# Patient Record
Sex: FEMALE | Race: WHITE | NOT HISPANIC OR LATINO | Employment: UNEMPLOYED | URBAN - NONMETROPOLITAN AREA
[De-identification: names, ages, dates, MRNs, and addresses within clinical notes are randomized per-mention and may not be internally consistent; named-entity substitution may affect disease eponyms.]

---

## 2017-01-04 RX ORDER — CITALOPRAM 20 MG/1
TABLET ORAL
Qty: 30 TABLET | Refills: 0 | Status: SHIPPED | OUTPATIENT
Start: 2017-01-04 | End: 2017-02-02 | Stop reason: SDUPTHER

## 2017-01-10 ENCOUNTER — HOSPITAL ENCOUNTER (EMERGENCY)
Facility: HOSPITAL | Age: 32
Discharge: HOME | End: 2017-01-10
Attending: EMERGENCY MEDICINE | Admitting: EMERGENCY MEDICINE

## 2017-01-10 VITALS
TEMPERATURE: 98.9 F | HEART RATE: 87 BPM | SYSTOLIC BLOOD PRESSURE: 117 MMHG | RESPIRATION RATE: 18 BRPM | WEIGHT: 175 LBS | DIASTOLIC BLOOD PRESSURE: 79 MMHG | OXYGEN SATURATION: 97 % | BODY MASS INDEX: 34.36 KG/M2 | HEIGHT: 60 IN

## 2017-01-10 DIAGNOSIS — J40 BRONCHITIS: Primary | ICD-10-CM

## 2017-01-10 LAB
FLUAV AG NPH QL: NEGATIVE
FLUBV AG NPH QL IA: NEGATIVE

## 2017-01-10 PROCEDURE — 99283 EMERGENCY DEPT VISIT LOW MDM: CPT

## 2017-01-10 PROCEDURE — 87804 INFLUENZA ASSAY W/OPTIC: CPT | Performed by: EMERGENCY MEDICINE

## 2017-01-10 RX ORDER — PREDNISONE 20 MG/1
20 TABLET ORAL ONCE
Status: CANCELLED | OUTPATIENT
Start: 2017-01-10 | End: 2017-01-10

## 2017-01-10 RX ORDER — ONDANSETRON 4 MG/1
4 TABLET, FILM COATED ORAL EVERY 6 HOURS PRN
Qty: 12 TABLET | Refills: 0 | Status: SHIPPED | OUTPATIENT
Start: 2017-01-10 | End: 2017-03-29

## 2017-01-10 RX ORDER — PREDNISONE 10 MG/1
10 TABLET ORAL 2 TIMES DAILY
Qty: 10 TABLET | Refills: 0 | Status: SHIPPED | OUTPATIENT
Start: 2017-01-10 | End: 2017-03-29

## 2017-01-10 RX ORDER — IPRATROPIUM BROMIDE AND ALBUTEROL SULFATE 2.5; .5 MG/3ML; MG/3ML
3 SOLUTION RESPIRATORY (INHALATION) ONCE
Status: CANCELLED | OUTPATIENT
Start: 2017-01-10 | End: 2017-01-10

## 2017-01-10 RX ORDER — ALBUTEROL SULFATE 90 UG/1
2 AEROSOL, METERED RESPIRATORY (INHALATION)
Qty: 1 INHALER | Refills: 0 | Status: SHIPPED | OUTPATIENT
Start: 2017-01-10 | End: 2017-11-10

## 2017-01-10 RX ORDER — AMOXICILLIN 500 MG/1
1000 CAPSULE ORAL 3 TIMES DAILY
Qty: 30 CAPSULE | Refills: 0 | Status: SHIPPED | OUTPATIENT
Start: 2017-01-10 | End: 2017-03-29

## 2017-01-10 RX ORDER — AZITHROMYCIN 250 MG/1
500 TABLET, FILM COATED ORAL ONCE
Status: CANCELLED | OUTPATIENT
Start: 2017-01-10 | End: 2017-01-10

## 2017-02-02 RX ORDER — CITALOPRAM 20 MG/1
TABLET ORAL
Qty: 30 TABLET | Refills: 0 | Status: SHIPPED | OUTPATIENT
Start: 2017-02-02 | End: 2017-03-07 | Stop reason: SDUPTHER

## 2017-02-08 NOTE — ED PROVIDER NOTES
Subjective   HPI Comments: Chief Complaint     Vomiting     Nausea     Headache     Cough           Review of Systems   Constitutional: Negative for chills and fever.   HENT: Negative for ear pain and sore throat.    Eyes: Negative for photophobia and discharge.   Respiratory: Positive for cough.    Cardiovascular: Negative for chest pain and palpitations.   Gastrointestinal: Positive for nausea and vomiting. Negative for abdominal pain.   Endocrine: Negative for cold intolerance and heat intolerance.   Genitourinary: Negative for flank pain and hematuria.   Musculoskeletal: Negative for back pain and neck pain.   Skin: Negative for rash.   Neurological: Positive for headaches. Negative for dizziness and syncope.   Psychiatric/Behavioral: Negative for behavioral problems and suicidal ideas.       Past Medical History   Diagnosis Date   • Acid reflux    • Anxiety and depression    • Menorrhagia    • Pelvic pain    • Pelvic pain      menorrha       No Known Allergies    Past Surgical History   Procedure Laterality Date   • Cholecystectomy     • Replacement total knee     • Hysteroscopy tubal sterilization  06/07/2016     Essure Sterilization   • Essure tubal ligation     • Endoscopy N/A 11/22/2016     Procedure: ESOPHAGOGASTRODUODENOSCOPY WITH ANESTHESIA;  Surgeon: Az Miller DO;  Location: [facility] ENDOSCOPY;  Service:        Family History   Problem Relation Age of Onset   • Cancer Other    • Diabetes Other    • Heart attack Father    • No Known Problems Mother    • No Known Problems Brother    • No Known Problems Sister    • No Known Problems Daughter    • Cancer Paternal Grandmother    • Lead poisoning Maternal Grandmother    • Asthma Daughter        Social History     Social History   • Marital status:      Spouse name: N/A   • Number of children: N/A   • Years of education: N/A     Occupational History   • [employer]      Social History Main Topics   • Smoking status: Current Some Day Smoker      Packs/day: 0.25     Years: 2.00     Types: Cigarettes   • Smokeless tobacco: None   • Alcohol use No   • Drug use: No   • Sexual activity: Yes     Partners: Male     Birth control/ protection: Other      Comment: Essure     Other Topics Concern   • None     Social History Narrative           Objective   Physical Exam   Constitutional: She appears well-developed and well-nourished.   HENT:   Head: Normocephalic.   Eyes: Conjunctivae and EOM are normal.   Neck: Normal range of motion. Neck supple.   Cardiovascular: Normal rate and regular rhythm.    Pulmonary/Chest: Effort normal and breath sounds normal.   Abdominal: Soft. There is no rebound.   Musculoskeletal: Normal range of motion.   Neurological: She is alert.   Skin: Skin is warm. No rash noted.   Nursing note and vitals reviewed.      Procedures         ED Course  ED Course                  MDM  Number of Diagnoses or Management Options  Bronchitis:   Diagnosis management comments: Labs Reviewed  INFLUENZA ANTIGEN - Normal  No orders to display        Final diagnoses:   Bronchitis            Ruby Saucedo DO  02/08/17 0905

## 2017-03-07 RX ORDER — CITALOPRAM 20 MG/1
TABLET ORAL
Qty: 30 TABLET | Refills: 0 | Status: SHIPPED | OUTPATIENT
Start: 2017-03-07 | End: 2017-11-10

## 2017-03-29 ENCOUNTER — OFFICE VISIT (OUTPATIENT)
Dept: RETAIL CLINIC | Facility: CLINIC | Age: 32
End: 2017-03-29

## 2017-03-29 VITALS
TEMPERATURE: 97.5 F | RESPIRATION RATE: 20 BRPM | HEART RATE: 93 BPM | BODY MASS INDEX: 35.65 KG/M2 | HEIGHT: 60 IN | OXYGEN SATURATION: 96 % | WEIGHT: 181.6 LBS

## 2017-03-29 DIAGNOSIS — J40 BRONCHITIS: Primary | ICD-10-CM

## 2017-03-29 PROCEDURE — 99213 OFFICE O/P EST LOW 20 MIN: CPT | Performed by: NURSE PRACTITIONER

## 2017-03-29 RX ORDER — METHYLPREDNISOLONE 4 MG/1
TABLET ORAL
Qty: 21 TABLET | Refills: 0 | Status: SHIPPED | OUTPATIENT
Start: 2017-03-29 | End: 2017-11-10

## 2017-03-29 RX ORDER — BENZONATATE 200 MG/1
200 CAPSULE ORAL 3 TIMES DAILY PRN
Qty: 20 CAPSULE | Refills: 0 | Status: SHIPPED | OUTPATIENT
Start: 2017-03-29 | End: 2017-11-10

## 2017-03-29 NOTE — PATIENT INSTRUCTIONS
Acute Bronchitis  Bronchitis is inflammation of the airways that extend from the windpipe into the lungs (bronchi). The inflammation often causes mucus to develop. This leads to a cough, which is the most common symptom of bronchitis.   In acute bronchitis, the condition usually develops suddenly and goes away over time, usually in a couple weeks. Smoking, allergies, and asthma can make bronchitis worse. Repeated episodes of bronchitis may cause further lung problems.   CAUSES  Acute bronchitis is most often caused by the same virus that causes a cold. The virus can spread from person to person (contagious) through coughing, sneezing, and touching contaminated objects.  SIGNS AND SYMPTOMS   · Cough.    · Fever.    · Coughing up mucus.    · Body aches.    · Chest congestion.    · Chills.    · Shortness of breath.    · Sore throat.    DIAGNOSIS   Acute bronchitis is usually diagnosed through a physical exam. Your health care provider will also ask you questions about your medical history. Tests, such as chest X-rays, are sometimes done to rule out other conditions.   TREATMENT   Acute bronchitis usually goes away in a couple weeks. Oftentimes, no medical treatment is necessary. Medicines are sometimes given for relief of fever or cough. Antibiotic medicines are usually not needed but may be prescribed in certain situations. In some cases, an inhaler may be recommended to help reduce shortness of breath and control the cough. A cool mist vaporizer may also be used to help thin bronchial secretions and make it easier to clear the chest.   HOME CARE INSTRUCTIONS  · Get plenty of rest.    · Drink enough fluids to keep your urine clear or pale yellow (unless you have a medical condition that requires fluid restriction). Increasing fluids may help thin your respiratory secretions (sputum) and reduce chest congestion, and it will prevent dehydration.    · Take medicines only as directed by your health care provider.  · If  you were prescribed an antibiotic medicine, finish it all even if you start to feel better.  · Avoid smoking and secondhand smoke. Exposure to cigarette smoke or irritating chemicals will make bronchitis worse. If you are a smoker, consider using nicotine gum or skin patches to help control withdrawal symptoms. Quitting smoking will help your lungs heal faster.    · Reduce the chances of another bout of acute bronchitis by washing your hands frequently, avoiding people with cold symptoms, and trying not to touch your hands to your mouth, nose, or eyes.    · Keep all follow-up visits as directed by your health care provider.    SEEK MEDICAL CARE IF:  Your symptoms do not improve after 1 week of treatment.   SEEK IMMEDIATE MEDICAL CARE IF:  · You develop an increased fever or chills.    · You have chest pain.    · You have severe shortness of breath.  · You have bloody sputum.    · You develop dehydration.  · You faint or repeatedly feel like you are going to pass out.  · You develop repeated vomiting.  · You develop a severe headache.  MAKE SURE YOU:   · Understand these instructions.  · Will watch your condition.  · Will get help right away if you are not doing well or get worse.     This information is not intended to replace advice given to you by your health care provider. Make sure you discuss any questions you have with your health care provider.     Document Released: 01/25/2006 Document Revised: 01/08/2016 Document Reviewed: 06/10/2014  [employer] Interactive Patient Education ©2016 [employer]

## 2017-04-11 RX ORDER — CITALOPRAM 20 MG/1
TABLET ORAL
Qty: 30 TABLET | Refills: 0 | Status: SHIPPED | OUTPATIENT
Start: 2017-04-11 | End: 2017-05-12 | Stop reason: SDUPTHER

## 2017-05-12 RX ORDER — CITALOPRAM 20 MG/1
TABLET ORAL
Qty: 30 TABLET | Refills: 0 | Status: SHIPPED | OUTPATIENT
Start: 2017-05-12 | End: 2017-06-19 | Stop reason: SDUPTHER

## 2017-05-25 ENCOUNTER — OFFICE VISIT (OUTPATIENT)
Dept: RETAIL CLINIC | Facility: CLINIC | Age: 32
End: 2017-05-25

## 2017-05-25 DIAGNOSIS — Z02.83 ENCOUNTER FOR DRUG SCREENING: Primary | ICD-10-CM

## 2017-06-03 ENCOUNTER — HOSPITAL ENCOUNTER (EMERGENCY)
Facility: HOSPITAL | Age: 32
Discharge: HOME | End: 2017-06-03
Attending: EMERGENCY MEDICINE | Admitting: EMERGENCY MEDICINE

## 2017-06-03 ENCOUNTER — APPOINTMENT (OUTPATIENT)
Dept: GENERAL RADIOLOGY | Facility: HOSPITAL | Age: 32
End: 2017-06-03

## 2017-06-03 VITALS
OXYGEN SATURATION: 98 % | BODY MASS INDEX: 35.53 KG/M2 | WEIGHT: 181 LBS | RESPIRATION RATE: 18 BRPM | HEIGHT: 60 IN | TEMPERATURE: 98.5 F | HEART RATE: 60 BPM | SYSTOLIC BLOOD PRESSURE: 118 MMHG | DIASTOLIC BLOOD PRESSURE: 71 MMHG

## 2017-06-03 DIAGNOSIS — S42.401A FRACTURE OF ELBOW, RIGHT, CLOSED, INITIAL ENCOUNTER: Primary | ICD-10-CM

## 2017-06-03 PROCEDURE — 25010000002 HYDROMORPHONE PER 4 MG: Performed by: EMERGENCY MEDICINE

## 2017-06-03 PROCEDURE — 73030 X-RAY EXAM OF SHOULDER: CPT

## 2017-06-03 PROCEDURE — 73070 X-RAY EXAM OF ELBOW: CPT

## 2017-06-03 PROCEDURE — 96372 THER/PROPH/DIAG INJ SC/IM: CPT

## 2017-06-03 PROCEDURE — 99283 EMERGENCY DEPT VISIT LOW MDM: CPT

## 2017-06-03 RX ORDER — ONDANSETRON 4 MG/1
4 TABLET, ORALLY DISINTEGRATING ORAL ONCE
Status: COMPLETED | OUTPATIENT
Start: 2017-06-03 | End: 2017-06-03

## 2017-06-03 RX ORDER — HYDROCODONE BITARTRATE AND ACETAMINOPHEN 5; 325 MG/1; MG/1
1 TABLET ORAL 4 TIMES DAILY PRN
Qty: 20 TABLET | Refills: 0 | Status: SHIPPED | OUTPATIENT
Start: 2017-06-03 | End: 2017-11-10

## 2017-06-03 RX ADMIN — HYDROMORPHONE HYDROCHLORIDE 1 MG: 1 INJECTION, SOLUTION INTRAMUSCULAR; INTRAVENOUS; SUBCUTANEOUS at 09:47

## 2017-06-03 RX ADMIN — ONDANSETRON 4 MG: 4 TABLET, ORALLY DISINTEGRATING ORAL at 09:47

## 2017-06-07 RX ORDER — CITALOPRAM 20 MG/1
TABLET ORAL
Qty: 30 TABLET | Refills: 0 | OUTPATIENT
Start: 2017-06-07

## 2017-06-19 RX ORDER — CITALOPRAM 20 MG/1
TABLET ORAL
Qty: 30 TABLET | Refills: 0 | Status: SHIPPED | OUTPATIENT
Start: 2017-06-19 | End: 2017-07-09 | Stop reason: SDUPTHER

## 2017-07-03 NOTE — ED PROVIDER NOTES
Subjective   HPI Comments: Patient is a 32-year-old female presents to our facility with complaints of fall and arm pain.  Patient fell off bottom step onto concrete sidewalk.  Patient is having left shoulder and arm pain.    Patient is a 32 y.o. female presenting with fall.   History provided by:  Patient  History limited by:  Acuity of condition   used: No    Fall   Mechanism of injury: fall    Injury location:  Hand  Hand injury location:  L hand  Incident location:  Outdoors  Arrived directly from scene: no    Fall:     Fall occurred:  Walking    Impact surface:  [place]    Point of impact:  Hands    Entrapped after fall: no    Protective equipment: none    Suspicion of alcohol use: no    Suspicion of drug use: no    Prior to arrival data:     Blood loss:  None    Loss of consciousness: no      Amnesic to event: no      Airway interventions:  None      Review of Systems   Musculoskeletal: Positive for arthralgias and myalgias.        Patient is having left shoulder and arm pain.   All other systems reviewed and are negative.      Past Medical History:   Diagnosis Date   • Acid reflux    • Anxiety and depression    • Asthma    • Menorrhagia    • Pelvic pain    • Pelvic pain     menorrha       No Known Allergies    Past Surgical History:   Procedure Laterality Date   • CHOLECYSTECTOMY     • ENDOSCOPY N/A 11/22/2016    Procedure: ESOPHAGOGASTRODUODENOSCOPY WITH ANESTHESIA;  Surgeon: Az Miller DO;  Location: [facility] ENDOSCOPY;  Service:    • ESSURE TUBAL LIGATION     • HYSTEROSCOPY TUBAL STERILIZATION  06/07/2016    Essure Sterilization   • REPLACEMENT TOTAL KNEE         Family History   Problem Relation Age of Onset   • Cancer Other    • Diabetes Other    • Heart attack Father    • No Known Problems Mother    • No Known Problems Brother    • No Known Problems Sister    • No Known Problems Daughter    • Cancer Paternal Grandmother    • Lead poisoning Maternal Grandmother    • Asthma  Daughter        Social History     Social History   • Marital status:      Spouse name: N/A   • Number of children: N/A   • Years of education: N/A     Occupational History   • [employer]      Social History Main Topics   • Smoking status: Current Some Day Smoker     Packs/day: 0.25     Years: 2.00     Types: Cigarettes   • Smokeless tobacco: None   • Alcohol use No   • Drug use: No   • Sexual activity: Yes     Partners: Male     Birth control/ protection: Other      Comment: Essure     Other Topics Concern   • None     Social History Narrative           Objective   Physical Exam   Constitutional: She is oriented to person, place, and time. She appears well-developed and well-nourished.   HENT:   Head: Normocephalic and atraumatic.   Right Ear: External ear normal.   Left Ear: External ear normal.   Nose: Nose normal.   Mouth/Throat: Oropharynx is clear and moist.   Eyes: Conjunctivae and EOM are normal. Pupils are equal, round, and reactive to light.   Neck: Normal range of motion.   Cardiovascular: Normal rate, regular rhythm, normal heart sounds and intact distal pulses.    Pulmonary/Chest: Effort normal and breath sounds normal.   Abdominal: Soft. Bowel sounds are normal.   Musculoskeletal: She exhibits edema and tenderness.   Sustained fall injury to right hand region.   Neurological: She is alert and oriented to person, place, and time. She has normal reflexes.   Skin: Skin is warm and dry.   Psychiatric: She has a normal mood and affect. Her behavior is normal. Judgment and thought content normal.   Nursing note and vitals reviewed.      Procedures         ED Course  ED Course   Comment By Time   IMPRESSION:  Acute closed intra-articular displaced fractures involving  the lateral epicondyle of the left humerus as well as the lateral aspect  of the humeral head. Kerri CLAIRE MD 06/03 1111   IMPRESSION:  No fractures identified. Mild narrowing of the subacromial  interval with superior migration of  the humerus may be related to  impingement and chronic rotator cuff pathology. Kerri CLAIRE MD 06/03 1112                  MDM  Number of Diagnoses or Management Options  Fracture of elbow, right, closed, initial encounter: established and improving     Amount and/or Complexity of Data Reviewed  Tests in the radiology section of CPT®: reviewed and ordered  Discussion of test results with the performing providers: yes  Obtain history from someone other than the patient: yes  Discuss the patient with other providers: yes  Independent visualization of images, tracings, or specimens: yes    Risk of Complications, Morbidity, and/or Mortality  Presenting problems: moderate  Diagnostic procedures: moderate  Management options: moderate    Critical Care  Total time providing critical care: > 105 minutes    Patient Progress  Patient progress: stable      Final diagnoses:   Fracture of elbow, right, closed, initial encounter            Kerri CLAIRE MD  07/03/17 0666

## 2017-07-10 RX ORDER — CITALOPRAM 20 MG/1
TABLET ORAL
Qty: 30 TABLET | Refills: 0 | Status: SHIPPED | OUTPATIENT
Start: 2017-07-10 | End: 2017-08-02 | Stop reason: SDUPTHER

## 2017-08-02 ENCOUNTER — OFFICE VISIT (OUTPATIENT)
Dept: OBSTETRICS AND GYNECOLOGY | Facility: CLINIC | Age: 32
End: 2017-08-02

## 2017-08-02 VITALS
WEIGHT: 175 LBS | SYSTOLIC BLOOD PRESSURE: 132 MMHG | BODY MASS INDEX: 34.36 KG/M2 | DIASTOLIC BLOOD PRESSURE: 68 MMHG | HEIGHT: 60 IN

## 2017-08-02 DIAGNOSIS — Z30.8 ENCOUNTER FOR OTHER CONTRACEPTIVE MANAGEMENT: ICD-10-CM

## 2017-08-02 DIAGNOSIS — Z01.419 ENCOUNTER FOR GYNECOLOGICAL EXAMINATION WITHOUT ABNORMAL FINDING: Primary | ICD-10-CM

## 2017-08-02 PROCEDURE — 87661 TRICHOMONAS VAGINALIS AMPLIF: CPT | Performed by: NURSE PRACTITIONER

## 2017-08-02 PROCEDURE — G0123 SCREEN CERV/VAG THIN LAYER: HCPCS | Performed by: NURSE PRACTITIONER

## 2017-08-02 PROCEDURE — 87624 HPV HI-RISK TYP POOLED RSLT: CPT | Performed by: NURSE PRACTITIONER

## 2017-08-02 PROCEDURE — 87512 GARDNER VAG DNA QUANT: CPT | Performed by: NURSE PRACTITIONER

## 2017-08-02 PROCEDURE — 87591 N.GONORRHOEAE DNA AMP PROB: CPT | Performed by: NURSE PRACTITIONER

## 2017-08-02 PROCEDURE — 87481 CANDIDA DNA AMP PROBE: CPT | Performed by: NURSE PRACTITIONER

## 2017-08-02 PROCEDURE — 87798 DETECT AGENT NOS DNA AMP: CPT | Performed by: NURSE PRACTITIONER

## 2017-08-02 PROCEDURE — 99395 PREV VISIT EST AGE 18-39: CPT | Performed by: NURSE PRACTITIONER

## 2017-08-02 PROCEDURE — 87491 CHLMYD TRACH DNA AMP PROBE: CPT | Performed by: NURSE PRACTITIONER

## 2017-08-02 RX ORDER — NORGESTIMATE AND ETHINYL ESTRADIOL 0.25-0.035
KIT ORAL
COMMUNITY
End: 2017-08-02 | Stop reason: SDUPTHER

## 2017-08-02 RX ORDER — NORGESTIMATE AND ETHINYL ESTRADIOL 0.25-0.035
1 KIT ORAL DAILY
Qty: 28 TABLET | Refills: 11 | Status: SHIPPED | OUTPATIENT
Start: 2017-08-02 | End: 2017-08-02 | Stop reason: ALTCHOICE

## 2017-08-02 RX ORDER — OMEPRAZOLE 20 MG/1
20 CAPSULE, DELAYED RELEASE ORAL DAILY
COMMUNITY
End: 2017-11-10

## 2017-08-02 RX ORDER — CITALOPRAM 20 MG/1
TABLET ORAL
Qty: 30 TABLET | Refills: 0 | Status: SHIPPED | OUTPATIENT
Start: 2017-08-02 | End: 2017-09-21 | Stop reason: SDUPTHER

## 2017-08-02 NOTE — PROGRESS NOTES
"Veronica Salomon is a 32 y.o. female.     HPI Comments: Annual exam.      The following portions of the patient's history were reviewed and updated as appropriate: allergies, current medications, past family history, past medical history, past social history, past surgical history and problem list.    /68 (BP Location: Left arm, Patient Position: Sitting)  Ht 60\" (152.4 cm)  Wt 175 lb (79.4 kg)  LMP 07/15/2017 (Approximate)  BMI 34.18 kg/m2    Review of Systems   Constitutional: Negative for activity change, appetite change, fatigue and fever.   HENT: Negative for congestion, sore throat and trouble swallowing.    Eyes: Negative for pain, discharge and visual disturbance.   Respiratory: Negative for apnea, shortness of breath and wheezing.    Cardiovascular: Negative for chest pain, palpitations and leg swelling.   Gastrointestinal: Negative for abdominal pain, constipation and diarrhea.   Genitourinary: Positive for vaginal discharge. Negative for frequency, pelvic pain and urgency.   Musculoskeletal: Negative for back pain and gait problem.        Left arm pain r/t recent fall and surgery, follow up in 2 wks.    Skin: Negative for color change and rash.   Neurological: Negative for dizziness, weakness and numbness.   Psychiatric/Behavioral: Positive for sleep disturbance. Negative for confusion.        Appt with a counselor at [facility] and plans to see Dr. Fabian to begin regulating medication  Pain and anxiety r/t recent injury       Objective   Physical Exam   Constitutional: She is oriented to person, place, and time. She appears well-developed and well-nourished. No distress.   HENT:   Head: Normocephalic.   Right Ear: External ear normal.   Left Ear: External ear normal.   Eyes: Conjunctivae are normal. Right eye exhibits no discharge. Left eye exhibits no discharge. No scleral icterus.   Neck: Normal range of motion. Neck supple. Carotid bruit is not present. No tracheal deviation present. No " thyromegaly present.   Cardiovascular: Normal rate, regular rhythm, normal heart sounds and intact distal pulses.    No murmur heard.  Pulmonary/Chest: Effort normal and breath sounds normal. No respiratory distress. She has no wheezes. Right breast exhibits no inverted nipple, no mass, no nipple discharge, no skin change and no tenderness. Left breast exhibits no inverted nipple, no mass, no nipple discharge, no skin change and no tenderness. Breasts are symmetrical. There is no breast swelling.   Abdominal: Soft. She exhibits no distension and no mass. There is no tenderness. There is no guarding. No hernia. Hernia confirmed negative in the right inguinal area and confirmed negative in the left inguinal area.   Genitourinary: Rectum normal, vagina normal and uterus normal. Rectal exam shows no mass. No breast tenderness, discharge or bleeding. Pelvic exam was performed with patient supine. There is no rash, tenderness, lesion or injury on the right labia. There is no rash, tenderness, lesion or injury on the left labia. Uterus is not enlarged, not fixed and not tender. Cervix exhibits no motion tenderness, no discharge and no friability. Right adnexum displays no mass, no tenderness and no fullness. Left adnexum displays no mass, no tenderness and no fullness. No erythema, tenderness or bleeding in the vagina. No foreign body in the vagina. No signs of injury around the vagina. No vaginal discharge found.   Genitourinary Comments:   BSU normal  Urethral meatus  Normal  Perineum  Normal   Musculoskeletal: Normal range of motion. She exhibits no edema or tenderness.   Lymphadenopathy:        Head (right side): No submental, no submandibular, no tonsillar, no preauricular, no posterior auricular and no occipital adenopathy present.        Head (left side): No submental, no submandibular, no tonsillar, no preauricular, no posterior auricular and no occipital adenopathy present.     She has no cervical adenopathy.         Right cervical: No superficial cervical, no deep cervical and no posterior cervical adenopathy present.       Left cervical: No superficial cervical, no deep cervical and no posterior cervical adenopathy present.     She has no axillary adenopathy.        Right: No inguinal adenopathy present.        Left: No inguinal adenopathy present.   Neurological: She is alert and oriented to person, place, and time. Coordination normal.   Skin: Skin is warm and dry. No bruising and no rash noted. She is not diaphoretic. No erythema.   Psychiatric: She has a normal mood and affect. Her behavior is normal. Judgment and thought content normal.   Nursing note and vitals reviewed.      Assessment/Plan    Well woman exam. Pap collected, BV panel, gonorrhea and chlamydia testing added.     Pt previously saw Dr. Fabian at [facility], that is who regulated medication prior to pregnancy.  Will continue Celexa at this time but will have [facility] monitor medication once pt starts care there.     Will stop OCP r/t to discussion with pt where she reported having a hx of stroke and heart attack in 2013 or 2014. Pt had Essure placed 6/2016 and has not had an HSG. Will order. Will need to discuss alternative BC if fallopian tubes are not occluded.        Ella was seen today for gynecologic exam.    Diagnoses and all orders for this visit:    Encounter for gynecological examination without abnormal finding  -     Liquid-based Pap Smear, Screening    Encounter for other contraceptive management  -     [place] hysterosalpingogram; Future    Other orders  -     Discontinue: norgestimate-ethinyl estradiol (ORTHO-CYCLEN) 0.25-35 MG-MCG per tablet; Take 1 tablet by mouth Daily.  -     citalopram (CeleXA) 20 MG tablet; 1 tablet daily

## 2017-08-07 ENCOUNTER — HOSPITAL ENCOUNTER (OUTPATIENT)
Dept: GENERAL RADIOLOGY | Facility: HOSPITAL | Age: 32
Discharge: HOME | End: 2017-08-07

## 2017-08-08 ENCOUNTER — TRANSCRIBE ORDERS (OUTPATIENT)
Dept: PHYSICAL THERAPY | Facility: HOSPITAL | Age: 32
End: 2017-08-08

## 2017-08-08 DIAGNOSIS — S42.302S: Primary | ICD-10-CM

## 2017-08-09 ENCOUNTER — HOSPITAL ENCOUNTER (OUTPATIENT)
Dept: PHYSICAL THERAPY | Facility: HOSPITAL | Age: 32
Setting detail: THERAPIES SERIES
Discharge: HOME | End: 2017-08-09

## 2017-08-09 DIAGNOSIS — M25.622 STIFFNESS OF ELBOW JOINT, LEFT: ICD-10-CM

## 2017-08-09 DIAGNOSIS — M25.522 ELBOW PAIN, LEFT: Primary | ICD-10-CM

## 2017-08-09 LAB
GEN CATEG CVX/VAG CYTO-IMP: NORMAL
LAB AP CASE REPORT: NORMAL
LAB AP GYN ADDITIONAL INFORMATION: NORMAL
Lab: NORMAL
PATH INTERP SPEC-IMP: NORMAL
STAT OF ADQ CVX/VAG CYTO-IMP: NORMAL

## 2017-08-09 PROCEDURE — 97162 PT EVAL MOD COMPLEX 30 MIN: CPT | Performed by: PHYSICAL THERAPIST

## 2017-08-09 NOTE — THERAPY EVALUATION
Outpatient Physical Therapy Ortho Initial Evaluation  [facility]     Patient Name: Ella Salomon  : 1985  MRN: [id number]  Today's Date: 2017      Visit Date: 2017    There is no problem list on file for this patient.       Past Medical History:   Diagnosis Date   • Acid reflux    • Anxiety and depression    • Asthma    • Bipolar 1 disorder, mixed, severe    • Menorrhagia    • Pelvic pain    • Pelvic pain     menorrha        Past Surgical History:   Procedure Laterality Date   • CHOLECYSTECTOMY     • ELBOW FUSION     • ELBOW PERCUTANEOUS PINNING     • ENDOSCOPY N/A 2016    Procedure: ESOPHAGOGASTRODUODENOSCOPY WITH ANESTHESIA;  Surgeon: Az Miller DO;  Location: [facility] ENDOSCOPY;  Service:    • ESSURE TUBAL LIGATION     • HYSTEROSCOPY TUBAL STERILIZATION  2016    Essure Sterilization   • PERCUTANEOUS PINNING ELBOW FRACTURE     • REPLACEMENT TOTAL KNEE         Visit Dx:     ICD-10-CM ICD-9-CM   1. Elbow pain, left M25.522 719.42   2. Stiffness of elbow joint, left M25.622 719.52             Patient History       17 1300          History    Chief Complaint Pain;Joint stiffness  -HR      Type of Pain Upper Extremity / Arm  -HR      Date Current Problem(s) Began 17  -HR      Brief Description of Current Complaint She had ORIF of L humerus at [place] 17.  -HR      Onset Date- PT 17  -HR      Patient/Caregiver Goals Relieve pain;Improve mobility  -HR      Patient's Rating of General Health Fair  -HR      Hand Dominance right-handed  -HR      Occupation/sports/leisure activities . Applying for disability  -HR      How has patient tried to help current problem? 4 weeks in a sling  -HR      Are you or can you be pregnant No  -HR      Pain     Pain Location Elbow;Arm  -HR      Pain at Present 3;4  -HR      Pain at Best 3  -HR      Pain at Worst 10  -HR      Pain Frequency Constant/continuous  -HR      Pain Description Aching;Sharp;Sore  -HR      What  Performance Factors Make the Current Problem(s) WORSE? Trying to move it.   -HR      What Performance Factors Make the Current Problem(s) BETTER? Rest and pain medication  -HR      Pain Comments She seems to have a very low tolerance to pain and is positioning the arm for most comfort and resisting any  movement at the elbow.   -HR      Difficulties with ADL's? multiple issues- can't lift anything.   -HR      Fall Risk Assessment    Any falls in the past year: Yes  -HR      Factors that contributed to the fall: Lost balance;Tripped  -HR      Daily Activities    Primary Language English  -HR      Are you able to read Yes  -HR      Are you able to write Yes  -HR      How does patient learn best? Listening  -HR      Teaching needs identified Home Exercise Program  -HR      Pt Participated in POC and Goals Yes  -HR      Safety    Are you being hurt, hit, or frightened by anyone at home or in your life? No  -HR      Are you being neglected by a caregiver No  -HR        User Key  (r) = Recorded By, (t) = Taken By, (c) = Cosigned By    Initials Name Provider Type    HR Marisol Greene, PT DPT Physical Therapist                PT Ortho       08/09/17 1300    Subjective Comments    Subjective Comments She saw the surgeon a month after surgery but has not been back. She has been out of the sling since that appointment which would have been around 7/12.  -HR    Precautions and Contraindications    Precautions/Limitations lifting restrictions (specify in comments);non-weight bearing status   no lifting with LUE. NWB through LUE  -HR    Precautions MD order specific to elbow and wrist/hand passive and active ROM.  strengthening. Nothing with shoulder. No lifting  -HR    Subjective Pain    Able to rate subjective pain? yes  -HR    Pre-Treatment Pain Level 3  -HR    Post-Treatment Pain Level 3  -HR    Posture/Observations    Posture/Observations Comments Carries her LUE across her body with elbow flexed. Sitting and  standing  -HR    ROM (Range of Motion)    General ROM upper extremity range of motion deficits identified  -HR    General ROM Detail -59 degrees of L elbow extension. Began with elbow flexed around 120.   -HR    General UE Assessment    ROM elbow/forearm, left: UE ROM deficit  -HR    ROM Detail after stretching, PROM into elbow extension -59.  Decreased supination as she is holding UE in pronation.  -HR    Left Elbow/Forearm    Extension/Flexion ROM Details -59 extension after sustained stretch and STM  -HR    Girth    Girth Measured? Left Upper Extremity  -HR    LUE Edema - Circumference (cm)    Proximal to Elbow Crease (5 inches) 34 cm   5 cm proximal, NOT inches  -HR    Elbow Crease 28 cm  -HR    Distal to Elbow Crease (5 inches) 26 cm   5 cm distal, NOT inches  -HR    Pathomechanics    Pathomechanics Comments She is holding the arm in a flexed position at all times.   -HR      User Key  (r) = Recorded By, (t) = Taken By, (c) = Cosigned By    Initials Name Provider Type    HR Marisol Greene, PT DPT Physical Therapist                            Therapy Education       08/09/17 1300          Therapy Education    Education Details sustained stretch into extension/positioning  -HR      Given HEP  -HR      Program New  -HR      How Provided Demonstration;Verbal  -HR      Provided to Patient;Caregiver  -HR      Level of Understanding Verbalized  -HR        User Key  (r) = Recorded By, (t) = Taken By, (c) = Cosigned By    Initials Name Provider Type    HR Marisol Greene, PT DPT Physical Therapist                PT OP Goals       08/09/17 1300       PT Short Term Goals    STG 1 Patient will be able to rest LUE without increased pain with elbow in less than 50 degrees of flexion.  -HR     STG 1 Progress New  -HR     STG 2 Patient will have no episodes of pain in LUE greater than 6/10.  -HR     STG 2 Progress New  -HR     Long Term Goals    LTG 1 Patient and  will be independent with HEP of sustained  stretching and positioning to improve L elbow extension and supination.  -HR     LTG 1 Progress New  -HR     LTG 2 Patient will be independent with HEP for  strengthening.  -HR     LTG 2 Progress New  -HR     LTG 3 Patient will be able to actively extend L elbow to within 20 degrees of 0.  -HR     LTG 3 Progress New  -HR     Time Calculation    PT Goal Re-Cert Due Date 09/08/17  -HR       User Key  (r) = Recorded By, (t) = Taken By, (c) = Cosigned By    Initials Name Provider Type    HR Marisol Greene, PT DPT Physical Therapist                PT Assessment/Plan       08/09/17 1300       PT Assessment    Functional Limitations Limitation in home management;Limitations in community activities;Limitations in functional capacity and performance;Performance in leisure activities;Performance in self-care ADL;Performance in work activities  -HR     Impairments Integumentary integrity;Impaired muscle length;Edema;Pain;Range of motion  -HR     Assessment Comments Mrs. Salomon had 2 fractures to the distal humerus on the L at the beginning of June. She had an ORIF to repair this 6/12/17. She was in a sling for 4 weeks but has now been out of the sling for almost 4 weeks and is still carrying the arm around across her body with the elbow flexed. She has developed contractures in elbow extension and supination. She also has  weakness on the L compared to the right but is right hand dominant.   -HR     Please refer to paper survey for additional self-reported information Yes  -HR     Rehab Potential Good  -HR     Patient/caregiver participated in establishment of treatment plan and goals Yes  -HR     Patient would benefit from skilled therapy intervention Yes  -HR     PT Plan    PT Frequency 2x/week  -HR     Predicted Duration of Therapy Intervention (days/wks) 4 weeks  -HR     Planned CPT's? PT THER PROC EA 15 MIN: 10649;PT MANUAL THERAPY EA 15 MIN: 03818;PT ELECTRICAL STIM ATTD EA 15 MIN: 01066;PT ELECTRICAL STIM  UNATTEND:   -HR     PT Plan Comments PT to treat 2X/wk X 4 weeks for L elbow, wrist, hand ROM.  -HR       User Key  (r) = Recorded By, (t) = Taken By, (c) = Cosigned By    Initials Name Provider Type    HR Marisol Greene, PT DPT Physical Therapist                  Exercises       08/09/17 1300          Subjective Comments    Subjective Comments She saw the surgeon a month after surgery but has not been back. She has been out of the sling since that appointment which would have been around 7/12.  -HR      Subjective Pain    Able to rate subjective pain? yes  -HR      Pre-Treatment Pain Level 3  -HR      Post-Treatment Pain Level 3  -HR        User Key  (r) = Recorded By, (t) = Taken By, (c) = Cosigned By    Initials Name Provider Type    HR Marisol Greene, PT DPT Physical Therapist                              Time Calculation:         Therapy Charges for Today     Code Description Service Date Service Provider Modifiers Qty    14602867035 HC PT EVAL MOD COMPLEXITY 3 8/9/2017 Marisol Greene, PT DPT GP 1                    Marisol Greene, PT DPT  8/9/2017

## 2017-08-16 ENCOUNTER — HOSPITAL ENCOUNTER (OUTPATIENT)
Dept: PHYSICAL THERAPY | Facility: HOSPITAL | Age: 32
Setting detail: THERAPIES SERIES
End: 2017-08-16

## 2017-08-17 RX ORDER — CITALOPRAM 20 MG/1
TABLET ORAL
Qty: 30 TABLET | Refills: 0 | OUTPATIENT
Start: 2017-08-17

## 2017-08-22 ENCOUNTER — HOSPITAL ENCOUNTER (OUTPATIENT)
Dept: PHYSICAL THERAPY | Facility: HOSPITAL | Age: 32
Setting detail: THERAPIES SERIES
Discharge: HOME | End: 2017-08-22

## 2017-08-22 DIAGNOSIS — M25.622 STIFFNESS OF ELBOW JOINT, LEFT: ICD-10-CM

## 2017-08-22 DIAGNOSIS — M25.522 ELBOW PAIN, LEFT: Primary | ICD-10-CM

## 2017-08-22 PROCEDURE — 97140 MANUAL THERAPY 1/> REGIONS: CPT

## 2017-08-24 NOTE — THERAPY TREATMENT NOTE
Outpatient Physical Therapy Ortho Treatment Note  [facility]     Patient Name: Ella Salomon  : 1985  MRN: [id number]  Today's Date: 2017      Visit Date: 2017    Visit Dx:    ICD-10-CM ICD-9-CM   1. Elbow pain, left M25.522 719.42   2. Stiffness of elbow joint, left M25.622 719.52       There is no problem list on file for this patient.       Past Medical History:   Diagnosis Date   • Acid reflux    • Anxiety and depression    • Asthma    • Bipolar 1 disorder, mixed, severe    • Menorrhagia    • Pelvic pain    • Pelvic pain     menorrha        Past Surgical History:   Procedure Laterality Date   • CHOLECYSTECTOMY     • ELBOW FUSION     • ELBOW PERCUTANEOUS PINNING     • ENDOSCOPY N/A 2016    Procedure: ESOPHAGOGASTRODUODENOSCOPY WITH ANESTHESIA;  Surgeon: Az Miller DO;  Location: [facility] ENDOSCOPY;  Service:    • ESSURE TUBAL LIGATION     • HYSTEROSCOPY TUBAL STERILIZATION  2016    Essure Sterilization   • PERCUTANEOUS PINNING ELBOW FRACTURE     • REPLACEMENT TOTAL KNEE               17 1125   PT Assessment   Assessment Comments No goals met today, this was her inital session post eval. Her elbow ROM is extremely limited still. IASTM was used to address significant ST restrictions initially followed by joint mobilizations to humeroulnar and humeroradial jts to improve overall range. Afterwards her ROM was improved in all ranges with most significant into extension which was improved by 16 degrees. I modified her elbow extension stretch to a more tolerable and improved pathomechanical position.     PT Plan   PT Plan Comments Continue to address her severe ST and joint restrictions of the L elbow with ROM afterwards.           17 1125   Manual Rx 1   Manual Rx 1 Location supine towel supporting elbow; L biceps, forearm structures and lateral scar tissue     Manual Rx 1 Type IASTM   Manual Rx 1 Grade moderate   Manual Rx 1 Duration 12   Manual Rx 2   Manual Rx 2  Location hooklying towel supporting L elbow   Manual Rx 2 Type AP humeroradial glide--inc flexion post   Manual Rx 2 Grade 3   Manual Rx 2 Duration 8   Manual Rx 3   Manual Rx 3 Location hooklying towel supporting L elbow   Manual Rx 3 Type PA humeroradial glide; inc ext post   Manual Rx 3 Grade 3   Manual Rx 3 Duration 5   Manual Rx 4   Manual Rx 4 Location hooklying towel supporting L elbow   Manual Rx 4 Type humeroulnar distraction in open packed position-     Manual Rx 4 Grade 3   Manual Rx 4 Duration 8   Manual Rx 5   Manual Rx 5 Location hooklying towel supporting L elbow   Manual Rx 5 Type prox radioulnar glide   Manual Rx 5 Grade 3   Manual Rx 5 Duration 5             08/22/17 1125   Subjective Comments   Subjective Comments She reported that her elbow is really sore today and painfull. She is trying new stretch exercise.    Subjective Pain   Able to rate subjective pain? yes   Pre-Treatment Pain Level 6   Post-Treatment Pain Level 5   Subjective Pain Comment R elbow   Exercise 1   Exercise Name 1 seated elbow extension stretch with elbow  supported on table    Cueing 1 Verbal;Tactile;Demo   Sets 1 2   Time (Seconds) 1 20s          08/22/17 1125   PT Short Term Goals   STG 1 Patient will be able to rest LUE without increased pain with elbow in less than 50 degrees of flexion.   STG 1 Progress Ongoing   STG 2 Patient will have no episodes of pain in LUE greater than 6/10.   STG 2 Progress Ongoing   STG 2 Progress Comments 6/10 today    Long Term Goals   LTG 1 Patient and  will be independent with HEP of sustained stretching and positioning to improve L elbow extension and supination.   LTG 1 Progress Ongoing   LTG 2 Patient will be independent with HEP for  strengthening.   LTG 2 Progress Ongoing   LTG 3 Patient will be able to actively extend L elbow to within 20 degrees of 0.   LTG 3 Progress Ongoing   LTG 3 Progress Comments lacking 43 from 0, improved from 59 extension; supination 28 deg  at  90/90; elbow flexion 135 painfree    Time Calculation   PT Goal Re-Cert Due Date 09/08/17                                       Time Calculation:   Start Time: 1125  Stop Time: 1210  Time Calculation (min): 45 min  Total Timed Code Minutes- PT: 45 minute(s)            HC PT MANUAL THERAPY EA 15 MIN [id number]   08/22/2017 Jacqueline Covington, PT DPT GP 3 Filed             Jacqueline Covington, PT DPT  8/24/2017

## 2017-08-31 ENCOUNTER — HOSPITAL ENCOUNTER (OUTPATIENT)
Dept: PHYSICAL THERAPY | Facility: HOSPITAL | Age: 32
Setting detail: THERAPIES SERIES
Discharge: HOME | End: 2017-08-31

## 2017-08-31 DIAGNOSIS — M25.622 STIFFNESS OF ELBOW JOINT, LEFT: ICD-10-CM

## 2017-08-31 DIAGNOSIS — M25.522 ELBOW PAIN, LEFT: Primary | ICD-10-CM

## 2017-08-31 PROCEDURE — 97110 THERAPEUTIC EXERCISES: CPT

## 2017-08-31 PROCEDURE — 97140 MANUAL THERAPY 1/> REGIONS: CPT

## 2017-09-05 ENCOUNTER — HOSPITAL ENCOUNTER (OUTPATIENT)
Dept: PHYSICAL THERAPY | Facility: HOSPITAL | Age: 32
Setting detail: THERAPIES SERIES
Discharge: HOME | End: 2017-09-05

## 2017-09-05 DIAGNOSIS — M25.622 STIFFNESS OF ELBOW JOINT, LEFT: ICD-10-CM

## 2017-09-05 DIAGNOSIS — M25.522 ELBOW PAIN, LEFT: Primary | ICD-10-CM

## 2017-09-05 PROCEDURE — 97140 MANUAL THERAPY 1/> REGIONS: CPT

## 2017-09-05 NOTE — THERAPY TREATMENT NOTE
Outpatient Physical Therapy Ortho Treatment Note  [facility]     Patient Name: Ella Salomon  : 1985  MRN: [id number]  Today's Date: 2017      Visit Date: 2017    Visit Dx:    ICD-10-CM ICD-9-CM   1. Elbow pain, left M25.522 719.42   2. Stiffness of elbow joint, left M25.622 719.52       There is no problem list on file for this patient.       Past Medical History:   Diagnosis Date   • Acid reflux    • Anxiety and depression    • Asthma    • Bipolar 1 disorder, mixed, severe    • Heart attack     Patient reported on 17 that she had stress induced heart attack three years ago.   • Menorrhagia    • Pelvic pain    • Pelvic pain     menorrha   • Stroke 3 years ago    Patient reported on 2017 that she had stress induced stroke 3 years ago, but states nothing was done to treat because it was stress induced.        Past Surgical History:   Procedure Laterality Date   • CHOLECYSTECTOMY     • ELBOW FUSION     • ELBOW PERCUTANEOUS PINNING     • ENDOSCOPY N/A 2016    Procedure: ESOPHAGOGASTRODUODENOSCOPY WITH ANESTHESIA;  Surgeon: Az Miller DO;  Location: [facility] ENDOSCOPY;  Service:    • ESSURE TUBAL LIGATION     • HYSTEROSCOPY TUBAL STERILIZATION  2016    Essure Sterilization   • PERCUTANEOUS PINNING ELBOW FRACTURE     • REPLACEMENT TOTAL KNEE                               PT Assessment/Plan       17 1015 17 0959    PT Assessment    Assessment Comments (P)  Vijay Pelletier PTA, saw the patient initially until 10:15am when I took over the session until 10:32. Opal improves with each session. This is only her 3rd session in therapy and her elbow flexion has improved to 135 and extension to lacking 40 degrees (improved by 19 degrees). We continue to work on improving her elbow's PROM into extension and supination with differing manual techniques and IASTM. During extension, she reported the limitation feeling like a muscle stretch at her forearm and during  supination it felt like a stretch at the joint. Per patient report, she is no longer wearing her sling and her elbow pain has significantly and steadily improved since begining therapy. While last week she wasn't able to  her 1yo daughter due to elbow pain, this week she was able to .  -SG Patient reports she continues to be able to tell improvement for her motion.  She continues to be limited for her supination with a firm end feel.  Her elbow extension continue to be limited due soft tissue restrictions.  -SHIMA    PT Plan    PT Plan Comments (P)  We will continue to stretch the brachioradialis, mobilization of the elbow into supination, incorporate AROM.   -SG (P)  Continue to stretch her (L) brachioradialis and capsular tightness into extension and supination.  -SG      User Key  (r) = Recorded By, (t) = Taken By, (c) = Cosigned By    Initials Name Provider Type    SHIMA Pelletier PTA Physical Therapy Assistant    ABRIL Al, PT Student PT Student                    Exercises       09/05/17 0908          Subjective Comments    Subjective Comments Patient reports she is a little sore this morning.  She feels she can straighten her elbow more  -SHIMA      Subjective Pain    Able to rate subjective pain? yes  -SHIMA      Pre-Treatment Pain Level 2  -SHIMA      Post-Treatment Pain Level 2  -TC (r) SG (t) TC (c)      Subjective Pain Comment L elbow  -TC (r) SG (t) TC (c)      Exercise 1    Exercise Name 1 Assessed elbow PROM  -TC (r) SG (t) TC (c)      Time (Minutes) 1 4  -TC (r) SG (t) TC (c)      Additional Comments extension is 40 deg short of 0, 31 deg of supination  -TC (r) SG (t) TC (c)        User Key  (r) = Recorded By, (t) = Taken By, (c) = Cosigned By    Initials Name Provider Type    SHIMA Pelletier PTA Physical Therapy Assistant    SERGEI Covington, PT DPT Physical Therapist    ABRIL Al, PT Student PT Student                        Manual Rx (last 36 hours)      Manual  Treatments       09/05/17 1000          Manual Rx 1    Manual Rx 1 Location PROM elbow extension stretches with towel roll under arm and shoulder stabilized  -SHIMA      Manual Rx 1 Grade 3x 45 second holds  -SHIMA      Manual Rx 1 Duration 5  -SHIMA      Manual Rx 2    Manual Rx 2 Location supine towel supporting elbow; L biceps, forearm structures and lateral scar tissue    -SHIMA      Manual Rx 2 Type IASTM  -SHIMA      Manual Rx 2 Duration 16  -TC (r) SG (t) TC (c)      Manual Rx 3    Manual Rx 3 Location Supine towel supported elbow  -TC (r) SG (t) TC (c)      Manual Rx 3 Type distal radius on ulna supination mobilization  -TC (r) SG (t) TC (c)      Manual Rx 3 Grade 3  -TC (r) SG (t) TC (c)      Manual Rx 3 Duration 8  -TC (r) SG (t) TC (c)        User Key  (r) = Recorded By, (t) = Taken By, (c) = Cosigned By    Initials Name Provider Type    SHIMA Pelletier, PTA Physical Therapy Assistant    TC Jacqueline Covington, PT DPT Physical Therapist    SG Deepa Al, PT Student PT Student                PT OP Goals       09/05/17 1000       PT Short Term Goals    STG 1 Patient will be able to rest LUE without increased pain with elbow in less than 50 degrees of flexion.  -TC (r) SG (t) TC (c)     STG 1 Progress Progressing  -TC (r) SG (t) TC (c)     STG 1 Progress Comments With light overpressure the patient attains 40 deg of flexion with neutral rotation  -TC (r) SG (t) TC (c)     STG 2 Patient will have no episodes of pain in LUE greater than 6/10.  -TC (r) SG (t) TC (c)     STG 2 Progress Ongoing  -TC (r) SG (t) TC (c)     STG 2 Progress Comments Patient's pain was ranked at a 2/10 today, before and after the session.  -TC (r) SG (t) TC (c)     Long Term Goals    LTG 1 Patient and  will be independent with HEP of sustained stretching and positioning to improve L elbow extension and supination.  -TC (r) SG (t) TC (c)     LTG 1 Progress Ongoing  -TC (r) SG (t) TC (c)     LTG 2 Patient will be independent with HEP  for  strengthening.  -TC (r) SG (t) TC (c)     LTG 2 Progress Ongoing  -TC (r) SG (t) TC (c)     LTG 3 Patient will be able to actively extend L elbow to within 20 degrees of 0.  -TC (r) SG (t) TC (c)     LTG 3 Progress Ongoing  -TC (r) SG (t) TC (c)     Time Calculation    PT Goal Re-Cert Due Date 09/08/17  -TC (r) SG (t) TC (c)       User Key  (r) = Recorded By, (t) = Taken By, (c) = Cosigned By    Initials Name Provider Type    SERGEI Covington, PT DPT Physical Therapist    ABRIL Al, PT Student PT Student                Therapy Education       09/05/17 1000          Therapy Education    Education Details Supported supination with light OP  -TC (r) SG (t) TC (c)      Given HEP  -TC (r) SG (t) TC (c)      Program New  -TC (r) SG (t) TC (c)      How Provided Verbal;Demonstration  -TC (r) SG (t) TC (c)      Provided to Patient  -TC (r) SG (t) TC (c)      Level of Understanding Verbalized  -TC (r) SG (t) TC (c)        User Key  (r) = Recorded By, (t) = Taken By, (c) = Cosigned By    Initials Name Provider Type    SEREGI Covington, PT DPT Physical Therapist    ABRIL Al, PT Student PT Student                Time Calculation:   Start Time: 1015  Stop Time: 1032  Time Calculation (min): 17 min  Total Timed Code Minutes- PT: 17 minute(s)    Therapy Charges for Today     Code Description Service Date Service Provider Modifiers Qty    67869243096 HC PT MANUAL THERAPY EA 15 MIN 9/5/2017 Jacqueline Covington, PT DPT GP 1                    Jacqueline Covington, PT DPT  9/5/2017

## 2017-09-05 NOTE — THERAPY TREATMENT NOTE
Outpatient Physical Therapy Ortho Treatment Note  [facility]     Patient Name: Ella Salomon  : 1985  MRN: [id number]  Today's Date: 2017      Visit Date: 2017    Visit Dx:    ICD-10-CM ICD-9-CM   1. Elbow pain, left M25.522 719.42   2. Stiffness of elbow joint, left M25.622 719.52       There is no problem list on file for this patient.       Past Medical History:   Diagnosis Date   • Acid reflux    • Anxiety and depression    • Asthma    • Bipolar 1 disorder, mixed, severe    • Heart attack     Patient reported on 17 that she had stress induced heart attack three years ago.   • Menorrhagia    • Pelvic pain    • Pelvic pain     menorrha   • Stroke 3 years ago    Patient reported on 2017 that she had stress induced stroke 3 years ago, but states nothing was done to treat because it was stress induced.        Past Surgical History:   Procedure Laterality Date   • CHOLECYSTECTOMY     • ELBOW FUSION     • ELBOW PERCUTANEOUS PINNING     • ENDOSCOPY N/A 2016    Procedure: ESOPHAGOGASTRODUODENOSCOPY WITH ANESTHESIA;  Surgeon: Az Miller DO;  Location: [facility] ENDOSCOPY;  Service:    • ESSURE TUBAL LIGATION     • HYSTEROSCOPY TUBAL STERILIZATION  2016    Essure Sterilization   • PERCUTANEOUS PINNING ELBOW FRACTURE     • REPLACEMENT TOTAL KNEE                               PT Assessment/Plan       17 1015 17 0959    PT Assessment    Assessment Comments Vijay Pelletier PTA, say the patient from 9:59 am to 10:15am when I took over the session until 10:32. We continued to work on improving her elbow's PROM into extension and supination with IASTM. During extension, she reported the limitation feeling like a muscle stretch at her forearm and during supination it felt like a stretch at the joint. Per patient report, she is no longer wearing her sling and her elbow pain has significantly and steadily improved since begining therapy. While last week she wasn't able  to  her 3yo daughter due to elbow pain, this week she was able to.  -TC (r) SG (t) TC (c) Patient reports she continues to be able to tell improvement for her motion.  She continues to be limited for her supination with a firm end feel.  Her elbow extension continue to be limited due soft tissue restrictions. Deepa Al, PT Student, finished treating the patient from 10:15 to 10:32.  -SHIMA    PT Plan    PT Plan Comments Continue to stretch the brachioradialis, mobilization of the elbow into supination, incorporate AROM   -TC (r) SG (t) TC (c) Continue to stretch her (L) brachioradialis and capsular tightness into extension and supination.  -TC (r) SG (t) TC (c)      User Key  (r) = Recorded By, (t) = Taken By, (c) = Cosigned By    Initials Name Provider Type    SHIMA Pelletier PTA Physical Therapy Assistant    SERGEI Covington, PT DPT Physical Therapist    ABRIL Al, PT Student PT Student                    Exercises       09/05/17 0959          Subjective Comments    Subjective Comments Patient reports she is a little sore this morning.  She feels she can straighten her elbow more  -SHIMA      Subjective Pain    Able to rate subjective pain? yes  -SHIMA      Pre-Treatment Pain Level 2  -SHIMA      Post-Treatment Pain Level 2  -TC (r) SG (t) TC (c)      Subjective Pain Comment L elbow  -TC (r) SG (t) TC (c)      Exercise 1    Exercise Name 1 Assessed elbow PROM  -TC (r) SG (t) TC (c)      Time (Minutes) 1 4  -TC (r) SG (t) TC (c)      Additional Comments extension is 40 deg short of 0, 31 deg of supination  -TC (r) SG (t) TC (c)        User Key  (r) = Recorded By, (t) = Taken By, (c) = Cosigned By    Initials Name Provider Type    SHIMA Pelletier PTA Physical Therapy Assistant    SERGEI Covington, PT DPT Physical Therapist    ABRIL Al, PT Student PT Student                        Manual Rx (last 36 hours)      Manual Treatments       09/05/17 1000          Manual Rx 1     Manual Rx 1 Location PROM elbow extension stretches with towel roll under arm and shoulder stabilized  -SHIMA      Manual Rx 1 Grade 3x 45 second holds  -SHIMA      Manual Rx 1 Duration 5  -SHIMA      Manual Rx 2    Manual Rx 2 Location supine towel supporting elbow; L biceps, forearm structures and lateral scar tissue    -SHIMA      Manual Rx 2 Type IASTM   8 min under PTA treatment, 8 min under PT Student treatment  -SHIMA      Manual Rx 2 Duration 16  -TC (r) SG (t) TC (c)      Manual Rx 3    Manual Rx 3 Location Supine towel supported elbow  -TC (r) SG (t) TC (c)      Manual Rx 3 Type distal radius on ulna supination mobilization  -TC (r) SG (t) TC (c)      Manual Rx 3 Grade 3  -TC (r) SG (t) TC (c)      Manual Rx 3 Duration 8  -TC (r) SG (t) TC (c)        User Key  (r) = Recorded By, (t) = Taken By, (c) = Cosigned By    Initials Name Provider Type    SHIMA Pelletier, PTA Physical Therapy Assistant    SERGEI Covington, PT DPT Physical Therapist    SG Deepa Al, PT Student PT Student                PT OP Goals       09/05/17 1000       PT Short Term Goals    STG 1 Patient will be able to rest LUE without increased pain with elbow in less than 50 degrees of flexion.  -TC (r) SG (t) TC (c)     STG 1 Progress Progressing  -TC (r) SG (t) TC (c)     STG 1 Progress Comments With light overpressure the patient attains 40 deg of flexion with neutral rotation  -TC (r) SG (t) TC (c)     STG 2 Patient will have no episodes of pain in LUE greater than 6/10.  -TC (r) SG (t) TC (c)     STG 2 Progress Ongoing  -TC (r) SG (t) TC (c)     STG 2 Progress Comments Patient's pain was ranked at a 2/10 today, before and after the session.  -TC (r) SG (t) TC (c)     Long Term Goals    LTG 1 Patient and  will be independent with HEP of sustained stretching and positioning to improve L elbow extension and supination.  -TC (r) SG (t) TC (c)     LTG 1 Progress Ongoing  -TC (r) SG (t) TC (c)     LTG 1 Progress Comments Patient  reports compliance with HEP twice daily  -SHIMA     LTG 2 Patient will be independent with HEP for  strengthening.  -TC (r) SG (t) TC (c)     LTG 2 Progress Ongoing  -TC (r) SG (t) TC (c)     LTG 3 Patient will be able to actively extend L elbow to within 20 degrees of 0.  -TC (r) SG (t) TC (c)     LTG 3 Progress Ongoing  -TC (r) SG (t) TC (c)     Time Calculation    PT Goal Re-Cert Due Date 09/08/17  -TC (r) SG (t) TC (c)       User Key  (r) = Recorded By, (t) = Taken By, (c) = Cosigned By    Initials Name Provider Type    SHIMA Pelletier PTA Physical Therapy Assistant    SERGEI Covington, PT DPT Physical Therapist    ABRIL Al, PT Student PT Student                Therapy Education       09/05/17 1000          Therapy Education    Education Details Supported supination with light OP  -TC (r) SG (t) TC (c)      Given HEP  -TC (r) SG (t) TC (c)      Program New  -TC (r) SG (t) TC (c)      How Provided Verbal;Demonstration  -TC (r) SG (t) TC (c)      Provided to Patient  -TC (r) SG (t) TC (c)      Level of Understanding Verbalized  -TC (r) SG (t) TC (c)        User Key  (r) = Recorded By, (t) = Taken By, (c) = Cosigned By    Initials Name Provider Type    SERGEI Covington, PT DPT Physical Therapist    ABRIL Al, PT Student PT Student                Time Calculation:   Start Time: 0959  Stop Time: 1014  Time Calculation (min): 15 min  Total Timed Code Minutes- PT: 15 minute(s)    Therapy Charges for Today     Code Description Service Date Service Provider Modifiers Qty    88447976945 HC PT MANUAL THERAPY EA 15 MIN 9/5/2017 Vijay Pelletier PTA GP 1                    Vijay Pelletier PTA  9/5/2017

## 2017-09-08 ENCOUNTER — APPOINTMENT (OUTPATIENT)
Dept: PHYSICAL THERAPY | Facility: HOSPITAL | Age: 32
End: 2017-09-08

## 2017-09-12 ENCOUNTER — APPOINTMENT (OUTPATIENT)
Dept: PHYSICAL THERAPY | Facility: HOSPITAL | Age: 32
End: 2017-09-12

## 2017-09-13 ENCOUNTER — HOSPITAL ENCOUNTER (OUTPATIENT)
Dept: PHYSICAL THERAPY | Facility: HOSPITAL | Age: 32
Setting detail: THERAPIES SERIES
Discharge: HOME | End: 2017-09-13

## 2017-09-13 DIAGNOSIS — M25.522 ELBOW PAIN, LEFT: Primary | ICD-10-CM

## 2017-09-13 DIAGNOSIS — M25.622 STIFFNESS OF ELBOW JOINT, LEFT: ICD-10-CM

## 2017-09-13 PROCEDURE — 97032 APPL MODALITY 1+ESTIM EA 15: CPT

## 2017-09-13 PROCEDURE — 97140 MANUAL THERAPY 1/> REGIONS: CPT

## 2017-09-13 PROCEDURE — 97110 THERAPEUTIC EXERCISES: CPT

## 2017-09-13 NOTE — THERAPY PROGRESS REPORT/RE-CERT
Outpatient Physical Therapy Ortho Progress Note  [facility]     Patient Name: Ella Salomon  : 1985  MRN: [id number]  Today's Date: 2017      Visit Date: 2017    Visit Dx:  No diagnosis found.    There is no problem list on file for this patient.       Past Medical History:   Diagnosis Date   • Acid reflux    • Anxiety and depression    • Asthma    • Bipolar 1 disorder, mixed, severe    • Heart attack     Patient reported on 17 that she had stress induced heart attack three years ago.   • Menorrhagia    • Pelvic pain    • Pelvic pain     menorrha   • Stroke 3 years ago    Patient reported on 2017 that she had stress induced stroke 3 years ago, but states nothing was done to treat because it was stress induced.        Past Surgical History:   Procedure Laterality Date   • CHOLECYSTECTOMY     • ELBOW FUSION     • ELBOW PERCUTANEOUS PINNING     • ENDOSCOPY N/A 2016    Procedure: ESOPHAGOGASTRODUODENOSCOPY WITH ANESTHESIA;  Surgeon: Az Miller DO;  Location: [facility] ENDOSCOPY;  Service:    • ESSURE TUBAL LIGATION     • HYSTEROSCOPY TUBAL STERILIZATION  2016    Essure Sterilization   • PERCUTANEOUS PINNING ELBOW FRACTURE     • REPLACEMENT TOTAL KNEE                               PT Assessment/Plan       17 1114       PT Assessment    Assessment Comments Today was the first session that I had worked with her and she is overdue for recertification due to recent cancelations. Her hand strength and elbow ROM are restricted and the musculature of her L UE is very adhered and she is a bit aprehensive with movement.  -EC     PT Plan    PT Plan Comments Continue to work on improving ROM, issue hand puty during her next session.  -EC       User Key  (r) = Recorded By, (t) = Taken By, (c) = Cosigned By    Initials Name Provider Type    EC Esequiel Christensen PTA Physical Therapy Assistant                Modalities       17 1000          ELECTRICAL STIMULATION     Attended/Unattended Attended  -EC      Stimulation Type --   Laserstim Chronic Inflammation Mode  -EC      Location/Electrode Placement/Other L elbow globally  -EC      Rx Minutes 10 mins  -EC        User Key  (r) = Recorded By, (t) = Taken By, (c) = Cosigned By    Initials Name Provider Type    ROSI Christensen PTA Physical Therapy Assistant                Exercises       09/13/17 1000          Subjective Comments    Subjective Comments Reports rain inceases her elbow pain  -EC      Subjective Pain    Able to rate subjective pain? yes  -EC      Pre-Treatment Pain Level 8  -EC      Exercise 1    Exercise Name 1 reviewed gol for recertification  -EC      Exercise 2    Exercise Name 2 PROM and stretching L elbo flexion/extension, pronation/supination   -EC        User Key  (r) = Recorded By, (t) = Taken By, (c) = Cosigned By    Initials Name Provider Type    ROSI Christensen PTA Physical Therapy Assistant                        Manual Rx (last 36 hours)      Manual Treatments       09/13/17 1100          Manual Rx 1    Manual Rx 1 Location supine towel supporting elbow; L biceps, forearm structures and lateral scar tissue    -EC      Manual Rx 1 Type IASTM  -EC      Manual Rx 1 Grade min-mod  -EC      Manual Rx 1 Duration 15  -EC        User Key  (r) = Recorded By, (t) = Taken By, (c) = Cosigned By    Initials Name Provider Type    ROSI Christensen PTA Physical Therapy Assistant                PT OP Goals       09/13/17 1023       PT Short Term Goals    STG Date to Achieve 10/05/17  -EC     STG 1 Patient will be able to rest LUE without increased pain with elbow in less than 50 degrees of flexion.  -EC     STG 1 Progress Progressing  -EC     STG 1 Progress Comments 60 degrees in resting position  -EC     STG 2 Patient will have no episodes of pain in LUE greater than 6/10.  -EC     STG 2 Progress Ongoing  -EC     STG 2 Progress Comments 8/10 today  -EC     Long Term Goals    LTG Date to Achieve 10/13/17   -EC     LTG 1 Patient and  will be independent with HEP of sustained stretching and positioning to improve L elbow extension and supination.  -EC     LTG 1 Progress Ongoing  -EC     LTG 1 Progress Comments verbalized all components today  -EC     LTG 2 Patient will be independent with HEP for  strengthening.  -EC     LTG 2 Progress Ongoing  -EC     LTG 2 Progress Comments pos 1, 2, 3 R 12, 25, and 20 lbs., L 7, 10, 9  -EC     LTG 3 Patient will be able to actively extend L elbow to within 20 degrees of 0.  -EC     LTG 3 Progress Ongoing  -EC     LTG 3 Progress Comments 50 degrees AROM  -EC     Time Calculation    PT Goal Re-Cert Due Date 10/13/17  -EC       User Key  (r) = Recorded By, (t) = Taken By, (c) = Cosigned By    Initials Name Provider Type    ROSI Christensen PTA Physical Therapy Assistant                Therapy Education       09/13/17 1114          Therapy Education    Given Symptoms/condition management  -EC      How Provided Verbal  -EC      Provided to Patient  -EC      Level of Understanding Verbalized  -EC        User Key  (r) = Recorded By, (t) = Taken By, (c) = Cosigned By    Initials Name Provider Type    ROSI Christensen PTA Physical Therapy Assistant                Time Calculation:   Start Time: 1020  Stop Time: 1100  Time Calculation (min): 40 min  Total Timed Code Minutes- PT: 40 minute(s)    Therapy Charges for Today     Code Description Service Date Service Provider Modifiers Qty    22705809892 HC PT MANUAL THERAPY EA 15 MIN 9/13/2017 Esequiel Christensen PTA GP 1    24263577144 HC PT THER PROC EA 15 MIN 9/13/2017 Esequiel Christensen PTA GP 1    06832341403 HC PT ELEC STIM EA-PER 15 MIN 9/13/2017 Esequiel Christensen PTA GP 1                    Esequiel Christensen PTA  9/13/2017

## 2017-09-21 ENCOUNTER — TELEPHONE (OUTPATIENT)
Dept: OBSTETRICS AND GYNECOLOGY | Facility: CLINIC | Age: 32
End: 2017-09-21

## 2017-09-21 ENCOUNTER — DOCUMENTATION (OUTPATIENT)
Dept: OBSTETRICS AND GYNECOLOGY | Facility: CLINIC | Age: 32
End: 2017-09-21

## 2017-09-21 RX ORDER — CITALOPRAM 20 MG/1
TABLET ORAL
Qty: 30 TABLET | Refills: 0 | Status: SHIPPED | OUTPATIENT
Start: 2017-09-21 | End: 2017-11-10

## 2017-09-21 NOTE — PROGRESS NOTES
Called [facility] and they will not give any information if she has been seen or not due to release of information not being signed.  Called [facility] and denied her Celexa till she contacts her office.

## 2017-10-03 ENCOUNTER — HOSPITAL ENCOUNTER (OUTPATIENT)
Dept: PHYSICAL THERAPY | Facility: HOSPITAL | Age: 32
Setting detail: THERAPIES SERIES
Discharge: HOME | End: 2017-10-03

## 2017-10-03 DIAGNOSIS — M25.622 STIFFNESS OF ELBOW JOINT, LEFT: ICD-10-CM

## 2017-10-03 DIAGNOSIS — M25.522 ELBOW PAIN, LEFT: Primary | ICD-10-CM

## 2017-10-03 PROCEDURE — 97032 APPL MODALITY 1+ESTIM EA 15: CPT

## 2017-10-03 PROCEDURE — 97140 MANUAL THERAPY 1/> REGIONS: CPT

## 2017-10-03 NOTE — THERAPY TREATMENT NOTE
Outpatient Physical Therapy Ortho Treatment Note  [facility]     Patient Name: Ella Salomon  : 1985  MRN: [id number]  Today's Date: 10/3/2017      Visit Date: 10/03/2017    Visit Dx:    ICD-10-CM ICD-9-CM   1. Elbow pain, left M25.522 719.42   2. Stiffness of elbow joint, left M25.622 719.52       There is no problem list on file for this patient.       Past Medical History:   Diagnosis Date   • Acid reflux    • Anxiety and depression    • Asthma    • Bipolar 1 disorder, mixed, severe    • Heart attack     Patient reported on 17 that she had stress induced heart attack three years ago.   • Menorrhagia    • Pelvic pain    • Pelvic pain     menorrha   • Stroke 3 years ago    Patient reported on 2017 that she had stress induced stroke 3 years ago, but states nothing was done to treat because it was stress induced.        Past Surgical History:   Procedure Laterality Date   • CHOLECYSTECTOMY     • ELBOW FUSION     • ELBOW PERCUTANEOUS PINNING     • ENDOSCOPY N/A 2016    Procedure: ESOPHAGOGASTRODUODENOSCOPY WITH ANESTHESIA;  Surgeon: Az Miller DO;  Location: [facility] ENDOSCOPY;  Service:    • ESSURE TUBAL LIGATION     • HYSTEROSCOPY TUBAL STERILIZATION  2016    Essure Sterilization   • PERCUTANEOUS PINNING ELBOW FRACTURE     • REPLACEMENT TOTAL KNEE                               PT Assessment/Plan       10/03/17 1018       PT Assessment    Assessment Comments Mrs. Piña has not been seen in our office since 17 due to waiting for more authorizations for treatment.  She presented today with increased pain, which she contributed to the weather.  She reports she has been performing her stretches at home.  She continues to be limited with forearm supination and elbow extension with increased pain during AAROM supination.  She also continues to have adhesions along her lateral aspect of elbow along scar, which improved post IASTM. Her range did improve and was able to achieve  70 degrees elbow extension passively.  -SHIMA     PT Plan    PT Plan Comments We will continue to address her decreased mobility.  We will assess the added hand putty to her  HEP next visit.  -SHIMA       User Key  (r) = Recorded By, (t) = Taken By, (c) = Cosigned By    Initials Name Provider Type    SHIMA Pelletier PTA Physical Therapy Assistant                Modalities       10/03/17 1018          ELECTRICAL STIMULATION    Attended/Unattended Attended  -SHIMA      Stimulation Type --   Laserstim Chronic Inflammation Mode  -SHIMA      Max mAmp --   minimal  -SHIMA      Location/Electrode Placement/Other L lateral elbow  -SHIMA      Rx Minutes 10 mins  -SHIMA        User Key  (r) = Recorded By, (t) = Taken By, (c) = Cosigned By    Initials Name Provider Type    SHIMA Pelletier PTA Physical Therapy Assistant                Exercises       10/03/17 1018          Subjective Comments    Subjective Comments Patient reports the stiffness and pain comes and goes.  She reports she is still dropping stuff due to weakness.   She reports she believes her increased pain is due to the weather outside.    -SHIMA      Subjective Pain    Able to rate subjective pain? yes  -SHIMA      Pre-Treatment Pain Level 8  -SHIAM      Post-Treatment Pain Level 5  -SHIMA      Subjective Pain Comment L elbow strong ache  -SHIMA      Exercise 1    Exercise Name 1 seated with arm supported by pillow: AAROM pronation/supination holding PTA's hands  -SHIMA      Cueing 1 Verbal;Tactile  -SHIMA      Sets 1 1  -SHIMA      Reps 1 5  -SHIMA      Additional Comments This increased pain into supination   -SHIMA      Exercise 2    Exercise Name 2 PROM elbow extension and pronation/supination with elbow at 90 degrees  -SHIMA      Cueing 2 Verbal;Tactile  -SHIMA      Sets 2 1  -SHIMA      Reps 2 5  -SHIMA      Additional Comments each position  -SHIMA        User Key  (r) = Recorded By, (t) = Taken By, (c) = Cosigned By    Initials Name Provider Type    SHIMA Pelletier PTA Physical Therapy Assistant                         Manual Rx (last 36 hours)      Manual Treatments       10/03/17 1022          Manual Rx 1    Manual Rx 1 Location supine towel supporting elbow; L biceps, forearm structures and lateral scar tissue    -SHIMA      Manual Rx 1 Type IASTM  -SHIMA      Manual Rx 1 Grade min-mod  -SHIMA      Manual Rx 1 Duration 15  -SHIMA      Manual Rx 2    Manual Rx 2 Location PROM elbow extension stretches with towel roll under arm and shoulder stabilized  -SHMIA      Manual Rx 2 Type LLLD  -SHIMA      Manual Rx 2 Grade 4 x 45 seconds  -SHIMA      Manual Rx 2 Duration 5  -SHIMA      Manual Rx 3    Manual Rx 3 Location hooklying towel supporting L elbow  -SHIMA      Manual Rx 3 Type humeroulnar distraction in open packed position-    -SHIMA      Manual Rx 3 Grade 2  -SHIMA      Manual Rx 3 Duration 5  -SHIMA        User Key  (r) = Recorded By, (t) = Taken By, (c) = Cosigned By    Initials Name Provider Type    SHIMA Pelletier, PTA Physical Therapy Assistant                PT OP Goals       10/03/17 1018       PT Short Term Goals    STG Date to Achieve 10/05/17  -SHIMA     STG 1 Patient will be able to rest LUE without increased pain with elbow in less than 50 degrees of flexion.  -SHIMA     STG 1 Progress Progressing  -SHIMA     STG 2 Patient will have no episodes of pain in LUE greater than 6/10.  -SHIMA     STG 2 Progress Ongoing  -SHIMA     Long Term Goals    LTG Date to Achieve 10/13/17  -SHIMA     LTG 1 Patient and  will be independent with HEP of sustained stretching and positioning to improve L elbow extension and supination.  -SHIMA     LTG 1 Progress Ongoing  -SHIMA     LTG 2 Patient will be independent with HEP for  strengthening.  -SHIMA     LTG 2 Progress Ongoing  -SHIMA     LTG 2 Progress Comments L was 10,15,20 with average of 15 pounds.  Patient was given hand putty today for HEP for 3-5minutes every day  -SHIMA     LTG 3 Patient will be able to actively extend L elbow to within 20 degrees of 0.  -SHIMA     LTG 3 Progress Ongoing  -SHIMA     LTG 3 Progress  Comments Patient was able to achieve PROM 70 degrees post stretches, IASTM, and mobilizations  -SHIMA     Time Calculation    PT Goal Re-Cert Due Date 10/13/17  -SHIMA       User Key  (r) = Recorded By, (t) = Taken By, (c) = Cosigned By    Initials Name Provider Type    SHIMA Pelletier PTA Physical Therapy Assistant                Therapy Education       10/03/17 1131          Therapy Education    Education Details Hand putty 3-5 minutes a day: pink putty.  -SHIMA      Given HEP  -SHIMA      Program New  -SHIMA      How Provided Verbal  -SHIMA      Provided to Patient  -SHIMA      Level of Understanding Verbalized  -SHIMA        User Key  (r) = Recorded By, (t) = Taken By, (c) = Cosigned By    Initials Name Provider Type    SHIMA Pelletier PTA Physical Therapy Assistant                Time Calculation:   Start Time: 1018  Stop Time: 1109  Time Calculation (min): 51 min  Total Timed Code Minutes- PT: 51 minute(s)    Therapy Charges for Today     Code Description Service Date Service Provider Modifiers Qty    15691098662 HC PT MANUAL THERAPY EA 15 MIN 10/3/2017 Vijay Pelletier PTA GP 2    54958238249 HC PT ELEC STIM EA-PER 15 MIN 10/3/2017 Vijay Pelletier PTA GP 1                    Vijay Pelletier PTA  10/3/2017

## 2017-10-05 ENCOUNTER — HOSPITAL ENCOUNTER (OUTPATIENT)
Dept: PHYSICAL THERAPY | Facility: HOSPITAL | Age: 32
Setting detail: THERAPIES SERIES
End: 2017-10-05

## 2017-10-10 ENCOUNTER — APPOINTMENT (OUTPATIENT)
Dept: PHYSICAL THERAPY | Facility: HOSPITAL | Age: 32
End: 2017-10-10

## 2017-10-17 ENCOUNTER — HOSPITAL ENCOUNTER (OUTPATIENT)
Dept: PHYSICAL THERAPY | Facility: HOSPITAL | Age: 32
Setting detail: THERAPIES SERIES
Discharge: HOME | End: 2017-10-17

## 2017-10-17 DIAGNOSIS — M25.622 STIFFNESS OF ELBOW JOINT, LEFT: ICD-10-CM

## 2017-10-17 DIAGNOSIS — M25.522 ELBOW PAIN, LEFT: Primary | ICD-10-CM

## 2017-10-17 PROCEDURE — 97110 THERAPEUTIC EXERCISES: CPT | Performed by: PHYSICAL THERAPIST

## 2017-10-17 PROCEDURE — 97140 MANUAL THERAPY 1/> REGIONS: CPT | Performed by: PHYSICAL THERAPIST

## 2017-10-17 NOTE — THERAPY PROGRESS REPORT/RE-CERT
Outpatient Physical Therapy Ortho Progress Note  [facility]     Patient Name: Ella Salomon  : 1985  MRN: [id number]  Today's Date: 10/17/2017      Visit Date: 10/17/2017    Visit Dx:    ICD-10-CM ICD-9-CM   1. Elbow pain, left M25.522 719.42   2. Stiffness of elbow joint, left M25.622 719.52       There is no problem list on file for this patient.       Past Medical History:   Diagnosis Date   • Acid reflux    • Anxiety and depression    • Asthma    • Bipolar 1 disorder, mixed, severe    • Heart attack     Patient reported on 17 that she had stress induced heart attack three years ago.   • Menorrhagia    • Pelvic pain    • Pelvic pain     menorrha   • Stroke 3 years ago    Patient reported on 2017 that she had stress induced stroke 3 years ago, but states nothing was done to treat because it was stress induced.        Past Surgical History:   Procedure Laterality Date   • CHOLECYSTECTOMY     • ELBOW FUSION     • ELBOW PERCUTANEOUS PINNING     • ENDOSCOPY N/A 2016    Procedure: ESOPHAGOGASTRODUODENOSCOPY WITH ANESTHESIA;  Surgeon: Az Miller DO;  Location: [facility] ENDOSCOPY;  Service:    • ESSURE TUBAL LIGATION     • HYSTEROSCOPY TUBAL STERILIZATION  2016    Essure Sterilization   • PERCUTANEOUS PINNING ELBOW FRACTURE     • REPLACEMENT TOTAL KNEE                               PT Assessment/Plan       10/17/17 1100       PT Assessment    Functional Limitations Limitations in community activities;Limitations in functional capacity and performance;Performance in leisure activities;Performance in self-care ADL  -KR     Impairments Muscle strength;Pain;Posture;Range of motion;Motor function;Joint mobility;Impaired flexibility  -KR     Assessment Comments This is only Ella's 6th treatment session since her initial evaluatioini on 17. She has had multiple cancelled appointments and one no show. She continues to keep her L elbow in a guarded/flexed position. Her ROM and   strength did show some improvements today but still very limtied.   -KR     Please refer to paper survey for additional self-reported information Yes  -KR     Rehab Potential Good  -KR     Patient/caregiver participated in establishment of treatment plan and goals Yes  -KR     Patient would benefit from skilled therapy intervention Yes  -KR     PT Plan    PT Frequency 2x/week  -KR     Predicted Duration of Therapy Intervention (days/wks) 4-6 weeks  -KR     Planned CPT's? PT THER PROC EA 15 MIN: 15680;PT THER ACT EA 15 MIN: 16486;PT MANUAL THERAPY EA 15 MIN: 39460;PT NEUROMUSC RE-EDUCATION EA 15 MIN: 11156;PT ELECTRICAL STIM UNATTEND: ;PT ELECTRICAL STIM ATTD EA 15 MIN: 55097  -KR     Physical Therapy Interventions (Optional Details) taping;swiss ball techniques;stretching;strengthening;ROM (Range of Motion);postural re-education;patient/family education;neuromuscular re-education;modalities;manual therapy techniques;joint mobilization;home exercise program;orthotic fitting/training  -KR     PT Plan Comments We may look into a brace to assisted with the elbow contracture. Continue to work elbow/soft tissue restrictions and progress with strengthening/ROm as tolerated.   -KR       User Key  (r) = Recorded By, (t) = Taken By, (c) = Cosigned By    Initials Name Provider Type    LONG Coyle, PT DPT Physical Therapist                    Exercises       10/17/17 1100          Subjective Comments    Subjective Comments She reports getting better. She repots increased pain last night, but better this morning. She reports able to do most activities without increase in pain. She reports occassional L forearm shooting pain, but not often.   -KR      Subjective Pain    Able to rate subjective pain? yes  -KR      Pre-Treatment Pain Level 2  -KR      Post-Treatment Pain Level 2  -KR      Subjective Pain Comment L elbow  -KR      Exercise 1    Exercise Name 1 addressed all goals for progress note  -KR      Exercise 2     Exercise Name 2 Prome elbow extension stretch PROM  with therapist blocked shoulder; towel roll supporting elbow  -KR      Cueing 2 Verbal  -KR      Sets 2 5  -KR      Time (Seconds) 2 30  -KR      Exercise 3    Exercise Name 3 manual supination stretch with pillow supporting elbow  -KR      Sets 3 3  -KR      Time (Seconds) 3 30  -KR      Exercise 4    Exercise Name 4 In hooklying with towel roll supporting elbow performed AAROm supination/pronation  -KR      Cueing 4 Verbal;Tactile  -KR      Sets 4 2  -KR      Reps 4 20  -KR      Exercise 5    Exercise Name 5 discussed increasing stretch frequency after she reports only doing elbow extension stretch 1-2 times per day.   -KR        User Key  (r) = Recorded By, (t) = Taken By, (c) = Cosigned By    Initials Name Provider Type    LONG Coyle, PT DPT Physical Therapist                        Manual Rx (last 36 hours)      Manual Treatments       10/17/17 1100          Manual Rx 1    Manual Rx 1 Location supine towel supporting elbow; L biceps, forearm structures and lateral scar tissue    -KR      Manual Rx 1 Type IASTM  -KR      Manual Rx 1 Grade min-mod  -KR      Manual Rx 1 Duration 25  -KR      Manual Rx 2    Manual Rx 2 Location --  -KR      Manual Rx 2 Type --  -KR      Manual Rx 2 Grade --  -KR      Manual Rx 2 Duration --  -KR      Manual Rx 3    Manual Rx 3 Location hooklying towel supporting L elbow  -KR      Manual Rx 3 Type humeroulnar distraction in open packed position-    -KR      Manual Rx 3 Grade 2  -KR      Manual Rx 3 Duration 5  -KR        User Key  (r) = Recorded By, (t) = Taken By, (c) = Cosigned By    Initials Name Provider Type    LONG Coyle, PT DPT Physical Therapist                PT OP Goals       10/17/17 1100       PT Short Term Goals    STG Date to Achieve 10/05/17  -KR     STG 1 Patient will be able to rest LUE without increased pain with elbow in less than 50 degrees of flexion.  -KR     STG 1 Progress Progressing   -KR     STG 1 Progress Comments to 50 today   -KR     STG 2 Patient will have no episodes of pain in LUE greater than 6/10.  -KR     STG 2 Progress Ongoing  -KR     STG 2 Progress Comments up to 9/10 for 2.5 hours  -KR     Long Term Goals    LTG Date to Achieve 10/13/17  -KR     LTG 1 Patient and  will be independent with HEP of sustained stretching and positioning to improve L elbow extension and supination.  -KR     LTG 1 Progress Ongoing  -KR     LTG 1 Progress Comments verbalizes  -KR     LTG 2 Patient will be independent with HEP for  strengthening.  -KR     LTG 2 Progress Ongoing  -KR     LTG 2 Progress Comments L 24, 16, 19 pounds; reports compliance with home putty use  -KR     LTG 3 Patient will be able to actively extend L elbow to within 20 degrees of 0.  -KR     LTG 3 Progress Ongoing  -KR     LTG 3 Progress Comments 50 AROM prior to treatment today today   -KR     Time Calculation    PT Goal Re-Cert Due Date 11/16/17  -KR       User Key  (r) = Recorded By, (t) = Taken By, (c) = Cosigned By    Initials Name Provider Type    LONG Coyle PT DPT Physical Therapist                Therapy Education       10/17/17 1100          Therapy Education    Given HEP  -KR      Program Reinforced  -KR      How Provided Verbal  -KR      Provided to Patient  -KR      Level of Understanding Verbalized;Teach back education performed  -KR        User Key  (r) = Recorded By, (t) = Taken By, (c) = Cosigned By    Initials Name Provider Type    LONG Coyle PT DPT Physical Therapist                Time Calculation:   Start Time: 1100  Stop Time: 1146  Time Calculation (min): 46 min  Total Timed Code Minutes- PT: 46 minute(s)    Therapy Charges for Today     Code Description Service Date Service Provider Modifiers Qty    36188256406 HC PT MANUAL THERAPY EA 15 MIN 10/17/2017 Eden Coyle PT DPT GP 2    10630538565 HC PT THER PROC EA 15 MIN 10/17/2017 Eden Coyle PT DPT GP 1                     Eden Coyle, PT DPT  10/17/2017

## 2017-10-19 ENCOUNTER — APPOINTMENT (OUTPATIENT)
Dept: PHYSICAL THERAPY | Facility: HOSPITAL | Age: 32
End: 2017-10-19

## 2017-10-24 ENCOUNTER — APPOINTMENT (OUTPATIENT)
Dept: PHYSICAL THERAPY | Facility: HOSPITAL | Age: 32
End: 2017-10-24

## 2017-10-26 ENCOUNTER — HOSPITAL ENCOUNTER (OUTPATIENT)
Dept: PHYSICAL THERAPY | Facility: HOSPITAL | Age: 32
Setting detail: THERAPIES SERIES
End: 2017-10-26

## 2017-11-10 PROCEDURE — 87081 CULTURE SCREEN ONLY: CPT | Performed by: NURSE PRACTITIONER

## 2017-11-21 ENCOUNTER — DOCUMENTATION (OUTPATIENT)
Dept: PHYSICAL THERAPY | Facility: HOSPITAL | Age: 32
End: 2017-11-21

## 2017-11-21 DIAGNOSIS — M25.622 STIFFNESS OF ELBOW JOINT, LEFT: ICD-10-CM

## 2017-11-21 DIAGNOSIS — M25.522 ELBOW PAIN, LEFT: Primary | ICD-10-CM

## 2017-11-21 NOTE — THERAPY DISCHARGE NOTE
Outpatient Physical Therapy Discharge Summary         Patient Name: Ella Salomon  : 1985  MRN: [id number]    Today's Date: 2017    Visit Dx:    ICD-10-CM ICD-9-CM   1. Elbow pain, left M25.522 719.42   2. Stiffness of elbow joint, left M25.622 719.52             PT OP Goals       17 1000       PT Short Term Goals    STG Date to Achieve 10/05/17  -KR     STG 1 Patient will be able to rest LUE without increased pain with elbow in less than 50 degrees of flexion.  -KR     STG 1 Progress Not Met  -KR     STG 2 Patient will have no episodes of pain in LUE greater than 6/10.  -KR     STG 2 Progress Not Met  -KR     Long Term Goals    LTG Date to Achieve 10/13/17  -KR     LTG 1 Patient and  will be independent with HEP of sustained stretching and positioning to improve L elbow extension and supination.  -KR     LTG 1 Progress Not Met  -KR     LTG 2 Patient will be independent with HEP for  strengthening.  -KR     LTG 2 Progress Not Met  -KR     LTG 3 Patient will be able to actively extend L elbow to within 20 degrees of 0.  -KR     LTG 3 Progress Not Met  -KR       User Key  (r) = Recorded By, (t) = Taken By, (c) = Cosigned By    Initials Name Provider Type    LONG Coyle, PT DPT Physical Therapist          OP PT Discharge Summary  Date of Discharge: 17  Reason for Discharge: Non-compliant, Unable to participate  Outcomes Achieved: Patient able to partially acheive established goals  Discharge Destination: Home with home program  Discharge Instructions: Pt only attended 6 treatment sessions after her initial evaluation on 17. She cancelled 8 and no showed one session. She made slight progress towards her goals, but unable to make much progress secondary to patient's attendance.       Time Calculation:                    Eden Coyle, PT DPT  2017

## 2018-09-04 ENCOUNTER — OFFICE VISIT (OUTPATIENT)
Dept: PRIMARY CARE CLINIC | Age: 33
End: 2018-09-04
Payer: MEDICAID

## 2018-09-04 VITALS
OXYGEN SATURATION: 98 % | HEIGHT: 60 IN | HEART RATE: 72 BPM | WEIGHT: 200 LBS | SYSTOLIC BLOOD PRESSURE: 118 MMHG | DIASTOLIC BLOOD PRESSURE: 64 MMHG | TEMPERATURE: 98 F | RESPIRATION RATE: 20 BRPM | BODY MASS INDEX: 39.27 KG/M2

## 2018-09-04 DIAGNOSIS — I69.398 VISUAL DISTURBANCE AS LATE EFFECT OF CEREBROVASCULAR ACCIDENT (CVA): ICD-10-CM

## 2018-09-04 DIAGNOSIS — Z86.73 HISTORY OF CARDIOEMBOLIC CEREBROVASCULAR ACCIDENT (CVA): ICD-10-CM

## 2018-09-04 DIAGNOSIS — R29.898 DECREASED STRENGTH OF LOWER EXTREMITY: ICD-10-CM

## 2018-09-04 DIAGNOSIS — K29.30 CHRONIC SUPERFICIAL GASTRITIS, PRESENCE OF BLEEDING UNSPECIFIED: ICD-10-CM

## 2018-09-04 DIAGNOSIS — H53.9 VISUAL DISTURBANCE AS LATE EFFECT OF CEREBROVASCULAR ACCIDENT (CVA): ICD-10-CM

## 2018-09-04 DIAGNOSIS — R55 SYNCOPE, UNSPECIFIED SYNCOPE TYPE: ICD-10-CM

## 2018-09-04 DIAGNOSIS — R29.898 DECREASED STRENGTH OF UPPER EXTREMITY: ICD-10-CM

## 2018-09-04 DIAGNOSIS — F33.1 MODERATE EPISODE OF RECURRENT MAJOR DEPRESSIVE DISORDER (HCC): ICD-10-CM

## 2018-09-04 DIAGNOSIS — G43.909 MIGRAINE SYNDROME: Primary | ICD-10-CM

## 2018-09-04 DIAGNOSIS — I25.10 CORONARY ARTERY DISEASE INVOLVING NATIVE CORONARY ARTERY OF NATIVE HEART WITHOUT ANGINA PECTORIS: ICD-10-CM

## 2018-09-04 PROCEDURE — 99205 OFFICE O/P NEW HI 60 MIN: CPT | Performed by: FAMILY MEDICINE

## 2018-09-04 RX ORDER — SUMATRIPTAN 50 MG/1
50 TABLET, FILM COATED ORAL
Qty: 9 TABLET | Refills: 3 | Status: SHIPPED | OUTPATIENT
Start: 2018-09-04 | End: 2018-10-03

## 2018-09-04 RX ORDER — ATORVASTATIN CALCIUM 20 MG/1
20 TABLET, FILM COATED ORAL DAILY
Qty: 30 TABLET | Refills: 3 | Status: SHIPPED | OUTPATIENT
Start: 2018-09-04 | End: 2018-10-31 | Stop reason: DRUGHIGH

## 2018-09-04 RX ORDER — ASPIRIN 81 MG/1
81 TABLET ORAL DAILY
Qty: 90 TABLET | Refills: 5 | Status: SHIPPED | OUTPATIENT
Start: 2018-09-04 | End: 2020-07-11 | Stop reason: SDUPTHER

## 2018-09-04 RX ORDER — PROPRANOLOL HYDROCHLORIDE 40 MG/1
TABLET ORAL
Qty: 90 TABLET | Refills: 0 | Status: SHIPPED | OUTPATIENT
Start: 2018-09-04 | End: 2018-10-02

## 2018-09-04 RX ORDER — SERTRALINE HYDROCHLORIDE 100 MG/1
150 TABLET, FILM COATED ORAL DAILY
COMMUNITY
End: 2018-10-02

## 2018-09-04 RX ORDER — KETOROLAC TROMETHAMINE 30 MG/ML
30 INJECTION, SOLUTION INTRAMUSCULAR; INTRAVENOUS ONCE
Status: COMPLETED | OUTPATIENT
Start: 2018-09-04 | End: 2018-09-04

## 2018-09-04 RX ADMIN — KETOROLAC TROMETHAMINE 30 MG: 30 INJECTION, SOLUTION INTRAMUSCULAR; INTRAVENOUS at 08:32

## 2018-09-04 ASSESSMENT — ENCOUNTER SYMPTOMS
PHOTOPHOBIA: 0
CHEST TIGHTNESS: 0
NAUSEA: 0
DIARRHEA: 0
SHORTNESS OF BREATH: 0
ABDOMINAL PAIN: 0
VOMITING: 0
CONSTIPATION: 0
COUGH: 0
WHEEZING: 0

## 2018-09-04 ASSESSMENT — PATIENT HEALTH QUESTIONNAIRE - PHQ9
SUM OF ALL RESPONSES TO PHQ9 QUESTIONS 1 & 2: 2
1. LITTLE INTEREST OR PLEASURE IN DOING THINGS: 1
2. FEELING DOWN, DEPRESSED OR HOPELESS: 1
SUM OF ALL RESPONSES TO PHQ QUESTIONS 1-9: 2
SUM OF ALL RESPONSES TO PHQ QUESTIONS 1-9: 2

## 2018-09-04 NOTE — PROGRESS NOTES
Negative for abdominal pain, constipation, diarrhea, nausea and vomiting. Genitourinary: Negative for difficulty urinating, dysuria and frequency. Neurological: Positive for syncope and headaches. Negative for tremors, seizures and speech difficulty. Past Medical History:   Diagnosis Date    Anxiety     Depression     Miscarriage June 2012       Current Outpatient Prescriptions   Medication Sig Dispense Refill    sertraline (ZOLOFT) 100 MG tablet Take 150 mg by mouth daily      aspirin EC 81 MG EC tablet Take 1 tablet by mouth daily 90 tablet 5    atorvastatin (LIPITOR) 20 MG tablet Take 1 tablet by mouth daily Take 1/2 tab daily for 2 weeks, then increase to full tab daily 30 tablet 3    SUMAtriptan (IMITREX) 50 MG tablet Take 1 tablet by mouth once as needed for Migraine (may repeat every 2 hrs, don't exceed 200 mg in 24 hrs) 9 tablet 3    propranolol (INDERAL) 40 MG tablet Take 1 pill twice a day for 5 days, then start 1 pill three times a day thereafter. 90 tablet 0    propranolol (INDERAL) 40 MG tablet Take 1 pill twice a day for 5 days, then start 1 pill three times a day thereafter.  90 tablet 0     Current Facility-Administered Medications   Medication Dose Route Frequency Provider Last Rate Last Dose    ketorolac (TORADOL) injection 30 mg  30 mg Intramuscular Once Yoav Alejandre MD           No Known Allergies    Past Surgical History:   Procedure Laterality Date    CHOLECYSTECTOMY, LAPAROSCOPIC      FRACTURE SURGERY      lower right leg, has metal plate and screws    FRACTURE SURGERY      left wrist    INTRAUTERINE DEVICE INSERTION      Insure placement 6/7/2016       Social History   Substance Use Topics    Smoking status: Never Smoker    Smokeless tobacco: Never Used    Alcohol use Yes      Comment: rarely       Family History   Problem Relation Age of Onset    High Blood Pressure Mother     Diabetes Father     Heart Disease Father     Breast Cancer Other         maternal unspecified syncope type R55 780.2 ECHO Complete 2D W Doppler W Color      Ultrasound carotid doppler      [facility]- Sapphire Mclean MD   8. Chronic superficial gastritis, presence of bleeding unspecified K29.30 535.40 ANTONIO TEST   9. 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 Based on her migraine syndrome and anxiety history I will start her on propanolol but this will also help cover her CAD risk. We may consider starting an ACE inhibitor in the future as well. We will do basic laboratories to rule out her organic disease risk. Time has been taken to fill out paperwork today as well which was an additional 15 minutes in addition to this very complex visit. I will order an echocardiogram carotid ultrasound based on her recurrent history of syncope. I will refer her though based on her history of prior stroke and myocardial infarction to cardiology and neurology. It is difficult to discern if she has any epilepsy, at this time I would doubt so but a rule out and consideration by neurology should be provided. I would expect her to have very little capacity for gainful employment based on her composite of psychiatric health and physical health in regards to her upper extremity and lower extremity weakness and pain. [id number] due to comprehensiveness and risk for patient as well as paperwork for disability.   Orders Placed This Encounter   Procedures    ANTONIO TEST     Standing Status:   Future     Standing Expiration Date:   9/4/2019    Comprehensive Metabolic Panel     Every 11 months     Standing Status:   Standing     Number of Occurrences:   15     Standing Expiration Date:   8/17/2029    Lipid Panel     Standing Status:   Standing Number of Occurrences:   15     Standing Expiration Date:   8/17/2029     Order Specific Question:   Is Patient Fasting?/# of Hours     Answer:   yes/8 hrs    CBC     Standing Status:   Standing     Number of Occurrences:   15     Standing Expiration Date:   8/17/2029    TSH 3RD GENERATION     Standing Status:   Standing     Number of Occurrences:   15     Standing Expiration Date:   8/17/2029   [facility] Neurology- Li Barraza MD     Referral Priority:   Routine     Referral Type:   Eval and Treat     Referral Reason:   Specialty Services Required     Referred to Provider:   Latia Wu DO     Requested Specialty:   Neurology     Number of Visits Requested:   [facility] - Parish Cordero MD     Referral Priority:   Routine     Referral Type:   Eval and Treat     Referral Reason:   Specialty Services Required     Referred to Provider:   Gela Delgado MD     Requested Specialty:   Cardiology     Number of Visits Requested:   1    Ultrasound carotid doppler     Standing Status:   Future     Standing Expiration Date:   9/4/2019    ECHO Complete 2D W Doppler W Color     Standing Status:   Future     Standing Expiration Date:   9/4/2019     Order Specific Question:   Reason for exam:     Answer:   recurrent syncope     Orders Placed This Encounter   Medications    aspirin EC 81 MG EC tablet     Sig: Take 1 tablet by mouth daily     Dispense:  90 tablet     Refill:  5    atorvastatin (LIPITOR) 20 MG tablet     Sig: Take 1 tablet by mouth daily Take 1/2 tab daily for 2 weeks, then increase to full tab daily     Dispense:  30 tablet     Refill:  3    SUMAtriptan (IMITREX) 50 MG tablet     Sig: Take 1 tablet by mouth once as needed for Migraine (may repeat every 2 hrs, don't exceed 200 mg in 24 hrs)     Dispense:  9 tablet     Refill:  3    propranolol (INDERAL) 40 MG tablet     Sig: Take 1 pill twice a day for 5 days, then start 1 pill three times a day thereafter.      Dispense:  90 tablet

## 2018-09-04 NOTE — PATIENT INSTRUCTIONS
Please call if you do not hear about your referal to breath test within 10 days. Please call if you do not hear about your referal to echo, carotid ultrasound, cardiology, and neurology within 10 days. Start taking daily 81 mg aspirin. Start 1/2 tab of atorvastatin daily for 2 weeks, then increase to full tab daily. Start propranolol twice a day and then increase to three times a day after 5 days. Please arrive 15 minutes early to next follow up appointment in months or schedule an appointment sooner if needed.

## 2018-09-06 DIAGNOSIS — I25.10 CORONARY ARTERY DISEASE INVOLVING NATIVE CORONARY ARTERY OF NATIVE HEART WITHOUT ANGINA PECTORIS: ICD-10-CM

## 2018-09-06 LAB
ALBUMIN SERPL-MCNC: 3.8 G/DL (ref 3.5–5.2)
ALP BLD-CCNC: 86 U/L (ref 35–104)
ALT SERPL-CCNC: 14 U/L (ref 5–33)
ANION GAP SERPL CALCULATED.3IONS-SCNC: 16 MMOL/L (ref 7–19)
AST SERPL-CCNC: 19 U/L (ref 5–32)
BILIRUB SERPL-MCNC: 0.4 MG/DL (ref 0.2–1.2)
BUN BLDV-MCNC: 9 MG/DL (ref 6–20)
CALCIUM SERPL-MCNC: 8.9 MG/DL (ref 8.6–10)
CHLORIDE BLD-SCNC: 105 MMOL/L (ref 98–111)
CHOLESTEROL, TOTAL: 177 MG/DL (ref 160–199)
CO2: 22 MMOL/L (ref 22–29)
CREAT SERPL-MCNC: 0.6 MG/DL (ref 0.5–0.9)
GFR NON-AFRICAN AMERICAN: >60
GLUCOSE BLD-MCNC: 84 MG/DL (ref 74–109)
HCT VFR BLD CALC: 38.6 % (ref 37–47)
HDLC SERPL-MCNC: 49 MG/DL (ref 65–121)
HEMOGLOBIN: 12.2 G/DL (ref 12–16)
LDL CHOLESTEROL CALCULATED: 112 MG/DL
MCH RBC QN AUTO: 30.4 PG (ref 27–31)
MCHC RBC AUTO-ENTMCNC: 31.6 G/DL (ref 33–37)
MCV RBC AUTO: 96.3 FL (ref 81–99)
PDW BLD-RTO: 14.1 % (ref 11.5–14.5)
PLATELET # BLD: 332 K/UL (ref 130–400)
PMV BLD AUTO: 9 FL (ref 9.4–12.3)
POTASSIUM SERPL-SCNC: 3.7 MMOL/L (ref 3.5–5)
RBC # BLD: 4.01 M/UL (ref 4.2–5.4)
SODIUM BLD-SCNC: 143 MMOL/L (ref 136–145)
TOTAL PROTEIN: 7.1 G/DL (ref 6.6–8.7)
TRIGL SERPL-MCNC: 79 MG/DL (ref 0–149)
WBC # BLD: 7.8 K/UL (ref 4.8–10.8)

## 2018-09-07 LAB — TSH, 3RD GENERATION: 3.34 MU/L (ref 0.3–4)

## 2018-09-10 ENCOUNTER — TELEPHONE (OUTPATIENT)
Dept: NEUROSURGERY | Age: 33
End: 2018-09-10

## 2018-09-11 ENCOUNTER — HOSPITAL ENCOUNTER (OUTPATIENT)
Dept: NON INVASIVE DIAGNOSTICS | Age: 33
Discharge: HOME | End: 2018-09-11
Payer: MEDICAID

## 2018-09-11 DIAGNOSIS — R55 SYNCOPE, UNSPECIFIED SYNCOPE TYPE: ICD-10-CM

## 2018-09-11 LAB
LV EF: 58 %
LVEF MODALITY: NORMAL

## 2018-09-11 PROCEDURE — 93306 TTE W/DOPPLER COMPLETE: CPT

## 2018-09-11 PROCEDURE — 93880 EXTRACRANIAL BILAT STUDY: CPT

## 2018-09-12 ENCOUNTER — TELEPHONE (OUTPATIENT)
Dept: PRIMARY CARE CLINIC | Age: 33
End: 2018-09-12

## 2018-09-12 DIAGNOSIS — R94.30 ELEVATED RIGHT VENTRICULAR END-DIASTOLIC PRESSURE: Primary | ICD-10-CM

## 2018-10-02 ENCOUNTER — OFFICE VISIT (OUTPATIENT)
Dept: PRIMARY CARE CLINIC | Age: 33
End: 2018-10-02
Payer: MEDICAID

## 2018-10-02 VITALS
DIASTOLIC BLOOD PRESSURE: 80 MMHG | WEIGHT: 199 LBS | HEART RATE: 55 BPM | TEMPERATURE: 98 F | OXYGEN SATURATION: 99 % | BODY MASS INDEX: 38.86 KG/M2 | RESPIRATION RATE: 20 BRPM | SYSTOLIC BLOOD PRESSURE: 136 MMHG

## 2018-10-02 DIAGNOSIS — F51.04 INSOMNIA, PSYCHOPHYSIOLOGICAL: ICD-10-CM

## 2018-10-02 DIAGNOSIS — F33.1 MODERATE EPISODE OF RECURRENT MAJOR DEPRESSIVE DISORDER (HCC): Chronic | ICD-10-CM

## 2018-10-02 DIAGNOSIS — G43.909 MIGRAINE SYNDROME: Primary | ICD-10-CM

## 2018-10-02 DIAGNOSIS — F41.1 GENERALIZED ANXIETY DISORDER: ICD-10-CM

## 2018-10-02 DIAGNOSIS — J01.00 ACUTE NON-RECURRENT MAXILLARY SINUSITIS: ICD-10-CM

## 2018-10-02 PROCEDURE — 99214 OFFICE O/P EST MOD 30 MIN: CPT | Performed by: FAMILY MEDICINE

## 2018-10-02 RX ORDER — AMOXICILLIN AND CLAVULANATE POTASSIUM 875; 125 MG/1; MG/1
1 TABLET, FILM COATED ORAL 2 TIMES DAILY
Qty: 20 TABLET | Refills: 0 | Status: SHIPPED | OUTPATIENT
Start: 2018-10-02 | End: 2018-10-12

## 2018-10-02 RX ORDER — OMEPRAZOLE 20 MG/1
20 TABLET, DELAYED RELEASE ORAL DAILY
Qty: 30 TABLET | Refills: 3 | Status: SHIPPED | OUTPATIENT
Start: 2018-10-02 | End: 2019-02-20 | Stop reason: SDUPTHER

## 2018-10-02 RX ORDER — VENLAFAXINE HYDROCHLORIDE 37.5 MG/1
37.5 CAPSULE, EXTENDED RELEASE ORAL DAILY
Qty: 60 CAPSULE | Refills: 3 | Status: SHIPPED | OUTPATIENT
Start: 2018-10-02 | End: 2018-10-31

## 2018-10-03 ENCOUNTER — OFFICE VISIT (OUTPATIENT)
Dept: NEUROSURGERY | Age: 33
End: 2018-10-03
Payer: MEDICAID

## 2018-10-03 VITALS
WEIGHT: 199 LBS | HEIGHT: 60 IN | DIASTOLIC BLOOD PRESSURE: 68 MMHG | HEART RATE: 53 BPM | BODY MASS INDEX: 39.07 KG/M2 | SYSTOLIC BLOOD PRESSURE: 106 MMHG

## 2018-10-03 DIAGNOSIS — R51.9 NONINTRACTABLE HEADACHE, UNSPECIFIED CHRONICITY PATTERN, UNSPECIFIED HEADACHE TYPE: Primary | ICD-10-CM

## 2018-10-03 DIAGNOSIS — G43.109 MIGRAINE WITH AURA AND WITHOUT STATUS MIGRAINOSUS, NOT INTRACTABLE: Primary | ICD-10-CM

## 2018-10-03 PROCEDURE — 99214 OFFICE O/P EST MOD 30 MIN: CPT | Performed by: NURSE PRACTITIONER

## 2018-10-03 RX ORDER — BUTALBITAL, ACETAMINOPHEN AND CAFFEINE 50; 325; 40 MG/1; MG/1; MG/1
1 TABLET ORAL EVERY 6 HOURS PRN
Qty: 30 TABLET | Refills: 1 | Status: SHIPPED | OUTPATIENT
Start: 2018-10-03 | End: 2018-11-29 | Stop reason: SDUPTHER

## 2018-10-03 RX ORDER — METHYLPREDNISOLONE 4 MG/1
TABLET ORAL
Qty: 1 KIT | Refills: 0 | Status: SHIPPED | OUTPATIENT
Start: 2018-10-03 | End: 2018-10-09

## 2018-10-15 ENCOUNTER — TELEPHONE (OUTPATIENT)
Dept: NEUROLOGY | Age: 33
End: 2018-10-15

## 2018-10-16 ASSESSMENT — ENCOUNTER SYMPTOMS
CONSTIPATION: 0
ABDOMINAL PAIN: 0
SHORTNESS OF BREATH: 0
SORE THROAT: 1
CHEST TIGHTNESS: 0
VOMITING: 0
SINUS PRESSURE: 1
DIARRHEA: 0
COUGH: 0
NAUSEA: 0
WHEEZING: 0
RHINORRHEA: 1

## 2018-10-31 ENCOUNTER — OFFICE VISIT (OUTPATIENT)
Dept: CARDIOLOGY | Age: 33
End: 2018-10-31
Payer: MEDICAID

## 2018-10-31 VITALS
DIASTOLIC BLOOD PRESSURE: 70 MMHG | WEIGHT: 201 LBS | SYSTOLIC BLOOD PRESSURE: 98 MMHG | BODY MASS INDEX: 39.46 KG/M2 | HEIGHT: 60 IN | HEART RATE: 71 BPM

## 2018-10-31 DIAGNOSIS — R55 SYNCOPE, UNSPECIFIED SYNCOPE TYPE: ICD-10-CM

## 2018-10-31 DIAGNOSIS — R53.1 LEFT-SIDED WEAKNESS: ICD-10-CM

## 2018-10-31 DIAGNOSIS — R07.89 OTHER CHEST PAIN: ICD-10-CM

## 2018-10-31 DIAGNOSIS — I25.10 CORONARY ARTERY DISEASE INVOLVING NATIVE CORONARY ARTERY OF NATIVE HEART WITHOUT ANGINA PECTORIS: Primary | Chronic | ICD-10-CM

## 2018-10-31 PROCEDURE — 99213 OFFICE O/P EST LOW 20 MIN: CPT | Performed by: CLINICAL NURSE SPECIALIST

## 2018-10-31 PROCEDURE — 93000 ELECTROCARDIOGRAM COMPLETE: CPT | Performed by: CLINICAL NURSE SPECIALIST

## 2018-10-31 PROCEDURE — 0296T PR EXT ECG > 48HR TO 21 DAY RCRD W/CONECT INTL RCRD: CPT | Performed by: CLINICAL NURSE SPECIALIST

## 2018-10-31 RX ORDER — M-VIT,TX,IRON,MINS/CALC/FOLIC 27MG-0.4MG
1 TABLET ORAL DAILY
COMMUNITY
End: 2020-07-01

## 2018-10-31 RX ORDER — ONDANSETRON 4 MG/1
4 TABLET, FILM COATED ORAL EVERY 8 HOURS PRN
Status: ON HOLD | COMMUNITY
End: 2019-06-24

## 2018-10-31 RX ORDER — ATORVASTATIN CALCIUM 20 MG/1
20 TABLET, FILM COATED ORAL DAILY
COMMUNITY
End: 2018-11-02 | Stop reason: SDUPTHER

## 2018-10-31 RX ORDER — SERTRALINE HYDROCHLORIDE 100 MG/1
TABLET, FILM COATED ORAL
COMMUNITY
End: 2018-12-04 | Stop reason: ALTCHOICE

## 2018-10-31 NOTE — PROGRESS NOTES
tablet 5     No current facility-administered medications for this visit. Review of Systems  Constitutional - + significant activity change  No appetite change, or unexpected weight change. No fever, chills or diaphoresis. + fatigue. HEENT - no significant rhinorrhea or epistaxis. No tinnitus or significant hearing loss. Eyes - no sudden vision change or amaurosis. Respiratory - no significant wheezing, stridor, apnea or cough. No dyspnea on exertion or shortness of breath. Cardiovascular -+ chest pain. No  Orthopnea.  + PND. No sensation of arrhythmia or slow heart rate. No claudication or leg edema. Gastrointestinal - no abdominal swelling or pain. No blood in stool. No severe constipation, diarrhea, nausea, or vomiting. Genitourinary - no difficulty urinating, dysuria, frequency, or urgency. No flank pain or hematuria. No  previous radiation or chemotherapy  Musculoskeletal - no back pain, gait disturbance, or myalgia. Skin - no color change or rash. No pallor. No new surgical incision. Neurologic - no speech difficulty. + lateralizing weakness. + seizures, +syncope  No  significant dizziness. Hematologic - no easy bruising or excessive bleeding. Psychiatric - no severe anxiety or insomnia. No confusion. All other review of systems are negative. Objective  Vital Signs - BP 98/70   Pulse 71   Ht 5' (1.524 m)   Wt 201 lb (91.2 kg)   BMI 39.26 kg/m²   General - [place] is alert, cooperative, and pleasant. Well groomed. No acute distress. Body habitus is obese. HEENT - The head is normocephalic. No circumoral cyanosis. Dentition is normal.   EYES -  No Xanthelasma, no arcus senilis, no conjunctival hemorrhages or discharge. Neck - Supple, without increased jugular venous pressures. No carotid bruits. No mass. Respiratory - Lungs are clear bilaterally. No wheezes or rales. Normal effort without use of accessory muscles.   Cardiovascular - Heart has regular rhythm and rate. No murmurs, rubs or gallops. + pedal pulses and no varicosities. Abdominal -  Soft, nontender, nondistended. Bowel sounds are intact. Extremities - No clubbing, cyanosis, or  edema. Musculoskeletal - No musculoskeletal symptoms. No clubbing . No Osler's nodes. Gait normal .  No kyphosis or scoliosis. Skin -  no statis ulcers or dermatitis. Neurological - No focal signs are identified. Oriented to person, place and time. Psychiatric -  Appropriate affect and mood. Assessment:          Diagnosis Orders   1. Coronary artery disease involving native coronary artery of native heart without angina pectoris  EKG 12 lead    CARDIAC STRESS TEST EXERCISE ONLY    CA EXT ECG > 48HR TO 21 DAY RCRD W/CONECT INTL RCRD   2. Other chest pain  CARDIAC STRESS TEST EXERCISE ONLY    CA EXT ECG > 48HR TO 21 DAY RCRD W/CONECT INTL RCRD   3. Left-sided weakness     4. Syncope, unspecified syncope type  CARDIAC STRESS TEST EXERCISE ONLY    CA EXT ECG > 48HR TO 21 DAY RCRD W/CONECT INTL RCRD   EKG today shows normal sinus rhythm with a rate of 71. No EKG for comparison    2-D echo and on 9/11/18 showed normal LV systolic function with EF 55-60%. Mildly dilated LA. Mildly thickened tricuspid aortic valve with normal movement, mildly thickened but normal mitral valve. Blood pressure and heart rate control. Medical management includes aspirin and statin  Self-reported diagnosis of heart attack. We'll try to get records from [facility]  Is having chest discomfort that will need to be evaluated. Will get stress test    However with syncopal episodes more concerned about arrhythmia versus neurological source. We'll have her wear 2 week monitor to evaluate for any arrhythmias she is having frequent syncopal spells. Does report seizure-like activity. States she does have referral in to neurology.  Will follow up with that as well        Plan    Stress test soon- scheduling will call to set up date and

## 2018-11-02 ENCOUNTER — OFFICE VISIT (OUTPATIENT)
Dept: PRIMARY CARE CLINIC | Age: 33
End: 2018-11-02
Payer: MEDICAID

## 2018-11-02 VITALS
TEMPERATURE: 98 F | HEIGHT: 60 IN | WEIGHT: 202 LBS | SYSTOLIC BLOOD PRESSURE: 120 MMHG | DIASTOLIC BLOOD PRESSURE: 72 MMHG | HEART RATE: 90 BPM | OXYGEN SATURATION: 99 % | RESPIRATION RATE: 20 BRPM | BODY MASS INDEX: 39.66 KG/M2

## 2018-11-02 DIAGNOSIS — G43.909 MIGRAINE SYNDROME: Primary | ICD-10-CM

## 2018-11-02 DIAGNOSIS — F33.1 MODERATE EPISODE OF RECURRENT MAJOR DEPRESSIVE DISORDER (HCC): Chronic | ICD-10-CM

## 2018-11-02 DIAGNOSIS — Z23 NEED FOR INFLUENZA VACCINATION: ICD-10-CM

## 2018-11-02 DIAGNOSIS — I25.10 CORONARY ARTERY DISEASE INVOLVING NATIVE CORONARY ARTERY OF NATIVE HEART WITHOUT ANGINA PECTORIS: Chronic | ICD-10-CM

## 2018-11-02 PROCEDURE — 90686 IIV4 VACC NO PRSV 0.5 ML IM: CPT | Performed by: FAMILY MEDICINE

## 2018-11-02 PROCEDURE — 90471 IMMUNIZATION ADMIN: CPT | Performed by: FAMILY MEDICINE

## 2018-11-02 PROCEDURE — 99214 OFFICE O/P EST MOD 30 MIN: CPT | Performed by: FAMILY MEDICINE

## 2018-11-02 RX ORDER — ATORVASTATIN CALCIUM 40 MG/1
40 TABLET, FILM COATED ORAL DAILY
Qty: 90 TABLET | Refills: 1 | Status: SHIPPED | OUTPATIENT
Start: 2018-11-02 | End: 2019-06-04 | Stop reason: SDUPTHER

## 2018-11-02 RX ORDER — VENLAFAXINE HYDROCHLORIDE 75 MG/1
75 CAPSULE, EXTENDED RELEASE ORAL DAILY
Qty: 30 CAPSULE | Refills: 1 | Status: SHIPPED | OUTPATIENT
Start: 2018-11-02 | End: 2019-01-15 | Stop reason: SDUPTHER

## 2018-11-02 NOTE — PROGRESS NOTES
daily      butalbital-acetaminophen-caffeine (FIORICET, ESGIC) -40 MG per tablet Take 1 tablet by mouth every 6 hours as needed for Headaches 30 tablet 1    omeprazole (PRILOSEC OTC) 20 MG tablet Take 1 tablet by mouth daily 30 tablet 3    aspirin EC 81 MG EC tablet Take 1 tablet by mouth daily 90 tablet 5    amoxicillin-clavulanate (AUGMENTIN) 875-125 MG per tablet Take 1 tablet by mouth 2 times daily for 10 days 20 tablet 0    benzonatate (TESSALON PERLES) 100 MG capsule Take 1 capsule by mouth 3 times daily as needed for Cough 20 capsule 0    predniSONE (DELTASONE) 10 MG tablet Take 3 tablets by mouth daily for 5 days 15 tablet 0     No current facility-administered medications for this visit. No Known Allergies    Past Surgical History:   Procedure Laterality Date    CHOLECYSTECTOMY, LAPAROSCOPIC      FRACTURE SURGERY      lower right leg, has metal plate and screws    FRACTURE SURGERY      left wrist    INTRAUTERINE DEVICE INSERTION      Insure placement 6/7/2016       Social History   Substance Use Topics    Smoking status: Never Smoker    Smokeless tobacco: Never Used    Alcohol use No       Family History   Problem Relation Age of Onset    High Blood Pressure Mother     Diabetes Father     Heart Disease Father     Breast Cancer Other         maternal great aunt       /72   Pulse 90   Temp 98 °F (36.7 °C) (Temporal)   Resp 20   Ht 5' (1.524 m)   Wt 202 lb (91.6 kg)   SpO2 99%   BMI 39.45 kg/m²     Physical Exam   Constitutional: She is oriented to person, place, and time. She appears well-developed and well-nourished. No distress. HENT:   Head: Normocephalic and atraumatic. Cardiovascular: Normal rate, regular rhythm and normal heart sounds. No murmur heard. Pulmonary/Chest: Effort normal and breath sounds normal. No respiratory distress. She has no wheezes. She has no rales. Abdominal: Soft. Bowel sounds are normal. She exhibits no distension.  There is no tenderness. Musculoskeletal:   No pretibial edema b/l. Neurological: She is alert and oriented to person, place, and time. Skin: Skin is warm and dry. She is not diaphoretic. No cyanosis. Psychiatric: Her mood appears not anxious. She does not exhibit a depressed mood. Overall improving based on affect   Nursing note and vitals reviewed. Assessment:    ICD-10-CM    1. Migraine syndrome G43.909    2. Need for influenza vaccination Z23 INFLUENZA, QUADV, 3 YRS AND OLDER, IM, PF, PREFILL SYR OR SDV, 0.5ML (FLUZONE QUADV, PF)   3. Moderate episode of recurrent major depressive disorder (HCC) F33.1    4. Coronary artery disease involving native coronary artery of native heart without angina pectoris I25.10        Plan:   Continue Effexor for migraine syndrome and depression. These are both improving overall. Depression still remains uncontrolled overall though. I will increase atorvastatin for better risk factor protection for her CAD. Orders Placed This Encounter   Procedures    INFLUENZA, QUADV, 3 YRS AND OLDER, IM, PF, PREFILL SYR OR SDV, 0.5ML (FLUZONE QUADV, PF)     Orders Placed This Encounter   Medications    venlafaxine (EFFEXOR XR) 75 MG extended release capsule     Sig: Take 1 capsule by mouth daily     Dispense:  30 capsule     Refill:  1    atorvastatin (LIPITOR) 40 MG tablet     Sig: Take 1 tablet by mouth daily     Dispense:  90 tablet     Refill:  1     Medications Discontinued During This Encounter   Medication Reason    atorvastatin (LIPITOR) 20 MG tablet REORDER     Patient Instructions   Stay on current medications. Please arrive 15 minutes early to next follow up appointment in 2 months or schedule an appointment sooner if needed. Patient given educational handouts and has had all questions answered. Patient voices understanding and agrees to plans along with risks and benefits of plan.  Patient isinstructed to continue prior meds, diet, and exercise plans unless instructed otherwise. Patient agrees to follow up as instructed and sooner if needed. Patient agrees to go to ER if condition becomes emergent. Notesmay be completed with dictation device and spelling errors may occur. Return in about 2 months (around 1/2/2019) for f/u effexor .

## 2018-11-12 ENCOUNTER — OFFICE VISIT (OUTPATIENT)
Dept: PRIMARY CARE CLINIC | Age: 33
End: 2018-11-12
Payer: MEDICAID

## 2018-11-12 VITALS
WEIGHT: 202 LBS | BODY MASS INDEX: 39.66 KG/M2 | RESPIRATION RATE: 20 BRPM | HEIGHT: 60 IN | HEART RATE: 74 BPM | TEMPERATURE: 98 F | OXYGEN SATURATION: 98 % | DIASTOLIC BLOOD PRESSURE: 80 MMHG | SYSTOLIC BLOOD PRESSURE: 118 MMHG

## 2018-11-12 DIAGNOSIS — J20.9 ACUTE BRONCHITIS, UNSPECIFIED ORGANISM: Primary | ICD-10-CM

## 2018-11-12 PROCEDURE — 99213 OFFICE O/P EST LOW 20 MIN: CPT | Performed by: FAMILY MEDICINE

## 2018-11-12 RX ORDER — BENZONATATE 100 MG/1
100 CAPSULE ORAL 3 TIMES DAILY PRN
Qty: 20 CAPSULE | Refills: 0 | Status: SHIPPED | OUTPATIENT
Start: 2018-11-12 | End: 2018-11-19

## 2018-11-12 RX ORDER — PREDNISONE 10 MG/1
30 TABLET ORAL DAILY
Qty: 15 TABLET | Refills: 0 | Status: SHIPPED | OUTPATIENT
Start: 2018-11-12 | End: 2018-11-17

## 2018-11-12 RX ORDER — AMOXICILLIN AND CLAVULANATE POTASSIUM 875; 125 MG/1; MG/1
1 TABLET, FILM COATED ORAL 2 TIMES DAILY
Qty: 20 TABLET | Refills: 0 | Status: SHIPPED | OUTPATIENT
Start: 2018-11-12 | End: 2018-11-22

## 2018-11-12 NOTE — PROGRESS NOTES
Rajesh Vazquez is a 26 y.o. female who presents today for   Chief Complaint   Patient presents with    Pharyngitis    Cough       HPI  Patient is here today for complaint four days worsening coarseness of voice and coughing chest pain and congestion. She denies any exertional chest pain but whenever he takes deep breath she does get some tenderness of the anterior chest wall. She denies history pneumonia but has history of MI in the past. Her ears are also bothering her feel full. No change in PMH, family, social, or surgical history unless mentioned above. Review of Systems   Constitutional: Positive for fatigue. Negative for chills and fever. HENT: Positive for congestion, rhinorrhea, sinus pressure and sore throat. Respiratory: Positive for cough. Negative for chest tightness, shortness of breath and wheezing. Cardiovascular: Negative for chest pain, palpitations and leg swelling. Gastrointestinal: Negative for abdominal pain, constipation, diarrhea, nausea and vomiting. Genitourinary: Negative for difficulty urinating, dysuria and frequency.        Past Medical History:   Diagnosis Date    Anxiety     [facility]erebral [facility] occlusion [facility] infarction [facility] [address]     Depression     [facility] (myocardial infarction) [facility] [address]     Miscarriage June 2012       Current Outpatient Prescriptions   Medication Sig Dispense Refill    amoxicillin-clavulanate (AUGMENTIN) 875-125 MG per tablet Take 1 tablet by mouth 2 times daily for 10 days 20 tablet 0    venlafaxine (EFFEXOR XR) 75 MG extended release capsule Take 1 capsule by mouth daily 30 capsule 1    atorvastatin (LIPITOR) 40 MG tablet Take 1 tablet by mouth daily 90 tablet 1    ondansetron (ZOFRAN) 4 MG tablet Take 4 mg by mouth every 8 hours as needed for Nausea or Vomiting      sertraline (ZOLOFT) 100 MG tablet Take 1/2 tablet daily      Multiple Vitamins-Minerals (THERAPEUTIC MULTIVITAMIN-MINERALS) tablet Take 1 tablet by mouth daily     

## 2018-11-15 ASSESSMENT — ENCOUNTER SYMPTOMS
ABDOMINAL PAIN: 0
VOMITING: 0
NAUSEA: 0
CONSTIPATION: 0
DIARRHEA: 0
CHEST TIGHTNESS: 0
WHEEZING: 0
COUGH: 0
SHORTNESS OF BREATH: 0

## 2018-11-20 ASSESSMENT — ENCOUNTER SYMPTOMS
WHEEZING: 0
DIARRHEA: 0
SORE THROAT: 1
COUGH: 1
CHEST TIGHTNESS: 0
SINUS PRESSURE: 1
RHINORRHEA: 1
VOMITING: 0
NAUSEA: 0
CONSTIPATION: 0
SHORTNESS OF BREATH: 0
ABDOMINAL PAIN: 0

## 2018-11-28 ENCOUNTER — TELEPHONE (OUTPATIENT)
Dept: CARDIOLOGY | Age: 33
End: 2018-11-28

## 2018-11-28 DIAGNOSIS — R51.9 NONINTRACTABLE HEADACHE, UNSPECIFIED CHRONICITY PATTERN, UNSPECIFIED HEADACHE TYPE: ICD-10-CM

## 2018-11-28 NOTE — TELEPHONE ENCOUNTER
Called the patient to give instructions for the Stress Test I scheduled for her.   It is for [facility] Dec 3rd at 9:00 in the [facility]    Left a voice mail   MB

## 2018-11-29 ENCOUNTER — HOSPITAL ENCOUNTER (OUTPATIENT)
Dept: MRI IMAGING | Age: 33
Discharge: HOME | End: 2018-11-29
Payer: MEDICAID

## 2018-11-29 DIAGNOSIS — G43.109 MIGRAINE WITH AURA AND WITHOUT STATUS MIGRAINOSUS, NOT INTRACTABLE: ICD-10-CM

## 2018-11-29 PROCEDURE — 70553 MRI BRAIN STEM W/O & W/DYE: CPT

## 2018-11-29 PROCEDURE — 6360000004 HC RX CONTRAST MEDICATION: Performed by: NURSE PRACTITIONER

## 2018-11-29 PROCEDURE — A9577 INJ MULTIHANCE: HCPCS | Performed by: NURSE PRACTITIONER

## 2018-11-29 RX ORDER — BUTALBITAL, ACETAMINOPHEN AND CAFFEINE 50; 325; 40 MG/1; MG/1; MG/1
1 TABLET ORAL EVERY 6 HOURS PRN
Qty: 30 TABLET | Refills: 1 | Status: SHIPPED | OUTPATIENT
Start: 2018-11-29 | End: 2019-02-14 | Stop reason: SDUPTHER

## 2018-11-29 RX ADMIN — GADOBENATE DIMEGLUMINE 18 ML: 529 INJECTION, SOLUTION INTRAVENOUS at 18:11

## 2018-11-29 NOTE — TELEPHONE ENCOUNTER
Requested Prescriptions     Pending Prescriptions Disp Refills    butalbital-acetaminophen-caffeine (FIORICET, ESGIC) -40 MG per tablet 30 tablet 1     Sig: Take 1 tablet by mouth every 6 hours as needed for Headaches Must Last 30 days     Last filled: 10/3/18  Last Office Visit: 10/3/18  Next Office Visit: 12/4/18  Last [place]:  10/3/18

## 2018-12-03 ENCOUNTER — HOSPITAL ENCOUNTER (OUTPATIENT)
Dept: NON INVASIVE DIAGNOSTICS | Age: 33
Discharge: HOME | End: 2018-12-03
Payer: MEDICAID

## 2018-12-03 DIAGNOSIS — I25.10 CORONARY ARTERY DISEASE INVOLVING NATIVE CORONARY ARTERY OF NATIVE HEART WITHOUT ANGINA PECTORIS: Chronic | ICD-10-CM

## 2018-12-03 DIAGNOSIS — R07.89 OTHER CHEST PAIN: ICD-10-CM

## 2018-12-03 DIAGNOSIS — R55 SYNCOPE, UNSPECIFIED SYNCOPE TYPE: ICD-10-CM

## 2018-12-03 PROCEDURE — 93017 CV STRESS TEST TRACING ONLY: CPT

## 2018-12-04 ENCOUNTER — OFFICE VISIT (OUTPATIENT)
Dept: NEUROSURGERY | Age: 33
End: 2018-12-04
Payer: MEDICAID

## 2018-12-04 VITALS
BODY MASS INDEX: 39.66 KG/M2 | WEIGHT: 202 LBS | DIASTOLIC BLOOD PRESSURE: 80 MMHG | SYSTOLIC BLOOD PRESSURE: 122 MMHG | HEIGHT: 60 IN | HEART RATE: 71 BPM

## 2018-12-04 DIAGNOSIS — R56.9 CONVULSIONS, UNSPECIFIED CONVULSION TYPE (HCC): ICD-10-CM

## 2018-12-04 DIAGNOSIS — R51.9 NONINTRACTABLE HEADACHE, UNSPECIFIED CHRONICITY PATTERN, UNSPECIFIED HEADACHE TYPE: Primary | ICD-10-CM

## 2018-12-04 PROCEDURE — 99213 OFFICE O/P EST LOW 20 MIN: CPT | Performed by: NURSE PRACTITIONER

## 2018-12-04 RX ORDER — NORTRIPTYLINE HYDROCHLORIDE 25 MG/1
25 CAPSULE ORAL NIGHTLY
Qty: 30 CAPSULE | Refills: 2 | Status: SHIPPED | OUTPATIENT
Start: 2018-12-04 | End: 2019-03-06

## 2018-12-04 NOTE — PROGRESS NOTES
ADDITIONAL REVIEW:  No results found for: [id number]  Lab Results   Component Value Date    WBC 7.8 09/06/2018    HGB 12.2 09/06/2018    HCT 38.6 09/06/2018    MCV 96.3 09/06/2018     09/06/2018     Lab Results   Component Value Date     09/06/2018    K 3.7 09/06/2018     09/06/2018    CO2 22 09/06/2018    BUN 9 09/06/2018    CREATININE 0.6 09/06/2018    GLUCOSE 84 09/06/2018    CALCIUM 8.9 09/06/2018    PROT 7.1 09/06/2018    LABALBU 3.8 09/06/2018    BILITOT 0.4 09/06/2018    ALKPHOS 86 09/06/2018    AST 19 09/06/2018    ALT 14 09/06/2018    LABGLOM >60 09/06/2018    GLOB 3.3 07/13/2016     No results found for: TSH  No components found for: VITAMIN D HYDROXY  No components found for: MRI  No components found for: CT     Carotid artery u/s (9/2018)- there is no plaque visualized in bilateral internal carotid arteries. No stenosis in right or left internal carotid arteries. Normal antegrade flow in the bilateral vertebral arteries    Echocardiogram (9/2018)- EF 55-60%; mildly thickened mitral valve and aortic valve; mild tricuspid and pulmonic regurgitation     MRI brain (11/2018)-no acute intracranial abnormality. No focal FLAIR signal abnormality    IMPRESSION:  Shahida Swift is a 26 y.o. female here for follow-up of headaches and possible seizures. She reports a history of stroke as well. Recent MRI brain was unremarkable. Given normal MRI and normal vascular workup at this time, will hold off on hypercoagulable labs. We'll plan to start patient on low-dose nortriptyline for headache preventative. Fioricet is helping with the headaches, would like to stay away from Imitrex given possible prior history of stroke. She continues to note possible seizure-like episodes, has not completed EEG yet. We'll plan to retry EEG, question if events related to underlying anxiety or if events are nonepileptic given history. Will consider AEDs pending EEG. ICD-10-CM    1.  Nonintractable headache, unspecified chronicity pattern, unspecified headache type R51    2. [facility]ulsions, [facility] R56.9        PLAN:  1. Re-try EEG  2. Re-try MRA   3. Recommend complete eye exam- patient will schedule this   4. Nortriptyline 25mg nightly for preventative. Discussed side effects   5. Continue Fioricet prn headaches. Discussed limiting use of this to avoid rebound headache  6. Patient advised of the pathophysiology, etiology, and diagnosis of migraines, along with treatment options, and treatment side effects  7. Continue stroke risk factor maximization  8. Follow up in 3 months, sooner with any worsening     SETH Gonzales     Note:  A total of >50% (>8 minutes) of 15 minutes was spent discussing the pathophysiology and treatment and/or coordination of care of the above diagnoses. This dictation was generated by voice recognition computer software. Although all attempts are made to edit the dictation for accuracy, there may be errors in the transcription that are not intended.

## 2018-12-20 ENCOUNTER — OFFICE VISIT (OUTPATIENT)
Dept: CARDIOLOGY | Age: 33
End: 2018-12-20
Payer: MEDICAID

## 2018-12-20 ENCOUNTER — HOSPITAL ENCOUNTER (OUTPATIENT)
Dept: NEUROLOGY | Age: 33
Discharge: HOME | End: 2018-12-20
Payer: MEDICAID

## 2018-12-20 VITALS
SYSTOLIC BLOOD PRESSURE: 128 MMHG | BODY MASS INDEX: 39.07 KG/M2 | DIASTOLIC BLOOD PRESSURE: 78 MMHG | WEIGHT: 199 LBS | HEIGHT: 60 IN | HEART RATE: 84 BPM

## 2018-12-20 DIAGNOSIS — R55 SYNCOPE, UNSPECIFIED SYNCOPE TYPE: Primary | ICD-10-CM

## 2018-12-20 PROCEDURE — 99213 OFFICE O/P EST LOW 20 MIN: CPT | Performed by: CLINICAL NURSE SPECIALIST

## 2018-12-20 PROCEDURE — 95819 EEG AWAKE AND ASLEEP: CPT

## 2018-12-20 PROCEDURE — 95819 EEG AWAKE AND ASLEEP: CPT | Performed by: PSYCHIATRY & NEUROLOGY

## 2018-12-20 NOTE — PROGRESS NOTES
hx-breast malignancy 07/18/2012    Diffuse cystic mastopathy 04/16/2012    Lump or mass in breast left  04/16/2012     Past Medical History:   Diagnosis Date    Anxiety     Cerebral artery occlusion with cerebral infarction ([facility] [address]     Depression     MI (myocardial infarction) ([facility] [address]     Miscarriage June 2012     Past Surgical History:   Procedure Laterality Date    CHOLECYSTECTOMY, LAPAROSCOPIC      FRACTURE SURGERY      lower right leg, has metal plate and screws    FRACTURE SURGERY      left wrist    INTRAUTERINE DEVICE INSERTION      Insure placement 6/7/2016     Family History   Problem Relation Age of Onset    High Blood Pressure Mother     Diabetes Father     Heart Disease Father     Breast Cancer Other         maternal great aunt     Social History   Substance Use Topics    Smoking status: Never Smoker    Smokeless tobacco: Never Used    Alcohol use No      Current Outpatient Prescriptions   Medication Sig Dispense Refill    nortriptyline (PAMELOR) 25 MG capsule Take 1 capsule by mouth nightly 30 capsule 2    butalbital-acetaminophen-caffeine (FIORICET, ESGIC) -40 MG per tablet Take 1 tablet by mouth every 6 hours as needed for Headaches Must Last 30 days 30 tablet 1    venlafaxine (EFFEXOR XR) 75 MG extended release capsule Take 1 capsule by mouth daily 30 capsule 1    atorvastatin (LIPITOR) 40 MG tablet Take 1 tablet by mouth daily 90 tablet 1    ondansetron (ZOFRAN) 4 MG tablet Take 4 mg by mouth every 8 hours as needed for Nausea or Vomiting      Multiple Vitamins-Minerals (THERAPEUTIC MULTIVITAMIN-MINERALS) tablet Take 1 tablet by mouth daily      omeprazole (PRILOSEC OTC) 20 MG tablet Take 1 tablet by mouth daily 30 tablet 3    aspirin EC 81 MG EC tablet Take 1 tablet by mouth daily 90 tablet 5     No current facility-administered medications for this visit. Allergies: Patient has no known allergies.     Review of Systems  Constitutional - no significant activity change, appetite change, or unexpected weight change. No fever, chills or diaphoresis. No fatigue. HEENT - no significant rhinorrhea or epistaxis. No tinnitus or significant hearing loss. Eyes - no sudden vision change or amaurosis. Respiratory - no significant wheezing, stridor, apnea or cough. No dyspnea on exertion or shortness of breath. Cardiovascular - no exertional chest pain, orthopnea or PND. No sensation of arrhythmia or slow heart rate. No claudication or leg edema. Gastrointestinal - no abdominal swelling or pain. No blood in stool. No severe constipation, diarrhea, nausea, or vomiting. Genitourinary - no difficulty urinating, dysuria, frequency, or urgency. No flank pain or hematuria. Musculoskeletal - no back pain, gait disturbance, or myalgia. Skin - no color change or rash. No pallor. No new surgical incision. Neurologic - no speech difficulty, facial asymmetry or lateralizing weakness. +seizures, + presyncope, + syncope,+ significant dizziness. Hematologic - no easy bruising or excessive bleeding. Psychiatric - no severe anxiety or insomnia. No confusion. All other review of systems are negative. Objective  Vital Signs - /78   Pulse 84   Ht 5' (1.524 m)   Wt 199 lb (90.3 kg)   BMI 38.86 kg/m²   General - [place] is alert, cooperative, and pleasant. Well groomed. No acute distress. Body habitus is obese. HEENT - The head is normocephalic. No circumoral cyanosis. Dentition is normal.   EYES -  No Xanthelasma, no arcus senilis, no conjunctival hemorrhages or discharge. Neck - Supple, without increased jugular venous pressures. No carotid bruits. No mass. Respiratory - Lungs are clear bilaterally. No wheezes or rales. Normal effort without use of accessory muscles. Cardiovascular - Heart has regular rhythm and rate. No murmurs, rubs or gallops. + pedal pulses and no varicosities. Abdominal -  Soft, nontender, nondistended.   Bowel sounds

## 2019-01-02 ENCOUNTER — OFFICE VISIT (OUTPATIENT)
Dept: PRIMARY CARE CLINIC | Age: 34
End: 2019-01-02
Payer: MEDICAID

## 2019-01-02 ENCOUNTER — HOSPITAL ENCOUNTER (OUTPATIENT)
Dept: GENERAL RADIOLOGY | Age: 34
Discharge: HOME | End: 2019-01-02
Payer: MEDICAID

## 2019-01-02 VITALS
HEART RATE: 81 BPM | HEIGHT: 60 IN | OXYGEN SATURATION: 99 % | BODY MASS INDEX: 38.68 KG/M2 | SYSTOLIC BLOOD PRESSURE: 128 MMHG | TEMPERATURE: 97.3 F | WEIGHT: 197 LBS | DIASTOLIC BLOOD PRESSURE: 64 MMHG

## 2019-01-02 DIAGNOSIS — F33.1 MODERATE EPISODE OF RECURRENT MAJOR DEPRESSIVE DISORDER (HCC): Primary | Chronic | ICD-10-CM

## 2019-01-02 DIAGNOSIS — R05.3 CHRONIC COUGH: ICD-10-CM

## 2019-01-02 DIAGNOSIS — R09.89 GLOBUS SENSATION: ICD-10-CM

## 2019-01-02 DIAGNOSIS — G43.909 MIGRAINE SYNDROME: Chronic | ICD-10-CM

## 2019-01-02 PROCEDURE — 99214 OFFICE O/P EST MOD 30 MIN: CPT | Performed by: FAMILY MEDICINE

## 2019-01-02 PROCEDURE — 71046 X-RAY EXAM CHEST 2 VIEWS: CPT

## 2019-01-08 ENCOUNTER — HOSPITAL ENCOUNTER (OUTPATIENT)
Dept: MRI IMAGING | Age: 34
Discharge: HOME | End: 2019-01-08
Payer: MEDICAID

## 2019-01-08 DIAGNOSIS — G43.109 MIGRAINE WITH AURA AND WITHOUT STATUS MIGRAINOSUS, NOT INTRACTABLE: ICD-10-CM

## 2019-01-08 PROCEDURE — 70544 MR ANGIOGRAPHY HEAD W/O DYE: CPT

## 2019-01-09 ASSESSMENT — ENCOUNTER SYMPTOMS
CHEST TIGHTNESS: 0
ABDOMINAL PAIN: 0
SHORTNESS OF BREATH: 0
NAUSEA: 0
DIARRHEA: 0
CONSTIPATION: 0
COUGH: 0
WHEEZING: 0
VOMITING: 0

## 2019-01-16 RX ORDER — VENLAFAXINE HYDROCHLORIDE 75 MG/1
CAPSULE, EXTENDED RELEASE ORAL
Qty: 30 CAPSULE | Refills: 1 | Status: SHIPPED | OUTPATIENT
Start: 2019-01-16 | End: 2019-04-08 | Stop reason: SDUPTHER

## 2019-01-18 ENCOUNTER — OFFICE VISIT (OUTPATIENT)
Dept: PSYCHIATRY | Age: 34
End: 2019-01-18
Payer: MEDICAID

## 2019-01-18 DIAGNOSIS — F33.3 SEVERE EPISODE OF RECURRENT MAJOR DEPRESSIVE DISORDER, WITH PSYCHOTIC FEATURES (HCC): Primary | ICD-10-CM

## 2019-01-18 PROCEDURE — 90791 PSYCH DIAGNOSTIC EVALUATION: CPT | Performed by: COUNSELOR

## 2019-01-23 ENCOUNTER — OFFICE VISIT (OUTPATIENT)
Dept: PRIMARY CARE CLINIC | Age: 34
End: 2019-01-23
Payer: MEDICAID

## 2019-01-23 DIAGNOSIS — J98.4 RESTRICTIVE LUNG DISEASE: Primary | ICD-10-CM

## 2019-01-23 PROCEDURE — 99213 OFFICE O/P EST LOW 20 MIN: CPT | Performed by: FAMILY MEDICINE

## 2019-01-23 PROCEDURE — 94060 EVALUATION OF WHEEZING: CPT | Performed by: FAMILY MEDICINE

## 2019-01-23 RX ORDER — PREDNISONE 20 MG/1
TABLET ORAL
Qty: 20 TABLET | Refills: 0 | Status: SHIPPED | OUTPATIENT
Start: 2019-01-23 | End: 2019-03-06

## 2019-01-23 RX ORDER — ALBUTEROL SULFATE 90 UG/1
2 AEROSOL, METERED RESPIRATORY (INHALATION) EVERY 6 HOURS PRN
Qty: 1 INHALER | Refills: 0 | Status: ON HOLD | OUTPATIENT
Start: 2019-01-23 | End: 2019-06-24

## 2019-01-23 RX ORDER — ALBUTEROL SULFATE 90 UG/1
2 AEROSOL, METERED RESPIRATORY (INHALATION) EVERY 6 HOURS PRN
Qty: 1 INHALER | Refills: 0 | Status: SHIPPED | OUTPATIENT
Start: 2019-01-23 | End: 2019-03-20 | Stop reason: SDUPTHER

## 2019-02-06 ENCOUNTER — OFFICE VISIT (OUTPATIENT)
Dept: PRIMARY CARE CLINIC | Age: 34
End: 2019-02-06
Payer: MEDICAID

## 2019-02-06 VITALS
HEIGHT: 60 IN | WEIGHT: 195.4 LBS | DIASTOLIC BLOOD PRESSURE: 60 MMHG | TEMPERATURE: 98.1 F | SYSTOLIC BLOOD PRESSURE: 122 MMHG | HEART RATE: 78 BPM | OXYGEN SATURATION: 98 % | BODY MASS INDEX: 38.36 KG/M2

## 2019-02-06 DIAGNOSIS — L30.4 INTERTRIGO: ICD-10-CM

## 2019-02-06 DIAGNOSIS — R13.19 OTHER DYSPHAGIA: ICD-10-CM

## 2019-02-06 DIAGNOSIS — J45.20 MILD INTERMITTENT ASTHMA WITHOUT COMPLICATION: ICD-10-CM

## 2019-02-06 DIAGNOSIS — F33.1 MODERATE EPISODE OF RECURRENT MAJOR DEPRESSIVE DISORDER (HCC): Primary | Chronic | ICD-10-CM

## 2019-02-06 PROCEDURE — 99214 OFFICE O/P EST MOD 30 MIN: CPT | Performed by: FAMILY MEDICINE

## 2019-02-06 RX ORDER — KETOCONAZOLE 20 MG/G
CREAM TOPICAL
Qty: 60 G | Refills: 1 | Status: SHIPPED | OUTPATIENT
Start: 2019-02-06 | End: 2020-07-01

## 2019-02-06 ASSESSMENT — ENCOUNTER SYMPTOMS
NAUSEA: 0
COUGH: 0
ABDOMINAL PAIN: 0
VOMITING: 0
WHEEZING: 0
CONSTIPATION: 0
DIARRHEA: 0
CHEST TIGHTNESS: 0
SHORTNESS OF BREATH: 1

## 2019-02-07 ENCOUNTER — HOSPITAL ENCOUNTER (OUTPATIENT)
Dept: SLEEP CENTER | Age: 34
Discharge: HOME | End: 2019-02-09
Payer: MEDICAID

## 2019-02-07 PROCEDURE — 95810 POLYSOM 6/> YRS 4/> PARAM: CPT | Performed by: PSYCHIATRY & NEUROLOGY

## 2019-02-07 PROCEDURE — 95810 POLYSOM 6/> YRS 4/> PARAM: CPT

## 2019-02-11 ASSESSMENT — ENCOUNTER SYMPTOMS
WHEEZING: 0
ABDOMINAL PAIN: 0
CHEST TIGHTNESS: 0
NAUSEA: 0
DIARRHEA: 0
VOMITING: 0
COUGH: 1
CONSTIPATION: 0
SHORTNESS OF BREATH: 1

## 2019-02-14 DIAGNOSIS — R51.9 NONINTRACTABLE HEADACHE, UNSPECIFIED CHRONICITY PATTERN, UNSPECIFIED HEADACHE TYPE: ICD-10-CM

## 2019-02-14 RX ORDER — BUTALBITAL, ACETAMINOPHEN AND CAFFEINE 50; 325; 40 MG/1; MG/1; MG/1
TABLET ORAL
Qty: 30 TABLET | Refills: 1 | Status: SHIPPED | OUTPATIENT
Start: 2019-02-14 | End: 2019-03-27 | Stop reason: SDUPTHER

## 2019-02-25 ENCOUNTER — HOSPITAL ENCOUNTER (OUTPATIENT)
Dept: PULMONOLOGY | Age: 34
Discharge: HOME | End: 2019-02-25
Payer: MEDICAID

## 2019-02-25 ENCOUNTER — OUTSIDE FACILITY SERVICE (OUTPATIENT)
Dept: PULMONOLOGY | Facility: CLINIC | Age: 34
End: 2019-02-25

## 2019-02-25 DIAGNOSIS — J98.4 RESTRICTIVE LUNG DISEASE: ICD-10-CM

## 2019-02-25 PROCEDURE — 94375 RESPIRATORY FLOW VOLUME LOOP: CPT | Performed by: INTERNAL MEDICINE

## 2019-02-25 PROCEDURE — 94375 RESPIRATORY FLOW VOLUME LOOP: CPT

## 2019-02-28 DIAGNOSIS — G47.33 OBSTRUCTIVE SLEEP APNEA: Primary | ICD-10-CM

## 2019-03-06 ENCOUNTER — OFFICE VISIT (OUTPATIENT)
Dept: PRIMARY CARE CLINIC | Age: 34
End: 2019-03-06
Payer: MEDICAID

## 2019-03-06 VITALS
BODY MASS INDEX: 37.69 KG/M2 | HEIGHT: 60 IN | DIASTOLIC BLOOD PRESSURE: 78 MMHG | HEART RATE: 88 BPM | WEIGHT: 192 LBS | TEMPERATURE: 98 F | SYSTOLIC BLOOD PRESSURE: 128 MMHG | OXYGEN SATURATION: 96 % | RESPIRATION RATE: 20 BRPM

## 2019-03-06 DIAGNOSIS — R51.9 NONINTRACTABLE HEADACHE, UNSPECIFIED CHRONICITY PATTERN, UNSPECIFIED HEADACHE TYPE: ICD-10-CM

## 2019-03-06 DIAGNOSIS — J45.40 MODERATE PERSISTENT ASTHMA WITHOUT COMPLICATION: Primary | ICD-10-CM

## 2019-03-06 PROCEDURE — 99213 OFFICE O/P EST LOW 20 MIN: CPT | Performed by: FAMILY MEDICINE

## 2019-03-06 RX ORDER — FLUTICASONE FUROATE AND VILANTEROL 100; 25 UG/1; UG/1
2 POWDER RESPIRATORY (INHALATION) DAILY
Qty: 60 EACH | Refills: 3 | Status: SHIPPED | OUTPATIENT
Start: 2019-03-06 | End: 2019-07-15 | Stop reason: SDUPTHER

## 2019-03-20 ENCOUNTER — OFFICE VISIT (OUTPATIENT)
Dept: CARDIOLOGY | Age: 34
End: 2019-03-20
Payer: MEDICAID

## 2019-03-20 VITALS
HEART RATE: 68 BPM | BODY MASS INDEX: 38.09 KG/M2 | SYSTOLIC BLOOD PRESSURE: 106 MMHG | DIASTOLIC BLOOD PRESSURE: 78 MMHG | WEIGHT: 194 LBS | HEIGHT: 60 IN

## 2019-03-20 DIAGNOSIS — E78.5 DYSLIPIDEMIA: ICD-10-CM

## 2019-03-20 DIAGNOSIS — I25.10 CORONARY ARTERY DISEASE INVOLVING NATIVE CORONARY ARTERY OF NATIVE HEART WITHOUT ANGINA PECTORIS: ICD-10-CM

## 2019-03-20 DIAGNOSIS — R55 SYNCOPE, UNSPECIFIED SYNCOPE TYPE: Primary | ICD-10-CM

## 2019-03-20 PROCEDURE — 99214 OFFICE O/P EST MOD 30 MIN: CPT | Performed by: CLINICAL NURSE SPECIALIST

## 2019-03-21 ASSESSMENT — ENCOUNTER SYMPTOMS
ABDOMINAL PAIN: 0
NAUSEA: 0
CONSTIPATION: 0
SHORTNESS OF BREATH: 0
VOMITING: 0
WHEEZING: 0
COUGH: 0
DIARRHEA: 0
CHEST TIGHTNESS: 0

## 2019-03-22 PROCEDURE — 90471 IMMUNIZATION ADMIN: CPT | Performed by: FAMILY MEDICINE

## 2019-03-22 PROCEDURE — 90732 PPSV23 VACC 2 YRS+ SUBQ/IM: CPT | Performed by: FAMILY MEDICINE

## 2019-03-27 ENCOUNTER — TELEPHONE (OUTPATIENT)
Dept: NEUROSURGERY | Age: 34
End: 2019-03-27

## 2019-03-27 ENCOUNTER — OFFICE VISIT (OUTPATIENT)
Dept: NEUROSURGERY | Age: 34
End: 2019-03-27
Payer: MEDICAID

## 2019-03-27 VITALS
HEART RATE: 75 BPM | DIASTOLIC BLOOD PRESSURE: 75 MMHG | BODY MASS INDEX: 37.3 KG/M2 | WEIGHT: 190 LBS | OXYGEN SATURATION: 99 % | SYSTOLIC BLOOD PRESSURE: 111 MMHG | HEIGHT: 60 IN

## 2019-03-27 DIAGNOSIS — G43.109 MIGRAINE WITH AURA AND WITHOUT STATUS MIGRAINOSUS, NOT INTRACTABLE: ICD-10-CM

## 2019-03-27 DIAGNOSIS — R56.9 CONVULSIONS, UNSPECIFIED CONVULSION TYPE (HCC): ICD-10-CM

## 2019-03-27 DIAGNOSIS — R55 SYNCOPE, UNSPECIFIED SYNCOPE TYPE: Primary | ICD-10-CM

## 2019-03-27 DIAGNOSIS — R51.9 NONINTRACTABLE HEADACHE, UNSPECIFIED CHRONICITY PATTERN, UNSPECIFIED HEADACHE TYPE: ICD-10-CM

## 2019-03-27 PROCEDURE — 99213 OFFICE O/P EST LOW 20 MIN: CPT | Performed by: NURSE PRACTITIONER

## 2019-03-27 RX ORDER — BUTALBITAL, ACETAMINOPHEN AND CAFFEINE 50; 325; 40 MG/1; MG/1; MG/1
TABLET ORAL
Qty: 30 TABLET | Refills: 1 | Status: SHIPPED | OUTPATIENT
Start: 2019-03-27 | End: 2019-06-27 | Stop reason: SDUPTHER

## 2019-03-28 ENCOUNTER — TELEPHONE (OUTPATIENT)
Dept: NEUROLOGY | Age: 34
End: 2019-03-28

## 2019-03-28 ENCOUNTER — TELEPHONE (OUTPATIENT)
Dept: NEUROSURGERY | Age: 34
End: 2019-03-28

## 2019-04-02 ENCOUNTER — TELEPHONE (OUTPATIENT)
Dept: NEUROSURGERY | Age: 34
End: 2019-04-02

## 2019-04-02 NOTE — TELEPHONE ENCOUNTER
Called and spoke with the patient informing her that her Emgality has been approved. Patient voiced thank you.

## 2019-04-03 ENCOUNTER — HOSPITAL ENCOUNTER (OUTPATIENT)
Dept: VASCULAR LAB | Age: 34
Discharge: HOME | End: 2019-04-03
Payer: MEDICAID

## 2019-04-03 DIAGNOSIS — R55 SYNCOPE, UNSPECIFIED SYNCOPE TYPE: ICD-10-CM

## 2019-04-03 PROCEDURE — 93880 EXTRACRANIAL BILAT STUDY: CPT

## 2019-04-08 RX ORDER — VENLAFAXINE HYDROCHLORIDE 75 MG/1
CAPSULE, EXTENDED RELEASE ORAL
Qty: 30 CAPSULE | Refills: 1 | Status: SHIPPED | OUTPATIENT
Start: 2019-04-08 | End: 2019-06-04 | Stop reason: SDUPTHER

## 2019-04-10 ENCOUNTER — HOSPITAL ENCOUNTER (OUTPATIENT)
Dept: CARDIAC CATH/INVASIVE PROCEDURES | Age: 34
Setting detail: OUTPATIENT SURGERY
Discharge: HOME | End: 2019-04-10
Attending: INTERNAL MEDICINE | Admitting: INTERNAL MEDICINE
Payer: MEDICAID

## 2019-04-10 VITALS
DIASTOLIC BLOOD PRESSURE: 110 MMHG | TEMPERATURE: 98.3 F | OXYGEN SATURATION: 96 % | HEIGHT: 60 IN | WEIGHT: 199 LBS | BODY MASS INDEX: 39.07 KG/M2 | SYSTOLIC BLOOD PRESSURE: 131 MMHG | RESPIRATION RATE: 18 BRPM | HEART RATE: 101 BPM

## 2019-04-10 DIAGNOSIS — R55 SYNCOPE, UNSPECIFIED SYNCOPE TYPE: ICD-10-CM

## 2019-04-10 PROCEDURE — 2580000003 HC RX 258: Performed by: INTERNAL MEDICINE

## 2019-04-10 PROCEDURE — 99152 MOD SED SAME PHYS/QHP 5/>YRS: CPT | Performed by: INTERNAL MEDICINE

## 2019-04-10 PROCEDURE — 93005 ELECTROCARDIOGRAM TRACING: CPT

## 2019-04-10 PROCEDURE — 99238 HOSP IP/OBS DSCHRG MGMT 30/<: CPT | Performed by: INTERNAL MEDICINE

## 2019-04-10 PROCEDURE — 33285 INSJ SUBQ CAR RHYTHM MNTR: CPT | Performed by: INTERNAL MEDICINE

## 2019-04-10 PROCEDURE — 2500000003 HC RX 250 WO HCPCS

## 2019-04-10 PROCEDURE — 6360000002 HC RX W HCPCS

## 2019-04-10 PROCEDURE — 6370000000 HC RX 637 (ALT 250 FOR IP): Performed by: INTERNAL MEDICINE

## 2019-04-10 RX ORDER — SODIUM CHLORIDE 0.9 % (FLUSH) 0.9 %
10 SYRINGE (ML) INJECTION PRN
Status: DISCONTINUED | OUTPATIENT
Start: 2019-04-10 | End: 2019-04-10 | Stop reason: HOSPADM

## 2019-04-10 RX ORDER — SODIUM CHLORIDE 0.9 % (FLUSH) 0.9 %
10 SYRINGE (ML) INJECTION EVERY 12 HOURS SCHEDULED
Status: DISCONTINUED | OUTPATIENT
Start: 2019-04-10 | End: 2019-04-10 | Stop reason: HOSPADM

## 2019-04-10 RX ORDER — ACETAMINOPHEN 325 MG/1
650 TABLET ORAL EVERY 4 HOURS PRN
Status: DISCONTINUED | OUTPATIENT
Start: 2019-04-10 | End: 2019-04-10 | Stop reason: HOSPADM

## 2019-04-10 RX ORDER — SODIUM CHLORIDE 9 MG/ML
INJECTION, SOLUTION INTRAVENOUS CONTINUOUS
Status: DISCONTINUED | OUTPATIENT
Start: 2019-04-10 | End: 2019-04-10 | Stop reason: HOSPADM

## 2019-04-10 RX ORDER — ONDANSETRON 2 MG/ML
4 INJECTION INTRAMUSCULAR; INTRAVENOUS EVERY 6 HOURS PRN
Status: DISCONTINUED | OUTPATIENT
Start: 2019-04-10 | End: 2019-04-10 | Stop reason: HOSPADM

## 2019-04-10 RX ORDER — CHLORHEXIDINE GLUCONATE 4 G/100ML
SOLUTION TOPICAL ONCE
Status: COMPLETED | OUTPATIENT
Start: 2019-04-10 | End: 2019-04-10

## 2019-04-10 RX ADMIN — SODIUM CHLORIDE: 9 INJECTION, SOLUTION INTRAVENOUS at 09:03

## 2019-04-10 RX ADMIN — CHLORHEXIDINE GLUCONATE: 213 SOLUTION TOPICAL at 09:03

## 2019-04-10 NOTE — H&P
SETH Jansen   Nurse Practitioner   Certified Nurse Specialist   Progress Notes      Signed   Encounter Date:  3/20/2019          Related encounter: Office Visit from 3/20/2019 in [facility]         Signed        Expand All Collapse All          Show:Clear all  [x]Manual[x]Template[x]Copied    Added by:  [x]SETH Pemberton      []Pema for details      [facility] [id number]  Phone: [phone]  Fax: [phone]     OFFICE VISIT:  3/20/2019     Skylar Rodriguez - : 1985     Reason For Visit:  Eliseo Pham is a 26 y.o. female who is here for 3 Month Follow-Up (Patient has had an episode of syncope last Sun); Loss of Consciousness (FU); and Coronary Artery Disease  Patient was seen and evaluated 10/31/18 for syncope  2-D echo in September showed no significant structural abnormality. Patient underwent stress test December 3 that was negative for ischemia  She wore monitor for 12-1/2 days. Showed average heart rate 86 with range   Underlying rhythm was sinus. Symptoms correlated with normal sinus rhythm and tachycardia. Rare ectopy     She has had sleep study. Has follow-up next week  Under care of psychiatrist     She has had another episode of syncope. She put daughter in car and walked around car. Sudden onset of syncope. Collapsed on the ground. Was witnessed by her brother and has been. States was unconscious for about 5 minutes. When she came to she was confused, SOB and blurred vision. That resolved. She states her EEG was normal.  No recent change in medications except for she is now off one of her psychiatric medications because it was causing hallucinations.     Subjective  Opel denies exertional chest pain. She has AKBAR. Usually no resting shortness of breath. Had breathing tests and has been put on inhaler. No paroxysmal nocturnal dyspnea,  arrhythmia, edema.   The patient denies numbness or weakness to suggest cerebrovascular accident or transient ischemic attack. Vianney Wolf MD is PCP.   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) 09/04/2018    Moderate episode of recurrent major depressive disorder ([address] 09/04/2018    Decreased strength of lower extremity 09/04/2018    Syncope 09/04/2018    Chronic superficial gastritis 09/04/2018    Family hx-breast malignancy 07/18/2012    Diffuse cystic mastopathy 04/16/2012    Lump or mass in breast left  04/16/2012      Past Medical History        Past Medical History:   Diagnosis Date    Anxiety      [facility]erebral artery occlusion [facility] infarction [address] [address]      Depression      [facility])      Miscarriage June 2012         Past Surgical History         Past Surgical History:   Procedure Laterality Date    CHOLECYSTECTOMY, LAPAROSCOPIC        FRACTURE SURGERY         lower right leg, has metal plate and screws    FRACTURE SURGERY         left wrist    INTRAUTERINE DEVICE INSERTION         Insure placement 6/7/2016         Family History         Family History   Problem Relation Age of Onset    High Blood Pressure Mother      Diabetes Father      Heart Disease Father      Breast Cancer Other           maternal great aunt         Social History           Tobacco Use    Smoking status: Never Smoker    Smokeless tobacco: Never Used   Substance Use Topics    Alcohol use: No      Current Facility-Administered Medications          Current Outpatient Medications   Medication Sig Dispense Refill    fluticasone-vilanterol (BREO ELLIPTA) 100-25 MCG/INH AEPB inhaler Inhale 2 puffs into the lungs daily 60 each 3    omeprazole 20 MG EC tablet TAKE 1 TABLET BY MOUTH ONCE DAILY 30 tablet 11    butalbital-acetaminophen-caffeine (FIORICET, ESGIC) -40 MG per tablet TAKE 1 TABLET BY MOUTH EVERY 6 HOURS AS NEEDED FOR HEADACHE MUST  LAST  30  DAYS 30 tablet 1    ketoconazole (NIZORAL) 2 % cream Apply topically daily. 60 g 1    albuterol sulfate  (90 Base) MCG/ACT inhaler Inhale 2 puffs into the lungs every 6 hours as needed for Wheezing 1 Inhaler 0    venlafaxine (EFFEXOR XR) 75 MG extended release capsule TAKE 1 CAPSULE BY MOUTH ONCE DAILY 30 capsule 1    atorvastatin (LIPITOR) 40 MG tablet Take 1 tablet by mouth daily 90 tablet 1    ondansetron (ZOFRAN) 4 MG tablet Take 4 mg by mouth every 8 hours as needed for Nausea or Vomiting        Multiple Vitamins-Minerals (THERAPEUTIC MULTIVITAMIN-MINERALS) tablet Take 1 tablet by mouth daily        aspirin EC 81 MG EC tablet Take 1 tablet by mouth daily 90 tablet 5      No current facility-administered medications for this visit.          Allergies: Patient has no known allergies.     Review of Systems  Constitutional - no significant activity change, appetite change, or unexpected weight change. No fever, chills or diaphoresis. + fatigue. HEENT - no significant rhinorrhea or epistaxis. No tinnitus or significant hearing loss. Eyes - no sudden vision change or amaurosis. Respiratory - no significant wheezing, stridor, apnea or cough. No dyspnea on exertion or shortness of breath. Cardiovascular - no exertional chest pain, orthopnea or PND. No sensation of arrhythmia or slow heart rate. No claudication or leg edema. Gastrointestinal - no abdominal swelling or pain. No blood in stool. No severe constipation, diarrhea, nausea, or vomiting. Genitourinary - no difficulty urinating, dysuria, frequency, or urgency. No flank pain or hematuria. Musculoskeletal - no back pain, gait disturbance, or myalgia. Skin - no color change or rash. No pallor. No new surgical incision.   Neurologic - no speech difficulty, facial asymmetry or lateralizing weakness. No seizures, presyncope  + syncope,  No  significant dizziness. Hematologic - no easy bruising or excessive bleeding. Psychiatric - + anxiety no  insomnia. No confusion. All other review of systems are negative.        Objective  Vital Signs - /78   Pulse 68   Ht 5' (1.524 m)   Wt 194 lb (88 kg)   BMI 37.89 kg/m²   General - Opel is alert, cooperative, and pleasant. Well groomed. No acute distress. Body habitus is obese. HEENT - The head is normocephalic. No circumoral cyanosis. Dentition is normal.   EYES -  No Xanthelasma, no arcus senilis, no conjunctival hemorrhages or discharge. Neck - Supple, without increased jugular venous pressures. No carotid bruits. No mass. Respiratory - Lungs are clear bilaterally. No wheezes or rales. Normal effort without use of accessory muscles. Cardiovascular - Heart has regular rhythm and rate. No murmurs, rubs or gallops. + pedal pulses and no varicosities. Abdominal -  Soft, nontender, nondistended. Bowel sounds are intact. Extremities - No clubbing, cyanosis, or  edema. Musculoskeletal -  No clubbing . No Osler's nodes. Gait normal .  No kyphosis or scoliosis. Skin -  no statis ulcers or dermatitis. Neurological - No focal signs are identified. Oriented to person, place and time. Psychiatric -  Appropriate affect and mood.         Assessment:      Diagnosis Orders   1. Syncope, unspecified syncope type      2. Coronary artery disease involving native coronary artery of native heart without angina pectoris      3.  Dyslipidemia         Data:      BP Readings from Last 3 Encounters:   03/20/19 106/78   03/06/19 128/78   02/06/19 122/60         Pulse Readings from Last 3 Encounters:   03/20/19 68   03/06/19 88   02/06/19 78              Wt Readings from Last 3 Encounters:   03/20/19 194 lb (88 kg)   03/06/19 192 lb (87.1 kg)   02/06/19 195 lb 6.4 oz (88.6 kg)    Reviewed all office notes from other providers. No source of syncope found  Fairly does have BRUCE. Has appointment follow-up with neurosurgery next week to discuss.     Reviewed labs. Continues to have unprovoked current known source of syncope. Patient is no longer working. Recommended driving  We discussed placement of loop recorder to evaluate correlation with her syncopal spells. She is willing to proceed. Scheduled for April States taking medications as prescribed        Plan     Loop recorder replacement- April 10th, arrive at 8AM   Nothing to eat or drink after midnight, will need a   Follow up after procedure. Call with any questions or concerns  Follow up with Janyth Cranker, MD for non cardiac problems  Follow-up with other specialists as planned  Report any new problems  Cardiovascular Fitness-Exercise as tolerated. Strive for 30 minutes of exercise most days of the week. Cardiac / Healthy Diet  Continue current medications as directed  Continue plan of treatment  It is always recommended that you bring your medications bottles with you to each visit - this is for your safety!         SETH Vasquez        This note was in part dictated using Dragon dictation device. Date of the Proposed Procedure:  4/10/2019     Proposed Procedure:  Placement of loop recorder    :  BRENT Nguyen MD    Indications:  Syncope of uncertain etilogy    American Society of Anesthesiologists (ASA) Classification:  III    Plan of Sedation:  Moderate Sedation    Mallampati Classification: II    I have examined this patient on 04/10/19  in cath holding and find no interval changes since the original History and Physical / Consult Noted written by Magdalena ANN, on 3/20/2019    With the constellation of symptoms and these findings, I recommend placement of a loop recorder     I discussed with her  in detail  the risks, benefits, and alternatives of this  procedure.   The risks mentioned to her include but are not limited to:  vascular access complications (including lung collapse, misplacement of the pacemaker lead, or perforation of the vein or artery; inadvertent misplacement of the pacemaker lead, or infection. Overall, major complications occur in < than 5% all new implantations and < 3% of all generator changes. Long term complications also include but are not limited to:  sensitivity to the device material, swelling of the arm, failure to deliver therapy when it is need, or receiving extra therapy when it is not needed. Additionally, there are limitations with respect to magnet and electromagnetic fields, electric or gas - powered appliances, and tools with which you may want to use. (from Gloria, 10th edition, page 6,700)    Additionally, there are risks associated with moderate sedation which includes transient drop in blood pressure and need for assisted ventilation. She appeared to understand, had no questions, and agreed to proceed with this plan. This procedure is scheduled for 04/10/19 .     Faviola Feng MD

## 2019-04-10 NOTE — DISCHARGE SUMMARY
[facility]    Discharge Summary          I personally saw the patient and rounded with:  Bradley Grady RN, on  4/10/19      The observations documented in this note, including the assessment and plan are mine              Patient ID: Eulalio Pratt      Patient's PCP: Luna Cunningham MD    Admit Date: 4/10/2019     Discharge Date:  04/10/19     Admitting Physician:  Larisa Santiago MD       Discharge Physician: Larisa Santiago MD     Discharge Diagnoses:    1. Syncope and collapse    9/11/2018  Echo  Normal LVFX, RVSP 39 mmHg  12/3/2019  Stress test indeterminate  4/10/19 loop recorder placed        Cardiac Specific Diagnoses:    Specialty Problems        Cardiology Problems    Coronary artery disease involving native coronary artery of native heart without angina pectoris        Migraine syndrome        Syncope                The patient was seen and examined on day of discharge and this discharge summary is in conjunction with any daily progress note from day of discharge. History of Present Illness: The patient was seen in the office on 3/20/2019 by Mini ANN and the following was noted:    \"Reason For Visit:  Racheal Whitman is a 26 y.o. female who is here for 3 Month Follow-Up (Patient has had an episode of syncope last Sun); Loss of Consciousness (FU); and Coronary Artery Disease  Patient was seen and evaluated 10/31/18 for syncope  2-D echo in September showed no significant structural abnormality. Patient underwent stress test December 3 that was negative for ischemia  She wore monitor for 12-1/2 days. Showed average heart rate 86 with range   Underlying rhythm was sinus. Symptoms correlated with normal sinus rhythm and tachycardia. Rare ectopy     She has had sleep study. Has follow-up next week  Under care of psychiatrist     She has had another episode of syncope. She put daughter in car and walked around car. Sudden onset of syncope. Collapsed on the ground.  Was witnessed by her brother and has been. States was unconscious for about 5 minutes. When she came to she was confused, SOB and blurred vision. That resolved. She states her EEG was normal.  No recent change in medications except for she is now off one of her psychiatric medications because it was causing hallucinations. \"     Additionally,  It was noted by the same observer at that time:    \"Loop recorder replacement- April 10th, arrive at 8AM   Nothing to eat or drink after midnight, will need a \"    She was electively admitted for placement of a loop recorder. Hospital Course:     After admission, the patient the following procedure was preformed:    Successful Implantable of a Implantable Cardiac Monitor (Reveal LINQ) for syncope and collapse. There were no apparent complications. The rest of the patient's hospitalization was uneventful. She was discharged home on medical therapy with outpatient follow up. Consults:     None      Significant Diagnostic Studies:    1. Placement of a loop recorder, 04/10/19     Significant Therapeutic Endeavors:      1. none      Activity: activity as directed per discharge instructions. Diet:  Cardiac diet    Labs:  For convenience and continuity at follow-up the following most recent labs are provided:    CBC:    Lab Results   Component Value Date    WBC 7.8 09/06/2018    HGB 12.2 09/06/2018    HCT 38.6 09/06/2018     09/06/2018       Renal:    Lab Results   Component Value Date     09/06/2018    K 3.7 09/06/2018     09/06/2018    CO2 22 09/06/2018    BUN 9 09/06/2018    CREATININE 0.6 09/06/2018    CALCIUM 8.9 09/06/2018         Discharge Medications:     Current Discharge Medication List           Details   venlafaxine (EFFEXOR XR) 75 MG extended release capsule TAKE 1 CAPSULE BY MOUTH ONCE DAILY  Qty: 30 capsule, Refills: 1    Comments: Please consider 90 day supplies to promote better adherence      butalbital-acetaminophen-caffeine

## 2019-04-10 NOTE — PROCEDURES
Cardiac Risk Profile -         Risk Factor Yes / No / Unknown       Gender Female   Cigarette Use No   Family History of Cardiovascular Disease Yes: high blood pressure, diabetes, heart disease   Diabetes Mellitus no   Hypercholesteremia no   Hypertension no        Prior Cardiac History -    9/11/2018  Echo  Normal LVFX, RVSP 39 mmHg  12/3/2019  Stress test indeterminate  4/10/19 loop recorder placed    Indications for Reveal LINQ Implantation -  Syncope and collapse      Conscious Sedation Protocol Used During this Procedure -          Anesthesia: Moderate   Sedation: 1 mg Midazolam (Versed)  50 mcg Fentanyl   Start time: 12:34   Stop time: 12:45   ASA Class: III              After obtaining informed written consent, the patient was brought to the catheterization laboratory where the right groin was prepared in the usual sterile fashion. The patient was monitored continuously with ECG, pulse oximetry, blood pressure monitoring and direct observation. After obtaining informed consent, the patient was brought to the catheterization laboratory where the left chest was prepared in the usual sterile fashion. The 's hands were scrubbed in a betadine solution for 5 minutes. Utilizing local anesthesia and a percutaneous technique, a single puncture was made in the left chest.    The Implantable Cardiac Monitor was then inserted and a sterile dressing applied. The patient was taken to her room in stable and satisfactory condition. No apparent complications. Technical Information:    The generator is a Medtronic Implantable Cardiac Monitor (Reveal LINQ):  Model #:  [id number], Serial #:  [id number]. IMPRESSION:  Successful Implantable of a Implantable Cardiac Monitor (Reveal LINQ) for syncope and collapse.     Electronically signed by Juni Tran MD on 4/10/19

## 2019-04-11 LAB
EKG P AXIS: 31 DEGREES
EKG P-R INTERVAL: 132 MS
EKG Q-T INTERVAL: 400 MS
EKG QRS DURATION: 88 MS
EKG QTC CALCULATION (BAZETT): 415 MS
EKG T AXIS: 11 DEGREES

## 2019-04-17 ENCOUNTER — OFFICE VISIT (OUTPATIENT)
Dept: PRIMARY CARE CLINIC | Age: 34
End: 2019-04-17
Payer: MEDICAID

## 2019-04-17 VITALS
RESPIRATION RATE: 14 BRPM | OXYGEN SATURATION: 99 % | BODY MASS INDEX: 38.01 KG/M2 | SYSTOLIC BLOOD PRESSURE: 132 MMHG | HEIGHT: 60 IN | HEART RATE: 64 BPM | DIASTOLIC BLOOD PRESSURE: 84 MMHG | WEIGHT: 193.6 LBS | TEMPERATURE: 97.6 F

## 2019-04-17 DIAGNOSIS — R11.10 RECURRENT VOMITING: ICD-10-CM

## 2019-04-17 DIAGNOSIS — J45.40 MODERATE PERSISTENT ASTHMA WITHOUT COMPLICATION: Primary | ICD-10-CM

## 2019-04-17 DIAGNOSIS — R10.84 GENERALIZED ABDOMINAL PAIN: ICD-10-CM

## 2019-04-17 PROCEDURE — 99213 OFFICE O/P EST LOW 20 MIN: CPT | Performed by: FAMILY MEDICINE

## 2019-04-17 RX ORDER — OMEPRAZOLE 20 MG/1
20 CAPSULE, DELAYED RELEASE ORAL
Qty: 60 CAPSULE | Refills: 0 | Status: SHIPPED | OUTPATIENT
Start: 2019-04-17 | End: 2019-05-18 | Stop reason: SDUPTHER

## 2019-04-17 RX ORDER — RANITIDINE 150 MG/1
150 TABLET ORAL 2 TIMES DAILY
Qty: 60 TABLET | Refills: 3 | Status: SHIPPED | OUTPATIENT
Start: 2019-04-17 | End: 2019-09-29 | Stop reason: SDUPTHER

## 2019-04-17 ASSESSMENT — ENCOUNTER SYMPTOMS
WHEEZING: 0
VOMITING: 0
SHORTNESS OF BREATH: 1
DIARRHEA: 0
NAUSEA: 0
CONSTIPATION: 0
COUGH: 0
ABDOMINAL PAIN: 0
CHEST TIGHTNESS: 0

## 2019-04-17 NOTE — PATIENT INSTRUCTIONS
Start omeprazole and ranitidine twice daily before meals. Please call if you do not hear about your referal to GI and lung Dr within 10 days.

## 2019-04-17 NOTE — PROGRESS NOTES
Lindsey Dumas is a 26 y.o. female who presents today for   Chief Complaint   Patient presents with    Asthma     FU    Abdominal Pain     shooting pains       HPI  Patient is here for f/u asthma and breathing issues. She had a loop recorder placed and having some burising of the chest.  She has a f/u w/ cardio soon. Generalized abd pain that is sharp and lasts almost all day. She is having pain that ranges from 3-9/10. This has been chronic but has been more bothersome. Ongoing for many years since about age 13. Thought that she had ulcers in the past.      No change in PMH, family, social, or surgical history unless mentioned above. Review of Systems   Constitutional: Negative for chills and fever. Respiratory: Positive for shortness of breath. Negative for cough, chest tightness and wheezing. Cardiovascular: Negative for chest pain, palpitations and leg swelling. Gastrointestinal: Negative for abdominal pain, constipation, diarrhea, nausea and vomiting. Genitourinary: Negative for difficulty urinating, dysuria and frequency.           Past Medical History:   Diagnosis Date    Anxiety     [facility] [address]     Depression     MI (myocardial infarction) [facility] [address]     Miscarriage June 2012       Current Outpatient Medications   Medication Sig Dispense Refill    omeprazole (PRILOSEC) 20 MG delayed release capsule Take 1 capsule by mouth 2 times daily (before meals) 60 capsule 0    ranitidine (ZANTAC) 150 MG tablet Take 1 tablet by mouth 2 times daily 60 tablet 3    venlafaxine (EFFEXOR XR) 75 MG extended release capsule TAKE 1 CAPSULE BY MOUTH ONCE DAILY 30 capsule 1    Galcanezumab-gnlm (EMGALITY) 120 MG/ML SOAJ Inject 120 mg into the skin every 30 days Starting on Month 2 1 pen 5    butalbital-acetaminophen-caffeine (FIORICET, ESGIC) -40 MG per tablet TAKE 1 TABLET BY MOUTH EVERY 6 HOURS AS NEEDED FOR HEADACHE MUST  LAST  30  DAYS 30 tablet 1    fluticasone-vilanterol (BREO ELLIPTA) 100-25 MCG/INH AEPB inhaler Inhale 2 puffs into the lungs daily 60 each 3    ketoconazole (NIZORAL) 2 % cream Apply topically daily. 60 g 1    albuterol sulfate  (90 Base) MCG/ACT inhaler Inhale 2 puffs into the lungs every 6 hours as needed for Wheezing 1 Inhaler 0    atorvastatin (LIPITOR) 40 MG tablet Take 1 tablet by mouth daily 90 tablet 1    ondansetron (ZOFRAN) 4 MG tablet Take 4 mg by mouth every 8 hours as needed for Nausea or Vomiting      Multiple Vitamins-Minerals (THERAPEUTIC MULTIVITAMIN-MINERALS) tablet Take 1 tablet by mouth daily      aspirin EC 81 MG EC tablet Take 1 tablet by mouth daily 90 tablet 5     No current facility-administered medications for this visit. No Known Allergies    Past Surgical History:   Procedure Laterality Date    CHOLECYSTECTOMY, LAPAROSCOPIC      FRACTURE SURGERY      lower right leg, has metal plate and screws    FRACTURE SURGERY      left wrist    INTRAUTERINE DEVICE INSERTION      Insure placement 6/7/2016       Social History     Tobacco Use    Smoking status: Never Smoker    Smokeless tobacco: Never Used   Substance Use Topics    Alcohol use: No    Drug use: No       Family History   Problem Relation Age of Onset    High Blood Pressure Mother     Diabetes Father     Heart Disease Father     Breast Cancer Other         maternal great aunt       /84   Pulse 64   Temp 97.6 °F (36.4 °C) (Temporal)   Resp 14   Ht 5' (1.524 m)   Wt 193 lb 9.6 oz (87.8 kg)   SpO2 99%   BMI 37.81 kg/m²     Physical Exam   Constitutional: She is oriented to person, place, and time. She appears well-developed and well-nourished. No distress. HENT:   Head: Normocephalic and atraumatic. Cardiovascular: Normal rate, regular rhythm and normal heart sounds. No murmur heard. Pulmonary/Chest: Effort normal and breath sounds normal. No respiratory distress. She has no wheezes. She has no rales. Abdominal: Soft. Bowel sounds are normal. She exhibits no distension. There is no tenderness. Musculoskeletal:   No pretibial edema b/l. Neurological: She is alert and oriented to person, place, and time. Skin: Skin is warm and dry. She is not diaphoretic. No cyanosis. Nursing note and vitals reviewed. Assessment:    ICD-10-CM    1. Moderate persistent asthma without complication V26.94 External Referral To Pulmonology   2. Generalized abdominal pain R10.84 [facility], SETH Isidro, Gastroenterology, [place]   3. Recurrent vomiting R11.10 S[facility] Llamas, Gastroenterology, [place]       Plan:   Breathing issues likely secondary to  Possible underlying arrhythmia. Pt to f/u w/ cardio. Pt to cont breo as she is improving somewhat but still considerable exertional dyspnea. Has h/o spirometry showing dec fvc, refer to pulm for possible obstructive and restrictive dz. H/o pud, refer gi.    Orders Placed This Encounter   Procedures   SETH Llamas, Gastroenterology, [place]     Referral Priority:   Routine     Referral Type:   Eval and Treat     Referral Reason:   Specialty Services Required     Referred to Provider:   SETH Montoya     Requested Specialty:   Family Nurse Practitioner     Number of Visits Requested:   1    External Referral To Pulmonology     Referral Priority:   Routine     Referral Type:   Eval and Treat     Referral Reason:   Specialty Services Required     Requested Specialty:   Pulmonology     Number of Visits Requested:   1     Orders Placed This Encounter   Medications    omeprazole (PRILOSEC) 20 MG delayed release capsule     Sig: Take 1 capsule by mouth 2 times daily (before meals)     Dispense:  60 capsule     Refill:  0    ranitidine (ZANTAC) 150 MG tablet     Sig: Take 1 tablet by mouth 2 times daily     Dispense:  60 tablet     Refill:  3        Medications Discontinued During This Encounter   Medication Reason    omeprazole 20 MG EC tablet      Patient

## 2019-04-24 DIAGNOSIS — R55 SYNCOPE, UNSPECIFIED SYNCOPE TYPE: ICD-10-CM

## 2019-04-24 DIAGNOSIS — Z95.818 STATUS POST PLACEMENT OF IMPLANTABLE LOOP RECORDER: Primary | ICD-10-CM

## 2019-05-09 DIAGNOSIS — Z95.818 STATUS POST PLACEMENT OF IMPLANTABLE LOOP RECORDER: ICD-10-CM

## 2019-05-09 DIAGNOSIS — R55 SYNCOPE, UNSPECIFIED SYNCOPE TYPE: Primary | ICD-10-CM

## 2019-05-13 ENCOUNTER — OFFICE VISIT (OUTPATIENT)
Dept: GASTROENTEROLOGY | Age: 34
End: 2019-05-13
Payer: MEDICAID

## 2019-05-13 VITALS
DIASTOLIC BLOOD PRESSURE: 80 MMHG | HEIGHT: 60 IN | SYSTOLIC BLOOD PRESSURE: 120 MMHG | WEIGHT: 190 LBS | OXYGEN SATURATION: 97 % | BODY MASS INDEX: 37.3 KG/M2 | TEMPERATURE: 97 F

## 2019-05-13 DIAGNOSIS — K62.5 RECTAL BLEEDING: ICD-10-CM

## 2019-05-13 DIAGNOSIS — R10.84 GENERALIZED ABDOMINAL PAIN: Primary | ICD-10-CM

## 2019-05-13 DIAGNOSIS — R13.10 DYSPHAGIA, UNSPECIFIED TYPE: ICD-10-CM

## 2019-05-13 DIAGNOSIS — R11.2 NAUSEA AND VOMITING, INTRACTABILITY OF VOMITING NOT SPECIFIED, UNSPECIFIED VOMITING TYPE: ICD-10-CM

## 2019-05-13 PROCEDURE — 99204 OFFICE O/P NEW MOD 45 MIN: CPT | Performed by: NURSE PRACTITIONER

## 2019-05-13 ASSESSMENT — ENCOUNTER SYMPTOMS
BLOOD IN STOOL: 1
DIARRHEA: 0
RECTAL PAIN: 0
ABDOMINAL PAIN: 1
SORE THROAT: 0
CONSTIPATION: 0
SHORTNESS OF BREATH: 0
BACK PAIN: 0
COUGH: 0
VOICE CHANGE: 0
CHEST TIGHTNESS: 0
VOMITING: 1
NAUSEA: 1
ABDOMINAL DISTENTION: 0

## 2019-05-13 NOTE — PATIENT INSTRUCTIONS
prep. Take all of the bowel prep as directed. If you are having problems with nausea, stop your prep for 30-45 min to allow the nausea to subside before resuming your prep. It is important to drink plenty of fluids throughout the day before taking your laxatives. This will help to protect your kidneys, prevent dehydration and maximize the effect of the bowel prep. If polyps are removed during the procedure they will be sent to a pathologist for analysis. Unless you have a follow up appointment scheduled, you will be notified by mail of the pathology results within 4 weeks. If you have not received results after 4 weeks you may call the office to obtain this information. Your diet before a colonoscopy bowel preparation is very important to ensure a successful colon exam. It is recommended to consider certain changes to your diet three to four days prior to the procedure. Remember that your bowels need to be empty for the exam.    What foods are good to eat? Cut down on heavy solid foods three to four days before the procedure and start introducing lighter meals to your diet. The following food suggestions are a good part of your diet before a colonoscopy bowel preparation. Light meat that is easily digestible such as chicken (without the skin)   Potatoes without skin   Cheese   Eggs   A light meal of steamed white fish   Light clear soups    Foods and drinks to avoid  Avoid foods that contain too much fiber. Stay clear of dark colored beverages. They can stick to the walls of the digestive tract and make it difficult to differentiate from blood. Some of these foods are:  Red meat, rice, nuts and vegetables   Milk, other milk based fluids and cream   Most fruit and puddings   Whole grain pasta   Cereals, bran and seeds   Colored beverages, especially those that are red or purple in color   Red colored Jell-O   On the day before the colonoscopy, continue to drink plenty of clear fluids.  It is important to keep yourself hydrated before the exam.     Please follow all instructions as provided for cleansing the bowel. Failure to have an adequately prepped colon may cause you to have incomplete exam with further testing required.      [email or web address]

## 2019-05-13 NOTE — PROGRESS NOTES
Subjective:      Patient ID: Fifi Martinez is a 26 y.o. female  MD Miguel Garcia MD    \[facility] [place] [facility]\""  Chief Complaint   Patient presents with    Abdominal Pain     Patient referred for chronic abdominal pain. Onset age 13. Worse recently. She has generalized abdominal pain, \"all over\". Pain is constant and stabbing. Moderate to severe. No aggravating or alleviating factors. She c/o daily nausea with vomiting at least 3 times per week. Some days she can't eat anything and can have weight loss. She states she has no heartburn or reflux. She is taking omeprazole bid and ranitidine bid. She says this does nothing for her pain. She c/o rectal bleeding. She has known bleeding hemorrhoids but unsure if all bleeding r/t that. She has regular bm. No change from normal.     Previous evaluation in 2016 at [facility]. She had CT scan and EGD. She states no etiology for symptoms found.        Family History   Problem Relation Age of Onset    High Blood Pressure Mother     Diabetes Father     Heart Disease Father     Stomach Cancer Father     Breast Cancer Other         maternal great aunt    Colon Cancer Paternal Grandmother     Colon Polyps Neg Hx     Esophageal Cancer Neg Hx     Liver Cancer Neg Hx     Liver Disease Neg Hx     Rectal Cancer Neg Hx        Past Medical History:   Diagnosis Date    Anxiety     Cerebral artery occlusion with cerebral infarction ([address]     Depression     MI (myocardial infarction) ([address]     Miscarriage June 2012       Past Surgical History:   Procedure Laterality Date    CHOLECYSTECTOMY, LAPAROSCOPIC      [facility] [place]    FRACTURE SURGERY      lower right leg, has metal plate and screws    FRACTURE SURGERY      left wrist    INTRAUTERINE DEVICE INSERTION      Insure placement 6/7/2016       Current Outpatient Medications   Medication Sig Dispense Refill    omeprazole (PRILOSEC) 20 MG delayed release capsule Take 1 capsule by mouth 2 times daily (before meals) 60 capsule 0    ranitidine (ZANTAC) 150 MG tablet Take 1 tablet by mouth 2 times daily 60 tablet 3    venlafaxine (EFFEXOR XR) 75 MG extended release capsule TAKE 1 CAPSULE BY MOUTH ONCE DAILY 30 capsule 1    Galcanezumab-gnlm (EMGALITY) 120 MG/ML SOAJ Inject 120 mg into the skin every 30 days Starting on Month 2 1 pen 5    butalbital-acetaminophen-caffeine (FIORICET, ESGIC) -40 MG per tablet TAKE 1 TABLET BY MOUTH EVERY 6 HOURS AS NEEDED FOR HEADACHE MUST  LAST  30  DAYS 30 tablet 1    fluticasone-vilanterol (BREO ELLIPTA) 100-25 MCG/INH AEPB inhaler Inhale 2 puffs into the lungs daily 60 each 3    ketoconazole (NIZORAL) 2 % cream Apply topically daily. 60 g 1    albuterol sulfate  (90 Base) MCG/ACT inhaler Inhale 2 puffs into the lungs every 6 hours as needed for Wheezing 1 Inhaler 0    atorvastatin (LIPITOR) 40 MG tablet Take 1 tablet by mouth daily 90 tablet 1    ondansetron (ZOFRAN) 4 MG tablet Take 4 mg by mouth every 8 hours as needed for Nausea or Vomiting      Multiple Vitamins-Minerals (THERAPEUTIC MULTIVITAMIN-MINERALS) tablet Take 1 tablet by mouth daily      aspirin EC 81 MG EC tablet Take 1 tablet by mouth daily 90 tablet 5     No current facility-administered medications for this visit. No Known Allergies     reports that she has never smoked. She has never used smokeless tobacco. She reports that she drinks alcohol. She reports that she does not use drugs. Review of Systems   Constitutional: Negative for appetite change, fever and unexpected weight change. HENT: Negative for sore throat and voice change. Respiratory: Negative for cough, chest tightness and shortness of breath. Cardiovascular: Negative for chest pain, palpitations and leg swelling. Gastrointestinal: Positive for abdominal pain, blood in stool, nausea and vomiting. Negative for abdominal distention, constipation, diarrhea and rectal pain.         Swallowing problems Musculoskeletal: Negative for arthralgias, back pain and gait problem. Skin: Negative for pallor, rash and wound. Neurological: Negative for dizziness, weakness and light-headedness. Hematological: Negative for adenopathy. Does not bruise/bleed easily. All other systems reviewed and are negative. Objective:   Physical Exam   Constitutional: She is oriented to person, place, and time. She appears well-developed and well-nourished. No distress. /80   Temp 97 °F (36.1 °C)   Ht 5' (1.524 m)   Wt 190 lb (86.2 kg)   SpO2 97%   BMI 37.11 kg/m²      Eyes: Conjunctivae are normal. No scleral icterus. Neck: No tracheal deviation present. Cardiovascular: Normal rate and regular rhythm. Exam reveals no gallop and no friction rub. No murmur heard. Pulmonary/Chest: Effort normal and breath sounds normal. No respiratory distress. She has no wheezes. She has no rhonchi. She has no rales. Abdominal: Soft. Normal appearance and bowel sounds are normal. She exhibits no distension and no mass. There is no hepatomegaly. There is no tenderness. There is no rebound and no guarding. Musculoskeletal: She exhibits no edema. Neurological: She is alert and oriented to person, place, and time. She has normal strength. Skin: Skin is warm, dry and intact. No cyanosis. No pallor. Psychiatric: She has a normal mood and affect. Her behavior is normal. Thought content normal. Cognition and memory are normal.       Assessment:      1. Generalized abdominal pain    2. Nausea and vomiting, intractability of vomiting not specified, unspecified vomiting type    3. Dysphagia, unspecified type    4. Rectal bleeding          Plan:      Schedule colonoscopy and endoscopy     Instruct on bowel prep. Nothing to eat or drink after midnight the day of the exam.  Unable to drive for 24 hours after the procedure. No aspirin or nonsteroidal anti-inflammatories for 5 days before procedure.     I have discussed the

## 2019-05-22 ENCOUNTER — OFFICE VISIT (OUTPATIENT)
Dept: CARDIOLOGY | Age: 34
End: 2019-05-22
Payer: MEDICAID

## 2019-05-22 VITALS
HEIGHT: 60 IN | WEIGHT: 191 LBS | SYSTOLIC BLOOD PRESSURE: 130 MMHG | HEART RATE: 76 BPM | DIASTOLIC BLOOD PRESSURE: 80 MMHG | BODY MASS INDEX: 37.5 KG/M2

## 2019-05-22 DIAGNOSIS — R55 SYNCOPE, UNSPECIFIED SYNCOPE TYPE: Primary | ICD-10-CM

## 2019-05-22 DIAGNOSIS — Z95.818 STATUS POST PLACEMENT OF IMPLANTABLE LOOP RECORDER: ICD-10-CM

## 2019-05-22 PROCEDURE — 93285 PRGRMG DEV EVAL SCRMS IP: CPT | Performed by: CLINICAL NURSE SPECIALIST

## 2019-05-22 PROCEDURE — 99213 OFFICE O/P EST LOW 20 MIN: CPT | Performed by: CLINICAL NURSE SPECIALIST

## 2019-05-22 NOTE — PATIENT INSTRUCTIONS
Loop recorder will send monthly  Follow up in 6 mos  Call with any questions or concerns  Follow up with Titi Fuller MD for non cardiac problems  Follow up with neurology as planned and have EEG   Report any new problems  Cardiovascular Fitness-Exercise as tolerated. Strive for 30 minutes of exercise most days of the week. Cardiac / Healthy Diet  Continue current medications as directed  Continue plan of treatment  It is always recommended that you bring your medications bottles with you to each visit - this is for your safety!

## 2019-05-22 NOTE — PROGRESS NOTES
[facility]  [address]  Phone: [phone]  Fax: [phone]    OFFICE VISIT:  2019    Yoav Villanueva - : 1985    Reason For Visit:  Reji Johnson is a 26 y.o. female who is here for Follow Up After Procedure (Patient is here for a 6 week post loop recorder follow up, patient still feels like she is going to \"pass out\", dizziness, and SOA. ); Coronary Artery Disease; and Loss of Consciousness  Patient has been followed for recurrent syncope. 2-D echo in September showed no structural abnormality. Negative stress test in December. 12 day Holter monitor showed no significant bradycardia or tachycardia with some ectopy. She did have sleep study and is followed with neurology. Is supposed to have EEG with video monitoring  Continue syncope. Had a carotid duplex that showed no stenosis  Patient had a loop recorder placed 4/10/19 for continued syncopal episodes    She returns today for follow-up. She states she's had multiple episodes used her symptom marker. She's had 2 overt syncopal spells with shaking afterwards. Subjective  Opel denies exertional chest pain. She continues to have fatigue and dyspnea on exertion. No resting shortness of breath, orthopnea, paroxysmal nocturnal dyspnea  Multiple dizzy spells. She's had 2 overt syncopal spells with some presyncope   The patient denies numbness or weakness to suggest cerebrovascular accident or transient ischemic attack. Nick Donis MD is PCP   She is following with neurology and psychiatry.   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) 2018    Moderate episode of recurrent major depressive disorder ([facility] 2018    Decreased strength of lower extremity 09/04/2018    Syncope and collapse 09/04/2018    Chronic superficial gastritis 09/04/2018    Family hx-breast malignancy 07/18/2012    Diffuse cystic mastopathy 04/16/2012    Lump or mass in breast left  04/16/2012     Past Medical History:   Diagnosis Date    Anxiety     Cerebral artery occlusion with cerebral infarction ([address]     Depression     MI (myocardial infarction) ([address]     Miscarriage June 2012    Syncope      Past Surgical History:   Procedure Laterality Date    CHOLECYSTECTOMY, LAPAROSCOPIC      [facility]    FRACTURE SURGERY      lower right leg, has metal plate and screws    FRACTURE SURGERY      left wrist    INTRAUTERINE DEVICE INSERTION      Insure placement 6/7/2016     Family History   Problem Relation Age of Onset    High Blood Pressure Mother     Diabetes Father     Heart Disease Father     Stomach Cancer Father     Breast Cancer Other         maternal great aunt    Colon Cancer Paternal Grandmother     Colon Polyps Neg Hx     Esophageal Cancer Neg Hx     Liver Cancer Neg Hx     Liver Disease Neg Hx     Rectal Cancer Neg Hx      Social History     Tobacco Use    Smoking status: Never Smoker    Smokeless tobacco: Never Used   Substance Use Topics    Alcohol use: Yes     Comment: occ      Current Outpatient Medications   Medication Sig Dispense Refill    omeprazole (PRILOSEC) 20 MG delayed release capsule TAKE 1 CAPSULE BY MOUTH TWICE DAILY BEFORE MEALS 60 capsule 11    ranitidine (ZANTAC) 150 MG tablet Take 1 tablet by mouth 2 times daily 60 tablet 3    venlafaxine (EFFEXOR XR) 75 MG extended release capsule TAKE 1 CAPSULE BY MOUTH ONCE DAILY 30 capsule 1    Galcanezumab-gnlm (EMGALITY) 120 MG/ML SOAJ Inject 120 mg into the skin every 30 days Starting on Month 2 1 pen 5    butalbital-acetaminophen-caffeine (FIORICET, ESGIC) -40 MG per tablet TAKE 1 TABLET BY MOUTH EVERY 6 HOURS AS NEEDED FOR HEADACHE bleeding. Psychiatric - no severe anxiety or insomnia. No confusion. All other review of systems are negative. Objective  Vital Signs - /80   Pulse 76   Ht 5' (1.524 m)   Wt 191 lb (86.6 kg)   BMI 37.30 kg/m²   General - Opel is alert, cooperative, and pleasant. Well groomed. No acute distress. Body habitus is obese. HEENT - The head is normocephalic. No circumoral cyanosis. Dentition is normal.   EYES -  No Xanthelasma, no arcus senilis, no conjunctival hemorrhages or discharge. Neck - Supple, without increased jugular venous pressures. No carotid bruits. No mass. Respiratory - Lungs are clear bilaterally. No wheezes or rales. Normal effort without use of accessory muscles. Cardiovascular - Heart has regular rhythm and rate. No murmurs, rubs or gallops. + pedal pulses and no varicosities. Abdominal -  Soft, nontender, nondistended. Bowel sounds are intact. Extremities - No clubbing, cyanosis, or  edema. Musculoskeletal -  No clubbing . No Osler's nodes. Gait normal .  No kyphosis or scoliosis. Skin -  no statis ulcers or dermatitis. Neurological - No focal signs are identified. Oriented to person, place and time. Psychiatric -  Appropriate affect and mood. Assessment:     Diagnosis Orders   1. Syncope, unspecified syncope type     2. Status post placement of implantable loop recorder       Data:  BP Readings from Last 3 Encounters:   05/22/19 130/80   05/13/19 120/80   04/17/19 132/84    Pulse Readings from Last 3 Encounters:   05/22/19 76   04/17/19 64   04/10/19 101        Wt Readings from Last 3 Encounters:   05/22/19 191 lb (86.6 kg)   05/13/19 190 lb (86.2 kg)   04/17/19 193 lb 9.6 oz (87.8 kg)     Loop recorder today shows multiple syncopal episodes. All showed normal sinus rhythm sinus tach. There's been no AutoCapture episodes. Neuro parameters for AutoCapture. Reassurance given.  Recommend she continue to follow-up with neurology for her EEG  Blood pressure and heart rate have been well controlled. On aspirin, statin and takes no anti-hypertensives or rate lowering medications   States taking medications as prescribed      Plan  Loop recorder will send monthly  Follow up in 6 mos  Call with any questions or concerns  Follow up with Luna Cunningham MD for non cardiac problems  Follow up with neurology as planned and have EEG   Report any new problems  Cardiovascular Fitness-Exercise as tolerated. Strive for 30 minutes of exercise most days of the week. Cardiac / Healthy Diet  Continue current medications as directed  Continue plan of treatment  It is always recommended that you bring your medications bottles with you to each visit - this is for your safety! SETH Boyd    EMR dragon/transcription disclaimer: Much of this encounter note is electronic transcription/translation of spoken language to printed tach. Electronic translation of spoken language may be erroneous, or at times, nonsensical words or phrases may be inadvertently transcribed.  Although, I have reviewed the note for such errors, some may still exist.

## 2019-05-24 DIAGNOSIS — R55 SYNCOPE, UNSPECIFIED SYNCOPE TYPE: ICD-10-CM

## 2019-05-24 DIAGNOSIS — Z95.818 STATUS POST PLACEMENT OF IMPLANTABLE LOOP RECORDER: Primary | ICD-10-CM

## 2019-05-28 ENCOUNTER — OFFICE VISIT (OUTPATIENT)
Dept: NEUROSURGERY | Age: 34
End: 2019-05-28
Payer: MEDICAID

## 2019-05-28 VITALS
OXYGEN SATURATION: 98 % | HEIGHT: 60 IN | DIASTOLIC BLOOD PRESSURE: 66 MMHG | HEART RATE: 76 BPM | SYSTOLIC BLOOD PRESSURE: 112 MMHG | BODY MASS INDEX: 37.26 KG/M2 | WEIGHT: 189.8 LBS

## 2019-05-28 DIAGNOSIS — G43.109 MIGRAINE WITH AURA AND WITHOUT STATUS MIGRAINOSUS, NOT INTRACTABLE: ICD-10-CM

## 2019-05-28 DIAGNOSIS — R56.9 CONVULSIONS, UNSPECIFIED CONVULSION TYPE (HCC): Primary | ICD-10-CM

## 2019-05-28 DIAGNOSIS — R55 SYNCOPE, UNSPECIFIED SYNCOPE TYPE: ICD-10-CM

## 2019-05-28 PROCEDURE — 99214 OFFICE O/P EST MOD 30 MIN: CPT | Performed by: PSYCHIATRY & NEUROLOGY

## 2019-05-28 NOTE — PROGRESS NOTES
[facility] NEUROLOGY:    Patient: Hodan Lo  :  1985  Age:  26 y.o. MRN:  [id number]  Today:  19      Chief Complaint:  Chief Complaint   Patient presents with    Loss of Consciousness     2 Month follow up last eppisode was 3 days ago (19)    Headache     Headaches are getting worse    Dizziness     Couldn't hardly stand up for being so dizzy       HISTORY OF PRESENT ILLNESS:   Hodan Lo is a 26y.o. year old female here for follow up of headaches. Started on Emgality and seems to be doing better. Still noting intermittent headaches. Video EEG has not been completed as of yet. No overt seizures noted, but is noting intermittent episodes of shaking without a consistent YANG. She is off nortriptyline. No change in characteristics of headaches. Headache pain is global with little radiation of pain. Pain is described as sharp and stabbing, She does report some scotoma like changes prior to headaches, sees spots in both eyes. She notes nausea, vomiting, vomiting, sonophobia, photophobia, dizziness and diplopia with headaches. She has previously tried Imitrex, Tylenol, Ibuprofen, Aleve with little relief. She is using Fioricet daily now. Prior history of kidney stones as a child. Long history of headaches since age 13. Some possible myoclonus noted at times as well. No incontinence or tongue biting. No clear confusion noted afterwards, takes 15-20 minutes for her to be able to stand up again. Seen by cardiology as well, loop placed, states episodes seem to occur when she is in stressful situations. She does report a history of CVA and MI. No further stroke like symptoms. She states that these conditions were \"stressed induced\". She notes residual left-sided weakness and intermittent slurred speech from the stroke.       PAST MEDICAL HISTORY:    Medical History:      Diagnosis Date    Anxiety     Cerebral artery occlusion with [facility] infarction ([facility] [address]     Depression     [facility]     Miscarriage June 2012    Syncope        Surgical History:      Procedure Laterality Date    CHOLECYSTECTOMY, LAPAROSCOPIC      EGD  2016    [facility]   [address]      lower right leg, has metal plate and screws    FRACTURE SURGERY      left wrist    INTRAUTERINE DEVICE INSERTION      Insure placement 6/7/2016       Current Medications:  Current Outpatient Medications   Medication Sig Dispense Refill    omeprazole (PRILOSEC) 20 MG delayed release capsule TAKE 1 CAPSULE BY MOUTH TWICE DAILY BEFORE MEALS 60 capsule 11    ranitidine (ZANTAC) 150 MG tablet Take 1 tablet by mouth 2 times daily 60 tablet 3    venlafaxine (EFFEXOR XR) 75 MG extended release capsule TAKE 1 CAPSULE BY MOUTH ONCE DAILY 30 capsule 1    Galcanezumab-gnlm (EMGALITY) 120 MG/ML SOAJ Inject 120 mg into the skin every 30 days Starting on Month 2 1 pen 5    butalbital-acetaminophen-caffeine (FIORICET, ESGIC) -40 MG per tablet TAKE 1 TABLET BY MOUTH EVERY 6 HOURS AS NEEDED FOR HEADACHE MUST  LAST  30  DAYS 30 tablet 1    fluticasone-vilanterol (BREO ELLIPTA) 100-25 MCG/INH AEPB inhaler Inhale 2 puffs into the lungs daily 60 each 3    ketoconazole (NIZORAL) 2 % cream Apply topically daily. 60 g 1    albuterol sulfate  (90 Base) MCG/ACT inhaler Inhale 2 puffs into the lungs every 6 hours as needed for Wheezing 1 Inhaler 0    atorvastatin (LIPITOR) 40 MG tablet Take 1 tablet by mouth daily 90 tablet 1    ondansetron (ZOFRAN) 4 MG tablet Take 4 mg by mouth every 8 hours as needed for Nausea or Vomiting      Multiple Vitamins-Minerals (THERAPEUTIC MULTIVITAMIN-MINERALS) tablet Take 1 tablet by mouth daily      aspirin EC 81 MG EC tablet Take 1 tablet by mouth daily 90 tablet 5     No current facility-administered medications for this visit. Allergies:  Patient has no known allergies.     SOCIAL HISTORY:   Social History     Socioeconomic History    Marital status:      Spouse name: Not on file    Number of children: Not on file    Years of education: Not on file    Highest education level: Not on file   Occupational History    Not on file   Social Needs    Financial resource strain: Not on file    Food insecurity:     Worry: Not on file     Inability: Not on file    Transportation needs:     Medical: Not on file     Non-medical: Not on file   Tobacco Use    Smoking status: Never Smoker    Smokeless tobacco: Never Used   Substance and Sexual Activity    Alcohol use: Yes     Comment: occ    Drug use: No    Sexual activity: Not on file   Lifestyle    Physical activity:     Days per week: Not on file     Minutes per session: Not on file    Stress: Not on file   Relationships    Social connections:     Talks on phone: Not on file     Gets together: Not on file     Attends Buddhism service: Not on file     Active member of club or organization: Not on file     Attends meetings of clubs or organizations: Not on file     Relationship status: Not on file    Intimate partner violence:     Fear of current or ex partner: Not on file     Emotionally abused: Not on file     Physically abused: Not on file     Forced sexual activity: Not on file   Other Topics Concern    Not on file   Social History Narrative    Not on file       FAMILY HISTORY:       Problem Relation Age of Onset    High Blood Pressure Mother     Diabetes Father     Heart Disease Father     Stomach Cancer Father     Breast Cancer Other         maternal great aunt    Colon Cancer Paternal Grandmother     Colon Polyps Neg Hx     Esophageal Cancer Neg Hx     Liver Cancer Neg Hx     Liver Disease Neg Hx     Rectal Cancer Neg Hx      REVIEW OF SYSTEMS    Constitutional: []Fever []Sweat []Chills [] Recent Injury [x] Denies all unless marked  HEENT:[x]Headache  [] Head Injury/Hearing Loss  [] Sore Throat  [x] Ear Ache/Dizziness  [x] Denies all unless marked  Spine:  [x] Neck pain  [x] Back pain  [] Sciaticia  [x] Denies all unless marked  Cardiovascular:[]Heart Disease [x]Chest Pain [] Palpitations  [x] Denies all unless marked  Pulmonary: [x]Shortness of Breath []Cough   [x] Denies all unless marke  Gastrointestinal: [x]Nausea  []Vomiting  [x]Abdominal Pain  []Constipation  []Diarrhea  []Dark Bloody Stools  [x] Denies all unless marked  Psychiatric/Behavioral:[x] Depression [x] Anxiety [x] Denies all unless marked  Genitourinary:   [] Frequency  [] Urgency  [] Incontinence [] Pain with Urination  [x] Denies all unless marked  Extremities: []Pain  []Swelling  [x] Denies all unless marked  Musculoskeletal: [] Muscle Pain  [] Joint Pain  [] Arthritis [x] Muscle Cramps [] Muscle Twitches  [x] Denies all unless marked  Sleep: [x] Insomnia [] Snoring [] Restless Legs [] Sleep Apnea  [] Daytime Sleepiness  [x] Denies all unless marked  Skin:[] Rash [] Skin Discoloration [x] Denies all unless marked   Neurological: []Visual Disturbance/Memory Loss [x] Loss of Balance [] Slurred Speech/Weakness [] Seizures  [x] Vertigo/Dizziness [x] Denies all unless marked     ROS reviewed, agree with above. PHYSICAL EXAMINATION:  Constitutional -   /66   Pulse 76   Ht 5' (1.524 m)   Wt 189 lb 12.8 oz (86.1 kg)   LMP 05/27/2019   SpO2 98%   BMI 37.07 kg/m²   General appearance: No acute distress   EYES -   Conjunctiva normal  Pupillary exam as below, see CN exam in the neurologic exam  ENT-    No scars, masses, or lesions over external nose or ears  Hearing normal bilaterally to finger rub  Cardiovascular -   RRR  No clubbing, cyanosis, or edema   Respiratory-   Good expansion, normal effort without use of accessory muscles  CTA  Musculoskeletal -   No significant wasting of muscles noted  Gait as below, see gait exam in the neurologic exam  Muscle strength, tone, stability as below. No bony deformities  Skin -   Warm, dry, and intact to inspection and palpation.     No rash, erythema, or pallor  Psychiatric -   Mood, affect, and behavior appear normal    Memory as below see mental status examination in the neurologic exam    NEUROLOGIC EXAMINATION:    Mental status   [x]Awake, alert, oriented   [x]Affect attention and concentration appear appropriate  [x]Recent and remote memory appears unremarkable  [x]Speech normal without dysarthria or aphasia, comprehension and repetition intact. COMMENTS:     Cranial Nerves [x]No VF deficit to confrontation,  no papilledema on fundoscopic exam.  [x]PERRLA, EOMI, no nystagmus, conjugate eye movements, no ptosis  [x]Face symmetric  [x]Facial sensation intact  [x]Tongue midline no atrophy or fasciculations present  [x]Palate midline, hearing to finger rub normal bilaterally  [x]Shoulder shrug and SCM testing normal bilaterally  COMMENTS:   Motor   []5/5 strength x 4 extremities  [x]Normal bulk and tone  [x]No tremor present  [x]No rigidity or bradykinesia noted  COMMENTS: 3/5 strength LUE (chronic r/t elbow injury)   Sensory  [x]Sensation intact to light touch, pin prick, vibration, and proprioception BLE  [x]Sensation intact to light touch, pin prick, vibration, and proprioception BUE  COMMENTS:   Coordination [x]FTN normal bilaterally   [x]HTS normal bilaterally  [x]LACEY normal bilaterally.    COMMENTS:    Reflexes  [x]Symmetric and non-pathological  [x]Toes down going bilaterally  [x]No clonus present  COMMENTS:    Gait                  [x]Normal steady gait    []Ataxic    []Spastic     []Magnetic     []Shuffling  COMMENTS:        ADDITIONAL REVIEW:  No results found for: [id number]  Lab Results   Component Value Date    WBC 7.8 09/06/2018    HGB 12.2 09/06/2018    HCT 38.6 09/06/2018    MCV 96.3 09/06/2018     09/06/2018     Lab Results   Component Value Date     09/06/2018    K 3.7 09/06/2018     09/06/2018    CO2 22 09/06/2018    BUN 9 09/06/2018    CREATININE 0.6 09/06/2018    GLUCOSE 84 09/06/2018    CALCIUM 8.9 09/06/2018    PROT 7.1 09/06/2018    LABALBU 3.8 09/06/2018 on AEDs for now. PLAN:  1. Continue Emgality 120 mg monthly. Discussed injection technique. Discussed side effects   2. Plan Video EEG monitoring  3. Continue Fioricet prn headaches. Discussed limiting use of this to avoid rebound headaches. 4. Patient advised of the pathophysiology, etiology, and diagnosis of migraines, along with treatment options, and treatment side effects. Epilepsy precautions and seizure first aid discussed. No driving, heights, swimming, tub baths, open flames, or heavy machinery. 5. Continue stroke risk factor maximization  6. Follow up in 2 months, sooner with any worsening.    7.  Follow up with cardiology     Adelaida Logan DO   Board Certified Neurology

## 2019-06-04 ENCOUNTER — OFFICE VISIT (OUTPATIENT)
Dept: PRIMARY CARE CLINIC | Age: 34
End: 2019-06-04
Payer: MEDICAID

## 2019-06-04 ENCOUNTER — OFFICE VISIT (OUTPATIENT)
Dept: PULMONOLOGY | Facility: CLINIC | Age: 34
End: 2019-06-04

## 2019-06-04 VITALS
DIASTOLIC BLOOD PRESSURE: 70 MMHG | BODY MASS INDEX: 37.69 KG/M2 | TEMPERATURE: 98.4 F | SYSTOLIC BLOOD PRESSURE: 100 MMHG | OXYGEN SATURATION: 98 % | HEART RATE: 71 BPM | WEIGHT: 192 LBS | HEIGHT: 60 IN

## 2019-06-04 VITALS
WEIGHT: 190.6 LBS | SYSTOLIC BLOOD PRESSURE: 116 MMHG | DIASTOLIC BLOOD PRESSURE: 82 MMHG | HEIGHT: 60 IN | OXYGEN SATURATION: 98 % | HEART RATE: 71 BPM | BODY MASS INDEX: 37.42 KG/M2

## 2019-06-04 DIAGNOSIS — J98.4 RESTRICTIVE LUNG DISEASE: ICD-10-CM

## 2019-06-04 DIAGNOSIS — L03.119 CELLULITIS, UPPER ARM: ICD-10-CM

## 2019-06-04 DIAGNOSIS — R06.02 SHORTNESS OF BREATH: Primary | ICD-10-CM
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
## 2019-06-04 DIAGNOSIS — E66.3 OVERWEIGHT: ICD-10-CM

## 2019-06-04 PROCEDURE — 99213 OFFICE O/P EST LOW 20 MIN: CPT | Performed by: NURSE PRACTITIONER

## 2019-06-04 PROCEDURE — 99214 OFFICE O/P EST MOD 30 MIN: CPT | Performed by: NURSE PRACTITIONER

## 2019-06-04 RX ORDER — VENLAFAXINE HYDROCHLORIDE 75 MG/1
CAPSULE, EXTENDED RELEASE ORAL
Qty: 30 CAPSULE | Refills: 0 | Status: SHIPPED | OUTPATIENT
Start: 2019-06-04 | End: 2019-06-18 | Stop reason: SDUPTHER

## 2019-06-04 RX ORDER — BUTALBITAL, ACETAMINOPHEN AND CAFFEINE 50; 325; 40 MG/1; MG/1; MG/1
TABLET ORAL
Refills: 1 | COMMUNITY
Start: 2019-05-22 | End: 2020-07-09 | Stop reason: HOSPADM

## 2019-06-04 RX ORDER — GALCANEZUMAB 120 MG/ML
INJECTION, SOLUTION SUBCUTANEOUS
Refills: 5 | COMMUNITY
Start: 2019-05-22 | End: 2020-01-27

## 2019-06-04 RX ORDER — KETOROLAC TROMETHAMINE 30 MG/ML
30 INJECTION, SOLUTION INTRAMUSCULAR; INTRAVENOUS ONCE
Status: COMPLETED | OUTPATIENT
Start: 2019-06-04 | End: 2019-06-04

## 2019-06-04 RX ORDER — RANITIDINE 150 MG/1
150 TABLET ORAL 2 TIMES DAILY
Refills: 3 | COMMUNITY
Start: 2019-05-22 | End: 2020-01-27

## 2019-06-04 RX ORDER — ALBUTEROL SULFATE 90 UG/1
2 AEROSOL, METERED RESPIRATORY (INHALATION)
COMMUNITY
Start: 2019-01-23

## 2019-06-04 RX ORDER — M-VIT,TX,IRON,MINS/CALC/FOLIC 27MG-0.4MG
1 TABLET ORAL
COMMUNITY
End: 2020-07-09 | Stop reason: HOSPADM

## 2019-06-04 RX ORDER — SULFAMETHOXAZOLE AND TRIMETHOPRIM 800; 160 MG/1; MG/1
1 TABLET ORAL 2 TIMES DAILY
Qty: 20 TABLET | Refills: 0 | Status: SHIPPED | OUTPATIENT
Start: 2019-06-04 | End: 2019-06-14

## 2019-06-04 RX ORDER — KETOCONAZOLE 20 MG/G
CREAM TOPICAL
COMMUNITY
Start: 2019-02-06 | End: 2020-08-31

## 2019-06-04 RX ORDER — ONDANSETRON 4 MG/1
4 TABLET, FILM COATED ORAL
COMMUNITY

## 2019-06-04 RX ORDER — ASPIRIN 81 MG/1
TABLET ORAL DAILY
Refills: 0 | COMMUNITY
Start: 2019-05-08

## 2019-06-04 RX ORDER — VENLAFAXINE HYDROCHLORIDE 75 MG/1
CAPSULE, EXTENDED RELEASE ORAL
Refills: 1 | COMMUNITY
Start: 2019-05-07 | End: 2020-01-27

## 2019-06-04 RX ORDER — CEFTRIAXONE 1 G/1
1 INJECTION, POWDER, FOR SOLUTION INTRAMUSCULAR; INTRAVENOUS ONCE
Status: COMPLETED | OUTPATIENT
Start: 2019-06-04 | End: 2019-06-04

## 2019-06-04 RX ORDER — ATORVASTATIN CALCIUM 40 MG/1
TABLET, FILM COATED ORAL
Qty: 60 TABLET | Refills: 0 | Status: SHIPPED | OUTPATIENT
Start: 2019-06-04 | End: 2019-08-11 | Stop reason: SDUPTHER

## 2019-06-04 RX ORDER — ATORVASTATIN CALCIUM 40 MG/1
TABLET, FILM COATED ORAL DAILY
Refills: 0 | COMMUNITY
Start: 2019-05-04 | End: 2020-08-31

## 2019-06-04 RX ORDER — OMEPRAZOLE 20 MG/1
CAPSULE, DELAYED RELEASE ORAL
COMMUNITY
Start: 2019-05-19

## 2019-06-04 RX ADMIN — KETOROLAC TROMETHAMINE 30 MG: 30 INJECTION, SOLUTION INTRAMUSCULAR; INTRAVENOUS at 16:37

## 2019-06-04 RX ADMIN — CEFTRIAXONE 1 G: 1 INJECTION, POWDER, FOR SOLUTION INTRAMUSCULAR; INTRAVENOUS at 16:38

## 2019-06-04 ASSESSMENT — ENCOUNTER SYMPTOMS
RESPIRATORY NEGATIVE: 1
NAUSEA: 0
DIARRHEA: 0
VOMITING: 0

## 2019-06-04 NOTE — PROGRESS NOTES
tablet by mouth 2 times daily 60 tablet 3    Galcanezumab-gnlm (EMGALITY) 120 MG/ML SOAJ Inject 120 mg into the skin every 30 days Starting on Month 2 1 pen 5    butalbital-acetaminophen-caffeine (FIORICET, ESGIC) -40 MG per tablet TAKE 1 TABLET BY MOUTH EVERY 6 HOURS AS NEEDED FOR HEADACHE MUST  LAST  30  DAYS 30 tablet 1    fluticasone-vilanterol (BREO ELLIPTA) 100-25 MCG/INH AEPB inhaler Inhale 2 puffs into the lungs daily 60 each 3    ketoconazole (NIZORAL) 2 % cream Apply topically daily. 60 g 1    albuterol sulfate  (90 Base) MCG/ACT inhaler Inhale 2 puffs into the lungs every 6 hours as needed for Wheezing 1 Inhaler 0    ondansetron (ZOFRAN) 4 MG tablet Take 4 mg by mouth every 8 hours as needed for Nausea or Vomiting      Multiple Vitamins-Minerals (THERAPEUTIC MULTIVITAMIN-MINERALS) tablet Take 1 tablet by mouth daily      aspirin EC 81 MG EC tablet Take 1 tablet by mouth daily 90 tablet 5     Current Facility-Administered Medications   Medication Dose Route Frequency Provider Last Rate Last Dose    ketorolac (TORADOL) injection 30 mg  30 mg Intramuscular Once Bobbi Congo, APRN        cefTRIAXone (ROCEPHIN) injection 1 g  1 g Intramuscular Once Katrina Congo, APRN           No Known Allergies    Family History   Problem Relation Age of Onset    High Blood Pressure Mother     Diabetes Father     Heart Disease Father     Stomach Cancer Father     Breast Cancer Other         maternal great aunt    Colon Cancer Paternal Grandmother     Colon Polyps Neg Hx     Esophageal Cancer Neg Hx     Liver Cancer Neg Hx     Liver Disease Neg Hx     Rectal Cancer Neg Hx                Review of Systems   Constitutional: Negative for fatigue and fever. Respiratory: Negative. Cardiovascular: Negative. Gastrointestinal: Negative for diarrhea, nausea and vomiting. Musculoskeletal: Negative for myalgias. Skin: Positive for rash and wound.        Objective:     Physical Exam 1 tablet by mouth 2 times daily for 10 days     Dispense:  20 tablet     Refill:  0    mupirocin (BACTROBAN) 2 % ointment     Sig: Apply 3 times daily. Dispense:  1 Tube     Refill:  0        Patient offered educational materials - see patient instructions for any instruction needed. Discussed use, benefit, and side effects of prescribed medications. All patient questions answered. Instructed to continue current medications, diet and exercise. Patient agreed with treatment plan. Follow up as directed. Patient was advised to go to the ED if condition ever becomes emergent.          Electronically signed by Indira Keller on 6/4/2019 at 4:26 PM

## 2019-06-04 NOTE — PROGRESS NOTES
After obtaining consent, and per orders of Kuldeep Collins, APRN injection of Toradol and Rocephin  given in Right vastus lateralis and Left Vastus lateralis  by Raysa Garcia. Patient instructed to remain in clinic for 20 minutes afterwards, and to report any adverse reaction to me immediately.

## 2019-06-07 ENCOUNTER — TELEPHONE (OUTPATIENT)
Dept: CARDIOLOGY | Age: 34
End: 2019-06-07

## 2019-06-07 NOTE — TELEPHONE ENCOUNTER
Patient returned call and left voice message . She stated she may have had some chest pains and that is why she sent a loop recorder event report. She stated she was doing ok.

## 2019-06-07 NOTE — TELEPHONE ENCOUNTER
Received [facility] loop recorder patient activated symptom on 6/6/19. Left voice message for patient to return call to find out what type of symptom she had. Keenan Isabella

## 2019-06-10 ENCOUNTER — HOSPITAL ENCOUNTER (EMERGENCY)
Facility: HOSPITAL | Age: 34
Discharge: HOME | End: 2019-06-10
Admitting: EMERGENCY MEDICINE

## 2019-06-10 VITALS
TEMPERATURE: 98.1 F | SYSTOLIC BLOOD PRESSURE: 122 MMHG | OXYGEN SATURATION: 99 % | HEART RATE: 78 BPM | BODY MASS INDEX: 37.26 KG/M2 | DIASTOLIC BLOOD PRESSURE: 78 MMHG | WEIGHT: 189.8 LBS | RESPIRATION RATE: 16 BRPM | HEIGHT: 60 IN

## 2019-06-10 DIAGNOSIS — L03.114 CELLULITIS OF LEFT UPPER EXTREMITY: Primary | ICD-10-CM

## 2019-06-10 PROCEDURE — 99283 EMERGENCY DEPT VISIT LOW MDM: CPT

## 2019-06-10 RX ORDER — CEPHALEXIN 500 MG/1
500 CAPSULE ORAL 3 TIMES DAILY
Qty: 30 CAPSULE | Refills: 0 | Status: SHIPPED | OUTPATIENT
Start: 2019-06-10 | End: 2020-01-27

## 2019-06-18 ENCOUNTER — OFFICE VISIT (OUTPATIENT)
Dept: PRIMARY CARE CLINIC | Age: 34
End: 2019-06-18
Payer: MEDICAID

## 2019-06-18 VITALS
DIASTOLIC BLOOD PRESSURE: 70 MMHG | BODY MASS INDEX: 37.11 KG/M2 | RESPIRATION RATE: 20 BRPM | SYSTOLIC BLOOD PRESSURE: 110 MMHG | HEIGHT: 60 IN | HEART RATE: 68 BPM | WEIGHT: 189 LBS

## 2019-06-18 DIAGNOSIS — I25.10 CORONARY ARTERY DISEASE INVOLVING NATIVE CORONARY ARTERY OF NATIVE HEART WITHOUT ANGINA PECTORIS: Chronic | ICD-10-CM

## 2019-06-18 DIAGNOSIS — F51.04 INSOMNIA, PSYCHOPHYSIOLOGICAL: ICD-10-CM

## 2019-06-18 DIAGNOSIS — F33.1 MODERATE EPISODE OF RECURRENT MAJOR DEPRESSIVE DISORDER (HCC): Primary | Chronic | ICD-10-CM

## 2019-06-18 PROCEDURE — 99214 OFFICE O/P EST MOD 30 MIN: CPT | Performed by: FAMILY MEDICINE

## 2019-06-18 RX ORDER — VENLAFAXINE HYDROCHLORIDE 150 MG/1
150 CAPSULE, EXTENDED RELEASE ORAL DAILY
Qty: 30 CAPSULE | Refills: 1 | Status: SHIPPED | OUTPATIENT
Start: 2019-06-18 | End: 2019-07-09 | Stop reason: SDUPTHER

## 2019-06-18 RX ORDER — TRAZODONE HYDROCHLORIDE 50 MG/1
TABLET ORAL
Qty: 60 TABLET | Refills: 1 | Status: ON HOLD | OUTPATIENT
Start: 2019-06-18 | End: 2019-11-07

## 2019-06-18 ASSESSMENT — ENCOUNTER SYMPTOMS
VOMITING: 0
SHORTNESS OF BREATH: 1
CONSTIPATION: 0
DIARRHEA: 0
COUGH: 0
WHEEZING: 0
CHEST TIGHTNESS: 0
ABDOMINAL PAIN: 0
NAUSEA: 0

## 2019-06-18 NOTE — PATIENT INSTRUCTIONS
Take trazodone 50 mg tablet 1-2 hrs before bedtime. If after 3 nights it is not helping, you may increase to 2 tabs at night 1-2 hrs before bedtime. If you feel too sedated the next morning, you may cut the tab in half and just take 25 mg.       Increase effexor to 150 mg daily

## 2019-06-18 NOTE — PROGRESS NOTES
Linda Aguilera is a 37 y.o. female who presents today for   Chief Complaint   Patient presents with    3 Month Follow-Up    Other     she has been to Pulmonology and they say its her heart       HPI  Patient is here for f/u pulmonology. She still is taking Breo. She was referred to cardiology for further testing. Also going to have an upcoming EEG with neurology due to the previous tremor like activity. She notes as well that she has been more depressed and anxious. The Effexor originally did help but she notes it is not helping as much now. Her 12year-old daughter notices this as well. No change in PMH, family, social, or surgical history unless mentioned above. Review of Systems   Constitutional: Negative for chills and fever. Respiratory: Positive for shortness of breath. Negative for cough, chest tightness and wheezing. Cardiovascular: Negative for chest pain, palpitations and leg swelling. Gastrointestinal: Negative for abdominal pain, constipation, diarrhea, nausea and vomiting. Genitourinary: Negative for difficulty urinating, dysuria and frequency. Neurological: Positive for tremors. Negative for speech difficulty and headaches. Psychiatric/Behavioral: Positive for dysphoric mood. Negative for agitation, self-injury, sleep disturbance and suicidal ideas. The patient is nervous/anxious.            Past Medical History:   Diagnosis Date    Anxiety     Cerebral artery occlusion with cerebral infarction ([facility] [address]     Depression     MI (myocardial infarction) ([facility] [address]     Miscarriage June 2012    Syncope        Current Outpatient Medications   Medication Sig Dispense Refill    venlafaxine (EFFEXOR XR) 150 MG extended release capsule Take 1 capsule by mouth daily TAKE 1 CAPSULE BY MOUTH EVERY DAY 30 capsule 1    traZODone (DESYREL) 50 MG tablet Take 1-2 tabs at night as directed 60 tablet 1    atorvastatin (LIPITOR) 40 MG tablet TAKE 1 TABLET BY MOUTH EVERY DAY 60 tablet 0    Esophageal Cancer Neg Hx     Liver Cancer Neg Hx     Liver Disease Neg Hx     Rectal Cancer Neg Hx        /70   Pulse 68   Resp 20   Ht 5' (1.524 m)   Wt 189 lb (85.7 kg)   LMP 05/27/2019   BMI 36.91 kg/m²     Physical Exam   Constitutional: She is oriented to person, place, and time. She appears well-developed and well-nourished. No distress. HENT:   Head: Normocephalic and atraumatic. Cardiovascular: Normal rate, regular rhythm and normal heart sounds. No murmur heard. Pulmonary/Chest: Effort normal and breath sounds normal. No respiratory distress. She has no wheezes. She has no rales. Abdominal: Soft. Bowel sounds are normal. She exhibits no distension. There is no tenderness. Musculoskeletal:        Left shoulder: She exhibits no pain. No pretibial edema b/l. Neurological: She is alert and oriented to person, place, and time. Skin: Skin is warm and dry. She is not diaphoretic. No cyanosis. Psychiatric: Her mood appears anxious. Cognition and memory are not impaired. She does not express impulsivity or inappropriate judgment. She exhibits a depressed mood. She expresses no homicidal and no suicidal ideation. She exhibits normal recent memory and normal remote memory. Nursing note and vitals reviewed. Assessment:    ICD-10-CM    1. Moderate episode of recurrent major depressive disorder (HCC) F33.1    2. Coronary artery disease involving native coronary artery of native heart without angina pectoris I25.10    3. Insomnia, psychophysiological F51.04        Plan:   She does have reactive depression in addition to her underlying organic depression. He remains uncontrolled I will increase her Effexor and add trazodone for the psychophysiologic insomnia. She is to follow-up with cardiology and neurology for further testing. No orders of the defined types were placed in this encounter.     Orders Placed This Encounter   Medications    venlafaxine (EFFEXOR XR) 150 MG extended release capsule     Sig: Take 1 capsule by mouth daily TAKE 1 CAPSULE BY MOUTH EVERY DAY     Dispense:  30 capsule     Refill:  1    traZODone (DESYREL) 50 MG tablet     Sig: Take 1-2 tabs at night as directed     Dispense:  60 tablet     Refill:  1        Medications Discontinued During This Encounter   Medication Reason    venlafaxine (EFFEXOR XR) 75 MG extended release capsule REORDER     Patient Instructions   Take trazodone 50 mg tablet 1-2 hrs before bedtime. If after 3 nights it is not helping, you may increase to 2 tabs at night 1-2 hrs before bedtime. If you feel too sedated the next morning, you may cut the tab in half and just take 25 mg. Increase effexor to 150 mg daily     Patient given educational handouts and has had all questions answered. Patient voices understanding and agrees to plans along with risks and benefits of plan. Patient isinstructed to continue prior meds, diet, and exercise plans unless instructed otherwise. Patient agrees to follow up as instructed and sooner if needed. Patient agrees to go to ER if condition becomes emergent. Notesmay be completed with dictation device and spelling errors may occur. Return in about 1 month (around 7/18/2019) for f/u effexor 150, trazodone .

## 2019-06-24 ENCOUNTER — HOSPITAL ENCOUNTER (OUTPATIENT)
Dept: NEUROLOGY | Age: 34
Setting detail: OBSERVATION
Discharge: HOME | End: 2019-06-26
Attending: PSYCHIATRY & NEUROLOGY | Admitting: PSYCHIATRY & NEUROLOGY
Payer: MEDICAID

## 2019-06-24 PROBLEM — R56.9 CONVULSIONS (HCC): Status: ACTIVE | Noted: 2019-06-24

## 2019-06-24 PROCEDURE — G0378 HOSPITAL OBSERVATION PER HR: HCPCS

## 2019-06-24 PROCEDURE — 95951 PR EEG MONITORING/VIDEORECORD: CPT | Performed by: PSYCHIATRY & NEUROLOGY

## 2019-06-24 PROCEDURE — 6360000002 HC RX W HCPCS: Performed by: PSYCHIATRY & NEUROLOGY

## 2019-06-24 PROCEDURE — 99219 PR INITIAL OBSERVATION CARE/DAY 50 MINUTES: CPT | Performed by: PSYCHIATRY & NEUROLOGY

## 2019-06-24 PROCEDURE — 96372 THER/PROPH/DIAG INJ SC/IM: CPT

## 2019-06-24 PROCEDURE — 2580000003 HC RX 258: Performed by: PSYCHIATRY & NEUROLOGY

## 2019-06-24 PROCEDURE — 95951 HC EEG MONITORING VIDEO RECORDING: CPT

## 2019-06-24 RX ORDER — SODIUM CHLORIDE 0.9 % (FLUSH) 0.9 %
10 SYRINGE (ML) INJECTION EVERY 12 HOURS SCHEDULED
Status: DISCONTINUED | OUTPATIENT
Start: 2019-06-24 | End: 2019-06-26 | Stop reason: HOSPADM

## 2019-06-24 RX ORDER — LORAZEPAM 2 MG/ML
1 INJECTION INTRAMUSCULAR PRN
Status: DISCONTINUED | OUTPATIENT
Start: 2019-06-24 | End: 2019-06-26 | Stop reason: HOSPADM

## 2019-06-24 RX ORDER — VENLAFAXINE HYDROCHLORIDE 75 MG/1
150 CAPSULE, EXTENDED RELEASE ORAL DAILY
Status: DISCONTINUED | OUTPATIENT
Start: 2019-06-24 | End: 2019-06-24

## 2019-06-24 RX ORDER — ASPIRIN 81 MG/1
81 TABLET ORAL DAILY
Status: DISCONTINUED | OUTPATIENT
Start: 2019-06-24 | End: 2019-06-26 | Stop reason: HOSPADM

## 2019-06-24 RX ORDER — VENLAFAXINE HYDROCHLORIDE 150 MG/1
150 CAPSULE, EXTENDED RELEASE ORAL DAILY
Status: DISCONTINUED | OUTPATIENT
Start: 2019-06-24 | End: 2019-06-26 | Stop reason: HOSPADM

## 2019-06-24 RX ORDER — FLUTICASONE FUROATE AND VILANTEROL 100; 25 UG/1; UG/1
2 POWDER RESPIRATORY (INHALATION) DAILY
Status: DISCONTINUED | OUTPATIENT
Start: 2019-06-24 | End: 2019-06-26 | Stop reason: HOSPADM

## 2019-06-24 RX ORDER — BUTALBITAL, ACETAMINOPHEN AND CAFFEINE 50; 325; 40 MG/1; MG/1; MG/1
1 TABLET ORAL EVERY 6 HOURS PRN
Status: DISCONTINUED | OUTPATIENT
Start: 2019-06-24 | End: 2019-06-26 | Stop reason: HOSPADM

## 2019-06-24 RX ORDER — ATORVASTATIN CALCIUM 40 MG/1
40 TABLET, FILM COATED ORAL DAILY
Status: DISCONTINUED | OUTPATIENT
Start: 2019-06-24 | End: 2019-06-26 | Stop reason: HOSPADM

## 2019-06-24 RX ORDER — SODIUM CHLORIDE 0.9 % (FLUSH) 0.9 %
10 SYRINGE (ML) INJECTION PRN
Status: DISCONTINUED | OUTPATIENT
Start: 2019-06-24 | End: 2019-06-26 | Stop reason: HOSPADM

## 2019-06-24 RX ORDER — OMEPRAZOLE 20 MG/1
20 CAPSULE, DELAYED RELEASE ORAL
Status: DISCONTINUED | OUTPATIENT
Start: 2019-06-24 | End: 2019-06-26 | Stop reason: HOSPADM

## 2019-06-24 RX ORDER — ONDANSETRON 4 MG/1
4 TABLET, FILM COATED ORAL EVERY 8 HOURS PRN
Status: DISCONTINUED | OUTPATIENT
Start: 2019-06-24 | End: 2019-06-26 | Stop reason: HOSPADM

## 2019-06-24 RX ORDER — ALBUTEROL SULFATE 90 UG/1
2 AEROSOL, METERED RESPIRATORY (INHALATION) EVERY 6 HOURS PRN
Status: DISCONTINUED | OUTPATIENT
Start: 2019-06-24 | End: 2019-06-26 | Stop reason: HOSPADM

## 2019-06-24 RX ORDER — RANITIDINE 150 MG/1
150 TABLET ORAL 2 TIMES DAILY
Status: DISCONTINUED | OUTPATIENT
Start: 2019-06-24 | End: 2019-06-26 | Stop reason: HOSPADM

## 2019-06-24 RX ORDER — KETOCONAZOLE 20 MG/G
CREAM TOPICAL DAILY
Status: DISCONTINUED | OUTPATIENT
Start: 2019-06-24 | End: 2019-06-26 | Stop reason: HOSPADM

## 2019-06-24 RX ADMIN — OMEPRAZOLE 20 MG: 20 CAPSULE, DELAYED RELEASE ORAL at 17:18

## 2019-06-24 RX ADMIN — RANITIDINE 150 MG: 150 TABLET ORAL at 21:52

## 2019-06-24 RX ADMIN — Medication 10 ML: at 21:52

## 2019-06-24 RX ADMIN — Medication 10 ML: at 13:49

## 2019-06-24 RX ADMIN — ENOXAPARIN SODIUM 40 MG: 40 INJECTION SUBCUTANEOUS at 13:48

## 2019-06-24 ASSESSMENT — PAIN SCALES - GENERAL
PAINLEVEL_OUTOF10: 0
PAINLEVEL_OUTOF10: 0

## 2019-06-24 NOTE — H&P
Miscarriage June 2012    Seizures [facility])     Syncope        Past Surgical History:      Procedure Laterality Date    CHOLECYSTECTOMY, LAPAROSCOPIC      EGD  2016    [facility]   [address]      lower right leg, has metal plate and screws    FRACTURE SURGERY      left wrist    INTRAUTERINE DEVICE INSERTION      Insure placement 6/7/2016       Social History:   TOBACCO:   reports that she has never smoked. She has never used smokeless tobacco.  ETOH:   reports that she drinks alcohol.   DRUG:  None    Family History:       Problem Relation Age of Onset    High Blood Pressure Mother     Diabetes Father     Heart Disease Father     Stomach Cancer Father     Breast Cancer Other         maternal great aunt    Colon Cancer Paternal Grandmother     Colon Polyps Neg Hx     Esophageal Cancer Neg Hx     Liver Cancer Neg Hx     Liver Disease Neg Hx     Rectal Cancer Neg Hx        Medications:      Current Facility-Administered Medications:     albuterol sulfate  (90 Base) MCG/ACT inhaler 2 puff, 2 puff, Inhalation, Q6H PRN, Vish Alvarado, DO    aspirin EC tablet 81 mg, 81 mg, Oral, Daily, Von  Couch, DO    atorvastatin (LIPITOR) tablet 40 mg, 40 mg, Oral, Daily, Vish Alvarado, DO    butalbital-acetaminophen-caffeine (FIORICET, ESGIC) per tablet 1 tablet, 1 tablet, Oral, Q6H PRN, Ramiroella Christiano, DO    fluticasone-vilanterol (BREO ELLIPTA) 100-25 MCG/INH inhaler 2 puff, 2 puff, Inhalation, Daily, Lavella Christiano, DO    ketoconazole (NIZORAL) 2 % cream, , Topical, Daily, Ramiroella Christiano, DO    omeprazole (PRILOSEC) delayed release capsule 20 mg, 20 mg, Oral, BID AC, Vish Alvarado, DO    ondansetron [facility]) tablet 4 mg, 4 mg, Oral, Q8H PRN, Lavella Christiano, DO    ranitidine (ZANTAC) tablet 150 mg, 150 mg, Oral, BID, Von  Couch, DO    sodium chloride flush 0.9 % injection 10 mL, 10 mL, Intravenous, 2 times per day, Vish Alvarado, DO    sodium chloride flush 0.9 % injection 10 mL, 10 mL, Intravenous, PRN, Nettie Brightly, DO    enoxaparin (LOVENOX) injection 40 mg, 40 mg, Subcutaneous, Daily, Nettie Brightly, DO    LORazepam (ATIVAN) injection 1 mg, 1 mg, Intravenous, PRN, Nettie Brightly, DO    venlafaxine (EFFEXOR XR) extended release capsule 150 mg, 150 mg, Oral, Daily, Nettie Brightly, DO    Allergies:  Patient has no known allergies. PHYSICAL EXAM:    Constitutional -   BP 98/61   Pulse 63   Temp 98.1 °F (36.7 °C)   Resp 18   LMP 05/27/2019   SpO2 99%   General appearance: No acute distress   EYES -   Conjunctiva normal  Pupillary exam as below, see CN exam in the neurologic exam  ENT-    No scars, masses, or lesions over external nose or ears  Hearing normal bilaterally to finger rub  Cardiovascular -   RRR  No clubbing, cyanosis, or edema   Respiratory-   Good expansion, normal effort without use of accessory muscles  CTA  Musculoskeletal -   No significant wasting of muscles noted  Gait as below, see gait exam in the neurologic exam  Muscle strength, tone, stability as below. No bony deformities  Skin -   Warm, dry, and intact to inspection and palpation. No rash, erythema, or pallor  Psychiatric -   Mood, affect, and behavior appear normal    Memory as below see mental status examination in the neurologic exam      NEUROLOGICAL EXAM    Mental status   [x]Awake, alert, oriented   [x]Affect attention and concentration appear appropriate  [x]Recent and remote memory appears unremarkable  [x]Speech normal without dysarthria or aphasia, comprehension and repetition intact.    COMMENTS:    Cranial Nerves [x]No VF deficit to confrontation,  no papilledema on fundoscopic exam.  [x]PERRLA, EOMI, no nystagmus, conjugate eye movements, no ptosis  [x]Face symmetric  [x]Facial sensation intact  [x]Tongue midline no atrophy or fasciculations present  [x]Palate midline, hearing to finger rub normal  [x]Shoulder shrug and SCM testing normal  COMMENTS:   Motor   [x]5/5 strength x 4 extremities  [x]Normal bulk and tone  [x]No tremor present  [x]No rigidity or bradykinesia noted  COMMENTS:   Sensory  [x]Sensation intact to light touch, pin prick, vibration, and proprioception BLE  []Sensation intact to light touch, pin prick, vibration, and proprioception BUE  COMMENTS:   Coordination [x]FTN normal bilaterally   []HTS normal bilaterally  []LACEY normal.   COMMENTS:   Reflexes  [x]Symmetric and non-pathological  [x]Toes down going bilaterally  [x]No clonus present  COMMENTS:   Gait                  [x]Normal steady gait    []Ataxic    []Spastic     []Magnetic     []Shuffling  COMMENTS:     LABS AND IMAGING:  As below and per HPI. ASSESSMENT:  37 y.o. here with recurrent seizure-like episodes. She has been admitted to our epilepsy monitoring unit for event clarification. Equivocal routine EEG findings were noted, these interictal discharges will also need to be further clarified. PLAN:  1. Video EEG monitoring 48-72 hours. 2.  Ativan prn, seizure precautions. 3.  Final AED recommendations pending above.      [place] DO  Board Certified Neurologist

## 2019-06-24 NOTE — PROGRESS NOTES
[facility] arrived to room # 333. Presented with: EEG  Mental Status: Patient is oriented, alert and coherent. Vitals:    06/24/19 0907   BP: 98/61   Pulse: 63   Resp: 18   Temp: 98.1 °F (36.7 °C)   SpO2: 99%     Patient safety contract and falls prevention contract reviewed with patient Yes. Oriented Patient and Family to room. Call light within reach. Yes.   Needs, issues or concerns expressed at this time: no.      Electronically signed by Kathy Fairbanks RN on 6/24/2019 at 9:30 AM

## 2019-06-25 DIAGNOSIS — Z95.818 STATUS POST PLACEMENT OF IMPLANTABLE LOOP RECORDER: Primary | ICD-10-CM

## 2019-06-25 DIAGNOSIS — R55 SYNCOPE, UNSPECIFIED SYNCOPE TYPE: ICD-10-CM

## 2019-06-25 PROCEDURE — 95951 HC EEG MONITORING VIDEO RECORDING: CPT

## 2019-06-25 PROCEDURE — G0378 HOSPITAL OBSERVATION PER HR: HCPCS

## 2019-06-25 PROCEDURE — 95951 PR EEG MONITORING/VIDEORECORD: CPT | Performed by: PSYCHIATRY & NEUROLOGY

## 2019-06-25 PROCEDURE — 6370000000 HC RX 637 (ALT 250 FOR IP): Performed by: PSYCHIATRY & NEUROLOGY

## 2019-06-25 PROCEDURE — 2580000003 HC RX 258: Performed by: PSYCHIATRY & NEUROLOGY

## 2019-06-25 PROCEDURE — 6360000002 HC RX W HCPCS: Performed by: PSYCHIATRY & NEUROLOGY

## 2019-06-25 PROCEDURE — 93298 REM INTERROG DEV EVAL SCRMS: CPT | Performed by: CLINICAL NURSE SPECIALIST

## 2019-06-25 PROCEDURE — 96372 THER/PROPH/DIAG INJ SC/IM: CPT

## 2019-06-25 PROCEDURE — 99225 PR SBSQ OBSERVATION CARE/DAY 25 MINUTES: CPT | Performed by: PSYCHIATRY & NEUROLOGY

## 2019-06-25 PROCEDURE — 93299 PR REM INTERROG ICPMS/SCRMS <30 D TECH REVIEW: CPT | Performed by: CLINICAL NURSE SPECIALIST

## 2019-06-25 RX ORDER — OXYCODONE AND ACETAMINOPHEN 7.5; 325 MG/1; MG/1
1 TABLET ORAL EVERY 4 HOURS PRN
Status: DISCONTINUED | OUTPATIENT
Start: 2019-06-25 | End: 2019-06-26 | Stop reason: HOSPADM

## 2019-06-25 RX ADMIN — Medication 10 ML: at 10:56

## 2019-06-25 RX ADMIN — ENOXAPARIN SODIUM 40 MG: 40 INJECTION SUBCUTANEOUS at 10:52

## 2019-06-25 RX ADMIN — OMEPRAZOLE 20 MG: 20 CAPSULE, DELAYED RELEASE ORAL at 07:28

## 2019-06-25 RX ADMIN — OMEPRAZOLE 20 MG: 20 CAPSULE, DELAYED RELEASE ORAL at 18:05

## 2019-06-25 RX ADMIN — RANITIDINE 150 MG: 150 TABLET ORAL at 10:51

## 2019-06-25 RX ADMIN — ATORVASTATIN CALCIUM 40 MG: 40 TABLET, FILM COATED ORAL at 10:52

## 2019-06-25 RX ADMIN — OXYCODONE HYDROCHLORIDE AND ACETAMINOPHEN 1 TABLET: 7.5; 325 TABLET ORAL at 18:05

## 2019-06-25 RX ADMIN — Medication 10 ML: at 22:06

## 2019-06-25 RX ADMIN — ASPIRIN 81 MG: 81 TABLET ORAL at 10:52

## 2019-06-25 RX ADMIN — FLUTICASONE FUROATE AND VILANTEROL 2 PUFF: 100; 25 POWDER RESPIRATORY (INHALATION) at 10:47

## 2019-06-25 RX ADMIN — VENLAFAXINE HYDROCHLORIDE 150 MG: 150 CAPSULE, EXTENDED RELEASE ORAL at 10:52

## 2019-06-25 RX ADMIN — RANITIDINE 150 MG: 150 TABLET ORAL at 21:37

## 2019-06-25 RX ADMIN — BUTALBITAL, ACETAMINOPHEN AND CAFFEINE 1 TABLET: 50; 325; 40 TABLET ORAL at 14:43

## 2019-06-25 ASSESSMENT — PAIN SCALES - GENERAL
PAINLEVEL_OUTOF10: 10
PAINLEVEL_OUTOF10: 9

## 2019-06-25 NOTE — PROGRESS NOTES
[facility] Neurology Progress Note      Patient:   Amarilis Mora  MR#:    [id number]   Room:    0333/333-01   YOB: 1985  Date of Progress Note: 6/25/2019  Time of Note                           8:41 AM  Attending Physician:  Manasa Alanis DO      INTERVAL HISTORY:  No overt seizures overnight, no new complaints this am.     REVIEW OF SYSTEMS:  Constitutional: No fevers No chills  Neck:No stiffness  Respiratory: No shortness of breath  Cardiovascular: No chest pain No palpitations  Gastrointestinal: No abdominal pain    Genitourinary: No Dysuria  Neurological: No headache, no confusion    PHYSICAL EXAM:    Constitutional -   /69   Pulse 67   Temp 97.8 °F (36.6 °C) (Temporal)   Resp 16   Ht 5' (1.524 m)   Wt 189 lb (85.7 kg)   LMP 05/27/2019   SpO2 97%   BMI 36.91 kg/m²   General appearance: No acute distress   EYES -   Conjunctiva normal  Pupillary exam as below, see CN exam in the neurologic exam  ENT-    No scars, masses, or lesions over external nose or ears  Hearing normal bilaterally to finger rub  Neck-   No neck masses noted  Thyroid normal   No jugular vein distension  Cardiovascular -   RRR  No clubbing, cyanosis, or edema   Pulmonary-   Good expansion, normal effort without use of accessory muscles  CTA  Musculoskeletal -   No significant wasting of muscles noted  Gait as below, see gait exam in the neurologic exam  Muscle strength, tone, stability as below see the motor exam in the neurologic exam.   No bony deformities  Skin -   Warm, dry, and intact to inspection and palpation. No rash, erythema, or pallor    NEUROLOGICAL EXAM    Mental status   [x] Awake, alert, oriented   [x] Affect attention and concentration appear appropriate  [x] Recent and remote memory appears unremarkable  [x] Speech normal without dysarthria or aphasia, comprehension and repetition intact.    COMMENTS:   Cranial Nerves [x] No VF deficit to confrontation  [x] PERRLA, EOMI, no nystagmus, conjugate eye movements, no ptosis  [x] Face symmetric  [x] Facial sensation intact  [x] Tongue midline no atrophy or fasciculations present  [x] Palate midline, hearing to finger rub normal  [x] Shoulder shrug and SCM testing normal  COMMENTS:   Motor   [x] 5/5 strength x 4 extremities  [x] Normal bulk and tone  [x] No tremor present  [x] No rigidity or bradykinesia noted  COMMENTS:   Sensory  [x] Sensation intact to light touch, pin prick, vibration, and proprioception BLE  [] Sensation intact to light touch, pin prick, vibration, and proprioception BUE  COMMENTS:   Coordination [x] FTN normal bilaterally   [] HTS normal bilaterally  [] LACEY normal.   COMMENTS:   Reflexes  [x] Symmetric and non-pathological  [x] Toes downgoing bilaterally  [x] No clonus present  COMMENTS:   Gait                  [x] Normal steady gait    [] Ataxic    [] Spastic     [] Magnetic     [] Shuffling  [] Not assessed  COMMENTS:       LABS/IMAGING:    Lab Results   Component Value Date    WBC 7.8 09/06/2018    HGB 12.2 09/06/2018    HCT 38.6 09/06/2018    MCV 96.3 09/06/2018     09/06/2018     Lab Results   Component Value Date     09/06/2018    K 3.7 09/06/2018     09/06/2018    CO2 22 09/06/2018    BUN 9 09/06/2018    CREATININE 0.6 09/06/2018    GLUCOSE 84 09/06/2018    CALCIUM 8.9 09/06/2018    PROT 7.1 09/06/2018    LABALBU 3.8 09/06/2018    BILITOT 0.4 09/06/2018    ALKPHOS 86 09/06/2018    AST 19 09/06/2018    ALT 14 09/06/2018    LABGLOM >60 09/06/2018    GLOB 3.3 07/13/2016     RECORD REVIEW:   Previous medical records, medications were reviewed at today's visit. Nursing/physician notes, imaging, labs and vitals reviewed. PT,OT and/or speech notes reviewed      ASSESSMENT:  37 y.o. with recurrent seizure-like episodes. She has been admitted to our epilepsy monitoring unit for event clarification. Equivocal routine EEG findings were noted, these interictal discharges will also need to be further clarified.  No overt events thus far.      PLAN:  1. Continue Video EEG monitoring 72-96 hours. 2.  Ativan prn, seizure precautions. 3.  Final AED recommendations pending above. Please feel free to call with any questions. [phone] (cell phone).       Jade Whittaker DO  Board Certified Neurology

## 2019-06-26 ENCOUNTER — TELEPHONE (OUTPATIENT)
Dept: NEUROSURGERY | Age: 34
End: 2019-06-26

## 2019-06-26 VITALS
SYSTOLIC BLOOD PRESSURE: 110 MMHG | BODY MASS INDEX: 37.11 KG/M2 | HEIGHT: 60 IN | WEIGHT: 189 LBS | HEART RATE: 67 BPM | RESPIRATION RATE: 18 BRPM | DIASTOLIC BLOOD PRESSURE: 71 MMHG | OXYGEN SATURATION: 97 % | TEMPERATURE: 98.6 F

## 2019-06-26 PROCEDURE — 95951 PR EEG MONITORING/VIDEORECORD: CPT | Performed by: PSYCHIATRY & NEUROLOGY

## 2019-06-26 PROCEDURE — G0378 HOSPITAL OBSERVATION PER HR: HCPCS

## 2019-06-26 PROCEDURE — 99217 PR OBSERVATION CARE DISCHARGE MANAGEMENT: CPT | Performed by: PSYCHIATRY & NEUROLOGY

## 2019-06-26 RX ORDER — ONDANSETRON 4 MG/1
4 TABLET, FILM COATED ORAL EVERY 8 HOURS PRN
Qty: 20 TABLET | Refills: 5
Start: 2019-06-26 | End: 2019-12-16 | Stop reason: SDUPTHER

## 2019-06-26 RX ORDER — ALBUTEROL SULFATE 90 UG/1
2 AEROSOL, METERED RESPIRATORY (INHALATION) EVERY 6 HOURS PRN
Qty: 1 INHALER | Refills: 0
Start: 2019-06-26 | End: 2020-07-01 | Stop reason: SDUPTHER

## 2019-06-26 RX ADMIN — OMEPRAZOLE 20 MG: 20 CAPSULE, DELAYED RELEASE ORAL at 07:25

## 2019-06-26 RX ADMIN — VENLAFAXINE HYDROCHLORIDE 150 MG: 150 CAPSULE, EXTENDED RELEASE ORAL at 09:24

## 2019-06-26 RX ADMIN — RANITIDINE 150 MG: 150 TABLET ORAL at 09:24

## 2019-06-26 RX ADMIN — BUTALBITAL, ACETAMINOPHEN AND CAFFEINE 1 TABLET: 50; 325; 40 TABLET ORAL at 09:24

## 2019-06-26 RX ADMIN — ASPIRIN 81 MG: 81 TABLET ORAL at 09:24

## 2019-06-26 RX ADMIN — FLUTICASONE FUROATE AND VILANTEROL 2 PUFF: 100; 25 POWDER RESPIRATORY (INHALATION) at 09:24

## 2019-06-26 RX ADMIN — ATORVASTATIN CALCIUM 40 MG: 40 TABLET, FILM COATED ORAL at 09:24

## 2019-06-26 ASSESSMENT — PAIN SCALES - GENERAL: PAINLEVEL_OUTOF10: 9

## 2019-06-26 NOTE — DISCHARGE SUMMARY
EPILEPSY MONITORING UNIT DISCHARGE SUMMARY      Patient Name: Dia Officer    :  1985    MRN:  [id number]  Admit date:  2019    Discharge date:  2019  Admitting Physician:  Edwige Thornton DO    Primary Care Physician:  Brittney Leon MD    Discharge Diagnoses:  Convulsions    Diagnostic Studies: 24 hour video EEG monitoring    Hospital Course: The patient was admitted to epilepsy monitoring unit. Event precautions were put in place. Multiple events were captured which included brief episodes of apparent unresponsiveness with milder tremulousness. There were also more significant episodes with an apparent loss of awareness with generalized shaking. No overt epileptiform abnormalities were noted. The events appeared nonepileptic. Her primary interictal EEG was also normal.  The patient be discharged home today. She will continue to follow hip precautions as outlined below. Final antiepileptic drug recommendations will be made in clinic with me at follow-up in a couple weeks. Discharge Medications:      Rosetta Barrett   Home Medication Instructions FCT:[id number]    Printed on:19 9102   Medication Information                      albuterol sulfate  (90 Base) MCG/ACT inhaler  Inhale 2 puffs into the lungs every 6 hours as needed for Wheezing             aspirin EC 81 MG EC tablet  Take 1 tablet by mouth daily             atorvastatin (LIPITOR) 40 MG tablet  TAKE 1 TABLET BY MOUTH EVERY DAY             butalbital-acetaminophen-caffeine (FIORICET, ESGIC) -40 MG per tablet  TAKE 1 TABLET BY MOUTH EVERY 6 HOURS AS NEEDED FOR HEADACHE MUST  LAST  30  DAYS             fluticasone-vilanterol (BREO ELLIPTA) 100-25 MCG/INH AEPB inhaler  Inhale 2 puffs into the lungs daily             Galcanezumab-gnlm (EMGALITY) 120 MG/ML SOAJ  Inject 120 mg into the skin every 30 days Starting on Month 2             ketoconazole (NIZORAL) 2 % cream  Apply topically daily.              Multiple Vitamins-Minerals (THERAPEUTIC MULTIVITAMIN-MINERALS) tablet  Take 1 tablet by mouth daily             omeprazole (PRILOSEC) 20 MG delayed release capsule  TAKE 1 CAPSULE BY MOUTH TWICE DAILY BEFORE MEALS             ondansetron (ZOFRAN) 4 MG tablet  Take 1 tablet by mouth every 8 hours as needed for Nausea or Vomiting             ranitidine (ZANTAC) 150 MG tablet  Take 1 tablet by mouth 2 times daily             traZODone (DESYREL) 50 MG tablet  Take 1-2 tabs at night as directed             venlafaxine (EFFEXOR XR) 150 MG extended release capsule  Take 1 capsule by mouth daily TAKE 1 CAPSULE BY MOUTH EVERY DAY                 Discharge Instructions: Follow up with Laurent Sheets in 1-2 weeks. Take medications as directed. Resume activity as tolerated. Epilepsy precautions and seizure first aid discussed. No driving, heights, swimming, tub baths, open flames, or heavy machinery. Disposition: Patient is medically stable and will be discharged home. Time spent on discharge 20 min.       Laurent Sheets DO  Board Certified Neurologist

## 2019-06-27 ENCOUNTER — TELEPHONE (OUTPATIENT)
Dept: INTERNAL MEDICINE | Age: 34
End: 2019-06-27

## 2019-06-27 RX ORDER — ALBUTEROL SULFATE 90 UG/1
2 AEROSOL, METERED RESPIRATORY (INHALATION) EVERY 6 HOURS PRN
Qty: 18 G | Refills: 0 | Status: SHIPPED | OUTPATIENT
Start: 2019-06-27 | End: 2019-08-02 | Stop reason: SDUPTHER

## 2019-06-27 NOTE — TELEPHONE ENCOUNTER
[facility] Transitions Initial Follow Up Call    Outreach made within 2 business days of discharge: Yes    Patient: Cely Fuller Patient : 1985   MRN: [id number]  Reason for Admission: Patient was admitted for planned epilepsy monitoring. Discharge Diagnoses:  Convulsions     Diagnostic Studies: 24 hour video EEG monitoring    Discharge Date: 19       Spoke with: patient. Discharge department/facility: [address] Interactive Patient Contact:  Was patient able to fill all prescriptions: No:  No new medications. Was patient instructed to bring all medications to the follow-up visit: No: Pt declined HFU at this time. Is patient taking all medications as directed in the discharge summary? Yes  Does patient understand their discharge instructions: Yes  Does patient have questions or concerns that need addressed prior to 7-14 day follow up office visit: no  Patient states she is doing fine. She states she has FU appointment with Dr. Malik Deras for his recommendations regarding her convulsions. At this time patient declined HFU until after she knows what  Dr. Malik eDras recommends. Patient appreciated the call and will contact office with any concerns.     Scheduled appointment with PCP within 7-14 days    Follow Up  Future Appointments   Date Time Provider Sandra Jon   2019 11:45 AM Brittney Morales, DO [facility]   2019  3:30 PM Christin Modi MD [facility]   2019 11:30 AM Brittney Snare, DO [facility]   2019  1:45 PM SETH Reynaga [facility]io [facility]       Dharmesh Saucedo LPN

## 2019-07-09 ENCOUNTER — OFFICE VISIT (OUTPATIENT)
Dept: NEUROSURGERY | Age: 34
End: 2019-07-09
Payer: MEDICAID

## 2019-07-09 VITALS
SYSTOLIC BLOOD PRESSURE: 128 MMHG | OXYGEN SATURATION: 98 % | DIASTOLIC BLOOD PRESSURE: 74 MMHG | WEIGHT: 180 LBS | HEART RATE: 76 BPM | HEIGHT: 60 IN | BODY MASS INDEX: 35.34 KG/M2

## 2019-07-09 DIAGNOSIS — R56.9 CONVULSIONS, UNSPECIFIED CONVULSION TYPE (HCC): Primary | ICD-10-CM

## 2019-07-09 DIAGNOSIS — R55 SYNCOPE, UNSPECIFIED SYNCOPE TYPE: ICD-10-CM

## 2019-07-09 DIAGNOSIS — G43.109 MIGRAINE WITH AURA AND WITHOUT STATUS MIGRAINOSUS, NOT INTRACTABLE: ICD-10-CM

## 2019-07-09 PROCEDURE — 99214 OFFICE O/P EST MOD 30 MIN: CPT | Performed by: PSYCHIATRY & NEUROLOGY

## 2019-07-09 RX ORDER — VENLAFAXINE HYDROCHLORIDE 75 MG/1
CAPSULE, EXTENDED RELEASE ORAL
Qty: 30 CAPSULE | Refills: 0 | Status: SHIPPED | OUTPATIENT
Start: 2019-07-09 | End: 2019-08-11

## 2019-07-09 NOTE — PROGRESS NOTES
 Syncope        Surgical History:      Procedure Laterality Date    CHOLECYSTECTOMY, LAPAROSCOPIC      [facility]GD  2016    [facility]    FRACTURE SURGERY      lower right leg, has metal plate and screws    FRACTURE SURGERY      left wrist    INTRAUTERINE DEVICE INSERTION      Insure placement 6/7/2016       Current Medications:  Current Outpatient Medications   Medication Sig Dispense Refill    venlafaxine (EFFEXOR XR) 75 MG extended release capsule TAKE 1 CAPSULE BY MOUTH EVERY DAY 30 capsule 0    albuterol sulfate  (90 Base) MCG/ACT inhaler INHALE 2 PUFFS INTO THE LUNGS EVERY 6 HOURS AS NEEDED FOR WHEEZING 18 g 0    butalbital-acetaminophen-caffeine (FIORICET, ESGIC) -40 MG per tablet TAKE 1 TABLET BY MOUTH EVERY 6 HOURS AS NEEDED FOR HEADACHE. MUST LAST 30 DAYS 30 tablet 3    albuterol sulfate  (90 Base) MCG/ACT inhaler Inhale 2 puffs into the lungs every 6 hours as needed for Wheezing 1 Inhaler 0    Galcanezumab-gnlm (EMGALITY) 120 MG/ML SOAJ Inject 120 mg into the skin every 30 days Starting on Month 2 1 pen 5    ondansetron (ZOFRAN) 4 MG tablet Take 1 tablet by mouth every 8 hours as needed for Nausea or Vomiting 20 tablet 5    traZODone (DESYREL) 50 MG tablet Take 1-2 tabs at night as directed 60 tablet 1    atorvastatin (LIPITOR) 40 MG tablet TAKE 1 TABLET BY MOUTH EVERY DAY 60 tablet 0    omeprazole (PRILOSEC) 20 MG delayed release capsule TAKE 1 CAPSULE BY MOUTH TWICE DAILY BEFORE MEALS 60 capsule 11    ranitidine (ZANTAC) 150 MG tablet Take 1 tablet by mouth 2 times daily 60 tablet 3    fluticasone-vilanterol (BREO ELLIPTA) 100-25 MCG/INH AEPB inhaler Inhale 2 puffs into the lungs daily 60 each 3    ketoconazole (NIZORAL) 2 % cream Apply topically daily.  60 g 1    Multiple Vitamins-Minerals (THERAPEUTIC MULTIVITAMIN-MINERALS) tablet Take 1 tablet by mouth daily      aspirin EC 81 MG EC tablet Take 1 tablet by mouth daily 90 tablet 5     No current well, loop recorder placed. PLAN:  1. Continue Emgality 120 mg monthly. Will give more time to become therapeutic. Discussed side effects. If does not help with consider botox at follow up. 2. Hold off on seizure mediations given Video EEG monitoring results, events are non-epileptic. 3. Continue Fioricet prn headaches. Discussed limiting use of this to avoid rebound headaches. 4. Epilepsy precautions and seizure first aid discussed. No driving, heights, swimming, tub baths, open flames, or heavy machinery. 5. Continue stroke risk factor maximization  6. Follow up with cardiology   7. Continue Effexor, recently increased, will see if helps, continue to maximize through primary, continue follow up at [facility] as well, no SI/HI today.      Lin Hook DO   Board Certified Neurology

## 2019-07-30 DIAGNOSIS — R55 SYNCOPE, UNSPECIFIED SYNCOPE TYPE: ICD-10-CM

## 2019-07-30 DIAGNOSIS — Z95.818 STATUS POST PLACEMENT OF IMPLANTABLE LOOP RECORDER: Primary | ICD-10-CM

## 2019-07-30 PROCEDURE — 93299 PR REM INTERROG ICPMS/SCRMS <30 D TECH REVIEW: CPT | Performed by: CLINICAL NURSE SPECIALIST

## 2019-07-30 PROCEDURE — 93298 REM INTERROG DEV EVAL SCRMS: CPT | Performed by: CLINICAL NURSE SPECIALIST

## 2019-08-02 RX ORDER — ALBUTEROL SULFATE 90 UG/1
2 AEROSOL, METERED RESPIRATORY (INHALATION) EVERY 6 HOURS PRN
Qty: 18 G | Refills: 0 | Status: SHIPPED | OUTPATIENT
Start: 2019-08-02 | End: 2019-11-22

## 2019-08-16 ENCOUNTER — TELEPHONE (OUTPATIENT)
Dept: NEUROLOGY | Age: 34
End: 2019-08-16

## 2019-08-16 DIAGNOSIS — Z95.818 STATUS POST PLACEMENT OF IMPLANTABLE LOOP RECORDER: Primary | ICD-10-CM

## 2019-08-16 DIAGNOSIS — R55 SYNCOPE, UNSPECIFIED SYNCOPE TYPE: ICD-10-CM

## 2019-08-16 NOTE — TELEPHONE ENCOUNTER
Patient called requesting that she talk to a  Nurse and has questions.  Please call patient at [phone]

## 2019-09-03 DIAGNOSIS — R00.0 TACHYCARDIA: ICD-10-CM

## 2019-09-03 DIAGNOSIS — R55 SYNCOPE, UNSPECIFIED SYNCOPE TYPE: ICD-10-CM

## 2019-09-03 DIAGNOSIS — Z95.818 STATUS POST PLACEMENT OF IMPLANTABLE LOOP RECORDER: Primary | ICD-10-CM

## 2019-09-03 PROCEDURE — 93298 REM INTERROG DEV EVAL SCRMS: CPT | Performed by: CLINICAL NURSE SPECIALIST

## 2019-09-03 PROCEDURE — 93299 PR REM INTERROG ICPMS/SCRMS <30 D TECH REVIEW: CPT | Performed by: CLINICAL NURSE SPECIALIST

## 2019-09-06 ENCOUNTER — TELEPHONE (OUTPATIENT)
Dept: GASTROENTEROLOGY | Age: 34
End: 2019-09-06

## 2019-09-30 DIAGNOSIS — R07.89 OTHER CHEST PAIN: ICD-10-CM

## 2019-09-30 DIAGNOSIS — R00.0 TACHYCARDIA: ICD-10-CM

## 2019-09-30 DIAGNOSIS — Z95.818 STATUS POST PLACEMENT OF IMPLANTABLE LOOP RECORDER: Primary | ICD-10-CM

## 2019-09-30 RX ORDER — RANITIDINE 150 MG/1
TABLET ORAL
Qty: 60 TABLET | Refills: 0 | Status: ON HOLD | OUTPATIENT
Start: 2019-09-30 | End: 2019-11-07 | Stop reason: HOSPADM

## 2019-10-05 ENCOUNTER — NURSE TRIAGE (OUTPATIENT)
Dept: CALL CENTER | Facility: HOSPITAL | Age: 34
End: 2019-10-05

## 2019-10-05 NOTE — TELEPHONE ENCOUNTER
"Caller states she drank two Fireballs earlier and wants to know if it is OK to take her Trazadone.  According to [employer], no interactions noted.    Reason for Disposition  • Caller has medication question only, adult not sick, and triager answers question    Additional Information  • Negative: Drug overdose and nurse unable to answer question  • Negative: Caller requesting information not related to medicine  • Negative: Caller requesting a prescription for Strep throat and has a positive culture result  • Negative: Rash while taking a medication or within 3 days of stopping it  • Negative: Immunization reaction suspected  • Negative: [1] Asthma and [2] having symptoms of asthma (cough, wheezing, etc)  • Negative: MORE THAN A DOUBLE DOSE of a prescription or over-the-counter (OTC) drug  • Negative: [1] DOUBLE DOSE (an extra dose or lesser amount) of over-the-counter (OTC) drug AND [2] any symptoms (e.g., dizziness, nausea, pain, sleepiness)  • Negative: [1] DOUBLE DOSE (an extra dose or lesser amount) of prescription drug AND [2] any symptoms (e.g., dizziness, nausea, pain, sleepiness)  • Negative: Took another person's prescription drug  • Negative: [1] DOUBLE DOSE (an extra dose or lesser amount) of prescription drug AND [2] NO symptoms (Exception: a double dose of antibiotics)  • Negative: Diabetes drug error or overdose (e.g., insulin or extra dose)  • Negative: [1] Request for URGENT new prescription or refill of \"essential\" medication (i.e., likelihood of harm to patient if not taken) AND [2] triager unable to fill per unit policy  • Negative: [1] Prescription not at pharmacy AND [2] was prescribed today by PCP  • Negative: Pharmacy calling with prescription questions and triager unable to answer question  • Negative: Caller has URGENT medication question about med that PCP prescribed and triager unable to answer question  • Negative: Caller has NON-URGENT medication question about med that PCP prescribed " "and triager unable to answer question  • Negative: Caller requesting a NON-URGENT new prescription or refill and triager unable to refill per unit policy  • Negative: Caller has medication question about med not prescribed by PCP and triager unable to answer question (e.g., compatibility with other med, storage)  • Negative: [1] DOUBLE DOSE (an extra dose or lesser amount) of over-the-counter (OTC) drug AND [2] NO symptoms  • Negative: [1] DOUBLE DOSE (an extra dose or lesser amount) of antibiotic drug AND [2] NO symptoms    Answer Assessment - Initial Assessment Questions  1. SYMPTOMS: \"Do you have any symptoms?\"      denies  2. SEVERITY: If symptoms are present, ask \"Are they mild, moderate or severe?\"      denies    Protocols used: MEDICATION QUESTION CALL-ADULT-      "

## 2019-10-07 DIAGNOSIS — R55 SYNCOPE, UNSPECIFIED SYNCOPE TYPE: ICD-10-CM

## 2019-10-07 DIAGNOSIS — Z95.818 STATUS POST PLACEMENT OF IMPLANTABLE LOOP RECORDER: Primary | ICD-10-CM

## 2019-10-07 PROCEDURE — 93299 PR REM INTERROG ICPMS/SCRMS <30 D TECH REVIEW: CPT | Performed by: NURSE PRACTITIONER

## 2019-10-07 PROCEDURE — 93298 REM INTERROG DEV EVAL SCRMS: CPT | Performed by: NURSE PRACTITIONER

## 2019-10-08 RX ORDER — METOPROLOL SUCCINATE 25 MG/1
25 TABLET, EXTENDED RELEASE ORAL NIGHTLY
Qty: 30 TABLET | Refills: 5 | Status: SHIPPED | OUTPATIENT
Start: 2019-10-08 | End: 2020-05-18 | Stop reason: DRUGHIGH

## 2019-10-18 ENCOUNTER — TELEPHONE (OUTPATIENT)
Dept: NEUROSURGERY | Age: 34
End: 2019-10-18

## 2019-10-24 ENCOUNTER — TELEPHONE (OUTPATIENT)
Dept: NEUROLOGY | Age: 34
End: 2019-10-24

## 2019-11-04 DIAGNOSIS — G43.909 MIGRAINE SYNDROME: Primary | Chronic | ICD-10-CM

## 2019-11-07 ENCOUNTER — ANESTHESIA EVENT (OUTPATIENT)
Dept: OPERATING ROOM | Age: 34
End: 2019-11-07

## 2019-11-07 ENCOUNTER — APPOINTMENT (OUTPATIENT)
Dept: OPERATING ROOM | Age: 34
End: 2019-11-07

## 2019-11-07 ENCOUNTER — ANESTHESIA (OUTPATIENT)
Dept: OPERATING ROOM | Age: 34
End: 2019-11-07

## 2019-11-07 ENCOUNTER — HOSPITAL ENCOUNTER (OUTPATIENT)
Age: 34
Setting detail: SPECIMEN
Discharge: HOME | End: 2019-11-07
Payer: MEDICAID

## 2019-11-07 ENCOUNTER — HOSPITAL ENCOUNTER (OUTPATIENT)
Age: 34
Setting detail: OUTPATIENT SURGERY
Discharge: HOME | End: 2019-11-07
Attending: INTERNAL MEDICINE | Admitting: INTERNAL MEDICINE
Payer: MEDICAID

## 2019-11-07 VITALS
RESPIRATION RATE: 16 BRPM | DIASTOLIC BLOOD PRESSURE: 55 MMHG | HEIGHT: 60 IN | HEART RATE: 70 BPM | TEMPERATURE: 98 F | WEIGHT: 189 LBS | BODY MASS INDEX: 37.11 KG/M2 | SYSTOLIC BLOOD PRESSURE: 90 MMHG | OXYGEN SATURATION: 98 %

## 2019-11-07 VITALS — DIASTOLIC BLOOD PRESSURE: 46 MMHG | SYSTOLIC BLOOD PRESSURE: 80 MMHG | OXYGEN SATURATION: 98 %

## 2019-11-07 PROCEDURE — 43239 EGD BIOPSY SINGLE/MULTIPLE: CPT

## 2019-11-07 PROCEDURE — 88342 IMHCHEM/IMCYTCHM 1ST ANTB: CPT

## 2019-11-07 PROCEDURE — 45378 DIAGNOSTIC COLONOSCOPY: CPT

## 2019-11-07 PROCEDURE — 43239 EGD BIOPSY SINGLE/MULTIPLE: CPT | Performed by: INTERNAL MEDICINE

## 2019-11-07 PROCEDURE — 88305 TISSUE EXAM BY PATHOLOGIST: CPT

## 2019-11-07 PROCEDURE — G8907 PT DOC NO EVENTS ON DISCHARG: HCPCS

## 2019-11-07 PROCEDURE — 45378 DIAGNOSTIC COLONOSCOPY: CPT | Performed by: INTERNAL MEDICINE

## 2019-11-07 PROCEDURE — G8918 PT W/O PREOP ORDER IV AB PRO: HCPCS

## 2019-11-07 RX ORDER — LIDOCAINE HYDROCHLORIDE 10 MG/ML
INJECTION, SOLUTION INFILTRATION; PERINEURAL PRN
Status: DISCONTINUED | OUTPATIENT
Start: 2019-11-07 | End: 2019-11-07 | Stop reason: SDUPTHER

## 2019-11-07 RX ORDER — FENTANYL CITRATE 50 UG/ML
INJECTION, SOLUTION INTRAMUSCULAR; INTRAVENOUS PRN
Status: DISCONTINUED | OUTPATIENT
Start: 2019-11-07 | End: 2019-11-07 | Stop reason: SDUPTHER

## 2019-11-07 RX ORDER — SODIUM CHLORIDE 9 MG/ML
INJECTION, SOLUTION INTRAVENOUS CONTINUOUS
Status: DISCONTINUED | OUTPATIENT
Start: 2019-11-07 | End: 2019-11-07 | Stop reason: HOSPADM

## 2019-11-07 RX ORDER — MIDAZOLAM HYDROCHLORIDE 1 MG/ML
INJECTION INTRAMUSCULAR; INTRAVENOUS PRN
Status: DISCONTINUED | OUTPATIENT
Start: 2019-11-07 | End: 2019-11-07 | Stop reason: SDUPTHER

## 2019-11-07 RX ORDER — PROPOFOL 10 MG/ML
INJECTION, EMULSION INTRAVENOUS PRN
Status: DISCONTINUED | OUTPATIENT
Start: 2019-11-07 | End: 2019-11-07 | Stop reason: SDUPTHER

## 2019-11-07 RX ADMIN — LIDOCAINE HYDROCHLORIDE 40 MG: 10 INJECTION, SOLUTION INFILTRATION; PERINEURAL at 11:02

## 2019-11-07 RX ADMIN — SODIUM CHLORIDE: 9 INJECTION, SOLUTION INTRAVENOUS at 10:48

## 2019-11-07 RX ADMIN — PROPOFOL 250 MG: 10 INJECTION, EMULSION INTRAVENOUS at 11:02

## 2019-11-07 RX ADMIN — MIDAZOLAM HYDROCHLORIDE 2 MG: 1 INJECTION INTRAMUSCULAR; INTRAVENOUS at 11:00

## 2019-11-07 RX ADMIN — FENTANYL CITRATE 50 MCG: 50 INJECTION, SOLUTION INTRAMUSCULAR; INTRAVENOUS at 11:02

## 2019-11-07 ASSESSMENT — PAIN SCALES - GENERAL
PAINLEVEL_OUTOF10: 0

## 2019-11-20 ENCOUNTER — APPOINTMENT (OUTPATIENT)
Dept: CT IMAGING | Facility: HOSPITAL | Age: 34
End: 2019-11-20

## 2019-11-20 ENCOUNTER — APPOINTMENT (OUTPATIENT)
Dept: GENERAL RADIOLOGY | Facility: HOSPITAL | Age: 34
End: 2019-11-20

## 2019-11-20 ENCOUNTER — HOSPITAL ENCOUNTER (EMERGENCY)
Facility: HOSPITAL | Age: 34
Discharge: HOME | End: 2019-11-20
Admitting: EMERGENCY MEDICINE

## 2019-11-20 VITALS
RESPIRATION RATE: 20 BRPM | OXYGEN SATURATION: 100 % | SYSTOLIC BLOOD PRESSURE: 152 MMHG | DIASTOLIC BLOOD PRESSURE: 94 MMHG | TEMPERATURE: 97.7 F | HEART RATE: 120 BPM | WEIGHT: 194 LBS | BODY MASS INDEX: 38.09 KG/M2 | HEIGHT: 60 IN

## 2019-11-20 DIAGNOSIS — R20.2 PARESTHESIAS: Primary | ICD-10-CM

## 2019-11-20 DIAGNOSIS — R07.89 CHEST WALL PAIN: ICD-10-CM

## 2019-11-20 LAB
ALBUMIN SERPL-MCNC: 3.9 G/DL (ref 3.5–5.2)
ALBUMIN/GLOB SERPL: 1.2 G/DL
ALP SERPL-CCNC: 119 U/L (ref 39–117)
ALT SERPL W P-5'-P-CCNC: 19 U/L (ref 1–33)
AMPHET+METHAMPHET UR QL: NEGATIVE
AMPHETAMINES UR QL: NEGATIVE
ANION GAP SERPL CALCULATED.3IONS-SCNC: 8 MMOL/L (ref 5–15)
APTT PPP: 32.3 SECONDS (ref 24.1–35)
AST SERPL-CCNC: 22 U/L (ref 1–32)
B-HCG UR QL: NEGATIVE
BARBITURATES UR QL SCN: POSITIVE
BASOPHILS # BLD AUTO: 0.05 10*3/MM3 (ref 0–0.2)
BASOPHILS NFR BLD AUTO: 0.5 % (ref 0–1.5)
BENZODIAZ UR QL SCN: NEGATIVE
BILIRUB SERPL-MCNC: 0.2 MG/DL (ref 0.2–1.2)
BUN BLD-MCNC: 10 MG/DL (ref 6–20)
BUN/CREAT SERPL: 17.9 (ref 7–25)
BUPRENORPHINE SERPL-MCNC: NEGATIVE NG/ML
CALCIUM SPEC-SCNC: 8.7 MG/DL (ref 8.6–10.5)
CANNABINOIDS SERPL QL: NEGATIVE
CHLORIDE SERPL-SCNC: 106 MMOL/L (ref 98–107)
CO2 SERPL-SCNC: 27 MMOL/L (ref 22–29)
COCAINE UR QL: NEGATIVE
CREAT BLD-MCNC: 0.56 MG/DL (ref 0.57–1)
DEPRECATED RDW RBC AUTO: 48 FL (ref 37–54)
EOSINOPHIL # BLD AUTO: 0.1 10*3/MM3 (ref 0–0.4)
EOSINOPHIL NFR BLD AUTO: 1 % (ref 0.3–6.2)
ERYTHROCYTE [DISTWIDTH] IN BLOOD BY AUTOMATED COUNT: 13.7 % (ref 12.3–15.4)
GFR SERPL CREATININE-BSD FRML MDRD: 122 ML/MIN/1.73
GLOBULIN UR ELPH-MCNC: 3.3 GM/DL
GLUCOSE BLD-MCNC: 98 MG/DL (ref 65–99)
HCG SERPL QL: NEGATIVE
HCT VFR BLD AUTO: 40.4 % (ref 34–46.6)
HGB BLD-MCNC: 13.2 G/DL (ref 12–15.9)
IMM GRANULOCYTES # BLD AUTO: 0.03 10*3/MM3 (ref 0–0.05)
IMM GRANULOCYTES NFR BLD AUTO: 0.3 % (ref 0–0.5)
INR PPP: 0.82 (ref 0.91–1.09)
INTERNAL NEGATIVE CONTROL: NEGATIVE
INTERNAL POSITIVE CONTROL: POSITIVE
LIPASE SERPL-CCNC: 27 U/L (ref 13–60)
LYMPHOCYTES # BLD AUTO: 2.82 10*3/MM3 (ref 0.7–3.1)
LYMPHOCYTES NFR BLD AUTO: 28.5 % (ref 19.6–45.3)
Lab: NORMAL
MCH RBC QN AUTO: 31.1 PG (ref 26.6–33)
MCHC RBC AUTO-ENTMCNC: 32.7 G/DL (ref 31.5–35.7)
MCV RBC AUTO: 95.3 FL (ref 79–97)
METHADONE UR QL SCN: NEGATIVE
MONOCYTES # BLD AUTO: 0.55 10*3/MM3 (ref 0.1–0.9)
MONOCYTES NFR BLD AUTO: 5.6 % (ref 5–12)
NEUTROPHILS # BLD AUTO: 6.34 10*3/MM3 (ref 1.7–7)
NEUTROPHILS NFR BLD AUTO: 64.1 % (ref 42.7–76)
NRBC BLD AUTO-RTO: 0 /100 WBC (ref 0–0.2)
NT-PROBNP SERPL-MCNC: 41.7 PG/ML (ref 5–450)
OPIATES UR QL: NEGATIVE
OXYCODONE UR QL SCN: NEGATIVE
PCP UR QL SCN: NEGATIVE
PLATELET # BLD AUTO: 331 10*3/MM3 (ref 140–450)
PMV BLD AUTO: 8.8 FL (ref 6–12)
POTASSIUM BLD-SCNC: 3.9 MMOL/L (ref 3.5–5.2)
PROPOXYPH UR QL: NEGATIVE
PROT SERPL-MCNC: 7.2 G/DL (ref 6–8.5)
PROTHROMBIN TIME: 11.5 SECONDS (ref 11.9–14.6)
RBC # BLD AUTO: 4.24 10*6/MM3 (ref 3.77–5.28)
SODIUM BLD-SCNC: 141 MMOL/L (ref 136–145)
TRICYCLICS UR QL SCN: NEGATIVE
TROPONIN T SERPL-MCNC: <0.01 NG/ML (ref 0–0.03)
TROPONIN T SERPL-MCNC: <0.01 NG/ML (ref 0–0.03)
WBC NRBC COR # BLD: 9.89 10*3/MM3 (ref 3.4–10.8)

## 2019-11-20 PROCEDURE — 0 IOPAMIDOL PER 1 ML: Performed by: NURSE PRACTITIONER

## 2019-11-20 PROCEDURE — 84484 ASSAY OF TROPONIN QUANT: CPT | Performed by: NURSE PRACTITIONER

## 2019-11-20 PROCEDURE — 36415 COLL VENOUS BLD VENIPUNCTURE: CPT | Performed by: NURSE PRACTITIONER

## 2019-11-20 PROCEDURE — 70450 CT HEAD/BRAIN W/O DYE: CPT

## 2019-11-20 PROCEDURE — 71045 X-RAY EXAM CHEST 1 VIEW: CPT

## 2019-11-20 PROCEDURE — 96375 TX/PRO/DX INJ NEW DRUG ADDON: CPT

## 2019-11-20 PROCEDURE — 83690 ASSAY OF LIPASE: CPT | Performed by: NURSE PRACTITIONER

## 2019-11-20 PROCEDURE — 83880 ASSAY OF NATRIURETIC PEPTIDE: CPT | Performed by: NURSE PRACTITIONER

## 2019-11-20 PROCEDURE — 96374 THER/PROPH/DIAG INJ IV PUSH: CPT

## 2019-11-20 PROCEDURE — 80053 COMPREHEN METABOLIC PANEL: CPT | Performed by: NURSE PRACTITIONER

## 2019-11-20 PROCEDURE — 81025 URINE PREGNANCY TEST: CPT | Performed by: NURSE PRACTITIONER

## 2019-11-20 PROCEDURE — 93005 ELECTROCARDIOGRAM TRACING: CPT | Performed by: EMERGENCY MEDICINE

## 2019-11-20 PROCEDURE — 85730 THROMBOPLASTIN TIME PARTIAL: CPT | Performed by: NURSE PRACTITIONER

## 2019-11-20 PROCEDURE — 93005 ELECTROCARDIOGRAM TRACING: CPT | Performed by: NURSE PRACTITIONER

## 2019-11-20 PROCEDURE — 85025 COMPLETE CBC W/AUTO DIFF WBC: CPT | Performed by: NURSE PRACTITIONER

## 2019-11-20 PROCEDURE — 25010000002 MORPHINE PER 10 MG: Performed by: NURSE PRACTITIONER

## 2019-11-20 PROCEDURE — 71275 CT ANGIOGRAPHY CHEST: CPT

## 2019-11-20 PROCEDURE — 93010 ELECTROCARDIOGRAM REPORT: CPT | Performed by: INTERNAL MEDICINE

## 2019-11-20 PROCEDURE — 85610 PROTHROMBIN TIME: CPT | Performed by: NURSE PRACTITIONER

## 2019-11-20 PROCEDURE — 36415 COLL VENOUS BLD VENIPUNCTURE: CPT

## 2019-11-20 PROCEDURE — 99284 EMERGENCY DEPT VISIT MOD MDM: CPT

## 2019-11-20 PROCEDURE — 80307 DRUG TEST PRSMV CHEM ANLYZR: CPT | Performed by: NURSE PRACTITIONER

## 2019-11-20 PROCEDURE — 84703 CHORIONIC GONADOTROPIN ASSAY: CPT | Performed by: NURSE PRACTITIONER

## 2019-11-20 PROCEDURE — 25010000002 ONDANSETRON PER 1 MG: Performed by: NURSE PRACTITIONER

## 2019-11-20 RX ORDER — SODIUM CHLORIDE 0.9 % (FLUSH) 0.9 %
10 SYRINGE (ML) INJECTION AS NEEDED
Status: DISCONTINUED | OUTPATIENT
Start: 2019-11-20 | End: 2019-11-20 | Stop reason: HOSPADM

## 2019-11-20 RX ORDER — ONDANSETRON 2 MG/ML
4 INJECTION INTRAMUSCULAR; INTRAVENOUS ONCE
Status: COMPLETED | OUTPATIENT
Start: 2019-11-20 | End: 2019-11-20

## 2019-11-20 RX ADMIN — MORPHINE SULFATE 4 MG: 4 INJECTION, SOLUTION INTRAMUSCULAR; INTRAVENOUS at 11:48

## 2019-11-20 RX ADMIN — IOPAMIDOL 100 ML: 755 INJECTION, SOLUTION INTRAVENOUS at 13:27

## 2019-11-20 RX ADMIN — ONDANSETRON HYDROCHLORIDE 4 MG: 2 SOLUTION INTRAMUSCULAR; INTRAVENOUS at 11:48

## 2019-11-20 RX ADMIN — SODIUM CHLORIDE 500 ML: 9 INJECTION, SOLUTION INTRAVENOUS at 11:45

## 2019-11-22 ENCOUNTER — OFFICE VISIT (OUTPATIENT)
Dept: CARDIOLOGY | Age: 34
End: 2019-11-22
Payer: MEDICAID

## 2019-11-22 VITALS
WEIGHT: 196 LBS | SYSTOLIC BLOOD PRESSURE: 122 MMHG | HEIGHT: 60 IN | HEART RATE: 91 BPM | BODY MASS INDEX: 38.48 KG/M2 | DIASTOLIC BLOOD PRESSURE: 78 MMHG

## 2019-11-22 DIAGNOSIS — R00.0 TACHYCARDIA: ICD-10-CM

## 2019-11-22 DIAGNOSIS — R55 SYNCOPE, UNSPECIFIED SYNCOPE TYPE: ICD-10-CM

## 2019-11-22 DIAGNOSIS — Z95.818 STATUS POST PLACEMENT OF IMPLANTABLE LOOP RECORDER: Primary | ICD-10-CM

## 2019-11-22 PROCEDURE — 93291 INTERROG DEV EVAL SCRMS IP: CPT | Performed by: CLINICAL NURSE SPECIALIST

## 2019-11-22 PROCEDURE — 99214 OFFICE O/P EST MOD 30 MIN: CPT | Performed by: CLINICAL NURSE SPECIALIST

## 2019-11-26 ENCOUNTER — OFFICE VISIT (OUTPATIENT)
Dept: NEUROSURGERY | Age: 34
End: 2019-11-26
Payer: MEDICAID

## 2019-11-26 VITALS
OXYGEN SATURATION: 98 % | DIASTOLIC BLOOD PRESSURE: 66 MMHG | HEIGHT: 60 IN | WEIGHT: 195.04 LBS | SYSTOLIC BLOOD PRESSURE: 118 MMHG | HEART RATE: 78 BPM | BODY MASS INDEX: 38.29 KG/M2

## 2019-11-26 DIAGNOSIS — G45.9 TIA (TRANSIENT ISCHEMIC ATTACK): ICD-10-CM

## 2019-11-26 DIAGNOSIS — G43.909 MIGRAINE SYNDROME: Primary | ICD-10-CM

## 2019-11-26 DIAGNOSIS — R51.9 NONINTRACTABLE HEADACHE, UNSPECIFIED CHRONICITY PATTERN, UNSPECIFIED HEADACHE TYPE: ICD-10-CM

## 2019-11-26 DIAGNOSIS — R55 SYNCOPE, UNSPECIFIED SYNCOPE TYPE: ICD-10-CM

## 2019-11-26 DIAGNOSIS — R56.9 CONVULSIONS, UNSPECIFIED CONVULSION TYPE (HCC): ICD-10-CM

## 2019-11-26 DIAGNOSIS — F41.9 ANXIETY: ICD-10-CM

## 2019-11-26 PROCEDURE — 99214 OFFICE O/P EST MOD 30 MIN: CPT | Performed by: PSYCHIATRY & NEUROLOGY

## 2019-12-09 ENCOUNTER — APPOINTMENT (OUTPATIENT)
Dept: CT IMAGING | Age: 34
DRG: 871 | End: 2019-12-09
Payer: MEDICAID

## 2019-12-09 ENCOUNTER — HOSPITAL ENCOUNTER (INPATIENT)
Age: 34
LOS: 3 days | Discharge: HOME | DRG: 871 | End: 2019-12-12
Attending: EMERGENCY MEDICINE | Admitting: INTERNAL MEDICINE
Payer: MEDICAID

## 2019-12-09 DIAGNOSIS — A41.9 SEPTICEMIA (HCC): Primary | ICD-10-CM

## 2019-12-09 DIAGNOSIS — R11.2 NAUSEA AND VOMITING, INTRACTABILITY OF VOMITING NOT SPECIFIED, UNSPECIFIED VOMITING TYPE: ICD-10-CM

## 2019-12-09 DIAGNOSIS — N12 PYELONEPHRITIS: ICD-10-CM

## 2019-12-09 DIAGNOSIS — R10.9 ACUTE ABDOMINAL PAIN: ICD-10-CM

## 2019-12-09 PROBLEM — N39.0 UTI (URINARY TRACT INFECTION): Status: ACTIVE | Noted: 2019-12-09

## 2019-12-09 LAB
ALBUMIN SERPL-MCNC: 3.4 G/DL (ref 3.5–5.2)
ALP BLD-CCNC: 114 U/L (ref 35–104)
ALT SERPL-CCNC: 16 U/L (ref 5–33)
ANION GAP SERPL CALCULATED.3IONS-SCNC: 13 MMOL/L (ref 7–19)
AST SERPL-CCNC: 22 U/L (ref 5–32)
BACTERIA: ABNORMAL /HPF
BASOPHILS ABSOLUTE: 0.1 K/UL (ref 0–0.2)
BASOPHILS RELATIVE PERCENT: 0.5 % (ref 0–1)
BILIRUB SERPL-MCNC: 0.5 MG/DL (ref 0.2–1.2)
BILIRUBIN URINE: NEGATIVE
BLOOD, URINE: ABNORMAL
BUN BLDV-MCNC: 8 MG/DL (ref 6–20)
CALCIUM SERPL-MCNC: 8.9 MG/DL (ref 8.6–10)
CHLORIDE BLD-SCNC: 102 MMOL/L (ref 98–111)
CLARITY: ABNORMAL
CO2: 20 MMOL/L (ref 22–29)
COLOR: YELLOW
CREAT SERPL-MCNC: 0.7 MG/DL (ref 0.5–0.9)
EOSINOPHILS ABSOLUTE: 0 K/UL (ref 0–0.6)
EOSINOPHILS RELATIVE PERCENT: 0.1 % (ref 0–5)
GFR NON-AFRICAN AMERICAN: >60
GLUCOSE BLD-MCNC: 87 MG/DL (ref 74–109)
GLUCOSE URINE: NEGATIVE MG/DL
HCG(URINE) PREGNANCY TEST: NEGATIVE
HCT VFR BLD CALC: 48.2 % (ref 37–47)
HEMOGLOBIN: 15 G/DL (ref 12–16)
IMMATURE GRANULOCYTES #: 0.1 K/UL
KETONES, URINE: ABNORMAL MG/DL
LACTIC ACID: 1.6 MMOL/L (ref 0.5–1.9)
LEUKOCYTE ESTERASE, URINE: ABNORMAL
LIPASE: 13 U/L (ref 13–60)
LYMPHOCYTES ABSOLUTE: 2.4 K/UL (ref 1.1–4.5)
LYMPHOCYTES RELATIVE PERCENT: 14.2 % (ref 20–40)
MCH RBC QN AUTO: 30.7 PG (ref 27–31)
MCHC RBC AUTO-ENTMCNC: 31.1 G/DL (ref 33–37)
MCV RBC AUTO: 98.6 FL (ref 81–99)
MONOCYTES ABSOLUTE: 1.3 K/UL (ref 0–0.9)
MONOCYTES RELATIVE PERCENT: 7.7 % (ref 0–10)
NEUTROPHILS ABSOLUTE: 13.3 K/UL (ref 1.5–7.5)
NEUTROPHILS RELATIVE PERCENT: 77 % (ref 50–65)
NITRITE, URINE: NEGATIVE
PDW BLD-RTO: 13.4 % (ref 11.5–14.5)
PH UA: 7.5 (ref 5–8)
PLATELET # BLD: 326 K/UL (ref 130–400)
PMV BLD AUTO: 9.5 FL (ref 9.4–12.3)
POTASSIUM REFLEX MAGNESIUM: 3.6 MMOL/L (ref 3.5–5)
PROTEIN UA: 30 MG/DL
RAPID INFLUENZA  B AGN: NEGATIVE
RAPID INFLUENZA A AGN: NEGATIVE
RBC # BLD: 4.89 M/UL (ref 4.2–5.4)
RBC UA: ABNORMAL /HPF (ref 0–2)
REASON FOR REJECTION: NORMAL
REJECTED TEST: NORMAL
SODIUM BLD-SCNC: 135 MMOL/L (ref 136–145)
SPECIFIC GRAVITY UA: 1.01 (ref 1–1.03)
TOTAL PROTEIN: 7.6 G/DL (ref 6.6–8.7)
URINE REFLEX TO CULTURE: YES
UROBILINOGEN, URINE: 1 E.U./DL
WBC # BLD: 17.2 K/UL (ref 4.8–10.8)
WBC UA: ABNORMAL /HPF (ref 0–5)

## 2019-12-09 PROCEDURE — 2580000003 HC RX 258: Performed by: INTERNAL MEDICINE

## 2019-12-09 PROCEDURE — 87804 INFLUENZA ASSAY W/OPTIC: CPT

## 2019-12-09 PROCEDURE — 96375 TX/PRO/DX INJ NEW DRUG ADDON: CPT

## 2019-12-09 PROCEDURE — 87186 SC STD MICRODIL/AGAR DIL: CPT

## 2019-12-09 PROCEDURE — 2580000003 HC RX 258: Performed by: EMERGENCY MEDICINE

## 2019-12-09 PROCEDURE — 99285 EMERGENCY DEPT VISIT HI MDM: CPT

## 2019-12-09 PROCEDURE — 2580000003 HC RX 258: Performed by: NURSE PRACTITIONER

## 2019-12-09 PROCEDURE — 87040 BLOOD CULTURE FOR BACTERIA: CPT

## 2019-12-09 PROCEDURE — 6360000002 HC RX W HCPCS: Performed by: INTERNAL MEDICINE

## 2019-12-09 PROCEDURE — 6370000000 HC RX 637 (ALT 250 FOR IP): Performed by: INTERNAL MEDICINE

## 2019-12-09 PROCEDURE — 85025 COMPLETE CBC W/AUTO DIFF WBC: CPT

## 2019-12-09 PROCEDURE — 80053 COMPREHEN METABOLIC PANEL: CPT

## 2019-12-09 PROCEDURE — 1210000000 HC MED SURG R&B

## 2019-12-09 PROCEDURE — 83605 ASSAY OF LACTIC ACID: CPT

## 2019-12-09 PROCEDURE — 84703 CHORIONIC GONADOTROPIN ASSAY: CPT

## 2019-12-09 PROCEDURE — 87086 URINE CULTURE/COLONY COUNT: CPT

## 2019-12-09 PROCEDURE — 81001 URINALYSIS AUTO W/SCOPE: CPT

## 2019-12-09 PROCEDURE — 74150 CT ABDOMEN W/O CONTRAST: CPT

## 2019-12-09 PROCEDURE — 36415 COLL VENOUS BLD VENIPUNCTURE: CPT

## 2019-12-09 PROCEDURE — 96374 THER/PROPH/DIAG INJ IV PUSH: CPT

## 2019-12-09 PROCEDURE — 6360000002 HC RX W HCPCS: Performed by: NURSE PRACTITIONER

## 2019-12-09 PROCEDURE — 83690 ASSAY OF LIPASE: CPT

## 2019-12-09 PROCEDURE — P9047 ALBUMIN (HUMAN), 25%, 50ML: HCPCS | Performed by: INTERNAL MEDICINE

## 2019-12-09 RX ORDER — PROMETHAZINE HYDROCHLORIDE 25 MG/ML
12.5 INJECTION, SOLUTION INTRAMUSCULAR; INTRAVENOUS ONCE
Status: COMPLETED | OUTPATIENT
Start: 2019-12-09 | End: 2019-12-09

## 2019-12-09 RX ORDER — 0.9 % SODIUM CHLORIDE 0.9 %
1000 INTRAVENOUS SOLUTION INTRAVENOUS ONCE
Status: COMPLETED | OUTPATIENT
Start: 2019-12-09 | End: 2019-12-09

## 2019-12-09 RX ORDER — SENNA PLUS 8.6 MG/1
1 TABLET ORAL DAILY PRN
Status: DISCONTINUED | OUTPATIENT
Start: 2019-12-09 | End: 2019-12-12 | Stop reason: HOSPADM

## 2019-12-09 RX ORDER — ALBUMIN (HUMAN) 12.5 G/50ML
25 SOLUTION INTRAVENOUS ONCE
Status: COMPLETED | OUTPATIENT
Start: 2019-12-10 | End: 2019-12-10

## 2019-12-09 RX ORDER — ONDANSETRON 2 MG/ML
4 INJECTION INTRAMUSCULAR; INTRAVENOUS EVERY 6 HOURS PRN
Status: DISCONTINUED | OUTPATIENT
Start: 2019-12-09 | End: 2019-12-12 | Stop reason: HOSPADM

## 2019-12-09 RX ORDER — SODIUM CHLORIDE 9 MG/ML
INJECTION, SOLUTION INTRAVENOUS CONTINUOUS
Status: DISCONTINUED | OUTPATIENT
Start: 2019-12-10 | End: 2019-12-10

## 2019-12-09 RX ORDER — SODIUM CHLORIDE 9 MG/ML
INJECTION, SOLUTION INTRAVENOUS CONTINUOUS
Status: DISCONTINUED | OUTPATIENT
Start: 2019-12-09 | End: 2019-12-10 | Stop reason: DRUGHIGH

## 2019-12-09 RX ORDER — ONDANSETRON 2 MG/ML
4 INJECTION INTRAMUSCULAR; INTRAVENOUS ONCE
Status: COMPLETED | OUTPATIENT
Start: 2019-12-09 | End: 2019-12-09

## 2019-12-09 RX ORDER — KETOROLAC TROMETHAMINE 30 MG/ML
30 INJECTION, SOLUTION INTRAMUSCULAR; INTRAVENOUS ONCE
Status: COMPLETED | OUTPATIENT
Start: 2019-12-09 | End: 2019-12-09

## 2019-12-09 RX ORDER — ACETAMINOPHEN 325 MG/1
650 TABLET ORAL EVERY 8 HOURS PRN
Status: DISCONTINUED | OUTPATIENT
Start: 2019-12-09 | End: 2019-12-10

## 2019-12-09 RX ORDER — ACETAMINOPHEN 500 MG
1000 TABLET ORAL ONCE
Status: COMPLETED | OUTPATIENT
Start: 2019-12-10 | End: 2019-12-09

## 2019-12-09 RX ADMIN — ONDANSETRON 4 MG: 2 INJECTION INTRAMUSCULAR; INTRAVENOUS at 17:17

## 2019-12-09 RX ADMIN — ACETAMINOPHEN 1000 MG: 500 TABLET ORAL at 23:41

## 2019-12-09 RX ADMIN — ALBUMIN (HUMAN) 25 G: 0.25 INJECTION, SOLUTION INTRAVENOUS at 23:56

## 2019-12-09 RX ADMIN — SODIUM CHLORIDE 1000 ML: 9 INJECTION, SOLUTION INTRAVENOUS at 17:17

## 2019-12-09 RX ADMIN — CEFTRIAXONE 1 G: 1 INJECTION, POWDER, FOR SOLUTION INTRAMUSCULAR; INTRAVENOUS at 19:18

## 2019-12-09 RX ADMIN — KETOROLAC TROMETHAMINE 30 MG: 30 INJECTION, SOLUTION INTRAMUSCULAR at 20:59

## 2019-12-09 RX ADMIN — SODIUM CHLORIDE: 9 INJECTION, SOLUTION INTRAVENOUS at 22:45

## 2019-12-09 RX ADMIN — SODIUM CHLORIDE 1000 ML: 9 INJECTION, SOLUTION INTRAVENOUS at 20:45

## 2019-12-09 RX ADMIN — PROMETHAZINE HYDROCHLORIDE 12.5 MG: 25 INJECTION INTRAMUSCULAR; INTRAVENOUS at 20:44

## 2019-12-09 ASSESSMENT — PAIN DESCRIPTION - PAIN TYPE
TYPE: ACUTE PAIN
TYPE: ACUTE PAIN

## 2019-12-09 ASSESSMENT — ENCOUNTER SYMPTOMS
VOMITING: 1
ABDOMINAL PAIN: 1
NAUSEA: 1

## 2019-12-09 ASSESSMENT — PAIN DESCRIPTION - LOCATION
LOCATION: ABDOMEN
LOCATION: ABDOMEN

## 2019-12-09 ASSESSMENT — PAIN SCALES - GENERAL
PAINLEVEL_OUTOF10: 0
PAINLEVEL_OUTOF10: 9
PAINLEVEL_OUTOF10: 0
PAINLEVEL_OUTOF10: 9

## 2019-12-10 LAB
ALBUMIN SERPL-MCNC: 2.8 G/DL (ref 3.5–5.2)
ALBUMIN SERPL-MCNC: 3.2 G/DL (ref 3.5–5.2)
ALP BLD-CCNC: 90 U/L (ref 35–104)
ALT SERPL-CCNC: 13 U/L (ref 5–33)
ANION GAP SERPL CALCULATED.3IONS-SCNC: 10 MMOL/L (ref 7–19)
AST SERPL-CCNC: 16 U/L (ref 5–32)
BASOPHILS ABSOLUTE: 0.1 K/UL (ref 0–0.2)
BASOPHILS RELATIVE PERCENT: 0.3 % (ref 0–1)
BILIRUB SERPL-MCNC: 0.6 MG/DL (ref 0.2–1.2)
BUN BLDV-MCNC: 8 MG/DL (ref 6–20)
CALCIUM SERPL-MCNC: 7.9 MG/DL (ref 8.6–10)
CHLORIDE BLD-SCNC: 108 MMOL/L (ref 98–111)
CO2: 22 MMOL/L (ref 22–29)
CREAT SERPL-MCNC: 0.6 MG/DL (ref 0.5–0.9)
EOSINOPHILS ABSOLUTE: 0.1 K/UL (ref 0–0.6)
EOSINOPHILS RELATIVE PERCENT: 0.3 % (ref 0–5)
GFR NON-AFRICAN AMERICAN: >60
GLUCOSE BLD-MCNC: 98 MG/DL (ref 74–109)
HCT VFR BLD CALC: 35.7 % (ref 37–47)
HEMOGLOBIN: 11.4 G/DL (ref 12–16)
IMMATURE GRANULOCYTES #: 0.1 K/UL
LACTIC ACID, SEPSIS: 0.7 MG/DL (ref 0.5–1.9)
LACTIC ACID, SEPSIS: 1 MG/DL (ref 0.5–1.9)
LACTIC ACID: 0.7 MMOL/L (ref 0.5–1.9)
LYMPHOCYTES ABSOLUTE: 1.4 K/UL (ref 1.1–4.5)
LYMPHOCYTES RELATIVE PERCENT: 9.2 % (ref 20–40)
MAGNESIUM: 1.8 MG/DL (ref 1.6–2.6)
MCH RBC QN AUTO: 31.1 PG (ref 27–31)
MCHC RBC AUTO-ENTMCNC: 31.9 G/DL (ref 33–37)
MCV RBC AUTO: 97.5 FL (ref 81–99)
MONOCYTES ABSOLUTE: 1.1 K/UL (ref 0–0.9)
MONOCYTES RELATIVE PERCENT: 7.5 % (ref 0–10)
NEUTROPHILS ABSOLUTE: 12.4 K/UL (ref 1.5–7.5)
NEUTROPHILS RELATIVE PERCENT: 82.1 % (ref 50–65)
PDW BLD-RTO: 13.2 % (ref 11.5–14.5)
PLATELET # BLD: 237 K/UL (ref 130–400)
PMV BLD AUTO: 8.9 FL (ref 9.4–12.3)
POTASSIUM REFLEX MAGNESIUM: 3.3 MMOL/L (ref 3.5–5)
RBC # BLD: 3.66 M/UL (ref 4.2–5.4)
SODIUM BLD-SCNC: 140 MMOL/L (ref 136–145)
TOTAL PROTEIN: 6.2 G/DL (ref 6.6–8.7)
WBC # BLD: 15.1 K/UL (ref 4.8–10.8)

## 2019-12-10 PROCEDURE — 36415 COLL VENOUS BLD VENIPUNCTURE: CPT

## 2019-12-10 PROCEDURE — 51702 INSERT TEMP BLADDER CATH: CPT

## 2019-12-10 PROCEDURE — 2500000003 HC RX 250 WO HCPCS: Performed by: INTERNAL MEDICINE

## 2019-12-10 PROCEDURE — 2000000000 HC ICU R&B

## 2019-12-10 PROCEDURE — 6370000000 HC RX 637 (ALT 250 FOR IP): Performed by: INTERNAL MEDICINE

## 2019-12-10 PROCEDURE — 82040 ASSAY OF SERUM ALBUMIN: CPT

## 2019-12-10 PROCEDURE — 6360000002 HC RX W HCPCS

## 2019-12-10 PROCEDURE — 80053 COMPREHEN METABOLIC PANEL: CPT

## 2019-12-10 PROCEDURE — 2580000003 HC RX 258: Performed by: INTERNAL MEDICINE

## 2019-12-10 PROCEDURE — 6360000002 HC RX W HCPCS: Performed by: INTERNAL MEDICINE

## 2019-12-10 PROCEDURE — 83735 ASSAY OF MAGNESIUM: CPT

## 2019-12-10 PROCEDURE — 85025 COMPLETE CBC W/AUTO DIFF WBC: CPT

## 2019-12-10 PROCEDURE — 83605 ASSAY OF LACTIC ACID: CPT

## 2019-12-10 RX ORDER — KETOROLAC TROMETHAMINE 30 MG/ML
INJECTION, SOLUTION INTRAMUSCULAR; INTRAVENOUS
Status: COMPLETED
Start: 2019-12-10 | End: 2019-12-10

## 2019-12-10 RX ORDER — SODIUM CHLORIDE, SODIUM LACTATE, POTASSIUM CHLORIDE, CALCIUM CHLORIDE 600; 310; 30; 20 MG/100ML; MG/100ML; MG/100ML; MG/100ML
INJECTION, SOLUTION INTRAVENOUS CONTINUOUS
Status: ACTIVE | OUTPATIENT
Start: 2019-12-10 | End: 2019-12-10

## 2019-12-10 RX ORDER — POTASSIUM CHLORIDE 20 MEQ/1
40 TABLET, EXTENDED RELEASE ORAL 3 TIMES DAILY
Status: COMPLETED | OUTPATIENT
Start: 2019-12-10 | End: 2019-12-10

## 2019-12-10 RX ORDER — KETOROLAC TROMETHAMINE 30 MG/ML
30 INJECTION, SOLUTION INTRAMUSCULAR; INTRAVENOUS ONCE
Status: COMPLETED | OUTPATIENT
Start: 2019-12-10 | End: 2019-12-10

## 2019-12-10 RX ORDER — SODIUM CHLORIDE 0.9 % (FLUSH) 0.9 %
10 SYRINGE (ML) INJECTION EVERY 12 HOURS SCHEDULED
Status: DISCONTINUED | OUTPATIENT
Start: 2019-12-10 | End: 2019-12-10 | Stop reason: SDUPTHER

## 2019-12-10 RX ORDER — ACETAMINOPHEN 325 MG/1
650 TABLET ORAL EVERY 4 HOURS PRN
Status: DISCONTINUED | OUTPATIENT
Start: 2019-12-10 | End: 2019-12-12 | Stop reason: HOSPADM

## 2019-12-10 RX ORDER — SODIUM CHLORIDE 0.9 % (FLUSH) 0.9 %
10 SYRINGE (ML) INJECTION PRN
Status: DISCONTINUED | OUTPATIENT
Start: 2019-12-10 | End: 2019-12-10 | Stop reason: SDUPTHER

## 2019-12-10 RX ORDER — SODIUM CHLORIDE 0.9 % (FLUSH) 0.9 %
10 SYRINGE (ML) INJECTION EVERY 12 HOURS SCHEDULED
Status: DISCONTINUED | OUTPATIENT
Start: 2019-12-10 | End: 2019-12-12 | Stop reason: HOSPADM

## 2019-12-10 RX ORDER — SODIUM CHLORIDE 0.9 % (FLUSH) 0.9 %
10 SYRINGE (ML) INJECTION PRN
Status: DISCONTINUED | OUTPATIENT
Start: 2019-12-10 | End: 2019-12-12 | Stop reason: HOSPADM

## 2019-12-10 RX ORDER — KETOROLAC TROMETHAMINE 30 MG/ML
15 INJECTION, SOLUTION INTRAMUSCULAR; INTRAVENOUS EVERY 6 HOURS PRN
Status: DISCONTINUED | OUTPATIENT
Start: 2019-12-10 | End: 2019-12-12 | Stop reason: HOSPADM

## 2019-12-10 RX ADMIN — Medication: at 09:17

## 2019-12-10 RX ADMIN — POTASSIUM CHLORIDE 40 MEQ: 1500 TABLET, EXTENDED RELEASE ORAL at 10:12

## 2019-12-10 RX ADMIN — KETOROLAC TROMETHAMINE 30 MG: 30 INJECTION, SOLUTION INTRAMUSCULAR at 09:16

## 2019-12-10 RX ADMIN — ACETAMINOPHEN 650 MG: 325 TABLET ORAL at 16:07

## 2019-12-10 RX ADMIN — ACETAMINOPHEN 650 MG: 325 TABLET ORAL at 13:43

## 2019-12-10 RX ADMIN — KETOROLAC TROMETHAMINE 15 MG: 30 INJECTION, SOLUTION INTRAMUSCULAR at 20:50

## 2019-12-10 RX ADMIN — SODIUM CHLORIDE, POTASSIUM CHLORIDE, SODIUM LACTATE AND CALCIUM CHLORIDE: 600; 310; 30; 20 INJECTION, SOLUTION INTRAVENOUS at 12:57

## 2019-12-10 RX ADMIN — KETOROLAC TROMETHAMINE 15 MG: 30 INJECTION, SOLUTION INTRAMUSCULAR at 14:47

## 2019-12-10 RX ADMIN — ONDANSETRON 4 MG: 2 INJECTION INTRAMUSCULAR; INTRAVENOUS at 07:25

## 2019-12-10 RX ADMIN — ENOXAPARIN SODIUM 40 MG: 40 INJECTION SUBCUTANEOUS at 07:26

## 2019-12-10 RX ADMIN — NOREPINEPHRINE BITARTRATE 2 MCG/MIN: 1 INJECTION INTRAVENOUS at 01:20

## 2019-12-10 RX ADMIN — MEROPENEM 1 G: 1 INJECTION, POWDER, FOR SOLUTION INTRAVENOUS at 16:02

## 2019-12-10 RX ADMIN — CEFTRIAXONE 1 G: 1 INJECTION, POWDER, FOR SOLUTION INTRAMUSCULAR; INTRAVENOUS at 19:20

## 2019-12-10 RX ADMIN — Medication: at 16:28

## 2019-12-10 RX ADMIN — ACETAMINOPHEN 650 MG: 325 TABLET ORAL at 06:12

## 2019-12-10 RX ADMIN — SODIUM CHLORIDE, PRESERVATIVE FREE 10 ML: 5 INJECTION INTRAVENOUS at 19:21

## 2019-12-10 RX ADMIN — KETOROLAC TROMETHAMINE 30 MG: 30 INJECTION, SOLUTION INTRAMUSCULAR; INTRAVENOUS at 09:16

## 2019-12-10 RX ADMIN — POTASSIUM CHLORIDE 40 MEQ: 1500 TABLET, EXTENDED RELEASE ORAL at 19:20

## 2019-12-10 RX ADMIN — POTASSIUM CHLORIDE 40 MEQ: 1500 TABLET, EXTENDED RELEASE ORAL at 13:43

## 2019-12-10 ASSESSMENT — PAIN DESCRIPTION - DESCRIPTORS
DESCRIPTORS: ACHING;DISCOMFORT
DESCRIPTORS: ACHING;DISCOMFORT
DESCRIPTORS: ACHING;HEADACHE;CRAMPING
DESCRIPTORS: ACHING;DISCOMFORT
DESCRIPTORS: ACHING;DISCOMFORT;JABBING

## 2019-12-10 ASSESSMENT — PAIN DESCRIPTION - ONSET
ONSET: GRADUAL
ONSET: GRADUAL
ONSET: ON-GOING

## 2019-12-10 ASSESSMENT — PAIN SCALES - GENERAL
PAINLEVEL_OUTOF10: 0
PAINLEVEL_OUTOF10: 9
PAINLEVEL_OUTOF10: 2
PAINLEVEL_OUTOF10: 5
PAINLEVEL_OUTOF10: 0
PAINLEVEL_OUTOF10: 3
PAINLEVEL_OUTOF10: 3
PAINLEVEL_OUTOF10: 7
PAINLEVEL_OUTOF10: 7
PAINLEVEL_OUTOF10: 9
PAINLEVEL_OUTOF10: 9
PAINLEVEL_OUTOF10: 4
PAINLEVEL_OUTOF10: 2

## 2019-12-10 ASSESSMENT — ENCOUNTER SYMPTOMS
DOUBLE VISION: 0
CONSTIPATION: 0
VOMITING: 0
ABDOMINAL PAIN: 1
DIARRHEA: 0
ORTHOPNEA: 0
VOMITING: 1
BACK PAIN: 1
COUGH: 0
NAUSEA: 1
SPUTUM PRODUCTION: 0
BLURRED VISION: 0
PHOTOPHOBIA: 0
EYE PAIN: 0
SHORTNESS OF BREATH: 0

## 2019-12-10 ASSESSMENT — PAIN DESCRIPTION - FREQUENCY
FREQUENCY: INTERMITTENT
FREQUENCY: CONTINUOUS

## 2019-12-10 ASSESSMENT — PAIN DESCRIPTION - PAIN TYPE
TYPE: ACUTE PAIN

## 2019-12-10 ASSESSMENT — PAIN DESCRIPTION - LOCATION
LOCATION: BACK;FLANK
LOCATION: BACK
LOCATION: ABDOMEN;FLANK
LOCATION: ABDOMEN
LOCATION: ABDOMEN;FLANK
LOCATION: HEAD;BACK

## 2019-12-10 ASSESSMENT — PAIN DESCRIPTION - PROGRESSION
CLINICAL_PROGRESSION: GRADUALLY WORSENING

## 2019-12-10 ASSESSMENT — PAIN DESCRIPTION - ORIENTATION
ORIENTATION: LEFT
ORIENTATION: LEFT

## 2019-12-10 ASSESSMENT — PAIN - FUNCTIONAL ASSESSMENT: PAIN_FUNCTIONAL_ASSESSMENT: PREVENTS OR INTERFERES SOME ACTIVE ACTIVITIES AND ADLS

## 2019-12-11 LAB
ALBUMIN SERPL-MCNC: 3.1 G/DL (ref 3.5–5.2)
ALP BLD-CCNC: 79 U/L (ref 35–104)
ALT SERPL-CCNC: 12 U/L (ref 5–33)
ANION GAP SERPL CALCULATED.3IONS-SCNC: 12 MMOL/L (ref 7–19)
AST SERPL-CCNC: 13 U/L (ref 5–32)
BASOPHILS ABSOLUTE: 0 K/UL (ref 0–0.2)
BASOPHILS RELATIVE PERCENT: 0.3 % (ref 0–1)
BILIRUB SERPL-MCNC: 0.3 MG/DL (ref 0.2–1.2)
BUN BLDV-MCNC: 5 MG/DL (ref 6–20)
CALCIUM SERPL-MCNC: 7.8 MG/DL (ref 8.6–10)
CHLORIDE BLD-SCNC: 107 MMOL/L (ref 98–111)
CO2: 21 MMOL/L (ref 22–29)
CREAT SERPL-MCNC: 0.5 MG/DL (ref 0.5–0.9)
EOSINOPHILS ABSOLUTE: 0.1 K/UL (ref 0–0.6)
EOSINOPHILS RELATIVE PERCENT: 1.1 % (ref 0–5)
FOLATE: 16.2 NG/ML (ref 4.8–37.3)
GFR NON-AFRICAN AMERICAN: >60
GLUCOSE BLD-MCNC: 98 MG/DL (ref 74–109)
HCT VFR BLD CALC: 33.8 % (ref 37–47)
HEMOGLOBIN: 10.5 G/DL (ref 12–16)
IMMATURE GRANULOCYTES #: 0.1 K/UL
IRON SATURATION: 10 % (ref 14–50)
IRON: 14 UG/DL (ref 37–145)
LACTIC ACID: 0.5 MMOL/L (ref 0.5–1.9)
LYMPHOCYTES ABSOLUTE: 2.3 K/UL (ref 1.1–4.5)
LYMPHOCYTES RELATIVE PERCENT: 18.9 % (ref 20–40)
MCH RBC QN AUTO: 30.7 PG (ref 27–31)
MCHC RBC AUTO-ENTMCNC: 31.1 G/DL (ref 33–37)
MCV RBC AUTO: 98.8 FL (ref 81–99)
MONOCYTES ABSOLUTE: 0.9 K/UL (ref 0–0.9)
MONOCYTES RELATIVE PERCENT: 7.1 % (ref 0–10)
NEUTROPHILS ABSOLUTE: 8.7 K/UL (ref 1.5–7.5)
NEUTROPHILS RELATIVE PERCENT: 72.2 % (ref 50–65)
ORGANISM: ABNORMAL
PDW BLD-RTO: 13.2 % (ref 11.5–14.5)
PLATELET # BLD: 202 K/UL (ref 130–400)
PMV BLD AUTO: 8.8 FL (ref 9.4–12.3)
POTASSIUM REFLEX MAGNESIUM: 3.8 MMOL/L (ref 3.5–5)
RBC # BLD: 3.42 M/UL (ref 4.2–5.4)
SODIUM BLD-SCNC: 140 MMOL/L (ref 136–145)
TOTAL IRON BINDING CAPACITY: 142 UG/DL (ref 250–400)
TOTAL PROTEIN: 5.9 G/DL (ref 6.6–8.7)
URINE CULTURE, ROUTINE: ABNORMAL
URINE CULTURE, ROUTINE: ABNORMAL
VITAMIN B-12: 457 PG/ML (ref 211–946)
WBC # BLD: 12.1 K/UL (ref 4.8–10.8)

## 2019-12-11 PROCEDURE — 2500000003 HC RX 250 WO HCPCS: Performed by: INTERNAL MEDICINE

## 2019-12-11 PROCEDURE — 6360000002 HC RX W HCPCS: Performed by: INTERNAL MEDICINE

## 2019-12-11 PROCEDURE — 6360000002 HC RX W HCPCS

## 2019-12-11 PROCEDURE — 82746 ASSAY OF FOLIC ACID SERUM: CPT

## 2019-12-11 PROCEDURE — 82607 VITAMIN B-12: CPT

## 2019-12-11 PROCEDURE — 2580000003 HC RX 258: Performed by: INTERNAL MEDICINE

## 2019-12-11 PROCEDURE — 36415 COLL VENOUS BLD VENIPUNCTURE: CPT

## 2019-12-11 PROCEDURE — 83550 IRON BINDING TEST: CPT

## 2019-12-11 PROCEDURE — 1210000000 HC MED SURG R&B

## 2019-12-11 PROCEDURE — P9047 ALBUMIN (HUMAN), 25%, 50ML: HCPCS

## 2019-12-11 PROCEDURE — 83605 ASSAY OF LACTIC ACID: CPT

## 2019-12-11 PROCEDURE — 6370000000 HC RX 637 (ALT 250 FOR IP): Performed by: INTERNAL MEDICINE

## 2019-12-11 PROCEDURE — 83540 ASSAY OF IRON: CPT

## 2019-12-11 PROCEDURE — 85025 COMPLETE CBC W/AUTO DIFF WBC: CPT

## 2019-12-11 PROCEDURE — 80053 COMPREHEN METABOLIC PANEL: CPT

## 2019-12-11 RX ORDER — ALBUMIN (HUMAN) 12.5 G/50ML
25 SOLUTION INTRAVENOUS ONCE
Status: COMPLETED | OUTPATIENT
Start: 2019-12-11 | End: 2019-12-11

## 2019-12-11 RX ORDER — POTASSIUM CHLORIDE 20 MEQ/1
40 TABLET, EXTENDED RELEASE ORAL 3 TIMES DAILY
Status: COMPLETED | OUTPATIENT
Start: 2019-12-11 | End: 2019-12-11

## 2019-12-11 RX ORDER — 0.9 % SODIUM CHLORIDE 0.9 %
500 INTRAVENOUS SOLUTION INTRAVENOUS ONCE
Status: COMPLETED | OUTPATIENT
Start: 2019-12-11 | End: 2019-12-11

## 2019-12-11 RX ORDER — ALBUMIN (HUMAN) 12.5 G/50ML
SOLUTION INTRAVENOUS
Status: COMPLETED
Start: 2019-12-11 | End: 2019-12-11

## 2019-12-11 RX ADMIN — SODIUM CHLORIDE 500 ML: 9 INJECTION, SOLUTION INTRAVENOUS at 05:01

## 2019-12-11 RX ADMIN — CEFTRIAXONE 1 G: 1 INJECTION, POWDER, FOR SOLUTION INTRAMUSCULAR; INTRAVENOUS at 18:01

## 2019-12-11 RX ADMIN — ACETAMINOPHEN 650 MG: 325 TABLET ORAL at 09:00

## 2019-12-11 RX ADMIN — Medication: at 16:00

## 2019-12-11 RX ADMIN — ALBUMIN (HUMAN) 25 G: 0.25 INJECTION, SOLUTION INTRAVENOUS at 05:02

## 2019-12-11 RX ADMIN — MEROPENEM 1 G: 1 INJECTION, POWDER, FOR SOLUTION INTRAVENOUS at 16:00

## 2019-12-11 RX ADMIN — POTASSIUM CHLORIDE 40 MEQ: 1500 TABLET, EXTENDED RELEASE ORAL at 09:20

## 2019-12-11 RX ADMIN — ONDANSETRON 4 MG: 2 INJECTION INTRAMUSCULAR; INTRAVENOUS at 18:09

## 2019-12-11 RX ADMIN — Medication: at 23:08

## 2019-12-11 RX ADMIN — KETOROLAC TROMETHAMINE 15 MG: 30 INJECTION, SOLUTION INTRAMUSCULAR at 18:09

## 2019-12-11 RX ADMIN — POTASSIUM CHLORIDE 40 MEQ: 1500 TABLET, EXTENDED RELEASE ORAL at 23:08

## 2019-12-11 RX ADMIN — ENOXAPARIN SODIUM 40 MG: 40 INJECTION SUBCUTANEOUS at 08:54

## 2019-12-11 RX ADMIN — ACETAMINOPHEN 650 MG: 325 TABLET ORAL at 00:21

## 2019-12-11 RX ADMIN — ALBUMIN (HUMAN) 25 G: 12.5 SOLUTION INTRAVENOUS at 05:02

## 2019-12-11 RX ADMIN — MEROPENEM 1 G: 1 INJECTION, POWDER, FOR SOLUTION INTRAVENOUS at 23:08

## 2019-12-11 RX ADMIN — MEROPENEM 1 G: 1 INJECTION, POWDER, FOR SOLUTION INTRAVENOUS at 00:22

## 2019-12-11 RX ADMIN — SODIUM CHLORIDE, PRESERVATIVE FREE 10 ML: 5 INJECTION INTRAVENOUS at 23:09

## 2019-12-11 RX ADMIN — SODIUM CHLORIDE, PRESERVATIVE FREE 10 ML: 5 INJECTION INTRAVENOUS at 09:07

## 2019-12-11 RX ADMIN — POTASSIUM CHLORIDE 40 MEQ: 1500 TABLET, EXTENDED RELEASE ORAL at 14:00

## 2019-12-11 RX ADMIN — MEROPENEM 1 G: 1 INJECTION, POWDER, FOR SOLUTION INTRAVENOUS at 08:00

## 2019-12-11 ASSESSMENT — ENCOUNTER SYMPTOMS
COUGH: 0
DIARRHEA: 0
CONSTIPATION: 0
NAUSEA: 0
SHORTNESS OF BREATH: 0
VOMITING: 0
BACK PAIN: 0

## 2019-12-11 ASSESSMENT — PAIN SCALES - GENERAL
PAINLEVEL_OUTOF10: 0
PAINLEVEL_OUTOF10: 0
PAINLEVEL_OUTOF10: 3
PAINLEVEL_OUTOF10: 0
PAINLEVEL_OUTOF10: 0
PAINLEVEL_OUTOF10: 9
PAINLEVEL_OUTOF10: 0

## 2019-12-11 ASSESSMENT — PAIN DESCRIPTION - PAIN TYPE
TYPE: OTHER (COMMENT)
TYPE: ACUTE PAIN

## 2019-12-11 ASSESSMENT — PAIN DESCRIPTION - LOCATION
LOCATION: HEAD
LOCATION: OTHER (COMMENT)

## 2019-12-11 ASSESSMENT — PAIN DESCRIPTION - DESCRIPTORS: DESCRIPTORS: ACHING;PRESSURE

## 2019-12-11 ASSESSMENT — PAIN DESCRIPTION - ORIENTATION: ORIENTATION: MID

## 2019-12-11 ASSESSMENT — PAIN - FUNCTIONAL ASSESSMENT: PAIN_FUNCTIONAL_ASSESSMENT: PREVENTS OR INTERFERES SOME ACTIVE ACTIVITIES AND ADLS

## 2019-12-11 ASSESSMENT — PAIN DESCRIPTION - ONSET: ONSET: GRADUAL

## 2019-12-11 ASSESSMENT — PAIN DESCRIPTION - FREQUENCY: FREQUENCY: CONTINUOUS

## 2019-12-11 ASSESSMENT — PAIN DESCRIPTION - PROGRESSION: CLINICAL_PROGRESSION: GRADUALLY WORSENING

## 2019-12-12 VITALS
HEART RATE: 64 BPM | DIASTOLIC BLOOD PRESSURE: 70 MMHG | SYSTOLIC BLOOD PRESSURE: 104 MMHG | OXYGEN SATURATION: 94 % | WEIGHT: 188.2 LBS | TEMPERATURE: 98.1 F | RESPIRATION RATE: 16 BRPM | BODY MASS INDEX: 36.95 KG/M2 | HEIGHT: 60 IN

## 2019-12-12 PROBLEM — N39.0 UTI (URINARY TRACT INFECTION): Status: RESOLVED | Noted: 2019-12-09 | Resolved: 2019-12-12

## 2019-12-12 LAB
ANION GAP SERPL CALCULATED.3IONS-SCNC: 13 MMOL/L (ref 7–19)
BLOOD CULTURE, ROUTINE: ABNORMAL
BUN BLDV-MCNC: 5 MG/DL (ref 6–20)
CALCIUM SERPL-MCNC: 8.9 MG/DL (ref 8.6–10)
CHLORIDE BLD-SCNC: 101 MMOL/L (ref 98–111)
CO2: 23 MMOL/L (ref 22–29)
CREAT SERPL-MCNC: 0.7 MG/DL (ref 0.5–0.9)
GFR NON-AFRICAN AMERICAN: >60
GLUCOSE BLD-MCNC: 91 MG/DL (ref 74–109)
HCT VFR BLD CALC: 37.3 % (ref 37–47)
HEMOGLOBIN: 12.2 G/DL (ref 12–16)
MCH RBC QN AUTO: 32.1 PG (ref 27–31)
MCHC RBC AUTO-ENTMCNC: 32.7 G/DL (ref 33–37)
MCV RBC AUTO: 98.2 FL (ref 81–99)
ORGANISM: ABNORMAL
PDW BLD-RTO: 13.1 % (ref 11.5–14.5)
PLATELET # BLD: 286 K/UL (ref 130–400)
PMV BLD AUTO: 10.3 FL (ref 9.4–12.3)
POTASSIUM SERPL-SCNC: 4.6 MMOL/L (ref 3.5–5)
RBC # BLD: 3.8 M/UL (ref 4.2–5.4)
SODIUM BLD-SCNC: 137 MMOL/L (ref 136–145)
WBC # BLD: 8.9 K/UL (ref 4.8–10.8)

## 2019-12-12 PROCEDURE — 6360000002 HC RX W HCPCS: Performed by: INTERNAL MEDICINE

## 2019-12-12 PROCEDURE — 6370000000 HC RX 637 (ALT 250 FOR IP): Performed by: INTERNAL MEDICINE

## 2019-12-12 PROCEDURE — 85027 COMPLETE CBC AUTOMATED: CPT

## 2019-12-12 PROCEDURE — 80048 BASIC METABOLIC PNL TOTAL CA: CPT

## 2019-12-12 PROCEDURE — 2580000003 HC RX 258: Performed by: INTERNAL MEDICINE

## 2019-12-12 PROCEDURE — 36415 COLL VENOUS BLD VENIPUNCTURE: CPT

## 2019-12-12 RX ORDER — LEVOFLOXACIN 500 MG/1
500 TABLET, FILM COATED ORAL DAILY
Qty: 7 TABLET | Refills: 0 | Status: SHIPPED | OUTPATIENT
Start: 2019-12-12 | End: 2019-12-19

## 2019-12-12 RX ADMIN — ACETAMINOPHEN 650 MG: 325 TABLET ORAL at 04:14

## 2019-12-12 RX ADMIN — ENOXAPARIN SODIUM 40 MG: 40 INJECTION SUBCUTANEOUS at 08:20

## 2019-12-12 RX ADMIN — MEROPENEM 1 G: 1 INJECTION, POWDER, FOR SOLUTION INTRAVENOUS at 08:20

## 2019-12-12 ASSESSMENT — PAIN DESCRIPTION - LOCATION: LOCATION: ABDOMEN

## 2019-12-12 ASSESSMENT — PAIN DESCRIPTION - PAIN TYPE: TYPE: ACUTE PAIN

## 2019-12-12 ASSESSMENT — PAIN SCALES - GENERAL
PAINLEVEL_OUTOF10: 5
PAINLEVEL_OUTOF10: 0

## 2019-12-12 ASSESSMENT — PAIN DESCRIPTION - PROGRESSION: CLINICAL_PROGRESSION: NOT CHANGED

## 2019-12-12 ASSESSMENT — PAIN DESCRIPTION - FREQUENCY: FREQUENCY: INTERMITTENT

## 2019-12-12 ASSESSMENT — PAIN DESCRIPTION - ONSET: ONSET: ON-GOING

## 2019-12-12 ASSESSMENT — PAIN - FUNCTIONAL ASSESSMENT: PAIN_FUNCTIONAL_ASSESSMENT: ACTIVITIES ARE NOT PREVENTED

## 2019-12-12 ASSESSMENT — PAIN DESCRIPTION - DESCRIPTORS: DESCRIPTORS: DISCOMFORT

## 2019-12-12 ASSESSMENT — PAIN DESCRIPTION - ORIENTATION: ORIENTATION: MID

## 2019-12-13 ENCOUNTER — TELEPHONE (OUTPATIENT)
Dept: INTERNAL MEDICINE | Age: 34
End: 2019-12-13

## 2019-12-14 LAB — CULTURE, BLOOD 2: NORMAL

## 2019-12-16 ENCOUNTER — HOSPITAL ENCOUNTER (OUTPATIENT)
Dept: MRI IMAGING | Age: 34
Discharge: HOME | End: 2019-12-16
Payer: MEDICAID

## 2019-12-16 ENCOUNTER — OFFICE VISIT (OUTPATIENT)
Dept: PRIMARY CARE CLINIC | Age: 34
End: 2019-12-16
Payer: MEDICAID

## 2019-12-16 VITALS
RESPIRATION RATE: 20 BRPM | HEIGHT: 60 IN | HEART RATE: 88 BPM | DIASTOLIC BLOOD PRESSURE: 62 MMHG | BODY MASS INDEX: 36.91 KG/M2 | SYSTOLIC BLOOD PRESSURE: 100 MMHG | WEIGHT: 188 LBS | TEMPERATURE: 98 F | OXYGEN SATURATION: 98 %

## 2019-12-16 DIAGNOSIS — Z09 HOSPITAL DISCHARGE FOLLOW-UP: ICD-10-CM

## 2019-12-16 DIAGNOSIS — R51.9 NONINTRACTABLE HEADACHE, UNSPECIFIED CHRONICITY PATTERN, UNSPECIFIED HEADACHE TYPE: ICD-10-CM

## 2019-12-16 DIAGNOSIS — G45.9 TIA (TRANSIENT ISCHEMIC ATTACK): ICD-10-CM

## 2019-12-16 DIAGNOSIS — G43.909 MIGRAINE SYNDROME: ICD-10-CM

## 2019-12-16 DIAGNOSIS — R55 SYNCOPE, UNSPECIFIED SYNCOPE TYPE: ICD-10-CM

## 2019-12-16 DIAGNOSIS — R56.9 CONVULSIONS, UNSPECIFIED CONVULSION TYPE (HCC): ICD-10-CM

## 2019-12-16 DIAGNOSIS — R11.0 NAUSEA: Primary | ICD-10-CM

## 2019-12-16 DIAGNOSIS — F33.1 MODERATE EPISODE OF RECURRENT MAJOR DEPRESSIVE DISORDER (HCC): ICD-10-CM

## 2019-12-16 PROCEDURE — 99215 OFFICE O/P EST HI 40 MIN: CPT | Performed by: NURSE PRACTITIONER

## 2019-12-16 PROCEDURE — 70553 MRI BRAIN STEM W/O & W/DYE: CPT

## 2019-12-16 PROCEDURE — 6360000004 HC RX CONTRAST MEDICATION: Performed by: PSYCHIATRY & NEUROLOGY

## 2019-12-16 PROCEDURE — A9577 INJ MULTIHANCE: HCPCS | Performed by: PSYCHIATRY & NEUROLOGY

## 2019-12-16 PROCEDURE — 1111F DSCHRG MED/CURRENT MED MERGE: CPT | Performed by: NURSE PRACTITIONER

## 2019-12-16 RX ORDER — ONDANSETRON 4 MG/1
4 TABLET, FILM COATED ORAL EVERY 8 HOURS PRN
Qty: 20 TABLET | Refills: 5 | Status: SHIPPED | OUTPATIENT
Start: 2019-12-16 | End: 2020-07-01 | Stop reason: SDUPTHER

## 2019-12-16 RX ADMIN — GADOBENATE DIMEGLUMINE 18 ML: 529 INJECTION, SOLUTION INTRAVENOUS at 10:57

## 2019-12-16 ASSESSMENT — ENCOUNTER SYMPTOMS
EYES NEGATIVE: 1
NAUSEA: 0
VOMITING: 0
TROUBLE SWALLOWING: 0
RESPIRATORY NEGATIVE: 1
SORE THROAT: 0
BLOOD IN STOOL: 0
EYE DISCHARGE: 0
COUGH: 0
WHEEZING: 0
SHORTNESS OF BREATH: 0
SINUS PRESSURE: 0
DIARRHEA: 0
ALLERGIC/IMMUNOLOGIC NEGATIVE: 1
GASTROINTESTINAL NEGATIVE: 1

## 2019-12-17 ENCOUNTER — TELEPHONE (OUTPATIENT)
Dept: NEUROSURGERY | Age: 34
End: 2019-12-17

## 2019-12-20 ENCOUNTER — TELEPHONE (OUTPATIENT)
Dept: PRIMARY CARE CLINIC | Age: 34
End: 2019-12-20

## 2019-12-20 RX ORDER — OLANZAPINE 5 MG/1
2.5 TABLET ORAL NIGHTLY
Qty: 15 TABLET | Refills: 0 | Status: SHIPPED | OUTPATIENT
Start: 2019-12-20 | End: 2020-07-28 | Stop reason: ALTCHOICE

## 2019-12-26 DIAGNOSIS — R00.0 TACHYCARDIA: ICD-10-CM

## 2019-12-26 DIAGNOSIS — Z95.818 STATUS POST PLACEMENT OF IMPLANTABLE LOOP RECORDER: Primary | ICD-10-CM

## 2019-12-26 PROCEDURE — 93299 PR REM INTERROG ICPMS/SCRMS <30 D TECH REVIEW: CPT | Performed by: CLINICAL NURSE SPECIALIST

## 2019-12-26 PROCEDURE — 93298 REM INTERROG DEV EVAL SCRMS: CPT | Performed by: CLINICAL NURSE SPECIALIST

## 2020-01-09 ENCOUNTER — TELEPHONE (OUTPATIENT)
Dept: PAIN MANAGEMENT | Age: 35
End: 2020-01-09

## 2020-01-17 ENCOUNTER — OFFICE VISIT (OUTPATIENT)
Dept: PRIMARY CARE CLINIC | Age: 35
End: 2020-01-17
Payer: MEDICAID

## 2020-01-17 VITALS
RESPIRATION RATE: 14 BRPM | WEIGHT: 189 LBS | SYSTOLIC BLOOD PRESSURE: 108 MMHG | BODY MASS INDEX: 37.11 KG/M2 | TEMPERATURE: 97.8 F | HEART RATE: 83 BPM | DIASTOLIC BLOOD PRESSURE: 66 MMHG | HEIGHT: 60 IN | OXYGEN SATURATION: 98 %

## 2020-01-17 PROCEDURE — 99214 OFFICE O/P EST MOD 30 MIN: CPT | Performed by: NURSE PRACTITIONER

## 2020-01-17 NOTE — PROGRESS NOTES
Justyn Steel is a 37 y.o. female who presents today for   Chief Complaint   Patient presents with    Depression     1 month F/U for new medication-not able to take new med       HPI  Patient is here for:    Depression  1 month follow-up since starting zyprexa  Patient reports she is not able to take new med because it causes her to be too tired   Patient doesn't want to do anything; reports she spends a lot of time crying  Patient reports she hears voices at times   Reports previous thoughts of SI, but none at this time   Patient has her daughters in the room with her and reports her oldest daughter has similar issues but is being followed by behavioral health    No change in PMH, family, social, or surgical history unless mentioned above. Review of Systems   Constitutional: Positive for fatigue. HENT: Negative. Eyes: Negative. Respiratory: Negative. Gastrointestinal: Negative. Endocrine: Negative. Musculoskeletal: Negative. Skin: Negative. Allergic/Immunologic: Negative. Neurological: Negative. Hematological: Negative. Psychiatric/Behavioral: Positive for dysphoric mood, sleep disturbance and suicidal ideas (previously, not currently). Negative for self-injury. The patient is nervous/anxious.         Past Medical History:   Diagnosis Date    Anxiety     Blood circulation, collateral     CAD (coronary artery disease)     Cerebral artery occlusion with cerebral infarction (HCC)     Depression     IBS (irritable bowel syndrome)     MDD (major depressive disorder)     [facility] [address]     Miscarriage June 2012    Schizophrenia ([facility] [address]     Seizures ([facility] [address]     Syncope     UTI (urinary tract infection) 12/9/2019       Current Outpatient Medications   Medication Sig Dispense Refill    cariprazine hcl (VRAYLAR) 1.5 MG capsule Take 1 capsule by mouth daily 30 capsule 3    butalbital-acetaminophen-caffeine (FIORICET, ESGIC) -40 MG per tablet TAKE 1 TABLET BY status: Never Smoker    Smokeless tobacco: Never Used   Substance Use Topics    Alcohol use: Yes     Comment: occasional, last drink 6 mo ago    Drug use: No       Family History   Problem Relation Age of Onset    High Blood Pressure Mother     Diabetes Father     Heart Disease Father     Stomach Cancer Father     Breast Cancer Other         maternal great aunt    Colon Cancer Paternal Grandmother     Colon Polyps Neg Hx     Esophageal Cancer Neg Hx     Liver Cancer Neg Hx     Liver Disease Neg Hx     Rectal Cancer Neg Hx        /66   Pulse 83   Temp 97.8 °F (36.6 °C) (Temporal)   Resp 14   Ht 5' (1.524 m)   Wt 189 lb (85.7 kg)   SpO2 98%   BMI 36.91 kg/m²     Physical Exam  Vitals signs and nursing note reviewed. Constitutional:       General: She is not in acute distress. Appearance: She is well-developed. She is not diaphoretic. HENT:      Head: Normocephalic. Right Ear: External ear normal.      Left Ear: External ear normal.      Nose: Nose normal.      Mouth/Throat:      Mouth: Mucous membranes are moist.      Pharynx: No oropharyngeal exudate. Eyes:      General:         Right eye: No discharge. Left eye: No discharge. Conjunctiva/sclera: Conjunctivae normal.      Pupils: Pupils are equal, round, and reactive to light. Neck:      Musculoskeletal: Normal range of motion. Trachea: No tracheal deviation. Cardiovascular:      Rate and Rhythm: Normal rate and regular rhythm. Pulses: Normal pulses. Heart sounds: Normal heart sounds. No murmur. Pulmonary:      Effort: Pulmonary effort is normal. No respiratory distress. Breath sounds: Normal breath sounds. No stridor. Abdominal:      General: Bowel sounds are normal.      Palpations: Abdomen is soft. Tenderness: There is no tenderness. Musculoskeletal: Normal range of motion. General: No tenderness or deformity. Lymphadenopathy:      Cervical: No cervical adenopathy. Skin:     General: Skin is warm and dry. Capillary Refill: Capillary refill takes less than 2 seconds. Findings: No rash. Neurological:      Mental Status: She is alert and oriented to person, place, and time. Cranial Nerves: No cranial nerve deficit. Psychiatric:         Attention and Perception: Attention and perception normal.         Mood and Affect: Mood is depressed. Affect is flat. Speech: Speech normal.         Behavior: Behavior normal. Behavior is cooperative. Thought Content: Thought content normal.         Cognition and Memory: Cognition and memory normal.         Judgment: Judgment normal.         Assessment:    ICD-10-CM    1. Fatigue, unspecified type R53.83    2. Mood disorder (HCC) F39 cariprazine hcl (VRAYLAR) 1.5 MG capsule   3. Moderate episode of recurrent major depressive disorder (HCC) F33.1        Plan:   1. Fatigue, unspecified type  - try to get 7-9 hours of sleep at night     2. Mood disorder ([facility] [address]  - start vraylar 1.5 mg nightly and follow-up in 1 month    3. Moderate episode of recurrent major depressive disorder (HCC)  - start vraylar 1.5 mg nightly and follow-up in 1 month    Patient agrees if she were to have any thoughts of SI/HI or self- harm, she will seek help immediately. Patient educated on going to the nearest emergency room if these symptoms occur. If patient to noticed worsening depression, patient to follow-up sooner with provider in order to evaluate medication side effects. Over 50% of the total visit time of 25 minutes was spent on counseling and or coordination of care of mood disorder, MDD, fatigue, and home stressors. No orders of the defined types were placed in this encounter. Orders Placed This Encounter   Medications    cariprazine hcl (VRAYLAR) 1.5 MG capsule     Sig: Take 1 capsule by mouth daily     Dispense:  30 capsule     Refill:  3     There are no discontinued medications.   There are no Patient Instructions on

## 2020-01-18 ASSESSMENT — ENCOUNTER SYMPTOMS
EYES NEGATIVE: 1
RESPIRATORY NEGATIVE: 1
ALLERGIC/IMMUNOLOGIC NEGATIVE: 1
GASTROINTESTINAL NEGATIVE: 1

## 2020-01-27 ENCOUNTER — HOSPITAL ENCOUNTER (OUTPATIENT)
Dept: PAIN MANAGEMENT | Age: 35
Discharge: HOME | End: 2020-01-27
Payer: MEDICAID

## 2020-01-27 VITALS
SYSTOLIC BLOOD PRESSURE: 121 MMHG | RESPIRATION RATE: 15 BRPM | TEMPERATURE: 97.8 F | HEART RATE: 90 BPM | OXYGEN SATURATION: 98 % | DIASTOLIC BLOOD PRESSURE: 74 MMHG

## 2020-01-27 PROCEDURE — 64615 CHEMODENERV MUSC MIGRAINE: CPT | Performed by: PSYCHIATRY & NEUROLOGY

## 2020-01-27 PROCEDURE — 64615 CHEMODENERV MUSC MIGRAINE: CPT

## 2020-01-27 PROCEDURE — 6360000002 HC RX W HCPCS

## 2020-01-27 NOTE — PROGRESS NOTES
[facility] Neurology Botox Procedure Note     Patient:   Luis Tran  MR#:    [id number]  Account Number:                   [id number]      YOB: 1985  Date of Evaluation:  1/27/2020  Time of Note:                          1:19 PM  Primary Physician:    Ligia Smith MD   Consulting Physician:  Jayde Duarte DO    Consent was signed and on the chart. Risk, benefits, and side effects discussed. Pt has a clear history of having more than 15 days/month of migraine, lasting more than 4 hours with multiple treatment failures. Vial Exp Date: 5/22  Josue Groton Lot Number:  [id number]    Botox was diluted with 0.9% NS to yield a final concentration of 50 units / 1 ml. The following muscles were injected in 0.1 ml (5 unit) increments:    -   5 units left, 5 units right  Procerus-      5 units  Frontalis-      20 units divided into 4 sites left and right  Temporalis-  40 units divided into 4 sites left and 4 sites right  Occipitalis-    30 units divided into 3 sites left and 3 sites right  Cervical Paraspinal-  20 units divided into 2 sites left and 2 sites right  Trapezius-     30 units divided into 3 sites left and 3 sites right    Total units injected: 155  Total units unavoidably discarded: 45    Pt tolerated the procedure well. There were no complications. Pt will follow up in 6 weeks to assess effectiveness and will repeat injections in 12 weeks if continues to benefit.         Jayde Duarte DO  Board Certified Neurologist

## 2020-01-30 ENCOUNTER — OFFICE VISIT (OUTPATIENT)
Dept: PRIMARY CARE CLINIC | Age: 35
End: 2020-01-30
Payer: MEDICAID

## 2020-01-30 VITALS
TEMPERATURE: 99.6 F | BODY MASS INDEX: 37.11 KG/M2 | WEIGHT: 189 LBS | HEIGHT: 60 IN | HEART RATE: 108 BPM | OXYGEN SATURATION: 99 % | DIASTOLIC BLOOD PRESSURE: 74 MMHG | SYSTOLIC BLOOD PRESSURE: 112 MMHG

## 2020-01-30 DIAGNOSIS — N30.00 ACUTE CYSTITIS WITHOUT HEMATURIA: ICD-10-CM

## 2020-01-30 LAB
APPEARANCE FLUID: ABNORMAL
BILIRUBIN, POC: ABNORMAL
BLOOD URINE, POC: ABNORMAL
CLARITY, POC: ABNORMAL
COLOR, POC: YELLOW
GLUCOSE URINE, POC: ABNORMAL
KETONES, POC: ABNORMAL
LEUKOCYTE EST, POC: ABNORMAL
NITRITE, POC: ABNORMAL
PH, POC: 6
PROTEIN, POC: ABNORMAL
SPECIFIC GRAVITY, POC: 1.03
UROBILINOGEN, POC: 1

## 2020-01-30 PROCEDURE — 93298 REM INTERROG DEV EVAL SCRMS: CPT | Performed by: CLINICAL NURSE SPECIALIST

## 2020-01-30 PROCEDURE — G2066 INTER DEVC REMOTE 30D: HCPCS | Performed by: CLINICAL NURSE SPECIALIST

## 2020-01-30 PROCEDURE — 99213 OFFICE O/P EST LOW 20 MIN: CPT | Performed by: NURSE PRACTITIONER

## 2020-01-30 PROCEDURE — 81002 URINALYSIS NONAUTO W/O SCOPE: CPT | Performed by: NURSE PRACTITIONER

## 2020-01-30 RX ORDER — PROCHLORPERAZINE MALEATE 10 MG
10 TABLET ORAL EVERY 6 HOURS PRN
Qty: 30 TABLET | Refills: 0 | Status: SHIPPED | OUTPATIENT
Start: 2020-01-30 | End: 2020-07-01

## 2020-01-30 RX ORDER — PROMETHAZINE HYDROCHLORIDE 25 MG/1
25 TABLET ORAL EVERY 6 HOURS PRN
COMMUNITY
End: 2020-07-01

## 2020-01-30 RX ORDER — NITROFURANTOIN 25; 75 MG/1; MG/1
100 CAPSULE ORAL 2 TIMES DAILY
Qty: 20 CAPSULE | Refills: 0 | Status: SHIPPED | OUTPATIENT
Start: 2020-01-30 | End: 2020-02-09

## 2020-01-30 RX ORDER — SULFAMETHOXAZOLE AND TRIMETHOPRIM 800; 160 MG/1; MG/1
160 TABLET ORAL
COMMUNITY
Start: 2020-01-27 | End: 2020-01-30

## 2020-01-30 NOTE — PROGRESS NOTES
[address] [address]     Phone:  [phone]  Fax:  [phone]      Navid Carter is a 37 y.o. female who presents today for her medical conditions/complaints as noted below. Navid Carter is c/o of Urinary Tract Infection (not feeling better started over weekend)      Chief Complaint   Patient presents with    Urinary Tract Infection     not feeling better started over weekend       HPI:     HPI    Navid Carter presents today for UTI. She was seen in urgent care 3 days ago and started on Bactrim. She states she is feeling worse after starting Bactrim. She has had dysuria and urinary frequency since Saturday (5 days). She denies any new sexual partners. She has been afebrile. She also has nausea and vomiting. She states this is a chronic issue. She does state this is worse since having the UTI. She thinks the Bactrim is making her nauseous. She has taken Phenergan and Zofran. She states this has not helped.     Past Medical History:   Diagnosis Date    Anxiety     Blood circulation, collateral     CAD (coronary artery disease)     Cerebral artery occlusion with cerebral infarction (HCC)     Depression     IBS (irritable bowel syndrome)     MDD (major depressive disorder)     [facility]) ([facility] [address]     Miscarriage June 2012    Schizophrenia ([facility] [address]     Seizures ([facility] [address]     Syncope     UTI (urinary tract infection) 12/9/2019        Past Surgical History:   Procedure Laterality Date    CHOLECYSTECTOMY, LAPAROSCOPIC      COLONOSCOPY N/A 11/7/2019    Dr Veronique Mcdonald, 10 yr recall    EGD  2016    [address]      lower right leg, has metal plate and screws    FRACTURE SURGERY      left wrist    INSERTABLE CARDIAC MONITOR      loop recorder    INTRAUTERINE DEVICE INSERTION      Insure placement 6/7/2016    UPPER GASTROINTESTINAL ENDOSCOPY N/A 11/7/2019    Dr Foreign Sawyer       Social History     Tobacco Use    mouth daily      aspirin EC 81 MG EC tablet Take 1 tablet by mouth daily 90 tablet 5     No current facility-administered medications for this visit. No Known Allergies    Family History   Problem Relation Age of Onset    High Blood Pressure Mother     Diabetes Father     Heart Disease Father     Stomach Cancer Father     Breast Cancer Other         maternal great aunt    Colon Cancer Paternal Grandmother     Colon Polyps Neg Hx     Esophageal Cancer Neg Hx     Liver Cancer Neg Hx     Liver Disease Neg Hx     Rectal Cancer Neg Hx                Review of Systems   Constitutional: Negative for fever. Respiratory: Negative. Cardiovascular: Negative. Gastrointestinal: Positive for nausea and vomiting. Negative for abdominal distention and abdominal pain. Genitourinary: Positive for dysuria and frequency. Negative for decreased urine volume, hematuria, vaginal bleeding, vaginal discharge and vaginal pain. Musculoskeletal: Negative. Skin: Negative. Objective:     Physical Exam  Vitals signs and nursing note reviewed. Constitutional:       General: She is not in acute distress. Appearance: She is well-developed. HENT:      Head: Normocephalic and atraumatic. Right Ear: External ear normal.      Left Ear: External ear normal.      Nose: Nose normal.   Eyes:      Conjunctiva/sclera: Conjunctivae normal.      Pupils: Pupils are equal, round, and reactive to light. Neck:      Musculoskeletal: Normal range of motion and neck supple. Cardiovascular:      Rate and Rhythm: Normal rate and regular rhythm. Pulses: Normal pulses. Heart sounds: Normal heart sounds. No murmur. Pulmonary:      Effort: Pulmonary effort is normal. No respiratory distress. Breath sounds: Normal breath sounds. Abdominal:      General: Bowel sounds are normal. There is no distension. Palpations: Abdomen is soft. Tenderness: There is no abdominal tenderness.

## 2020-01-31 ASSESSMENT — ENCOUNTER SYMPTOMS
NAUSEA: 1
ABDOMINAL DISTENTION: 0
ABDOMINAL PAIN: 0
RESPIRATORY NEGATIVE: 1
VOMITING: 1

## 2020-02-01 LAB — URINE CULTURE, ROUTINE: NORMAL

## 2020-02-04 ENCOUNTER — TELEPHONE (OUTPATIENT)
Dept: PRIMARY CARE CLINIC | Age: 35
End: 2020-02-04

## 2020-02-04 NOTE — TELEPHONE ENCOUNTER
----- Message from SETH Shi sent at 2/1/2020  9:16 AM CST -----  Please notify patient of normal result. Culture normal, continue antibiotics. This could be because you were already on antibiotics that were effective.

## 2020-02-17 ENCOUNTER — TELEPHONE (OUTPATIENT)
Dept: CARDIOLOGY | Age: 35
End: 2020-02-17

## 2020-03-02 ENCOUNTER — TELEPHONE (OUTPATIENT)
Dept: CARDIOLOGY | Age: 35
End: 2020-03-02

## 2020-03-02 NOTE — TELEPHONE ENCOUNTER
Left message for patient to return call regarding carelink loop recorder results and recommended medication change.

## 2020-03-25 ENCOUNTER — TELEPHONE (OUTPATIENT)
Dept: CARDIOLOGY | Age: 35
End: 2020-03-25

## 2020-03-25 NOTE — TELEPHONE ENCOUNTER
Left another voice message for patient to return call to discuss carelink results and recommendations.

## 2020-03-27 NOTE — TELEPHONE ENCOUNTER
Attempted to reach patient. No answer and voicemail box full. Sent another [employer] message for patient to call the office to discuss her carelink loop results.

## 2020-04-02 PROCEDURE — 93298 REM INTERROG DEV EVAL SCRMS: CPT | Performed by: CLINICAL NURSE SPECIALIST

## 2020-04-02 PROCEDURE — G2066 INTER DEVC REMOTE 30D: HCPCS | Performed by: CLINICAL NURSE SPECIALIST

## 2020-04-14 ENCOUNTER — VIRTUAL VISIT (OUTPATIENT)
Dept: PRIMARY CARE CLINIC | Age: 35
End: 2020-04-14
Payer: MEDICAID

## 2020-04-14 PROCEDURE — 99213 OFFICE O/P EST LOW 20 MIN: CPT | Performed by: FAMILY MEDICINE

## 2020-04-14 RX ORDER — ALBUTEROL SULFATE 90 UG/1
2 AEROSOL, METERED RESPIRATORY (INHALATION) EVERY 6 HOURS PRN
Qty: 1 INHALER | Refills: 0 | Status: SHIPPED | OUTPATIENT
Start: 2020-04-14 | End: 2020-05-18

## 2020-04-14 ASSESSMENT — ENCOUNTER SYMPTOMS
ABDOMINAL PAIN: 0
DIARRHEA: 0
WHEEZING: 1
CONSTIPATION: 0
VOMITING: 0
CHEST TIGHTNESS: 0
COUGH: 0
NAUSEA: 0
SHORTNESS OF BREATH: 1

## 2020-04-14 NOTE — PROGRESS NOTES
mouth nightly Yes Swati Paula MD   metoprolol succinate (TOPROL XL) 25 MG extended release tablet Take 1 tablet by mouth nightly Yes SETH Sanders   atorvastatin (LIPITOR) 40 MG tablet TAKE 1 TABLET BY MOUTH EVERY DAY Yes Swati Paula MD   BREO ELLIPTA 100-25 MCG/INH AEPB inhaler INHALE 2 PUFFS BY MOUTH INTO LUNGS Yes Swati Paula MD   albuterol sulfate  (90 Base) MCG/ACT inhaler Inhale 2 puffs into the lungs every 6 hours as needed for Wheezing Yes Landon Deluca DO   omeprazole (PRILOSEC) 20 MG delayed release capsule TAKE 1 CAPSULE BY MOUTH TWICE Isabelle Ml Yes Swati Paula MD   ketoconazole (NIZORAL) 2 % cream Apply topically daily.  Yes Swati Paula MD   aspirin EC 81 MG EC tablet Take 1 tablet by mouth daily Yes Swati Paula MD   promethazine (PHENERGAN) 25 MG tablet Take 25 mg by mouth every 6 hours as needed for Nausea  Historical Provider, MD   prochlorperazine (COMPAZINE) 10 MG tablet Take 1 tablet by mouth every 6 hours as needed (nausea)  SETH Scott   ondansetron (ZOFRAN) 4 MG tablet Take 1 tablet by mouth every 8 hours as needed for Nausea or Vomiting  SETH Bateman - TRANG   linaclotide [place]) 145 MCG capsule Take 1 capsule by mouth every morning (before breakfast)  Lucy Ibanez MD   venlafaxine (EFFEXOR XR) 150 MG extended release capsule TAKE 1 CAPSULE BY MOUTH EVERY DAY  Swati Paula MD   Multiple Vitamins-Minerals (THERAPEUTIC MULTIVITAMIN-MINERALS) tablet Take 1 tablet by mouth daily  Historical Provider, MD       Social History     Tobacco Use    Smoking status: Never Smoker    Smokeless tobacco: Never Used   Substance Use Topics    Alcohol use: Yes     Comment: occasional, last drink 6 mo ago    Drug use: No        No Known Allergies,   Past Medical History:   Diagnosis Date    Anxiety     Blood circulation, collateral     CAD (coronary artery disease)     Cerebral artery occlusion with cerebral infarction ([facility] [address]     Depression    

## 2020-05-11 ENCOUNTER — HOSPITAL ENCOUNTER (OUTPATIENT)
Dept: PAIN MANAGEMENT | Age: 35
Discharge: HOME | End: 2020-05-11
Payer: MEDICAID

## 2020-05-11 VITALS
SYSTOLIC BLOOD PRESSURE: 107 MMHG | OXYGEN SATURATION: 97 % | RESPIRATION RATE: 18 BRPM | DIASTOLIC BLOOD PRESSURE: 68 MMHG | HEART RATE: 65 BPM | TEMPERATURE: 97.7 F

## 2020-05-11 PROCEDURE — 64615 CHEMODENERV MUSC MIGRAINE: CPT

## 2020-05-11 PROCEDURE — 64615 CHEMODENERV MUSC MIGRAINE: CPT | Performed by: PSYCHIATRY & NEUROLOGY

## 2020-05-11 PROCEDURE — 6360000002 HC RX W HCPCS

## 2020-05-14 PROCEDURE — G2066 INTER DEVC REMOTE 30D: HCPCS | Performed by: CLINICAL NURSE SPECIALIST

## 2020-05-14 PROCEDURE — 93298 REM INTERROG DEV EVAL SCRMS: CPT | Performed by: CLINICAL NURSE SPECIALIST

## 2020-05-18 ENCOUNTER — TELEPHONE (OUTPATIENT)
Dept: CARDIOLOGY | Age: 35
End: 2020-05-18

## 2020-05-18 RX ORDER — METOPROLOL SUCCINATE 25 MG/1
37.5 TABLET, EXTENDED RELEASE ORAL NIGHTLY
COMMUNITY
Start: 2020-05-18 | End: 2020-08-28

## 2020-05-18 RX ORDER — ALBUTEROL SULFATE 90 UG/1
2 AEROSOL, METERED RESPIRATORY (INHALATION) EVERY 6 HOURS PRN
Qty: 18 G | Refills: 3 | Status: SHIPPED | OUTPATIENT
Start: 2020-05-18 | End: 2021-09-23

## 2020-05-19 RX ORDER — OMEPRAZOLE 20 MG/1
CAPSULE, DELAYED RELEASE ORAL
Qty: 60 CAPSULE | Refills: 11 | Status: ON HOLD | OUTPATIENT
Start: 2020-05-19 | End: 2021-02-10 | Stop reason: HOSPADM

## 2020-06-17 PROCEDURE — G2066 INTER DEVC REMOTE 30D: HCPCS | Performed by: NURSE PRACTITIONER

## 2020-06-17 PROCEDURE — 93298 REM INTERROG DEV EVAL SCRMS: CPT | Performed by: NURSE PRACTITIONER

## 2020-06-19 ENCOUNTER — HOSPITAL ENCOUNTER (EMERGENCY)
Facility: HOSPITAL | Age: 35
Discharge: LEFT BEFORE DISCHARGE | End: 2020-06-19

## 2020-06-19 VITALS
WEIGHT: 194 LBS | HEIGHT: 60 IN | TEMPERATURE: 98 F | HEART RATE: 84 BPM | OXYGEN SATURATION: 100 % | DIASTOLIC BLOOD PRESSURE: 88 MMHG | BODY MASS INDEX: 38.09 KG/M2 | RESPIRATION RATE: 16 BRPM | SYSTOLIC BLOOD PRESSURE: 124 MMHG

## 2020-06-23 ENCOUNTER — NURSE TRIAGE (OUTPATIENT)
Dept: OTHER | Facility: CLINIC | Age: 35
End: 2020-06-23

## 2020-06-24 ENCOUNTER — HOSPITAL ENCOUNTER (OUTPATIENT)
Dept: GENERAL RADIOLOGY | Age: 35
Discharge: HOME | End: 2020-06-24
Payer: MEDICAID

## 2020-06-24 ENCOUNTER — VIRTUAL VISIT (OUTPATIENT)
Dept: PRIMARY CARE CLINIC | Age: 35
End: 2020-06-24
Payer: MEDICAID

## 2020-06-24 PROCEDURE — 74018 RADEX ABDOMEN 1 VIEW: CPT

## 2020-06-24 PROCEDURE — 99214 OFFICE O/P EST MOD 30 MIN: CPT | Performed by: FAMILY MEDICINE

## 2020-06-24 RX ORDER — LACTULOSE 10 G/15ML
SOLUTION ORAL
Qty: 270 ML | Refills: 0 | Status: SHIPPED | OUTPATIENT
Start: 2020-06-24 | End: 2020-07-28 | Stop reason: ALTCHOICE

## 2020-06-24 RX ORDER — PREDNISONE 20 MG/1
40 TABLET ORAL DAILY
Qty: 10 TABLET | Refills: 0 | Status: SHIPPED | OUTPATIENT
Start: 2020-06-24 | End: 2020-06-29

## 2020-06-24 ASSESSMENT — ENCOUNTER SYMPTOMS
DIARRHEA: 1
NAUSEA: 0
COUGH: 0
CONSTIPATION: 0
SHORTNESS OF BREATH: 0
ABDOMINAL PAIN: 1
CHEST TIGHTNESS: 0
WHEEZING: 0
VOMITING: 0

## 2020-06-24 NOTE — PROGRESS NOTES
screen  09/06/2019    Colon cancer screen colonoscopy  11/07/2029    Flu vaccine  Completed    Pneumococcal 0-64 years Vaccine  Completed    Hepatitis A vaccine  Aged Out    Hepatitis B vaccine  Aged Out    Hib vaccine  Aged Out    Meningococcal (ACWY) vaccine  Aged Out       PHYSICAL EXAMINATION:  [ INSTRUCTIONS:  \"[x]\" Indicates a positive item  \"[]\" Indicates a negative item  -- DELETE ALL ITEMS NOT EXAMINED]  Vital Signs: (As obtained by patient/caregiver or practitioner observation)    Blood pressure-  Heart rate-    Respiratory rate-    Temperature-  Pulse oximetry-     Constitutional: [x] Appears well-developed and well-nourished [] No apparent distress      [] Abnormal-   Mental status  [x] Alert and awake  [x] Oriented to person/place/time [x]Able to follow commands      Eyes:  EOM    [x]  Normal  [] Abnormal-  Sclera  [x]  Normal  [] Abnormal -         Discharge []  None visible  [] Abnormal -    HENT:   [x] Normocephalic, atraumatic. [] Abnormal   [x] Mouth/Throat: Mucous membranes are moist.     External Ears [x] Normal  [] Abnormal-     Neck: [x] No visualized mass     Pulmonary/Chest: [x] Respiratory effort normal.  [x] No visualized signs of difficulty breathing or respiratory distress        [] Abnormal-      Musculoskeletal:   [x] Normal gait with no signs of ataxia         [x] Normal range of motion of neck        [] Abnormal-       Neurological:        [x] No Facial Asymmetry (Cranial nerve 7 motor function) (limited exam to video visit)          [x] No gaze palsy        [] Abnormal-         Skin:        [x] No significant exanthematous lesions or discoloration noted on facial skin         [] Abnormal-            Psychiatric:       [x] Normal Affect [x] No Hallucinations        [] Abnormal-     Other pertinent observable physical exam findings-     ASSESSMENT/PLAN:    ICD-10-CM    1. Diarrhea, unspecified type R19.7 XR ABDOMEN (KUB) (SINGLE AP VIEW)   2.  Migraine-cluster headache syndrome

## 2020-07-01 ENCOUNTER — VIRTUAL VISIT (OUTPATIENT)
Dept: PRIMARY CARE CLINIC | Age: 35
End: 2020-07-01
Payer: MEDICAID

## 2020-07-01 PROCEDURE — 93298 REM INTERROG DEV EVAL SCRMS: CPT | Performed by: NURSE PRACTITIONER

## 2020-07-01 PROCEDURE — 99214 OFFICE O/P EST MOD 30 MIN: CPT | Performed by: NURSE PRACTITIONER

## 2020-07-01 PROCEDURE — G2066 INTER DEVC REMOTE 30D: HCPCS | Performed by: NURSE PRACTITIONER

## 2020-07-01 RX ORDER — ONDANSETRON 4 MG/1
4 TABLET, FILM COATED ORAL EVERY 8 HOURS PRN
Qty: 20 TABLET | Refills: 5 | Status: ON HOLD | OUTPATIENT
Start: 2020-07-01 | End: 2020-09-09

## 2020-07-01 ASSESSMENT — ENCOUNTER SYMPTOMS
COUGH: 0
DIARRHEA: 1
FLATUS: 0
EYES NEGATIVE: 1
VOMITING: 1
BLOATING: 0
RESPIRATORY NEGATIVE: 1
ABDOMINAL PAIN: 1

## 2020-07-01 NOTE — PROGRESS NOTES
discoloration noted on facial skin         [] Abnormal-            Psychiatric:       [x] Normal Affect [x] No Hallucinations        [] Abnormal-     Other pertinent observable physical exam findings-     ASSESSMENT/PLAN:  1. Nausea  - ondansetron (ZOFRAN) 4 MG tablet; Take 1 tablet by mouth every 8 hours as needed for Nausea or Vomiting  Dispense: 20 tablet; Refill: 5    2. Diarrhea, unspecified type  - Clostridium Difficile Toxin/Antigen; Future    3. Encounter for counseling  - medication, lab, and follow-up    Over 50% of the total visit time of 25 minutes was spent on counseling and or coordination of care of nausea, diarrhea, counseling, medication, lab, and follow-up. Return if symptoms worsen or fail to improve. Rick Mcdermott is a 38 y.o. female being evaluated by a Virtual Visit (video visit) encounter to address concerns as mentioned above. A caregiver was present when appropriate. Due to this being a TeleHealth encounter (During [address] [address] public [facility] emergency), evaluation of the following organ systems was limited: Vitals/Constitutional/EENT/Resp/CV/GI//MS/Neuro/Skin/Heme-Lymph-Imm. Pursuant to the emergency declaration under the [facility], [address] [place] [address] authority and the [employer] and [employer] Act, this Virtual Visit was conducted with patient's (and/or legal guardian's) consent, to reduce the patient's risk of exposure to COVID-19 and provide necessary medical care. The patient (and/or legal guardian) has also been advised to contact this office for worsening conditions or problems, and seek emergency medical treatment and/or call 911 if deemed necessary. Patient identification was verified at the start of the visit: Yes    Total time spent on this encounter: 25 minutes    Services were provided through a video synchronous discussion virtually to substitute for in-person clinic visit.  Patient and provider were located at their individual homes. --SETH Ferraro NP on 7/1/2020 at 8:24 PM    An electronic signature was used to authenticate this note.

## 2020-07-08 ENCOUNTER — HOSPITAL ENCOUNTER (EMERGENCY)
Facility: HOSPITAL | Age: 35
Discharge: HOME | End: 2020-07-09
Attending: EMERGENCY MEDICINE | Admitting: EMERGENCY MEDICINE

## 2020-07-08 VITALS
HEIGHT: 62 IN | HEART RATE: 94 BPM | SYSTOLIC BLOOD PRESSURE: 135 MMHG | RESPIRATION RATE: 18 BRPM | TEMPERATURE: 99.3 F | WEIGHT: 188 LBS | DIASTOLIC BLOOD PRESSURE: 77 MMHG | OXYGEN SATURATION: 97 % | BODY MASS INDEX: 34.6 KG/M2

## 2020-07-08 DIAGNOSIS — R82.71 BACTERIURIA: ICD-10-CM

## 2020-07-08 DIAGNOSIS — O46.8X1 SUBCHORIONIC HEMORRHAGE OF PLACENTA IN FIRST TRIMESTER, SINGLE OR UNSPECIFIED FETUS: ICD-10-CM

## 2020-07-08 DIAGNOSIS — Z3A.01 LESS THAN 8 WEEKS GESTATION OF PREGNANCY: Primary | ICD-10-CM

## 2020-07-08 DIAGNOSIS — O41.8X10 SUBCHORIONIC HEMORRHAGE OF PLACENTA IN FIRST TRIMESTER, SINGLE OR UNSPECIFIED FETUS: ICD-10-CM

## 2020-07-08 LAB
ALBUMIN SERPL-MCNC: 3.8 G/DL (ref 3.5–5.2)
ALBUMIN/GLOB SERPL: 1.1 G/DL
ALP SERPL-CCNC: 102 U/L (ref 39–117)
ALT SERPL W P-5'-P-CCNC: 27 U/L (ref 1–33)
ANION GAP SERPL CALCULATED.3IONS-SCNC: 12 MMOL/L (ref 5–15)
AST SERPL-CCNC: 27 U/L (ref 1–32)
B-HCG UR QL: POSITIVE
BACTERIA UR QL AUTO: ABNORMAL /HPF
BASOPHILS # BLD AUTO: 0.05 10*3/MM3 (ref 0–0.2)
BASOPHILS NFR BLD AUTO: 0.4 % (ref 0–1.5)
BILIRUB SERPL-MCNC: 0.3 MG/DL (ref 0–1.2)
BILIRUB UR QL STRIP: ABNORMAL
BUN SERPL-MCNC: 5 MG/DL (ref 6–20)
BUN/CREAT SERPL: 9.8 (ref 7–25)
CALCIUM SPEC-SCNC: 8.9 MG/DL (ref 8.6–10.5)
CHLORIDE SERPL-SCNC: 102 MMOL/L (ref 98–107)
CLARITY UR: CLEAR
CO2 SERPL-SCNC: 22 MMOL/L (ref 22–29)
COLOR UR: ABNORMAL
CREAT SERPL-MCNC: 0.51 MG/DL (ref 0.57–1)
DEPRECATED RDW RBC AUTO: 48.5 FL (ref 37–54)
EOSINOPHIL # BLD AUTO: 0.05 10*3/MM3 (ref 0–0.4)
EOSINOPHIL NFR BLD AUTO: 0.4 % (ref 0.3–6.2)
ERYTHROCYTE [DISTWIDTH] IN BLOOD BY AUTOMATED COUNT: 14.4 % (ref 12.3–15.4)
GFR SERPL CREATININE-BSD FRML MDRD: 136 ML/MIN/1.73
GLOBULIN UR ELPH-MCNC: 3.5 GM/DL
GLUCOSE SERPL-MCNC: 86 MG/DL (ref 65–99)
GLUCOSE UR STRIP-MCNC: NEGATIVE MG/DL
HCT VFR BLD AUTO: 38.3 % (ref 34–46.6)
HGB BLD-MCNC: 12.7 G/DL (ref 12–15.9)
HGB UR QL STRIP.AUTO: ABNORMAL
HYALINE CASTS UR QL AUTO: ABNORMAL /LPF
IMM GRANULOCYTES # BLD AUTO: 0.04 10*3/MM3 (ref 0–0.05)
IMM GRANULOCYTES NFR BLD AUTO: 0.3 % (ref 0–0.5)
INTERNAL NEGATIVE CONTROL: NEGATIVE
INTERNAL POSITIVE CONTROL: POSITIVE
KETONES UR QL STRIP: ABNORMAL
LEUKOCYTE ESTERASE UR QL STRIP.AUTO: ABNORMAL
LIPASE SERPL-CCNC: 24 U/L (ref 13–60)
LYMPHOCYTES # BLD AUTO: 2.49 10*3/MM3 (ref 0.7–3.1)
LYMPHOCYTES NFR BLD AUTO: 19.7 % (ref 19.6–45.3)
Lab: ABNORMAL
MCH RBC QN AUTO: 30.8 PG (ref 26.6–33)
MCHC RBC AUTO-ENTMCNC: 33.2 G/DL (ref 31.5–35.7)
MCV RBC AUTO: 93 FL (ref 79–97)
MONOCYTES # BLD AUTO: 0.61 10*3/MM3 (ref 0.1–0.9)
MONOCYTES NFR BLD AUTO: 4.8 % (ref 5–12)
NEUTROPHILS NFR BLD AUTO: 74.4 % (ref 42.7–76)
NEUTROPHILS NFR BLD AUTO: 9.41 10*3/MM3 (ref 1.7–7)
NITRITE UR QL STRIP: NEGATIVE
NRBC BLD AUTO-RTO: 0 /100 WBC (ref 0–0.2)
PH UR STRIP.AUTO: <=5 [PH] (ref 5–8)
PLATELET # BLD AUTO: 289 10*3/MM3 (ref 140–450)
PMV BLD AUTO: 9.3 FL (ref 6–12)
POTASSIUM SERPL-SCNC: 3.4 MMOL/L (ref 3.5–5.2)
PROT SERPL-MCNC: 7.3 G/DL (ref 6–8.5)
PROT UR QL STRIP: NEGATIVE
RBC # BLD AUTO: 4.12 10*6/MM3 (ref 3.77–5.28)
RBC # UR: ABNORMAL /HPF
REF LAB TEST METHOD: ABNORMAL
SODIUM SERPL-SCNC: 136 MMOL/L (ref 136–145)
SP GR UR STRIP: 1.03 (ref 1–1.03)
SQUAMOUS #/AREA URNS HPF: ABNORMAL /HPF
UROBILINOGEN UR QL STRIP: ABNORMAL
WBC # BLD AUTO: 12.65 10*3/MM3 (ref 3.4–10.8)
WBC UR QL AUTO: ABNORMAL /HPF

## 2020-07-08 PROCEDURE — 96374 THER/PROPH/DIAG INJ IV PUSH: CPT

## 2020-07-08 PROCEDURE — 25010000002 ONDANSETRON PER 1 MG: Performed by: EMERGENCY MEDICINE

## 2020-07-08 PROCEDURE — 80053 COMPREHEN METABOLIC PANEL: CPT | Performed by: EMERGENCY MEDICINE

## 2020-07-08 PROCEDURE — 84702 CHORIONIC GONADOTROPIN TEST: CPT | Performed by: EMERGENCY MEDICINE

## 2020-07-08 PROCEDURE — 83690 ASSAY OF LIPASE: CPT | Performed by: EMERGENCY MEDICINE

## 2020-07-08 PROCEDURE — 86850 RBC ANTIBODY SCREEN: CPT | Performed by: EMERGENCY MEDICINE

## 2020-07-08 PROCEDURE — 81001 URINALYSIS AUTO W/SCOPE: CPT | Performed by: EMERGENCY MEDICINE

## 2020-07-08 PROCEDURE — 99283 EMERGENCY DEPT VISIT LOW MDM: CPT

## 2020-07-08 PROCEDURE — 86900 BLOOD TYPING SEROLOGIC ABO: CPT | Performed by: EMERGENCY MEDICINE

## 2020-07-08 PROCEDURE — 86901 BLOOD TYPING SEROLOGIC RH(D): CPT | Performed by: EMERGENCY MEDICINE

## 2020-07-08 PROCEDURE — 81025 URINE PREGNANCY TEST: CPT | Performed by: EMERGENCY MEDICINE

## 2020-07-08 PROCEDURE — 85025 COMPLETE CBC W/AUTO DIFF WBC: CPT | Performed by: EMERGENCY MEDICINE

## 2020-07-08 RX ORDER — ONDANSETRON 2 MG/ML
4 INJECTION INTRAMUSCULAR; INTRAVENOUS ONCE
Status: COMPLETED | OUTPATIENT
Start: 2020-07-08 | End: 2020-07-08

## 2020-07-08 RX ADMIN — SODIUM CHLORIDE 1000 ML: 9 INJECTION, SOLUTION INTRAVENOUS at 23:04

## 2020-07-08 RX ADMIN — ONDANSETRON HYDROCHLORIDE 4 MG: 2 SOLUTION INTRAMUSCULAR; INTRAVENOUS at 23:05

## 2020-07-09 ENCOUNTER — TELEPHONE (OUTPATIENT)
Dept: OBSTETRICS AND GYNECOLOGY | Facility: CLINIC | Age: 35
End: 2020-07-09

## 2020-07-09 ENCOUNTER — APPOINTMENT (OUTPATIENT)
Dept: ULTRASOUND IMAGING | Facility: HOSPITAL | Age: 35
End: 2020-07-09

## 2020-07-09 LAB
ABO GROUP BLD: NORMAL
BLD GP AB SCN SERPL QL: NEGATIVE
HCG INTACT+B SERPL-ACNC: NORMAL MIU/ML
HOLD SPECIMEN: NORMAL
HOLD SPECIMEN: NORMAL
NUMBER OF DOSES: NORMAL
RH BLD: NEGATIVE
WHOLE BLOOD HOLD SPECIMEN: NORMAL
WHOLE BLOOD HOLD SPECIMEN: NORMAL

## 2020-07-09 PROCEDURE — 76801 OB US < 14 WKS SINGLE FETUS: CPT

## 2020-07-09 RX ORDER — PNV NO.118/IRON FUMARATE/FA 29 MG-1 MG
1 TABLET,CHEWABLE ORAL DAILY
Qty: 30 TABLET | Refills: 0 | Status: SHIPPED | OUTPATIENT
Start: 2020-07-09 | End: 2021-07-09

## 2020-07-09 RX ORDER — CEPHALEXIN 500 MG/1
500 CAPSULE ORAL 2 TIMES DAILY
Qty: 10 CAPSULE | Refills: 0 | Status: SHIPPED | OUTPATIENT
Start: 2020-07-09 | End: 2020-08-31

## 2020-07-09 NOTE — ED PROVIDER NOTES
Subjective   34 y/o female arrives for 4 days of nausea and vomiting. She noted prior to this diarrhea but this resolved. She admits to history of IBS but is not currently on medication. She denies ill contacts, sore throat, cough or COVID-19 exposure. She denies any abdominal pain, fevers, chills, falls, trauma, abx usage, trips, travels, camping or other issues. She arrives in [facility].         Family, social and past history reviewed as below, prior documentation of H and Ps and other documentation are reviewed:    Past Medical History:  No date: Acid reflux  No date: Anxiety and depression  No date: Asthma  No date: Bipolar 1 disorder, mixed, severe (CMS/[facility])  2014: Heart attack (CMS/[facility])      Comment:  Patient reported on 8/31/17 that she had stress induced                heart attack three years ago.  No date: Menorrhagia  No date: Pelvic pain  No date: Pelvic pain      Comment:  menorrha  3 years ago: Stroke (CMS/[facility])      Comment:  Patient reported on 8/31/2017 that she had stress                induced stroke 3 years ago, but states nothing was done                to treat because it was stress induced.    Past Surgical History:  No date: CHOLECYSTECTOMY  No date: ELBOW FUSION  No date: ELBOW PERCUTANEOUS PINNING  11/22/2016: ENDOSCOPY; N/A      Comment:  Procedure: ESOPHAGOGASTRODUODENOSCOPY WITH ANESTHESIA;                 Surgeon: Az Miller DO;  Location: [facility]                ENDOSCOPY;  Service:   No date: ESSURE TUBAL LIGATION  06/07/2016: HYSTEROSCOPY TUBAL STERILIZATION      Comment:  Essure Sterilization  No date: PERCUTANEOUS PINNING ELBOW FRACTURE  No date: REPLACEMENT TOTAL KNEE    Social History    Socioeconomic History      Marital status:       Spouse name: Not on file      Number of children: Not on file      Years of education: Not on file      Highest education level: Not on file    Occupational History      Occupation: [employer]    Tobacco Use      Smoking status: Former  Smoker        Packs/day: 0.25        Years: 2.00        Pack years: .5        Types: Cigarettes        Quit date:         Years since quittin.5      Smokeless tobacco: Never Used    Substance and Sexual Activity      Alcohol use: Yes        Comment: OCC      Drug use: No      Sexual activity: Yes        Partners: Male        Birth control/protection: Other        Comment: Essure      Family history: reviewed and noncontributory           Review of Systems   All other systems reviewed and are negative.      Past Medical History:   Diagnosis Date   • Acid reflux    • Anxiety and depression    • Asthma    • Bipolar 1 disorder, mixed, severe (CMS/HCC)    • Heart attack (CMS/HCC)     Patient reported on 17 that she had stress induced heart attack three years ago.   • Menorrhagia    • Pelvic pain    • Pelvic pain     menorrha   • Stroke (CMS/HCC) 3 years ago    Patient reported on 2017 that she had stress induced stroke 3 years ago, but states nothing was done to treat because it was stress induced.       No Known Allergies    Past Surgical History:   Procedure Laterality Date   • CHOLECYSTECTOMY     • ELBOW FUSION     • ELBOW PERCUTANEOUS PINNING     • ENDOSCOPY N/A 2016    Procedure: ESOPHAGOGASTRODUODENOSCOPY WITH ANESTHESIA;  Surgeon: Az Miller DO;  Location: [facility] ENDOSCOPY;  Service:    • ESSURE TUBAL LIGATION     • HYSTEROSCOPY TUBAL STERILIZATION  2016    Essure Sterilization   • PERCUTANEOUS PINNING ELBOW FRACTURE     • REPLACEMENT TOTAL KNEE         Family History   Problem Relation Age of Onset   • Cancer Other    • Diabetes Other    • Heart attack Father    • No Known Problems Mother    • No Known Problems Brother    • No Known Problems Sister    • No Known Problems Daughter    • Cancer Paternal Grandmother    • Lead poisoning Maternal Grandmother    • Asthma Daughter        Social History     Socioeconomic History   • Marital status:      Spouse name: Not on  file   • Number of children: Not on file   • Years of education: Not on file   • Highest education level: Not on file   Occupational History   • Occupation: [employer]   Tobacco Use   • Smoking status: Former Smoker     Packs/day: 0.25     Years: 2.00     Pack years: 0.50     Types: Cigarettes     Last attempt to quit: 2016     Years since quittin.5   • Smokeless tobacco: Never Used   Substance and Sexual Activity   • Alcohol use: Yes     Comment: OCC   • Drug use: No   • Sexual activity: Yes     Partners: Male     Birth control/protection: Other     Comment: Essure           Objective   Physical Exam   Constitutional: She is oriented to person, place, and time. She appears well-developed and well-nourished.   HENT:   Head: Normocephalic and atraumatic.   Eyes: Pupils are equal, round, and reactive to light. Conjunctivae and EOM are normal.   Neck: Normal range of motion. Neck supple.   Cardiovascular: Normal rate, regular rhythm, normal heart sounds and intact distal pulses.   Pulmonary/Chest: Effort normal and breath sounds normal.   Abdominal: Soft. Bowel sounds are normal. She exhibits no distension and no mass. There is no tenderness. There is no rebound and no guarding. No hernia.   Musculoskeletal: Normal range of motion. She exhibits no edema, tenderness or deformity.   Neurological: She is alert and oriented to person, place, and time.   Skin: Skin is warm. Capillary refill takes less than 2 seconds.   Psychiatric: She has a normal mood and affect.   Vitals reviewed.      Procedures           ED Course  ED Course as of  0502   Thu 2020   0258 US shows IUP at 7 weeks 1 day with FHR of 154, suchoronic hemorrhage at 2.2.     Long talk with the patient. I am going to call OB to get her follow up given she has essure coils in place.     She has no bleeding and no pain at all at this time.     She is aware of all findings and need for follow up as well as reasons for return.     []   9402  Dr. Jeremy townsend with follow up, no further reccomendations.     [JH]      ED Course User Index  [] Giacomo Miller MD             Ob < 14 Weeks Single or First Gestation    (Results Pending)     Labs Reviewed   COMPREHENSIVE METABOLIC PANEL - Abnormal; Notable for the following components:       Result Value    BUN 5 (*)     Creatinine 0.51 (*)     Potassium 3.4 (*)     All other components within normal limits    Narrative:     GFR Normal >60  Chronic Kidney Disease <60  Kidney Failure <15     CBC WITH AUTO DIFFERENTIAL - Abnormal; Notable for the following components:    WBC 12.65 (*)     Monocyte % 4.8 (*)     Neutrophils, Absolute 9.41 (*)     All other components within normal limits   URINALYSIS W/ MICROSCOPIC IF INDICATED (NO CULTURE) - Abnormal; Notable for the following components:    Color, UA Dark Yellow (*)     Ketones, UA 15 mg/dL (1+) (*)     Bilirubin, UA Small (1+) (*)     Blood, UA Small (1+) (*)     Leuk Esterase, UA Small (1+) (*)     All other components within normal limits   URINALYSIS, MICROSCOPIC ONLY - Abnormal; Notable for the following components:    RBC, UA 6-12 (*)     WBC, UA 6-12 (*)     Bacteria, UA 1+ (*)     Squamous Epithelial Cells, UA 3-6 (*)     All other components within normal limits   POCT PEFORM URINE PREGNANCY - Abnormal; Notable for the following components:    HCG, Urine, QL Positive (*)     All other components within normal limits   LIPASE - Normal   RAINBOW DRAW    Narrative:     The following orders were created for panel order [place] Draw.  Procedure                               Abnormality         Status                     ---------                               -----------         ------                     Light Blue Top[[id number]]                                   Final result               Green Top (Gel)[[id number]]                                  Final result               Lavender Top[[id number]]                                     Final result                Red Top[[id number]]                                          Final result                 Please view results for these tests on the individual orders.   HCG, QUANTITATIVE, PREGNANCY    Narrative:     HCG Ranges by Gestational Age    Females - non-pregnant premenopausal   </= 1mIU/mL HCG  Females - postmenopausal               </= 7mIU/mL HCG    3 Weeks         5.8 -    71.2 mIU/mL  4 Weeks         9.5 -     750 mIU/mL  5 Weeks         217 -   7,138 mIU/mL  6 Weeks         158 -  31,795 mIU/mL  7 Weeks       3,697 - 163,563 mIU/mL  8 Weeks      32,065 - 149,571 mIU/mL  9 Weeks      63,803 - 151,410 mIU/mL  10 Weeks     46,509 - 186,977 mIU/mL  12 Weeks     27,832 - 210,612 mIU/mL  14 Weeks     13,950 -  62,530 mIU/mL  15 Weeks     12,039 -  70,971 mIU/mL  16 Weeks      9,040 -  56,451 mIU/mL  17 Weeks      8,175 -  55,868 mIU/mL  18 Weeks      8,099 -  58,176 mIU/mL  Results may be falsely decreased if patient taking Biotin.      RHIG EVALUATION   DOSES OF RH IMMUNE GLOBULIN   CBC AND DIFFERENTIAL    Narrative:     The following orders were created for panel order CBC & Differential.  Procedure                               Abnormality         Status                     ---------                               -----------         ------                     CBC Auto Differential[[id number]]        Abnormal            Final result                 Please view results for these tests on the individual orders.   LIGHT BLUE TOP   GREEN TOP   LAVENDER TOP   RED TOP                                       [facility]    Final diagnoses:   Less than 8 weeks gestation of pregnancy   Subchorionic hemorrhage of placenta in first trimester, single or unspecified fetus   Bacteriuria            Giacomo Miller MD  [phone]       Giacomo Miller MD  [phone]

## 2020-07-09 NOTE — TELEPHONE ENCOUNTER
Patient called and left  at the  stating her call dropped and was calling back to speak with her that person. I called patient back on 7/9 at 12:15pm and had to leave a .

## 2020-07-11 RX ORDER — ASPIRIN 81 MG/1
81 TABLET ORAL DAILY
Qty: 90 TABLET | Refills: 1 | Status: SHIPPED | OUTPATIENT
Start: 2020-07-11 | End: 2020-07-28 | Stop reason: ALTCHOICE

## 2020-07-11 NOTE — TELEPHONE ENCOUNTER
Judy Lianet called to request a refill on her medication.       Last office visit : 07/01/2020   Next office visit : 7/16/2020     Requested Prescriptions     Signed Prescriptions Disp Refills    aspirin EC 81 MG EC tablet 90 tablet 1     Sig: Take 1 tablet by mouth daily     Authorizing Provider: Chloe Patel     Ordering User: Rocky Steele MA

## 2020-07-16 ENCOUNTER — VIRTUAL VISIT (OUTPATIENT)
Dept: PRIMARY CARE CLINIC | Age: 35
End: 2020-07-16
Payer: MEDICAID

## 2020-07-16 PROCEDURE — 99214 OFFICE O/P EST MOD 30 MIN: CPT | Performed by: FAMILY MEDICINE

## 2020-07-16 RX ORDER — LANOLIN ALCOHOL/MO/W.PET/CERES
50 CREAM (GRAM) TOPICAL EVERY 6 HOURS PRN
Qty: 120 TABLET | Refills: 3 | Status: SHIPPED | OUTPATIENT
Start: 2020-07-16 | End: 2020-07-28 | Stop reason: ALTCHOICE

## 2020-07-16 ASSESSMENT — ENCOUNTER SYMPTOMS
WHEEZING: 0
DIARRHEA: 1
CONSTIPATION: 0
CHEST TIGHTNESS: 0
SHORTNESS OF BREATH: 0
VOMITING: 1
ABDOMINAL PAIN: 0
NAUSEA: 1
COUGH: 0

## 2020-07-16 NOTE — PROGRESS NOTES
2020    TELEHEALTH EVALUATION -- Audio/Visual (During LCPED-85 public health emergency)    HPI:    Nirali Okeefe (:  1985) has requested an audio/video evaluation for the following concern(s):    Patient presents today via video visit for f/u abdominal discomfort. She recently was found to have a uterine pregnancy. She did have XR prior to this as she was having a fallopian tube coil for contraception. Baby on US doing well but still having issues w/ N and V and diarrhea. No infectious Sx at this time. Reviewed last XR for abdominal issues, found stool retention, pt was tx w/ lactulose     Review of Systems   Constitutional: Negative for chills and fever. Respiratory: Negative for cough, chest tightness, shortness of breath and wheezing. Cardiovascular: Negative for chest pain, palpitations and leg swelling. Gastrointestinal: Positive for diarrhea, nausea and vomiting. Negative for abdominal pain and constipation. Genitourinary: Negative for difficulty urinating, dysuria and frequency. Prior to Visit Medications    Medication Sig Taking?  Authorizing Provider   vitamin B-6 (PYRIDOXINE) 50 MG tablet Take 1 tablet by mouth every 6 hours as needed (nausea) Yes Peter Cruz MD   aspirin EC 81 MG EC tablet Take 1 tablet by mouth daily  Peter Cruz MD   ondansetron (ZOFRAN) 4 MG tablet Take 1 tablet by mouth every 8 hours as needed for Nausea or Vomiting  SETH Duarte - NP   lactulose (CHRONULAC) 10 GM/15ML solution Take 30 mL every 2 hrs up 3 times in 24 hrs until large BM produced  Peter Cruz MD   omeprazole (PRILOSEC) 20 MG delayed release capsule TAKE 1 CAPSULE BY MOUTH TWICE DAILY  Peter Cruz MD   albuterol sulfate  (90 Base) MCG/ACT inhaler INHALE 2 PUFFS INTO THE LUNGS EVERY 6 HOURS AS NEEDED FOR WHEEZING  Peter Cruz MD   metoprolol succinate (TOPROL XL) 25 MG extended release tablet Take 37.5 mg by mouth nightly  SETH Sagastume   Nebulizers (COMPRESSOR/NEBULIZER) MISC Nebulizer with supplies  Wili Plunkett MD   albuterol (PROVENTIL) (5 MG/ML) 0.5% nebulizer solution Take 0.5 mLs by nebulization every 6 hours as needed for Wheezing  Wili Plunkett MD   butalbital-acetaminophen-caffeine (FIORICET, ESGIC) -40 MG per tablet TAKE 1 TABLET BY MOUTH EVERY 6 HOURS AS NEEDED FOR HEADACHE, MUST LAST 30 DAYS  Carla Valenzuela DO   OLANZapine (ZYPREXA) 5 MG tablet Take 0.5 tablets by mouth nightly  Wili Plunkett MD   linaclotide [facility]) 145 MCG capsule Take 1 capsule by mouth every morning (before breakfast)  Chantelle Augustin MD   venlafaxine (EFFEXOR XR) 150 MG extended release capsule TAKE 1 CAPSULE BY MOUTH EVERY DAY  Wili Plunkett MD   atorvastatin (LIPITOR) 40 MG tablet TAKE 1 TABLET BY MOUTH EVERY DAY  Wili Plunkett MD   BREO ELLIPTA 100-25 MCG/INH AEPB inhaler INHALE 2 PUFFS BY MOUTH INTO LUNGS  Wili Plunkett MD       Social History     Tobacco Use    Smoking status: Never Smoker    Smokeless tobacco: Never Used   Substance Use Topics    Alcohol use: Yes     Comment: occasional, last drink 6 mo ago    Drug use: No        No Known Allergies,   Past Medical History:   Diagnosis Date    Anxiety     Blood circulation, collateral     CAD (coronary artery disease)     Cerebral artery occlusion with cerebral infarction ([address]     Depression     IBS (irritable bowel syndrome)     MDD (major depressive disorder)     MI (myocardial infarction) ([address]     Miscarriage June 2012    Schizophrenia ([address]     Seizures ([address]     Syncope     UTI (urinary tract infection) 12/9/2019   ,   Past Surgical History:   Procedure Laterality Date    CHOLECYSTECTOMY, LAPAROSCOPIC      COLONOSCOPY N/A 11/7/2019    Dr Trini Vasques, 10 yr recall    EGD  2016    [address]      lower right leg, has metal plate and screws    FRACTURE SURGERY      left wrist    INSERTABLE CARDIAC MONITOR      loop recorder    INTRAUTERINE DEVICE INSERTION      Insure placement 6/7/2016    UPPER GASTROINTESTINAL ENDOSCOPY N/A 11/7/2019    Dr Cheri Serna   ,   Social History     Tobacco Use    Smoking status: Never Smoker    Smokeless tobacco: Never Used   Substance Use Topics    Alcohol use: Yes     Comment: occasional, last drink 6 mo ago    Drug use: No   ,   Family History   Problem Relation Age of Onset    High Blood Pressure Mother     Diabetes Father     Heart Disease Father     Stomach Cancer Father     Breast Cancer Other         maternal great aunt    Colon Cancer Paternal Grandmother     Colon Polyps Neg Hx     Esophageal Cancer Neg Hx     Liver Cancer Neg Hx     Liver Disease Neg Hx     Rectal Cancer Neg Hx    ,   Immunization History   Administered Date(s) Administered    Influenza, Quadv, IM, PF (6 mo and older Fluzone, Flulaval, Fluarix, and 3 yrs and older Afluria) 11/02/2018, 11/06/2019    Pneumococcal Polysaccharide (Oktpplapu93) 03/22/2019   ,   Health Maintenance   Topic Date Due    Varicella vaccine (1 of 2 - 2-dose childhood series) 06/01/1984    HIV screen  06/01/1998    DTaP/Tdap/Td vaccine (1 - Tdap) 06/01/2002    Cervical cancer screen  06/01/2004    Lipid screen  09/06/2019    Flu vaccine (1) 09/01/2020    Colon cancer screen colonoscopy  11/07/2029    Pneumococcal 0-64 years Vaccine  Completed    Hepatitis A vaccine  Aged Out    Hepatitis B vaccine  Aged Out    Hib vaccine  Aged Out    Meningococcal (ACWY) vaccine  Aged Out       PHYSICAL EXAMINATION:  [ INSTRUCTIONS:  \"[x]\" Indicates a positive item  \"[]\" Indicates a negative item  -- DELETE ALL ITEMS NOT EXAMINED]  Vital Signs: (As obtained by patient/caregiver or practitioner observation)    Blood pressure-  Heart rate-    Respiratory rate-    Temperature-  Pulse oximetry-     Constitutional: [x] Appears well-developed and well-nourished [] No apparent distress      [] Abnormal-   Mental status  [x] Alert and awake  [x] Oriented to person/place/time [x]Able to follow commands      Eyes:  EOM    [x]  Normal  [] Abnormal-  Sclera  [x]  Normal  [] Abnormal -         Discharge []  None visible  [] Abnormal -    HENT:   [x] Normocephalic, atraumatic. [] Abnormal   [x] Mouth/Throat: Mucous membranes are moist.     External Ears [x] Normal  [] Abnormal-     Neck: [x] No visualized mass     Pulmonary/Chest: [x] Respiratory effort normal.  [x] No visualized signs of difficulty breathing or respiratory distress        [] Abnormal-      Musculoskeletal:   [x] Normal gait with no signs of ataxia         [x] Normal range of motion of neck        [] Abnormal-       Neurological:        [x] No Facial Asymmetry (Cranial nerve 7 motor function) (limited exam to video visit)          [x] No gaze palsy        [] Abnormal-         Skin:        [x] No significant exanthematous lesions or discoloration noted on facial skin         [] Abnormal-            Psychiatric:       [x] Normal Affect [x] No Hallucinations        [] Abnormal-     Other pertinent observable physical exam findings-     ASSESSMENT/PLAN:    ICD-10-CM    1. 8 weeks gestation of pregnancy  Z3A.08    2. Moderate hyperemesis gravidarum  O21.0        Pt to see obgyn tomorrow. B6 for N recommended. No furhter radiation exposure. Hold on any tx until seen by ob gyn. Reviewed [facility] from [facility] ED and imaging. Both are new conditions    Orders Placed This Encounter   Medications    vitamin B-6 (PYRIDOXINE) 50 MG tablet     Sig: Take 1 tablet by mouth every 6 hours as needed (nausea)     Dispense:  120 tablet     Refill:  3       No orders of the defined types were placed in this encounter. Return if symptoms worsen or fail to improve. Pretty Le is a 38 y.o. female being evaluated by a Virtual Visit (video visit) encounter to address concerns as mentioned above. A caregiver was present when appropriate.  Due to this being a TeleHealth encounter (During RGOKP-58 public health emergency), evaluation of the following organ systems was limited: Vitals/Constitutional/EENT/Resp/CV/GI//MS/Neuro/Skin/Heme-Lymph-Imm. Pursuant to the emergency declaration under the [facility], [address] [place] [address] authority and the [employer] Act, this Virtual Visit was conducted with patient's (and/or legal guardian's) consent, to reduce the patient's risk of exposure to COVID-19 and provide necessary medical care. The patient (and/or legal guardian) has also been advised to contact this office for worsening conditions or problems, and seek emergency medical treatment and/or call 911 if deemed necessary. Services were provided through a video synchronous discussion virtually to substitute for in-person clinic visit. Patient and provider were located at their individual homes or provider at secure site for business. --Eric Bobby MD on 7/16/2020 at 9:52 AM    An electronic signature was used to authenticate this note.

## 2020-07-20 PROCEDURE — G2066 INTER DEVC REMOTE 30D: HCPCS | Performed by: NURSE PRACTITIONER

## 2020-07-20 PROCEDURE — 93298 REM INTERROG DEV EVAL SCRMS: CPT | Performed by: NURSE PRACTITIONER

## 2020-07-28 ENCOUNTER — INITIAL PRENATAL (OUTPATIENT)
Dept: OBGYN | Age: 35
End: 2020-07-28
Payer: MEDICAID

## 2020-07-28 VITALS
HEART RATE: 89 BPM | WEIGHT: 183 LBS | DIASTOLIC BLOOD PRESSURE: 81 MMHG | SYSTOLIC BLOOD PRESSURE: 114 MMHG | BODY MASS INDEX: 35.74 KG/M2

## 2020-07-28 DIAGNOSIS — Z3A.09 9 WEEKS GESTATION OF PREGNANCY: ICD-10-CM

## 2020-07-28 LAB
ABO/RH: NORMAL
ANTIBODY SCREEN: NORMAL

## 2020-07-28 PROCEDURE — 99213 OFFICE O/P EST LOW 20 MIN: CPT | Performed by: NURSE PRACTITIONER

## 2020-07-28 RX ORDER — ASPIRIN 81 MG/1
81 TABLET, CHEWABLE ORAL DAILY
Qty: 30 TABLET | Refills: 9 | Status: SHIPPED | OUTPATIENT
Start: 2020-07-28 | End: 2021-02-14 | Stop reason: SDUPTHER

## 2020-07-28 RX ORDER — PROMETHAZINE HYDROCHLORIDE 25 MG/1
25 TABLET ORAL 4 TIMES DAILY PRN
Qty: 30 TABLET | Refills: 0 | Status: SHIPPED | OUTPATIENT
Start: 2020-07-28 | End: 2020-09-29 | Stop reason: SDUPTHER

## 2020-07-28 NOTE — PROGRESS NOTES
Pt presents today for routine prenatal visit. Pt denies vaginal bleeding, cramping, or leaking of fluid. Pt states she has the esure and wondering how that will work with delivery. She states she has had nausea. She is wanting to wait on her pap.

## 2020-07-28 NOTE — PATIENT INSTRUCTIONS
Patient Education        Weeks 6 to 10 of Your Pregnancy: Care Instructions  Your Care Instructions     Congratulations on your pregnancy. This is an exciting and important time for you. During the first 6 to 10 weeks of your pregnancy, your body goes through many changes. Your baby grows very fast, even though you cannot feel it yet. You may start to notice that you feel different, both in your body and your emotions. Because each woman's pregnancy is unique, there is no right way to feel. You may feel the healthiest you have ever been, or you may feel tired or sick to your stomach (\"morning sickness\"). These early weeks are a time to make healthy choices and to eat the best foods for you and your baby. This care sheet will give you some ideas. This is also a good time to think about birth defects testing. These are tests done during pregnancy to look for possible problems with the baby. First trimester tests for birth defects can be done between 8 and 17 weeks of pregnancy, depending on the test. Talk with your doctor about what kinds of tests are available. Follow-up care is a key part of your treatment and safety. Be sure to make and go to all appointments, and call your doctor if you are having problems. It's also a good idea to know your test results and keep a list of the medicines you take. How can you care for yourself at home? Eat well  · Eat at least 3 meals and 2 healthy snacks every day. Eat fresh, whole foods, including:  ? 7 or more servings of bread, tortillas, cereal, rice, pasta, or oatmeal.  ? 3 or more servings of vegetables, especially leafy green vegetables. ? 2 or more servings of fruits. ? 3 or more servings of milk, yogurt, or cheese. ? 2 or more servings of meat, turkey, chicken, fish, eggs, or dried beans. · Drink plenty of fluids, especially water. Avoid sodas and other sweetened drinks.   · Choose foods that have important vitamins for your baby, such as calcium, iron, and folate. ? Dairy products, tofu, canned fish with bones, almonds, broccoli, dark leafy greens, corn tortillas, and fortified orange juice are good sources of calcium. ? Beef, poultry, liver, spinach, lentils, dried beans, fortified cereals, and dried fruits are rich in iron. ? Dark leafy greens, broccoli, asparagus, liver, fortified cereals, orange juice, peanuts, and almonds are good sources of folate. · Avoid foods that could harm your baby. ? Do not eat raw or undercooked meat, chicken, or fish (such as sushi or raw oysters). ? Do not eat raw eggs or foods that contain raw eggs, such as Caesar dressing. ? Do not eat soft cheeses and unpasteurized dairy foods, such as Brie, feta, or blue cheese. ? Do not eat fish that contains a lot of mercury, such as shark, swordfish, tilefish, or mindi mackerel. Do not eat more than 6 ounces of tuna each week. ? Do not eat raw sprouts, especially alfalfa sprouts. ? Cut down on caffeine, such as coffee, tea, and cola. Protect yourself and your baby  · Do not touch marquez litter or cat feces. They can cause an infection that could harm your baby. · High body temperature can be harmful to your baby. So if you want to use a sauna or hot tub, be sure to talk to your doctor about how to use it safely. [place] with morning sickness  · Sip small amounts of water, juices, or shakes. Try drinking between meals, not with meals. · Eat 5 or 6 small meals a day. Try dry toast or crackers when you first get up, and eat breakfast a little later. · Avoid spicy, greasy, and fatty foods. · When you feel sick, open your windows or go for a short walk to get fresh air. · Try nausea wristbands. These help some women. · Tell your doctor if you think your prenatal vitamins make you sick. Where can you learn more? Go to https://harlan.[employer]. org and sign in to your [employer] account.  Enter G112 in the [employer] box to learn more about \"Weeks 6 to 10 of Your Pregnancy: Care Instructions. \"     If you do not have an account, please click on the \"Sign Up Now\" link. Current as of: February 11, 2020               Content Version: 12.5  © 5778-3454 [employer]. Care instructions adapted under license by [facility] ([facility]). If you have questions about a medical condition or this instruction, always ask your healthcare professional. [address] any warranty or liability for your use of this information.

## 2020-07-28 NOTE — PROGRESS NOTES
Abdon Conti is here for a return obstetrical visit. Today she is 9w6d weeks EGA. She  does not have vaginal bleeding, leaking of fluid, contractions. She does not have blurred vision, SOB, or increased swelling in legs or face. Pt does not feel fetal movement regularly. Pt presents for NOB. Pt has had the Essure so this is a very surprise pregnancy. Had viability u/s [facility] [place]. Lengthy medical history including history of stroke and MI. History of bipolar and schizophrenia. Also history of seizures, sees Dr Pao Maxwell and does not take treatment. Questions about which medications are safe. Discussed with pt and will refer to MFM. Will start baby ASA. Discussed AMA and will need FFDNA in 2 weeks. Gave handouts. Has been taking Zofran for nausea and has not been helping, starting Phenergan for prn use. Taking PNV. History of 37 week delivery with infant weighing 4lbs with precipitous labor. Objective: Mother's Prenatal Vitals  BP: 114/81  Weight: 183 lb (83 kg)  Pulse: 89  Patient Position: Sitting  Prenatal Fetal Information  Fetal Heart Rate: US-169  Movement: Absent  Pt is A&Ox3, in no acute distress. Normocephalic, atraumatic. PERRL. Resp even and non-labored. Skin pink, warm & dry. Gravid abdomen. ESTRADA's well. Gait steady. Assessment:  IUP at 9w6d wks      Diagnosis Orders   1. High-risk pregnancy in first trimester     2. 9 weeks gestation of pregnancy  Herpes simplex virus (HSV) I/II antibodies IgG & IgM w/ reflex    HIV Obstetric Panel    Culture, Urine    C.trachomatis N.gonorrhoeae DNA    Varicella Zoster Antibody, IgG    External Referral To Maternal Fetal Medicine     Plan:Pt counseled on Genetic testing  Continue with routine prenatal care.   RTC in 4 wk for prenatal visit    MEDICATIONS:  Orders Placed This Encounter   Medications    promethazine (PHENERGAN) 25 MG tablet     Sig: Take 1 tablet by mouth 4 times daily as needed for Nausea     Dispense:  30 tablet     Refill:  0    aspirin (ASPIRIN CHILDRENS) 81 MG chewable tablet     Sig: Take 1 tablet by mouth daily     Dispense:  30 tablet     Refill:  9       ORDERS:  Orders Placed This Encounter   Procedures    Culture, Urine    C.trachomatis N.gonorrhoeae DNA    Herpes simplex virus (HSV) I/II antibodies IgG & IgM w/ reflex    HIV Obstetric Panel    Varicella Zoster Antibody, IgG    External Referral To Maternal Fetal Medicine

## 2020-07-30 LAB
BASOPHILS ABSOLUTE: 0 K/UL (ref 0–0.2)
BASOPHILS RELATIVE PERCENT: 0.2 % (ref 0–1)
EOSINOPHILS ABSOLUTE: 0.1 K/UL (ref 0–0.6)
EOSINOPHILS RELATIVE PERCENT: 0.7 % (ref 0–5)
HCT VFR BLD CALC: 40.9 % (ref 37–47)
HEMOGLOBIN: 13.2 G/DL (ref 12–16)
HEPATITIS B SURFACE ANTIGEN INTERPRETATION: ABNORMAL
HIV-1 P24 AG: ABNORMAL
IMMATURE GRANULOCYTES #: 0.1 K/UL
LYMPHOCYTES ABSOLUTE: 2.6 K/UL (ref 1.1–4.5)
LYMPHOCYTES RELATIVE PERCENT: 23.5 % (ref 20–40)
MCH RBC QN AUTO: 30.7 PG (ref 27–31)
MCHC RBC AUTO-ENTMCNC: 32.3 G/DL (ref 33–37)
MCV RBC AUTO: 95.1 FL (ref 81–99)
MONOCYTES ABSOLUTE: 0.5 K/UL (ref 0–0.9)
MONOCYTES RELATIVE PERCENT: 4.6 % (ref 0–10)
NEUTROPHILS ABSOLUTE: 7.6 K/UL (ref 1.5–7.5)
NEUTROPHILS RELATIVE PERCENT: 70.5 % (ref 50–65)
PDW BLD-RTO: 14.5 % (ref 11.5–14.5)
PLATELET # BLD: 335 K/UL (ref 130–400)
PMV BLD AUTO: 10.1 FL (ref 9.4–12.3)
RAPID HIV 1&2: ABNORMAL
RBC # BLD: 4.3 M/UL (ref 4.2–5.4)
RPR: ABNORMAL
RUBELLA ANTIBODY IGG: REACTIVE
URINE CULTURE, ROUTINE: NORMAL
WBC # BLD: 10.8 K/UL (ref 4.8–10.8)

## 2020-07-31 LAB
CHLAMYDIA TRACHOMATIS AMPLIFIED DET: NEGATIVE
HERPES TYPE 1/2 IGM COMBINED: 0.64 IV
HERPES TYPE I/II IGG COMBINED: 0.32 IV
N GONORRHOEAE AMPLIFIED DET: NEGATIVE
SPECIMEN SOURCE: NORMAL

## 2020-08-03 LAB — VZV IGG SER QL IA: 0.73

## 2020-08-06 ENCOUNTER — TELEPHONE (OUTPATIENT)
Dept: OBGYN | Age: 35
End: 2020-08-06

## 2020-08-06 NOTE — TELEPHONE ENCOUNTER
Pt called has questions left message for pt to call back. I have called a few times and line was busy to inform of MFM appt scheduled on 10/6/2020 at 10:15. Rachel      Pt is on Nat's schedule for today 8/11/2020 and will be notified of MFM appt.  Rachel

## 2020-08-07 ENCOUNTER — TELEPHONE (OUTPATIENT)
Dept: OBGYN | Age: 35
End: 2020-08-07

## 2020-08-07 RX ORDER — ONDANSETRON 4 MG/1
4 TABLET, ORALLY DISINTEGRATING ORAL EVERY 8 HOURS PRN
Qty: 30 TABLET | Refills: 1 | Status: ON HOLD | OUTPATIENT
Start: 2020-08-07 | End: 2021-02-10 | Stop reason: HOSPADM

## 2020-08-07 NOTE — TELEPHONE ENCOUNTER
I called in Zofran, can sometimes have constipating side effect but will help with nausea during the day. Needs to start BRAT diet for diarrhea.  Can do Immodium, but try the BRAT diet first. Thanks ML

## 2020-08-11 ENCOUNTER — ROUTINE PRENATAL (OUTPATIENT)
Dept: OBGYN | Age: 35
End: 2020-08-11
Payer: MEDICAID

## 2020-08-11 VITALS — BODY MASS INDEX: 35.94 KG/M2 | SYSTOLIC BLOOD PRESSURE: 122 MMHG | WEIGHT: 184 LBS | DIASTOLIC BLOOD PRESSURE: 88 MMHG

## 2020-08-11 PROBLEM — Z87.42 HISTORY OF ABNORMAL CERVICAL PAP SMEAR: Status: ACTIVE | Noted: 2020-08-11

## 2020-08-11 PROCEDURE — 99214 OFFICE O/P EST MOD 30 MIN: CPT | Performed by: ADVANCED PRACTICE MIDWIFE

## 2020-08-11 NOTE — PROGRESS NOTES
Pt is here for routine parental care. Pt denies vaginal bleeding, cramping or leaking of fluid. Pt c/o nausea and diarrhea since finding out she was pregnant.

## 2020-08-11 NOTE — PROGRESS NOTES
CNM Prenatal Office Note  Subjective:  Neel Velasquez is here for a return obstetrical visit. Today she is 11w6d weeks EGA. She is doing well and has no complaints. She  does not have vaginal bleeding, n/v, dizziness, or cramping. She is taking her vitamins and baby aspirin daily. Objective: Mother's Prenatal Vitals  BP: 122/88  Weight: 184 lb (83.5 kg)  Patient Position: Sitting  Prenatal Fetal Information  Fetal Heart Rate: 160  Movement: Absent  Pt is A&Ox3, in no acute distress. Normocephalic, atraumatic. PERRL. Resp even and non-labored. Skin pink, warm & dry. Gravid abdomen. ESTRADA's well. Gait steady. Assessment:    IUP at 11w6d wks      Diagnosis Orders   1. Advanced maternal age in multigravida, first trimester     2. 11 weeks gestation of pregnancy  PAP SMEAR    Human papillomavirus (HPV) DNA probe thin prep high risk   3. History of miscarriage, currently pregnant, first trimester     4. Coronary artery disease involving native coronary artery of native heart without angina pectoris     5. Moderate episode of recurrent major depressive disorder ([address]     6. Chronic migraine     7. Screening for malignant neoplasm of cervix  PAP SMEAR    Human papillomavirus (HPV) DNA probe thin prep high risk   8. History of abnormal cervical Pap smear  PAP SMEAR    Human papillomavirus (HPV) DNA probe thin prep high risk     Plan:   Pt counseled on need for MFM appointment soon and Genetic testing   Continue with routine prenatal care.  RTC in 4 wks for prenatal visit   I had a lengthy discussion with Opel about her high risk pregnancy and need for MFM and OB to co-manage. I will ask her to meet with the physicians periodically throughout the pregnancy. MEDICATIONS:  No orders of the defined types were placed in this encounter.     ORDERS:  Orders Placed This Encounter   Procedures    PAP SMEAR    Human papillomavirus (HPV) DNA probe thin prep high risk       More than 50% of this 20 min visit was education and counseling.

## 2020-08-11 NOTE — PATIENT INSTRUCTIONS
Patient Education        Weeks 10 to 14 of Your Pregnancy: Care Instructions  Your Care Instructions     By weeks 10 to 14 of your pregnancy, the placenta has formed inside your uterus. It is possible to hear your baby's heartbeat with a special ultrasound device. Your baby's eyes can and do move. The arms and legs can bend. This is a good time to think about testing for birth defects. There are two types of tests: screening and diagnostic. Screening tests show the chance that a baby has a certain birth defect. They can't tell you for sure that your baby has a problem. Diagnostic tests show if a baby has a certain birth defect. It's your choice whether to have these tests. You and your partner can talk to your doctor or midwife about birth defects tests. Follow-up care is a key part of your treatment and safety. Be sure to make and go to all appointments, and call your doctor if you are having problems. It's also a good idea to know your test results and keep a list of the medicines you take. How can you care for yourself at home? Decide about tests  · You can have screening tests and diagnostic tests to check for birth defects. The decision to have a test for birth defects is personal. Think about your age, your chance of passing on a family disease, your need to know about any problems, and what you might do after you have the test results. ? Triple or quadruple (quad) blood tests. These screening tests can be done between 15 and 20 weeks of pregnancy. They check the amounts of three or four substances in your blood. The doctor looks at these test results, along with your age and other factors, to find out the chance that your baby may have certain problems. ? Amniocentesis. This diagnostic test is used to look for chromosomal problems in the baby's cells.  It can be done between 15 and 20 weeks of pregnancy, usually around week 16.  ? Nuchal translucency test. This test uses ultrasound to measure the your chest and breasts may show more. · Don't worry about \"toughening'\" your nipples. Breastfeeding will naturally do this. Where can you learn more? Go to https://PresstlerpeParents Journeyeb.[employer] Media. org and sign in to your [email or web address] account. Enter N235 in the SendtoNews box to learn more about \"Weeks 10 to 14 of Your Pregnancy: Care Instructions. \"     If you do not have an account, please click on the \"Sign Up Now\" link. Current as of: February 11, 2020               Content Version: 12.5  © 1172-3727 Healthwise, Incorporated. Care instructions adapted under license by [facility] ([facility]). If you have questions about a medical condition or this instruction, always ask your healthcare professional. [address] any warranty or liability for your use of this information.

## 2020-08-14 LAB
HPV COMMENT: NORMAL
HPV TYPE 16: NOT DETECTED
HPV TYPE 18: NOT DETECTED
HPVOH (OTHER TYPES): NOT DETECTED

## 2020-08-20 PROCEDURE — 93298 REM INTERROG DEV EVAL SCRMS: CPT | Performed by: NURSE PRACTITIONER

## 2020-08-20 PROCEDURE — G2066 INTER DEVC REMOTE 30D: HCPCS | Performed by: NURSE PRACTITIONER

## 2020-08-28 RX ORDER — LABETALOL 100 MG/1
50 TABLET, FILM COATED ORAL DAILY
Qty: 30 TABLET | Refills: 5 | Status: SHIPPED | OUTPATIENT
Start: 2020-08-28 | End: 2020-11-24

## 2020-08-31 ENCOUNTER — TELEPHONE (OUTPATIENT)
Dept: OBGYN | Age: 35
End: 2020-08-31

## 2020-08-31 NOTE — TELEPHONE ENCOUNTER
Returned call to pt. Pt states she has already gone to Urgent Care for a burn on her breast that happened when she was cooking. States they gave her an antibiotic. No further needs at this time.

## 2020-09-09 ENCOUNTER — HOSPITAL ENCOUNTER (OUTPATIENT)
Age: 35
Discharge: HOME | End: 2020-09-09
Attending: ADVANCED PRACTICE MIDWIFE | Admitting: ADVANCED PRACTICE MIDWIFE
Payer: MEDICAID

## 2020-09-09 VITALS
TEMPERATURE: 98.1 F | RESPIRATION RATE: 14 BRPM | DIASTOLIC BLOOD PRESSURE: 72 MMHG | HEART RATE: 79 BPM | SYSTOLIC BLOOD PRESSURE: 116 MMHG

## 2020-09-09 PROBLEM — Z3A.16 16 WEEKS GESTATION OF PREGNANCY: Status: ACTIVE | Noted: 2020-09-09

## 2020-09-09 LAB
BACTERIA: ABNORMAL /HPF
BILIRUBIN URINE: NEGATIVE
BLOOD, URINE: NEGATIVE
CLARITY: ABNORMAL
COLOR: YELLOW
CRYSTALS, UA: ABNORMAL /HPF
EPITHELIAL CELLS, UA: 13 /HPF (ref 0–5)
GLUCOSE URINE: NEGATIVE MG/DL
HYALINE CASTS: 6 /HPF (ref 0–8)
KETONES, URINE: NEGATIVE MG/DL
LEUKOCYTE ESTERASE, URINE: ABNORMAL
NITRITE, URINE: NEGATIVE
PH UA: 6.5 (ref 5–8)
PROTEIN UA: NEGATIVE MG/DL
RBC UA: 1 /HPF (ref 0–4)
SPECIFIC GRAVITY UA: 1.01 (ref 1–1.03)
UROBILINOGEN, URINE: 0.2 E.U./DL
WBC UA: 17 /HPF (ref 0–5)

## 2020-09-09 PROCEDURE — 81001 URINALYSIS AUTO W/SCOPE: CPT

## 2020-09-09 PROCEDURE — 99211 OFF/OP EST MAY X REQ PHY/QHP: CPT

## 2020-09-09 RX ORDER — PROMETHAZINE HYDROCHLORIDE 25 MG/1
1 TABLET ORAL 4 TIMES DAILY PRN
Status: ON HOLD | COMMUNITY
Start: 2020-07-28 | End: 2021-02-10 | Stop reason: HOSPADM

## 2020-09-09 RX ORDER — PROMETHAZINE HYDROCHLORIDE 25 MG/1
12.5 TABLET ORAL EVERY 6 HOURS PRN
Status: DISCONTINUED | OUTPATIENT
Start: 2020-09-09 | End: 2020-09-09 | Stop reason: HOSPADM

## 2020-09-09 RX ORDER — ONDANSETRON 2 MG/ML
4 INJECTION INTRAMUSCULAR; INTRAVENOUS EVERY 6 HOURS PRN
Status: DISCONTINUED | OUTPATIENT
Start: 2020-09-09 | End: 2020-09-09 | Stop reason: HOSPADM

## 2020-09-09 RX ORDER — ACETAMINOPHEN 325 MG/1
650 TABLET ORAL EVERY 4 HOURS PRN
Status: DISCONTINUED | OUTPATIENT
Start: 2020-09-09 | End: 2020-09-09 | Stop reason: HOSPADM

## 2020-09-09 RX ORDER — PNV NO.118/IRON FUMARATE/FA 29 MG-1 MG
1 TABLET,CHEWABLE ORAL DAILY
COMMUNITY
Start: 2020-07-09 | End: 2020-09-29 | Stop reason: SDUPTHER

## 2020-09-10 NOTE — PROGRESS NOTES
Admitted to L&D for c/o lower pelvic pressure and back pain. 16 week GA with burning upon urination, pelvic pressure and feeling of not emptying completely. FHT obtained and monitoring for contractions initiated. Urine sample obtained and sent to lab. Denies leaking of fluid or vaginal bleeding. Pain reported as mostly constant.     Dr. Funez Comment to be notified of admission and lab results

## 2020-09-22 PROCEDURE — G2066 INTER DEVC REMOTE 30D: HCPCS | Performed by: NURSE PRACTITIONER

## 2020-09-22 PROCEDURE — 93298 REM INTERROG DEV EVAL SCRMS: CPT | Performed by: NURSE PRACTITIONER

## 2020-09-28 NOTE — PATIENT INSTRUCTIONS
Patient Education        Weeks 18 to 22 of Your Pregnancy: Care Instructions  Your Care Instructions     Your baby is continuing to develop quickly. At this stage, babies can now suck their thumbs,  firmly with their hands, and open and close their eyelids. Sometime between 18 and 22 weeks, you will start to feel your baby move. At first, these small fetal movements feel like fluttering or \"butterflies. \" Some women say that they feel like gas bubbles. As the baby grows, these movements will become stronger. You may also notice that your baby kicks and hiccups. During this time, you may find that your nausea and fatigue are gone. Overall, you may feel better and have more energy than you did in your first trimester. But you may also have new discomforts now, such as sleep problems or leg cramps. This care sheet can help you ease these discomforts. Follow-up care is a key part of your treatment and safety. Be sure to make and go to all appointments, and call your doctor if you are having problems. It's also a good idea to know your test results and keep a list of the medicines you take. How can you care for yourself at home? Ease sleep problems  · Avoid caffeine in drinks or chocolate late in the day. · Get some exercise every day. · Take a warm shower or bath before bed. · Have a light snack or glass of milk at bedtime. · Do relaxation exercises in bed to calm your mind and body. · Support your legs and back with extra pillows. Try a pillow between your legs if you sleep on your side. · Do not use sleeping pills or alcohol. They could harm your baby. Ease leg cramps  · Do not massage your calf during the cramp. · Sit on a firm bed or chair. Straighten your leg, and bend your foot (flex your ankle) slowly upward, toward your knee. Bend your toes up and down. · Stand on a cool, flat surface. Stretch your toes upward, and take small steps walking on your heels.   · Use a heating pad or hot water bottle to help with muscle ache. Prevent leg cramps  · Be sure to get enough calcium. If you are worried that you are not getting enough, talk to your doctor. · Exercise every day, and stretch your legs before bed. · Take a warm bath before bed, and try leg warmers at night. Where can you learn more? Go to https://chpelashay.healthNaiscorp Information Technology Services. org and sign in to your ThePresent.Co account. Enter A497 in the Tabblo box to learn more about \"Weeks 18 to 22 of Your Pregnancy: Care Instructions. \"     If you do not have an account, please click on the \"Sign Up Now\" link. Current as of: February 11, 2020               Content Version: 12.5  © 7818-5021 Healthwise, Incorporated. Care instructions adapted under license by [facility] ([facility]). If you have questions about a medical condition or this instruction, always ask your healthcare professional. [address] any warranty or liability for your use of this information.

## 2020-09-29 ENCOUNTER — ROUTINE PRENATAL (OUTPATIENT)
Dept: OBGYN | Age: 35
End: 2020-09-29
Payer: MEDICAID

## 2020-09-29 VITALS — DIASTOLIC BLOOD PRESSURE: 76 MMHG | SYSTOLIC BLOOD PRESSURE: 122 MMHG | BODY MASS INDEX: 36.52 KG/M2 | WEIGHT: 187 LBS

## 2020-09-29 PROBLEM — Z3A.16 16 WEEKS GESTATION OF PREGNANCY: Status: RESOLVED | Noted: 2020-09-09 | Resolved: 2020-09-29

## 2020-09-29 PROCEDURE — 99213 OFFICE O/P EST LOW 20 MIN: CPT | Performed by: ADVANCED PRACTICE MIDWIFE

## 2020-09-29 RX ORDER — PNV NO.118/IRON FUMARATE/FA 29 MG-1 MG
1 TABLET,CHEWABLE ORAL DAILY
Qty: 30 TABLET | Refills: 11 | Status: SHIPPED | OUTPATIENT
Start: 2020-09-29 | End: 2021-05-05 | Stop reason: ALTCHOICE

## 2020-09-29 RX ORDER — PROMETHAZINE HYDROCHLORIDE 25 MG/1
25 TABLET ORAL 4 TIMES DAILY PRN
Qty: 30 TABLET | Refills: 1 | Status: SHIPPED | OUTPATIENT
Start: 2020-09-29 | End: 2020-10-09

## 2020-09-29 NOTE — PROGRESS NOTES
Pt is here for routine parental care. Pt denies vaginal bleeding, cramping or leaking of fluid. Pt has not started her new \"heart pill\". Requesting refills on Phenergan and PNV.

## 2020-09-29 NOTE — PROGRESS NOTES
CNM Prenatal Office Note  Subjective:  Kt Montemayor is here for a return obstetrical visit. Today she is 18w6d weeks EGA. She is doing well, taking her PNV as directed, and has no complaints. She  does not have vaginal bleeding, n/v, syncope, or headaches. Pt does feel fetal movement regularly. Objective: Mother's Prenatal Vitals  BP: 122/76  Weight: 187 lb (84.8 kg)  Patient Position: Sitting  Prenatal Fetal Information  Fetal Heart Rate:   Movement: Absent  Pt is A&Ox3, in no acute distress. Normocephalic, atraumatic. PERRL. Resp even and non-labored. Skin pink, warm & dry. Gravid abdomen. ESTRADA's well. Gait steady. Assessment:    IUP at 18w6d wks      Diagnosis Orders   1. Advanced maternal age in multigravida, second trimester  Amb External Referral To Maternal Fetal Medicine   2. 18 weeks gestation of pregnancy  Amb External Referral To Maternal Fetal Medicine     Plan:   Pt counseled on balanced nutrition, adequate fluid intake, taking PNV daily, and exercise along with importance of compliance with PNV. I instructed her to continue with cardiology recommendation for medicines. She is to be seen ASAP by [facility] - we discussed this as well.  Continue with routine prenatal care.  RTC in 2 wks for prenatal visit and needs to see MD      MEDICATIONS:  Orders Placed This Encounter   Medications    promethazine (PHENERGAN) 25 MG tablet     Sig: Take 1 tablet by mouth 4 times daily as needed for Nausea     Dispense:  30 tablet     Refill:  1    Prenatal Vit-Fe Fumarate-FA (PRENATAL VITAMIN) CHEW     Sig: Take 1 tablet by mouth daily     Dispense:  30 tablet     Refill:  11     ORDERS:  Orders Placed This Encounter   Procedures    Amb External Referral To Maternal Fetal Medicine       More than 50% of this 20 min visit was education and counseling.

## 2020-10-07 DIAGNOSIS — R55 SYNCOPE, UNSPECIFIED SYNCOPE TYPE: Primary | ICD-10-CM

## 2020-10-07 DIAGNOSIS — Z95.818 STATUS POST PLACEMENT OF IMPLANTABLE LOOP RECORDER: ICD-10-CM

## 2020-10-13 ENCOUNTER — ROUTINE PRENATAL (OUTPATIENT)
Dept: OBGYN | Age: 35
End: 2020-10-13
Payer: MEDICAID

## 2020-10-13 VITALS
SYSTOLIC BLOOD PRESSURE: 113 MMHG | BODY MASS INDEX: 36.13 KG/M2 | DIASTOLIC BLOOD PRESSURE: 78 MMHG | WEIGHT: 185 LBS | HEART RATE: 86 BPM

## 2020-10-13 PROCEDURE — 90686 IIV4 VACC NO PRSV 0.5 ML IM: CPT | Performed by: NURSE PRACTITIONER

## 2020-10-13 PROCEDURE — 99213 OFFICE O/P EST LOW 20 MIN: CPT | Performed by: NURSE PRACTITIONER

## 2020-10-13 PROCEDURE — 90471 IMMUNIZATION ADMIN: CPT | Performed by: NURSE PRACTITIONER

## 2020-10-13 NOTE — Clinical Note
Can you have RAMO (fernando) send records from anatomical survey? I didn't see them in the chart.  Thanks ML

## 2020-10-13 NOTE — PATIENT INSTRUCTIONS
Patient Education        Weeks 18 to 22 of Your Pregnancy: Care Instructions  Your Care Instructions     Your baby is continuing to develop quickly. At this stage, babies can now suck their thumbs,  firmly with their hands, and open and close their eyelids. Sometime between 18 and 22 weeks, you will start to feel your baby move. At first, these small fetal movements feel like fluttering or \"butterflies. \" Some women say that they feel like gas bubbles. As the baby grows, these movements will become stronger. You may also notice that your baby kicks and hiccups. During this time, you may find that your nausea and fatigue are gone. Overall, you may feel better and have more energy than you did in your first trimester. But you may also have new discomforts now, such as sleep problems or leg cramps. This care sheet can help you ease these discomforts. Follow-up care is a key part of your treatment and safety. Be sure to make and go to all appointments, and call your doctor if you are having problems. It's also a good idea to know your test results and keep a list of the medicines you take. How can you care for yourself at home? Ease sleep problems  · Avoid caffeine in drinks or chocolate late in the day. · Get some exercise every day. · Take a warm shower or bath before bed. · Have a light snack or glass of milk at bedtime. · Do relaxation exercises in bed to calm your mind and body. · Support your legs and back with extra pillows. Try a pillow between your legs if you sleep on your side. · Do not use sleeping pills or alcohol. They could harm your baby. Ease leg cramps  · Do not massage your calf during the cramp. · Sit on a firm bed or chair. Straighten your leg, and bend your foot (flex your ankle) slowly upward, toward your knee. Bend your toes up and down. · Stand on a cool, flat surface. Stretch your toes upward, and take small steps walking on your heels.   · Use a heating pad or hot water bottle to help with muscle ache. Prevent leg cramps  · Be sure to get enough calcium. If you are worried that you are not getting enough, talk to your doctor. · Exercise every day, and stretch your legs before bed. · Take a warm bath before bed, and try leg warmers at night. Where can you learn more? Go to https://chpekellyewbowen.healthTabTale. org and sign in to your [employer] account. Enter H860 in the Celergo box to learn more about \"Weeks 18 to 22 of Your Pregnancy: Care Instructions. \"     If you do not have an account, please click on the \"Sign Up Now\" link. Current as of: February 11, 2020               Content Version: 12.6  © 6065-1652 InstantQ, Incorporated. Care instructions adapted under license by [facility] ([facility]). If you have questions about a medical condition or this instruction, always ask your healthcare professional. [address] any warranty or liability for your use of this information.

## 2020-10-13 NOTE — PROGRESS NOTES
Jesse Sam is here for a return obstetrical visit. Today she is 20w6d weeks EGA. She is doing well and has no complaints. She  does not have vaginal bleeding, leaking of fluid, contractions. She does not have blurred vision, SOB, or increased swelling in legs or face. Pt does not feel fetal movement regularly. Had anatomical survey wit Dr Rambo Lima- will obtain records. Objective: Mother's Prenatal Vitals  BP: 113/78  Weight: 185 lb (83.9 kg)  Pulse: 86  Patient Position: Sitting  Prenatal Fetal Information  Fetal Heart Rate: US-145  Movement: Present  Pt is A&Ox3, in no acute distress. Normocephalic, atraumatic. PERRL. Resp even and non-labored. Skin pink, warm & dry. Gravid abdomen. ESTRADA's well. Gait steady. Assessment:  IUP at 20w6d wks      Diagnosis Orders   1. High-risk pregnancy in second trimester     2. 20 weeks gestation of pregnancy     3. Flu vaccine need  INFLUENZA, QUADV, 3 YRS AND OLDER, IM PF, PREFILL SYR OR SDV, 0.5ML (AFLURIA QUADV, PF)   4. Moderate episode of recurrent major depressive disorder (HCC)       Plan:Pt counseled on GHTN precautions, Kick count and  labor  Continue with routine prenatal care. RTC in 2 wk for prenatal visit  F/u MFM    MEDICATIONS:  No orders of the defined types were placed in this encounter.       ORDERS:  Orders Placed This Encounter   Procedures    INFLUENZA, QUADV, 3 YRS AND OLDER, IM PF, PREFILL SYR OR SDV, 0.5ML (AFLURIA QUADV, PF)

## 2020-10-13 NOTE — PROGRESS NOTES
Pt presents today for routine prenatal visit. Pt denies vaginal bleeding, cramping, or leaking of fluid +fetal movement. After obtaining consent, and per orders of Annalise Poe, injection of Flu vaccine given in Right deltoid by Marcie Vance. Patient instructed to remain in clinic for 20 minutes afterwards, and to report any adverse reaction to me immediately.

## 2020-10-22 ENCOUNTER — HOSPITAL ENCOUNTER (OUTPATIENT)
Age: 35
Discharge: HOME | End: 2020-10-22
Attending: ADVANCED PRACTICE MIDWIFE | Admitting: ADVANCED PRACTICE MIDWIFE
Payer: MEDICARE

## 2020-10-22 VITALS — DIASTOLIC BLOOD PRESSURE: 60 MMHG | HEART RATE: 80 BPM | SYSTOLIC BLOOD PRESSURE: 107 MMHG

## 2020-10-22 PROBLEM — Z3A.22 22 WEEKS GESTATION OF PREGNANCY: Status: ACTIVE | Noted: 2020-10-22

## 2020-10-22 LAB
BACTERIA: ABNORMAL /HPF
BILIRUBIN URINE: NEGATIVE
BLOOD, URINE: ABNORMAL
CLARITY: ABNORMAL
COLOR: YELLOW
EPITHELIAL CELLS, UA: ABNORMAL /HPF
GLUCOSE URINE: NEGATIVE MG/DL
HYALINE CASTS: ABNORMAL /LPF (ref 0–5)
KETONES, URINE: =>160 MG/DL
LEUKOCYTE ESTERASE, URINE: ABNORMAL
NITRITE, URINE: NEGATIVE
PH UA: 5 (ref 5–8)
PROTEIN UA: NEGATIVE MG/DL
RBC UA: ABNORMAL /HPF (ref 0–2)
SPECIFIC GRAVITY UA: 1.02 (ref 1–1.03)
UROBILINOGEN, URINE: 1 E.U./DL
WBC UA: ABNORMAL /HPF (ref 0–5)

## 2020-10-22 PROCEDURE — 81001 URINALYSIS AUTO W/SCOPE: CPT

## 2020-10-22 PROCEDURE — 99211 OFF/OP EST MAY X REQ PHY/QHP: CPT

## 2020-10-23 NOTE — FLOWSHEET NOTE
Pt arrived to labor and delivery, reports she was outside walking her dog, who weighs about 35 lbs, per significant other, when the dogs leash wrapped around her lower abd. Pt the started to have RLQ pain, denies leaking of fluid, denies vaginal bleeding, positive fetal movements since pain started. Denies contractions. TOCO and EFM placed to soft non-tender abd.

## 2020-10-23 NOTE — FLOWSHEET NOTE
Discharge teaching reviewed. All questions answered. Patient ambulated from unit without complaints.

## 2020-10-27 ENCOUNTER — ROUTINE PRENATAL (OUTPATIENT)
Dept: OBGYN | Age: 35
End: 2020-10-27
Payer: MEDICAID

## 2020-10-27 VITALS
DIASTOLIC BLOOD PRESSURE: 75 MMHG | BODY MASS INDEX: 36.91 KG/M2 | HEART RATE: 76 BPM | SYSTOLIC BLOOD PRESSURE: 112 MMHG | WEIGHT: 189 LBS

## 2020-10-27 PROCEDURE — 99213 OFFICE O/P EST LOW 20 MIN: CPT | Performed by: ADVANCED PRACTICE MIDWIFE

## 2020-10-27 PROCEDURE — G2066 INTER DEVC REMOTE 30D: HCPCS | Performed by: CLINICAL NURSE SPECIALIST

## 2020-10-27 PROCEDURE — 93298 REM INTERROG DEV EVAL SCRMS: CPT | Performed by: CLINICAL NURSE SPECIALIST

## 2020-10-27 NOTE — PATIENT INSTRUCTIONS
more sessions each day. Ease or reduce swelling in your feet, ankles, hands, and fingers  · If your fingers are puffy, take off your rings. · Do not eat high-salt foods, such as potato chips. · Prop up your feet on a stool or couch as much as possible. Sleep with pillows under your feet. · Do not stand for long periods of time or wear tight shoes. · Wear support stockings. Where can you learn more? Go to https://HELIX BIOMEDIX.NXTM. org and sign in to your [employer] account. Enter G264 in the Ghz Technology box to learn more about \"Weeks 22 to 26 of Your Pregnancy: Care Instructions. \"     If you do not have an account, please click on the \"Sign Up Now\" link. Current as of: February 11, 2020               Content Version: 12.6  © 2699-9237 NATION Technologies, PersonSpot. Care instructions adapted under license by [facility] ([facility]). If you have questions about a medical condition or this instruction, always ask your healthcare professional. [place] [address] any warranty or liability for your use of this information. Patient Education        Counting Your Baby's Kicks: Care Instructions  Your Care Instructions     Counting your baby's kicks is one way your doctor can tell that your baby is healthy. Most women--especially in a first pregnancy--feel their baby move for the first time between 16 and 22 weeks. The movement may feel like flutters rather than kicks. Your baby may move more at certain times of the day. When you are active, you may notice less kicking than when you are resting. At your prenatal visits, your doctor will ask whether the baby is active. In your last trimester, your doctor may ask you to count the number of times you feel your baby move. Follow-up care is a key part of your treatment and safety. Be sure to make and go to all appointments, and call your doctor if you are having problems.  It's also a good idea to know your test results and keep a list of the medicines you take. How do you count fetal kicks? · A common method of checking your baby's movement is to count the number of kicks or moves you feel in 1 hour. Ten movements (such as kicks, flutters, or rolls) in 1 hour are normal. Some doctors suggest that you count in the morning until you get to 10 movements. Then you can quit for that day and start again the next day. · Pick your baby's most active time of day to count. This may be any time from morning to evening. · If you do not feel 10 movements in an hour, your baby may be sleeping. Wait for the next hour and count again. When should you call for help? Call your doctor now or seek immediate medical care if:    · You noticed that your baby has stopped moving or is moving much less than normal.   Watch closely for changes in your health, and be sure to contact your doctor if you have any problems. Where can you learn more? Go to https://Travelata.BlueData Software. org and sign in to your Sage[employer] account. Enter U075 in the Switchfly box to learn more about \"Counting Your Baby's Kicks: Care Instructions. \"     If you do not have an account, please click on the \"Sign Up Now\" link. Current as of: February 11, 2020               Content Version: 12.6  © 6045-4693 Wist[employer] ([employer]un[employer]an[employer], Incorporated. Care instructions adapted under license by [facility] ([facility]). If you have questions about a medical condition or this instruction, always ask your healthcare professional. [place] [address] any warranty or liability for your use of this information. Patient Education        Learning About When to Call Your Doctor During Pregnancy (After 20 Weeks)  Your Care Instructions  It's common to have concerns about what might be a problem during pregnancy. Although most pregnant women don't have any serious problems, it's important to know when to call your doctor if you have certain symptoms or signs of labor.   These are general suggestions. Your doctor may give you some more information about when to call. When to call your doctor (after 20 weeks)  Call 911 anytime you think you may need emergency care. For example, call if:  · You have severe vaginal bleeding. · You have sudden, severe pain in your belly. · You passed out (lost consciousness). · You have a seizure. · You see or feel the umbilical cord. · You think you are about to deliver your baby and can't make it safely to the hospital.  Call your doctor now or seek immediate medical care if:  · You have vaginal bleeding. · You have belly pain. · You have a fever. · You have symptoms of preeclampsia, such as:  ? Sudden swelling of your face, hands, or feet. ? New vision problems (such as dimness, blurring, or seeing spots). ? A severe headache. · You have a sudden release of fluid from your vagina. (You think your water broke.)  · You think that you may be in labor. This means that you've had at least 6 contractions in an hour. · You notice that your baby has stopped moving or is moving much less than normal.  · You have symptoms of a urinary tract infection. These may include:  ? Pain or burning when you urinate. ? A frequent need to urinate without being able to pass much urine. ? Pain in the flank, which is just below the rib cage and above the waist on either side of the back. ? Blood in your urine. Watch closely for changes in your health, and be sure to contact your doctor if:  · You have vaginal discharge that smells bad. · You have skin changes, such as:  ? A rash. ? Itching. ? Yellow color to your skin. · You have other concerns about your pregnancy. If you have labor signs at 37 weeks or more  If you have signs of labor at 37 weeks or more, your doctor may tell you to call when your labor becomes more active. Symptoms of active labor include:  · Contractions that are regular. · Contractions that are less than 5 minutes apart.   · Contractions that are hard to talk through. Follow-up care is a key part of your treatment and safety. Be sure to make and go to all appointments, and call your doctor if you are having problems. It's also a good idea to know your test results and keep a list of the medicines you take. Where can you learn more? Go to https://chpepiceweb.Fractal Analytics. org and sign in to your [employer] account. Enter  in the Glance App box to learn more about \"Learning About When to Call Your Doctor During Pregnancy (After 20 Weeks). \"     If you do not have an account, please click on the \"Sign Up Now\" link. Current as of: February 11, 2020               Content Version: 12.6  © 2006-2020 Vanna's Vanity. Care instructions adapted under license by [facility] ([facility]). If you have questions about a medical condition or this instruction, always ask your healthcare professional. [address] any warranty or liability for your use of this information. Patient Education         Pregnancy: Eating the Right Foods (01:44)  Your health professional recommends that you watch this short online health video. Learn about the nutrients you need to have a healthy pregnancy and a healthy baby. How to watch the video    Scan the QR code   OR Visit the website    https://hwi. se/[email or web address]   Current as of: February 11, 2020               Content Version: 12.6  © 2006-2020 Vanna's Vanity. Care instructions adapted under license by [facility] ([facility]). If you have questions about a medical condition or this instruction, always ask your healthcare professional. [address] any warranty or liability for your use of this information. Patient Education        During Pregnancy: Exercises  Introduction  Here are some examples of exercises to do during your pregnancy. Start each exercise slowly. Ease off the exercise if you start to have pain.   Your doctor or physical therapist will tell you when you can start these exercises and which ones will work best for you. How to do the exercises  Neck rotation   1. Sit in a firm chair, or stand up straight. 2. Keeping your chin level, turn your head to the right, and hold for 15 to 30 seconds. 3. Turn your head to the left and hold for 15 to 30 seconds. 4. Repeat 2 to 4 times to each side. Forward neck flexion   1. Sit in a firm chair, or stand up straight. 2. Bend your head forward. 3. Hold for 15 to 30 seconds. 4. Repeat 2 to 4 times. Back press   1. Place your feet 10 to 12 inches from the wall. 2. Rest your back flat against the wall and slide down the wall until your knees are slightly bent. 3. Press your lower back against the wall by pulling in your stomach muscles. 4. Hold for 6 seconds, and then relax your stomach muscles and slide back up the wall. 5. Repeat 8 to 12 times. Full body twist   1. Sit with your legs crossed. 2. Reach your left hand toward your right foot, and place your right hand at your side for support. 3. Slowly twist your torso to your right. 4. Switch your hands and twist to your left. 5. Repeat 2 to 4 times. Pelvic rocking   1. Kneeling on hands and knees, place your hands directly under your shoulders and your knees under your hips. 2. Breathe in deeply. Tuck your head downward and round your back up, making a curve with your back in the shape of the letter C. Hold this position for a count of 6.  3. Breathe out slowly and bring your head back up. Relax, keeping your back straight (don't allow it to curve toward the floor). Hold this for a count of 6.  4. Do this exercise 8 times or to your comfort level. Pelvic tilt   This exercise strengthens your lower back and pelvis. It is for use during the first 4 months of pregnancy. After this point, lying on your back is not recommended, because it can cause blood flow problems for you and your baby. 1. Lie on your back.   2. Keep your knees relaxed. 3. Tighten your belly and buttocks muscles. 4. At the same time, gently shift your pelvis upward. This should flatten the curve in your back. 5. Hold for 6 seconds and then relax. 6. Gradually increase the number of tilts you do each day, to your comfort level. Backward stretch   1. Kneel on hands and knees with your knees 8 to 10 inches apart, hands directly under your shoulders, and arms and back straight. 2. Keeping your arms straight, slowly lower your buttocks toward your heels and tuck your head toward your knees. Hold for 15 to 30 seconds. 3. Slowly return to the kneeling position. 4. Repeat 2 to 4 times. Forward bend   1. Sit comfortably in a chair, with your arms relaxed. 2. Slowly bend forward, allowing your arms to hang down in front of you. Lean only as far as you can without feeling discomfort or pressure on your belly. 3. Hold for 15 to 30 seconds and then slowly sit up straight. 4. Repeat 2 to 4 times or to your comfort level. Leg lift crawl   1. Kneeling on hands and knees, place your hands directly under your shoulders and straighten your arms. 2. Tighten your belly muscles by pulling in your belly button toward your spine. Be sure you continue to breathe normally and do not hold your breath. 3. Lift your left knee and bring it toward your elbow. 4. Slowly extend your leg behind you without completely straightening it. Be careful not to let your hip drop down. Avoid arching your back. 5. Hold your leg behind you for about 6 seconds. 6. Return to your starting position. 7. Do the same exercise with your other leg. 8. Repeat 8 to 12 times for each leg. Tailor sitting   1. Sit on the floor. 2. Bring your feet close to your body while crossing your ankles. 3. Hold this position for as long as you are comfortable. Tailor stretching   1. Sit on the floor with your back straight, legs about 12 inches apart, and feet relaxed outward.   2. Stretch your hands forward

## 2020-10-27 NOTE — PROGRESS NOTES
CNM Prenatal Office Note  Subjective:  Dione Perez is here for a return obstetrical visit. Today she is 22w6d weeks EGA. She is doing well, taking her PNV as directed, and has no complaints. She  does not have vaginal bleeding, n/v, syncope, or headaches. Pt does feel fetal movement regularly. Objective: Mother's Prenatal Vitals  BP: 112/75  Weight: 189 lb (85.7 kg)  Pulse: 76  Patient Position: Sitting  Prenatal Fetal Information  Fetal Heart Rate: 133  Movement: Present  Pt is A&Ox3, in no acute distress. Normocephalic, atraumatic. PERRL. Resp even and non-labored. Skin pink, warm & dry. Gravid abdomen. ESTRADA's well. Gait steady. Assessment:    IUP at 22w6d wks      Diagnosis Orders   1. High-risk pregnancy in second trimester     2.  screening encounter  CBC Auto Differential    Glucose 1 Hour Postprandial    Rhogam Immune Globulin, Antepartum   3. Moderate episode of recurrent major depressive disorder ([facility] [address]     4. Advanced maternal age in multigravida, second trimester       Plan:   Pt counseled on balanced nutrition, adequate fluid intake, taking PNV daily, and exercise along with MFM consult scheduled   Continue with routine prenatal care.  RTC in 4 wks for prenatal visit   Sudafed for ear pressure      MEDICATIONS:  No orders of the defined types were placed in this encounter. ORDERS:  No orders of the defined types were placed in this encounter. More than 50% of this 20 min visit was education and counseling.

## 2020-10-30 ENCOUNTER — TELEPHONE (OUTPATIENT)
Dept: OBGYN | Age: 35
End: 2020-10-30

## 2020-11-17 ENCOUNTER — TELEPHONE (OUTPATIENT)
Dept: OBGYN | Age: 35
End: 2020-11-17

## 2020-11-17 NOTE — TELEPHONE ENCOUNTER
Patient called asking if she had an appt today. Griseltamiko Altonganga it was on [facility]. Patient is requesting a call back. I tried calling her back and her phone was busy, so I will give it a few minutes and call her back.

## 2020-11-20 NOTE — PATIENT INSTRUCTIONS
Patient Education        Weeks 26 to 30 of Your Pregnancy: Care Instructions  Your Care Instructions     You are now in your last trimester of pregnancy. Your baby is growing rapidly. And you'll probably feel your baby moving around more often. Your doctor may ask you to count your baby's kicks. Your back may ache as your body gets used to your baby's size and length. If you haven't already had the Tdap shot during this pregnancy, talk to your doctor about getting it. It will help protect your  against pertussis infection. During this time, it's important to take care of yourself and pay attention to what your body needs. If you feel sexual, explore ways to be close with your partner that match your comfort and desire. Use the tips provided in this care sheet to find ways to be sexual in your own way. Follow-up care is a key part of your treatment and safety. Be sure to make and go to all appointments, and call your doctor if you are having problems. It's also a good idea to know your test results and keep a list of the medicines you take. How can you care for yourself at home? Take it easy at work  · Take frequent breaks. If possible, stop working when you are tired, and rest during your lunch hour. · Take bathroom breaks every 2 hours. · Change positions often. If you sit for long periods, stand up and walk around. · When you stand for a long time, keep one foot on a low stool with your knee bent. After standing a lot, sit with your feet up. · Avoid fumes, chemicals, and tobacco smoke. Be sexual in your own way  · Having sex during pregnancy is okay, unless your doctor tells you not to. · You may be very interested in sex, or you may have no interest at all. · Your growing belly can make it hard to find a good position during intercourse. [place] and explore. · You may get cramps in your uterus when your partner touches your breasts.   · A back rub may relieve the backache or cramps that sometimes follow orgasm. Learn about  labor  · Watch for signs of  labor. You may be going into labor if:  ? You have menstrual-like cramps, with or without nausea. ? You have about 6 or more contractions in 1 hour, even after you have had a glass of water and are resting. ? You have a low, dull backache that does not go away when you change your position. ? You have pain or pressure in your pelvis that comes and goes in a pattern. ? You have intestinal cramping or flu-like symptoms, with or without diarrhea.  ? You notice an increase or change in your vaginal discharge. Discharge may be heavy, mucus-like, watery, or streaked with blood. ? Your water breaks. · If you think you have  labor:  ? Drink 2 or 3 glasses of water or juice. Not drinking enough fluids can cause contractions. ? Stop what you are doing, and empty your bladder. Then lie down on your left side for at least 1 hour. ? While lying on your side, find your breast bone. Put your fingers in the soft spot just below it. Move your fingers down toward your belly button to find the top of your uterus. Check to see if it is tight. ? Contractions can be weak or strong. Record your contractions for an hour. Time a contraction from the start of one contraction to the start of the next one.  ? Single or several strong contractions without a pattern are called Guthrie-[place] contractions. They are practice contractions but not the start of labor. They often stop if you change what you are doing. ? Call your doctor if you have regular contractions. Where can you learn more? Go to https://Advanced Image Enhancementgabriel.CubeTree. org and sign in to your [employer] account. Enter S339 in the Epplament Energy box to learn more about \"Weeks 26 to 30 of Your Pregnancy: Care Instructions. \"     If you do not have an account, please click on the \"Sign Up Now\" link.   Current as of: 2020               Content Version: 12.6  © 7074-7717 Healthwise, Incorporated. Care instructions adapted under license by [facility] ([facility]). If you have questions about a medical condition or this instruction, always ask your healthcare professional. [address] any warranty or liability for your use of this information. Patient Education        Learning About Screening for Gestational Diabetes  What is gestational diabetes screening? Screening for gestational diabetes is a way to look for high blood sugar during pregnancy. You drink some very sweet liquid. Then you have a blood test to see how your body uses sugar (glucose). How is gestational diabetes screening done? Screening for gestational diabetes may be done in a couple of ways. Two-part screening. Part one (glucose challenge test): A blood sample is taken after you drink a liquid that contains sugar (glucose). You don't need to stop eating or drinking before this test. If the test shows that you don't have a lot of sugar in your blood, you don't have gestational diabetes. Part two (oral glucose tolerance test, or OGTT): If the first test shows a lot of sugar in your blood, then you may have an OGTT. You can't eat or drink for at least 8 hours before this test. A blood sample is taken, then you drink a sweet liquid. You have more blood tests after 1 to 3 hours. If the OGTT shows that you have a lot of sugar in your blood, you may have gestational diabetes. One-part screening. Sometimes doctors use the OGTT on its own. If the test shows that you don't have a lot of sugar in your blood, you don't have gestational diabetes. If you do have a lot of sugar in your blood, you may have the condition. What are the risks of screening? Your blood glucose level may drop very low toward the end of the test. If this happens, you may feel weak, hungry, and restless. Tell your doctor if you have these symptoms. The test usually will be stopped.   You may vomit after drinking the sweet liquid. If this happens, you may need to take the test at a later time. Your doctor may do more glucose tests at other times during your pregnancy. Follow-up care is a key part of your treatment and safety. Be sure to make and go to all appointments, and call your doctor if you are having problems. It's also a good idea to know your test results and keep a list of the medicines you take. Where can you learn more? Go to https://CloudianpepicNetrada.Jumpido. org and sign in to your [facility] account. Enter K558 in the Cross Mediaworks box to learn more about \"Learning About Screening for Gestational Diabetes. \"     If you do not have an account, please click on the \"Sign Up Now\" link. Current as of: December 20, 2019               Content Version: 12.6  © 3536-9457 Dexmo, Incorporated. Care instructions adapted under license by [facility] ([facility]). If you have questions about a medical condition or this instruction, always ask your healthcare professional. [place] [address] any warranty or liability for your use of this information. Patient Education        Counting Your Baby's Kicks: Care Instructions  Your Care Instructions     Counting your baby's kicks is one way your doctor can tell that your baby is healthy. Most women--especially in a first pregnancy--feel their baby move for the first time between 16 and 22 weeks. The movement may feel like flutters rather than kicks. Your baby may move more at certain times of the day. When you are active, you may notice less kicking than when you are resting. At your prenatal visits, your doctor will ask whether the baby is active. In your last trimester, your doctor may ask you to count the number of times you feel your baby move. Follow-up care is a key part of your treatment and safety. Be sure to make and go to all appointments, and call your doctor if you are having problems.  It's also a good idea to know your test results and keep a list of the medicines you take. How do you count fetal kicks? · A common method of checking your baby's movement is to count the number of kicks or moves you feel in 1 hour. Ten movements (such as kicks, flutters, or rolls) in 1 hour are normal. Some doctors suggest that you count in the morning until you get to 10 movements. Then you can quit for that day and start again the next day. · Pick your baby's most active time of day to count. This may be any time from morning to evening. · If you do not feel 10 movements in an hour, your baby may be sleeping. Wait for the next hour and count again. When should you call for help? Call your doctor now or seek immediate medical care if:    · You noticed that your baby has stopped moving or is moving much less than normal.   Watch closely for changes in your health, and be sure to contact your doctor if you have any problems. Where can you learn more? Go to https://ShoutOmatic.Metaweb Technologies. org and sign in to your [employer]each account. Enter O349 in the "Payz, Inc." box to learn more about \"Counting Your Baby's Kicks: Care Instructions. \"     If you do not have an account, please click on the \"Sign Up Now\" link. Current as of: February 11, 2020               Content Version: 12.6  © 0213-0115 Lifesum, Incorporated. Care instructions adapted under license by [facility] ([facility]). If you have questions about a medical condition or this instruction, always ask your healthcare professional. [place] [address] any warranty or liability for your use of this information.

## 2020-11-24 ENCOUNTER — ROUTINE PRENATAL (OUTPATIENT)
Dept: OBGYN | Age: 35
End: 2020-11-24
Payer: MEDICARE

## 2020-11-24 ENCOUNTER — OFFICE VISIT (OUTPATIENT)
Dept: CARDIOLOGY | Age: 35
End: 2020-11-24
Payer: MEDICARE

## 2020-11-24 VITALS
DIASTOLIC BLOOD PRESSURE: 64 MMHG | BODY MASS INDEX: 35.14 KG/M2 | HEIGHT: 60 IN | SYSTOLIC BLOOD PRESSURE: 110 MMHG | WEIGHT: 179 LBS | HEART RATE: 94 BPM

## 2020-11-24 VITALS
WEIGHT: 189 LBS | SYSTOLIC BLOOD PRESSURE: 111 MMHG | BODY MASS INDEX: 36.91 KG/M2 | DIASTOLIC BLOOD PRESSURE: 79 MMHG | HEART RATE: 94 BPM

## 2020-11-24 DIAGNOSIS — Z36.9 ANTENATAL SCREENING ENCOUNTER: ICD-10-CM

## 2020-11-24 PROBLEM — Z67.91 RH NEGATIVE STATUS DURING PREGNANCY IN SECOND TRIMESTER, ANTEPARTUM: Status: ACTIVE | Noted: 2020-11-24

## 2020-11-24 PROBLEM — O26.892 RH NEGATIVE STATUS DURING PREGNANCY IN SECOND TRIMESTER, ANTEPARTUM: Status: ACTIVE | Noted: 2020-11-24

## 2020-11-24 LAB
ABO/RH: NORMAL
ANTIBODY SCREEN: NORMAL
BASOPHILS ABSOLUTE: 0 K/UL (ref 0–0.2)
BASOPHILS RELATIVE PERCENT: 0.3 % (ref 0–1)
EOSINOPHILS ABSOLUTE: 0.1 K/UL (ref 0–0.6)
EOSINOPHILS RELATIVE PERCENT: 0.9 % (ref 0–5)
GLUCOSE, 1HR PP: 145 MG/DL (ref 75–140)
HCT VFR BLD CALC: 37.7 % (ref 37–47)
HEMOGLOBIN: 12.2 G/DL (ref 12–16)
IMMATURE GRANULOCYTES #: 0.1 K/UL
LYMPHOCYTES ABSOLUTE: 2.1 K/UL (ref 1.1–4.5)
LYMPHOCYTES RELATIVE PERCENT: 15.6 % (ref 20–40)
MCH RBC QN AUTO: 31 PG (ref 27–31)
MCHC RBC AUTO-ENTMCNC: 32.4 G/DL (ref 33–37)
MCV RBC AUTO: 95.7 FL (ref 81–99)
MONOCYTES ABSOLUTE: 0.6 K/UL (ref 0–0.9)
MONOCYTES RELATIVE PERCENT: 4.1 % (ref 0–10)
NEUTROPHILS ABSOLUTE: 10.7 K/UL (ref 1.5–7.5)
NEUTROPHILS RELATIVE PERCENT: 78.5 % (ref 50–65)
PDW BLD-RTO: 15 % (ref 11.5–14.5)
PLATELET # BLD: 325 K/UL (ref 130–400)
PMV BLD AUTO: 8.6 FL (ref 9.4–12.3)
RBC # BLD: 3.94 M/UL (ref 4.2–5.4)
RHIG LOT NUMBER: NORMAL
WBC # BLD: 13.6 K/UL (ref 4.8–10.8)

## 2020-11-24 PROCEDURE — 99213 OFFICE O/P EST LOW 20 MIN: CPT | Performed by: CLINICAL NURSE SPECIALIST

## 2020-11-24 PROCEDURE — 96372 THER/PROPH/DIAG INJ SC/IM: CPT | Performed by: ADVANCED PRACTICE MIDWIFE

## 2020-11-24 PROCEDURE — G8482 FLU IMMUNIZE ORDER/ADMIN: HCPCS | Performed by: ADVANCED PRACTICE MIDWIFE

## 2020-11-24 PROCEDURE — 1036F TOBACCO NON-USER: CPT | Performed by: ADVANCED PRACTICE MIDWIFE

## 2020-11-24 PROCEDURE — G8427 DOCREV CUR MEDS BY ELIG CLIN: HCPCS | Performed by: ADVANCED PRACTICE MIDWIFE

## 2020-11-24 PROCEDURE — G8482 FLU IMMUNIZE ORDER/ADMIN: HCPCS | Performed by: CLINICAL NURSE SPECIALIST

## 2020-11-24 PROCEDURE — G8417 CALC BMI ABV UP PARAM F/U: HCPCS | Performed by: ADVANCED PRACTICE MIDWIFE

## 2020-11-24 PROCEDURE — G8427 DOCREV CUR MEDS BY ELIG CLIN: HCPCS | Performed by: CLINICAL NURSE SPECIALIST

## 2020-11-24 PROCEDURE — 99214 OFFICE O/P EST MOD 30 MIN: CPT | Performed by: ADVANCED PRACTICE MIDWIFE

## 2020-11-24 PROCEDURE — 1036F TOBACCO NON-USER: CPT | Performed by: CLINICAL NURSE SPECIALIST

## 2020-11-24 PROCEDURE — 93285 PRGRMG DEV EVAL SCRMS IP: CPT | Performed by: CLINICAL NURSE SPECIALIST

## 2020-11-24 PROCEDURE — G8417 CALC BMI ABV UP PARAM F/U: HCPCS | Performed by: CLINICAL NURSE SPECIALIST

## 2020-11-24 RX ORDER — LABETALOL 100 MG/1
50 TABLET, FILM COATED ORAL 2 TIMES DAILY
Qty: 30 TABLET | Refills: 5
Start: 2020-11-24 | End: 2021-02-14 | Stop reason: SDUPTHER

## 2020-11-24 NOTE — PROGRESS NOTES
CNM Prenatal Office Note  Subjective:  Dawna Ricardo is here for a return obstetrical visit. Today she is 26w6d weeks EGA. She is doing well, taking her PNV as directed, and has no complaints. She  does not have vaginal bleeding, n/v, syncope, or headaches. Pt does feel fetal movement regularly. 1 hour GCT today. Rhogam indicated. She has been asked to see MFM - appointment today. Cardiology appointment today. Objective: Mother's Prenatal Vitals  BP: 111/79  Weight: 189 lb (85.7 kg)  Pulse: 94  Patient Position: Sitting  Prenatal Fetal Information  Fundal Height (cm): 27 cm  Fetal Heart Rate:   Movement: Present  Pt is A&Ox3, in no acute distress. Normocephalic, atraumatic. PERRL. Resp even and non-labored. Skin pink, warm & dry. Gravid abdomen. ESTRADA's well. Gait steady. Assessment:    IUP at 26w6d wks      Diagnosis Orders   1. High-risk pregnancy in second trimester     2. Moderate episode of recurrent major depressive disorder ([facility] [address]     3. Advanced maternal age in multigravida, third trimester     4. Rh negative status during pregnancy in second trimester, antepartum     5. History of MI (myocardial infarction)     6. History of CVA (cerebrovascular accident)       Plan:   Third trimester teaching completed including warning signs for pre-eclampsia (blurred vision, seeing spots/sparkles, sudden increased weight gain or profound edema, epigastric pain), FKC (decreased fetal movments),  labor ( contractions or watery discharge, vaginal bleeding, cramping), n/v, chills, and or fever. Instructed pt to come to office or go to LDR if these symptoms presented. Pt voiced understanding.  Pt counseled on balanced nutrition, adequate fluid intake, taking PNV daily, and exercise along with GHTN precautions, Kick count and  labor   Continue with routine prenatal care.  RTC in 2 wks for prenatal visit   I would like this patient to be followed by MFM and OB in our office.  Will make management plan after she sees GUALBERTO Myers today. MEDICATIONS:  No orders of the defined types were placed in this encounter. ORDERS:  No orders of the defined types were placed in this encounter. More than 50% of this 20 min visit was education and counseling.

## 2020-11-24 NOTE — PROGRESS NOTES
After obtaining consent, and per orders of Christoph Matos, injection of Rhogam given in Right upper quad. gluteus by Tyrese Slade. Patient instructed to remain in clinic for 20 minutes afterwards, and to report any adverse reaction to me immediately.

## 2020-11-24 NOTE — PROGRESS NOTES
[facility]  [facility] [address]  [id number]  Phone: [phone]  Fax: [phone]    OFFICE VISIT:  2020    Lanre Hendrickson - : 1985    Reason For Visit:  Karen Cortez is a 38 y.o. female who is here for 1 Year Follow Up (pt is having sob )  Patient has had history of syncopal spells with cardiac and neurological work-up. Normal carotids, negative stress test and normal 2D echo  Underwent placement of loop recorder due to recurrent syncope  Thus far the recorder has failed to show any correlation of her syncopal spells with arrhythmia or bradycardia  Cardia is been treated with beta-blocker.-Currently on labetalol  Patient is now pregnant in third trimester-26 weeks    She returns today for follow-up. She states she has noticed increase in her fast rates. She states the other night it happened and lasted for about 20 minutes. She felt like her loop recorder was going to vibrate out of her chest.  She has had more AKBAR. Had glucose tolerance test today at [facility] visit    Subjective  Opel denies exertional chest pain, shortness of breath, orthopnea, paroxysmal nocturnal dyspnea, syncope, presyncope, arrhythmia, edema and fatigue. The patient denies numbness or weakness to suggest cerebrovascular accident or transient ischemic attack. Christian Riley MD is PCP and   Following with [facility] for prenatal care but has seen OB/GYN with [facility] due to high risk pregnancy  .   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) 09/04/2018    Moderate episode of recurrent major depressive disorder ([facility] [address] 09/04/2018    Decreased strength of lower extremity 09/04/2018    Syncope and collapse 09/04/2018    Chronic superficial gastritis 09/04/2018    Family hx-breast malignancy 07/18/2012    Diffuse cystic mastopathy 04/16/2012    Lump or mass in breast left  04/16/2012     Past Medical History:   Diagnosis Date    Abnormal Pap smear of cervix     Anxiety     Blood circulation, collateral     CAD (coronary artery disease)     Cerebral artery occlusion with cerebral infarction (HCC)     Complication of anesthesia     difficulty waking    Depression     Heart disease     IBS (irritable bowel syndrome)     MDD (major depressive disorder)     MI (myocardial infarction) ([facility] [address]     Miscarriage June 2012    Neurologic disorder     Postpartum depression     Schizophrenia ([facility] [address]     Seizures (HCC)     anxiety related    Syncope     UTI (urinary tract infection) 12/9/2019     Past Surgical History:   Procedure Laterality Date    CHOLECYSTECTOMY, LAPAROSCOPIC      COLONOSCOPY N/A 11/7/2019    Dr Li Thibodeaux, 10 yr recall    EGD  2016    [facility]      lower right leg, has metal plate and screws    FRACTURE SURGERY      left wrist    INSERTABLE CARDIAC MONITOR      loop recorder    INTRAUTERINE DEVICE INSERTION  06/26/2016    Essure placement    UPPER GASTROINTESTINAL ENDOSCOPY N/A 11/7/2019    Dr Carly Jackson-Gastritis     Family History   Problem Relation Age of Onset    High Blood Pressure Mother     Diabetes Father     Heart Disease Father     Stomach Cancer Father     Breast Cancer Other         maternal great aunt    Colon Cancer Paternal Grandmother     Colon Polyps Neg Hx     Esophageal Cancer Neg Hx     Liver Cancer Neg Hx     Liver Disease Neg Hx     Rectal Cancer Neg Hx      Social History Tobacco Use    Smoking status: Former Smoker    Smokeless tobacco: Former User     Quit date: 9/9/2014   Substance Use Topics    Alcohol use: Not Currently     Comment: occasional, last drink 6 mo ago      Current Outpatient Medications   Medication Sig Dispense Refill    Prenatal Vit-Fe Fumarate-FA (PRENATAL VITAMIN) CHEW Take 1 tablet by mouth daily 30 tablet 11    promethazine (PHENERGAN) 25 MG tablet Take 1 tablet by mouth 4 times daily as needed for Nausea      labetalol (NORMODYNE) 100 MG tablet Take 0.5 tablets by mouth daily 30 tablet 5    ondansetron (ZOFRAN ODT) 4 MG disintegrating tablet Take 1 tablet by mouth every 8 hours as needed for Nausea or Vomiting 30 tablet 1    aspirin (ASPIRIN CHILDRENS) 81 MG chewable tablet Take 1 tablet by mouth daily 30 tablet 9    omeprazole (PRILOSEC) 20 MG delayed release capsule TAKE 1 CAPSULE BY MOUTH TWICE DAILY 60 capsule 11    albuterol sulfate  (90 Base) MCG/ACT inhaler INHALE 2 PUFFS INTO THE LUNGS EVERY 6 HOURS AS NEEDED FOR WHEEZING 18 g 3    venlafaxine (EFFEXOR XR) 150 MG extended release capsule TAKE 1 CAPSULE BY MOUTH EVERY DAY 30 capsule 5     No current facility-administered medications for this visit. Allergies: Patient has no known allergies. Review of Systems  Constitutional - no significant activity change, appetite change, or unexpected weight change. No fever, chills or diaphoresis. No fatigue. HEENT - no significant rhinorrhea or epistaxis. No tinnitus or significant hearing loss. Eyes - no sudden vision change or amaurosis. Respiratory - no significant wheezing, stridor, apnea or cough. + dyspnea on exertion. No resting shortness of breath. Cardiovascular - no exertional chest pain, orthopnea or PND. + sensation of arrhythmia  No  slow heart rate. No claudication or leg edema. Gastrointestinal - no abdominal swelling or pain. No blood in stool. No severe constipation, diarrhea, nausea, or vomiting. Genitourinary - no difficulty urinating, dysuria, frequency, or urgency. No flank pain or hematuria. 26 weeks pregnant  Musculoskeletal - no back pain, gait disturbance, or myalgia. + Right hip and leg pain, chronic  Skin - no color change or rash. No pallor. No new surgical incision. Neurologic - no speech difficulty, facial asymmetry or lateralizing weakness. No seizures, presyncope, syncope, or significant dizziness. Hematologic - no easy bruising or excessive bleeding. Psychiatric - no severe anxiety or insomnia. No confusion. All other review of systems are negative. Objective  Vital Signs - /64   Pulse 94   Ht 5' (1.524 m)   Wt 179 lb (81.2 kg)   LMP  (LMP Unknown)   BMI 34.96 kg/m²   General - Opel is alert, cooperative, and pleasant. Well groomed. No acute distress. Body habitus is obese. HEENT - The head is normocephalic. No circumoral cyanosis. Dentition is normal.   EYES -  No Xanthelasma, no arcus senilis, no conjunctival hemorrhages or discharge. Neck - Supple, without increased jugular venous pressures. No carotid bruits. No mass. Respiratory - Lungs are clear bilaterally. No wheezes or rales. Normal effort without use of accessory muscles. Cardiovascular - Heart has regular rhythm and rate. No murmurs, rubs or gallops. + pedal pulses and no varicosities. Abdominal - + pregnant bowel sounds are intact. Extremities - No clubbing, cyanosis, or  edema. Musculoskeletal -  No clubbing . No Osler's nodes. Gait normal .  No kyphosis or scoliosis. Skin -  no statis ulcers or dermatitis. Neurological - No focal signs are identified. Oriented to person, place and time. Psychiatric -  Appropriate affect and mood. Assessment:     Diagnosis Orders   1. Status post placement of implantable loop recorder     2. Syncope, unspecified syncope type     3.  Tachycardia       Data:  BP Readings from Last 3 Encounters:   11/24/20 110/64   11/24/20 111/79   10/27/20 112/75    Pulse Readings from Last 3 Encounters:   11/24/20 94   11/24/20 94   10/27/20 76        Wt Readings from Last 3 Encounters:   11/24/20 179 lb (81.2 kg)   11/24/20 189 lb (85.7 kg)   10/27/20 189 lb (85.7 kg)     Loop recorder interrogation today shows  Episodes of tachycardia around 150 260. Symptoms associated with tachycardia. Overall heart rate has been fairly stable but seems to be more symptomatic. May be correlating with her pregnancy. We will up titrate her labetalol to 50 mg twice a day. Currently has only been taking it once a day at night  Discussed that this hopefully will improve postpartum but may take a few weeks. We will continue to get her [facility] monthly  Blood pressure controlled. Urged to stay hydrated, avoid excessive caffeine or other stimulants. Get plenty of rest and sleep as well as activity/exercise as much as possible  States taking medications as prescribed  Stable cardiovascular status. No evidence of overt heart failure, angina or dysrhythmia. Plan  Increase labetalol to 50 mg twice a day  Recorder will send in a month  Follow up in 6 months  Follow-up with OB/GYN as scheduled  Call with any questions or concerns  Follow up with Horacio Stewart MD for non cardiac problems  Report any new problems  Cardiovascular Fitness-Exercise as tolerated. Strive for 30 minutes of exercise most days of the week. Cardiac / Healthy Diet-void excessive caffeine or other stimulants  Continue current medications as directed  Continue plan of treatment  It is always recommended that you bring your medications bottles with you to each visit - this is for your safety! SETH King    EMR dragon/transcription disclaimer: Much of this encounter note is electronic transcription/translation of spoken language to printed tach.  Electronic translation of spoken language may be erroneous, or at times, nonsensical words or phrases may be inadvertently

## 2020-12-07 NOTE — PATIENT INSTRUCTIONS
Patient Education        Weeks 26 to 30 of Your Pregnancy: Care Instructions  Your Care Instructions     You are now in your last trimester of pregnancy. Your baby is growing rapidly. And you'll probably feel your baby moving around more often. Your doctor may ask you to count your baby's kicks. Your back may ache as your body gets used to your baby's size and length. If you haven't already had the Tdap shot during this pregnancy, talk to your doctor about getting it. It will help protect your  against pertussis infection. During this time, it's important to take care of yourself and pay attention to what your body needs. If you feel sexual, explore ways to be close with your partner that match your comfort and desire. Use the tips provided in this care sheet to find ways to be sexual in your own way. Follow-up care is a key part of your treatment and safety. Be sure to make and go to all appointments, and call your doctor if you are having problems. It's also a good idea to know your test results and keep a list of the medicines you take. How can you care for yourself at home? Take it easy at work  · Take frequent breaks. If possible, stop working when you are tired, and rest during your lunch hour. · Take bathroom breaks every 2 hours. · Change positions often. If you sit for long periods, stand up and walk around. · When you stand for a long time, keep one foot on a low stool with your knee bent. After standing a lot, sit with your feet up. · Avoid fumes, chemicals, and tobacco smoke. Be sexual in your own way  · Having sex during pregnancy is okay, unless your doctor tells you not to. · You may be very interested in sex, or you may have no interest at all. · Your growing belly can make it hard to find a good position during intercourse. [place] and explore. · You may get cramps in your uterus when your partner touches your breasts.   · A back rub may relieve the backache or cramps that sometimes follow orgasm. Learn about  labor  · Watch for signs of  labor. You may be going into labor if:  ? You have menstrual-like cramps, with or without nausea. ? You have about 6 or more contractions in 1 hour, even after you have had a glass of water and are resting. ? You have a low, dull backache that does not go away when you change your position. ? You have pain or pressure in your pelvis that comes and goes in a pattern. ? You have intestinal cramping or flu-like symptoms, with or without diarrhea.  ? You notice an increase or change in your vaginal discharge. Discharge may be heavy, mucus-like, watery, or streaked with blood. ? Your water breaks. · If you think you have  labor:  ? Drink 2 or 3 glasses of water or juice. Not drinking enough fluids can cause contractions. ? Stop what you are doing, and empty your bladder. Then lie down on your left side for at least 1 hour. ? While lying on your side, find your breast bone. Put your fingers in the soft spot just below it. Move your fingers down toward your belly button to find the top of your uterus. Check to see if it is tight. ? Contractions can be weak or strong. Record your contractions for an hour. Time a contraction from the start of one contraction to the start of the next one.  ? Single or several strong contractions without a pattern are called [place] contractions. They are practice contractions but not the start of labor. They often stop if you change what you are doing. ? Call your doctor if you have regular contractions. Where can you learn more? Go to [email or web address]://[email or web address].Pingwyn. org and sign in to your EcoEridania account. Enter E243 in the Me!Box Media box to learn more about \"Weeks 26 to 30 of Your Pregnancy: Care Instructions. \"     If you do not have an account, please click on the \"Sign Up Now\" link.   Current as of: 2020               Content Version: 12.6  © 1500-5034 Healthwise, Incorporated. Care instructions adapted under license by [facility] ([facility] [place] If you have questions about a medical condition or this instruction, always ask your healthcare professional. [address] any warranty or liability for your use of this information. Patient Education        Weeks 30 to 28 of Your Pregnancy: Care Instructions  Your Care Instructions     You have made it to the final months of your pregnancy. By now, your baby is really starting to look like a baby, with hair and plump skin. As you enter the final weeks of pregnancy, the reality of having a baby may start to set in. This is the time to settle on a name, get your household in order, set up a safe nursery, and find quality  if needed. Doing these things in advance will allow you to focus on caring for and enjoying your new baby. You may also want to have a tour of your hospital's labor and delivery unit to get a better idea of what to expect while you are in the hospital.  During these last months, it is very important to take good care of yourself and pay attention to what your body needs. If your doctor says it is okay for you to work, don't push yourself too hard. Use the tips provided in this care sheet to ease heartburn and care for varicose veins. If you haven't already had the Tdap shot during this pregnancy, talk to your doctor about getting it. It will help protect your  against pertussis infection. Follow-up care is a key part of your treatment and safety. Be sure to make and go to all appointments, and call your doctor if you are having problems. It's also a good idea to know your test results and keep a list of the medicines you take. How can you care for yourself at home? Pay attention to your baby's movements  · You should feel your baby move several times every day. · Your baby now turns less, and kicks and jabs more.   · Your baby sleeps 20 to 45 minutes at a time and is more active at certain times of day. · If your doctor wants you to count your baby's kicks:  ? Empty your bladder, and lie on your side or relax in a comfortable chair. ? Write down your start time. ? Pay attention only to your baby's movements. Count any movement except hiccups. ? After you have counted 10 movements, write down your stop time. ? Write down how many minutes it took for your baby to move 10 times. ? If an hour goes by and you have not recorded 10 movements, have something to eat or drink and then count for another hour. If you do not record 10 movements in either hour, call your doctor. Ease heartburn  · Eat small, frequent meals. · Do not eat chocolate, peppermint, or very spicy foods. Avoid drinks with caffeine, such as coffee, tea, and sodas. · Avoid bending over or lying down after meals. · Talk a short walk after you eat. · If heartburn is a problem at night, do not eat for 2 hours before bedtime. · Take antacids like Mylanta, Maalox, Rolaids, or Tums. Do not take antacids that have sodium bicarbonate. Care for varicose veins  · Varicose veins are blood vessels that stretch out with the extra blood during pregnancy. Your legs may ache or throb. Most varicose veins will go away after the birth. · Avoid standing for long periods of time. Sit with your legs crossed at the ankles, not the knees. · Sit with your feet propped up. · Avoid tight clothing or stockings. Wear support hose. · Exercise regularly. Try walking for at least 30 minutes a day. Where can you learn more? Go to https://SharetribeludyInsem Spa.Corepair. org and sign in to your Actions account. Enter H511 in the LeftLane Sports box to learn more about \"Weeks 30 to 32 of Your Pregnancy: Care Instructions. \"     If you do not have an account, please click on the \"Sign Up Now\" link. Current as of: February 11, 2020               Content Version: 12.6  © 7654-6215 Dacheng Network, Incorporated.    Care instructions care provider  Tell your vaccine provider if the person getting the vaccine:  · Has had an allergic reaction after a previous dose of any vaccine that protects against tetanus, diphtheria, or pertussis, or has any severe, life threatening allergies. · Has had a coma, decreased level of consciousness, or prolonged seizures within 7 days after a previous dose of any pertussis vaccine (DTP, DTaP, or Tdap). · Has seizures or another nervous system problem. · Has ever had Guillain-Barré Syndrome (also called GBS). · Has had severe pain or swelling after a previous dose of any vaccine that protects against tetanus or diphtheria. In some cases, your health care provider may decide to postpone Tdap vaccination to a future visit. People with minor illnesses, such as a cold, may be vaccinated. People who are moderately or severely ill should usually wait until they recover before getting Tdap vaccine. Your health care provider can give you more information. Risks of a vaccine reaction  · Pain, redness, or swelling where the shot was given, mild fever, headache, feeling tired, and nausea, vomiting, diarrhea, or stomachache sometimes happen after Tdap vaccine. People sometimes faint after medical procedures, including vaccination. Tell your provider if you feel dizzy or have vision changes or ringing in the ears. As with any medicine, there is a very remote chance of a vaccine causing a severe allergic reaction, other serious injury, or death. What if there is a serious problem? An allergic reaction could occur after the vaccinated person leaves the clinic. If you see signs of a severe allergic reaction (hives, swelling of the face and throat, difficulty breathing, a fast heartbeat, dizziness, or weakness), call 9-1-1 and get the person to the nearest hospital.  For other signs that concern you, call your health care provider.   Adverse reactions should be reported to the Vaccine Adverse Event Reporting System (VAERS). Your health care provider will usually file this report, or you can do it yourself. Visit the VAERS website at www.vaers. hhs.gov or call 5-647.707.4918. VAERS is only for reporting reactions, and VAERS staff do not give medical advice. The National Vaccine Injury Compensation Program  The National Vaccine Injury Compensation Program (VICP) is a federal program that was created to compensate people who may have been injured by certain vaccines. Visit the VICP website at www.[facility].gov/vaccinecompensation or call 9-293.582.5624 to learn about the program and about filing a claim. There is a time limit to file a claim for compensation. How can I learn more? · Ask your health care provider. · Call your local or state health department. · Contact the Centers for Disease Control and Prevention (CDC):  ? Call 9-152.759.8185 (3-118-GVT-INFO) or  ? Visit CDC's website at www.cdc.gov/vaccines  Vaccine Information Statement (Interim)  Tdap (Tetanus, Diphtheria, Pertussis) Vaccine  04/01/2020  [address] [id number]  Department of Health and Human Services  Centers for Disease Control and Prevention  Many Vaccine Information Statements are available in Urdu and other languages. See www.immunize.org/vis. Muchas hojas de información sobre vacunas están disponibles en español y en otros idiomas. Visite www.immunize.org/vis. Care instructions adapted under license by [facility] ([facility]). If you have questions about a medical condition or this instruction, always ask your healthcare professional. [address] any warranty or liability for your use of this information. Patient Education        Counting Your Baby's Kicks: Care Instructions  Your Care Instructions     Counting your baby's kicks is one way your doctor can tell that your baby is healthy. Most women--especially in a first pregnancy--feel their baby move for the first time between 16 and 22 weeks.  The movement may feel like flutters rather than kicks. Your baby may move more at certain times of the day. When you are active, you may notice less kicking than when you are resting. At your prenatal visits, your doctor will ask whether the baby is active. In your last trimester, your doctor may ask you to count the number of times you feel your baby move. Follow-up care is a key part of your treatment and safety. Be sure to make and go to all appointments, and call your doctor if you are having problems. It's also a good idea to know your test results and keep a list of the medicines you take. How do you count fetal kicks? · A common method of checking your baby's movement is to count the number of kicks or moves you feel in 1 hour. Ten movements (such as kicks, flutters, or rolls) in 1 hour are normal. Some doctors suggest that you count in the morning until you get to 10 movements. Then you can quit for that day and start again the next day. · Pick your baby's most active time of day to count. This may be any time from morning to evening. · If you do not feel 10 movements in an hour, your baby may be sleeping. Wait for the next hour and count again. When should you call for help? Call your doctor now or seek immediate medical care if:    · You noticed that your baby has stopped moving or is moving much less than normal.   Watch closely for changes in your health, and be sure to contact your doctor if you have any problems. Where can you learn more? Go to https://Hydro-RunpeeveryArt.[facility]. org and sign in to your Thermalin Diabetes account. Enter H452 in the [facility] box to learn more about \"Counting Your Baby's Kicks: Care Instructions. \"     If you do not have an account, please click on the \"Sign Up Now\" link. Current as of: February 11, 2020               Content Version: 12.6  © 2975-7224 Coghead, Incorporated. Care instructions adapted under license by [facility] ([facility]).  If you have questions about a medical condition or Current as of: December 20, 2019               Content Version: 12.6  © 6227-3623 [email or web address]. Care instructions adapted under license by [facility] ([facility]). If you have questions about a medical condition or this instruction, always ask your healthcare professional. [address] any warranty or liability for your use of this information. Patient Education         Gestational Diabetes: Medicines (00:56)  Your health professional recommends that you watch this short online health video. Learn why you may need to take medicine when you have gestational diabetes. How to watch the video    Scan the QR code   OR Visit the website    [email or web address]   Current as of: December 20, 2019               Content Version: 12.6  © 2006-2020 [email or web address]. Care instructions adapted under license by [facility] ([facility]). If you have questions about a medical condition or this instruction, always ask your healthcare professional. [address] any warranty or liability for your use of this information. Patient Education         Gestational Diabetes: Testing Blood Sugar (02:04)  Your health professional recommends that you watch this short online health video. Learn why you need to test your blood sugar when you have gestational diabetes. How to watch the video    Scan the QR code   OR Visit the website    [email or web address]   Current as of: December 20, 2019               Content Version: 12.6  © 4291-2529 SPOTBY.COM. Care instructions adapted under license by [facility] ([facility]). If you have questions about a medical condition or this instruction, always ask your healthcare professional. [address] any warranty or liability for your use of this information. Patient Education         Gestational Diabetes:  Activity (01:56)  Your health professional recommends that you watch this short online health video. See how other women who had gestational diabetes found ways to be active. How to watch the video    Scan the QR code   OR Visit the website    [email or web address]   Current as of: December 20, 2019               Content Version: 12.6  © 0702-7906 Gridco, Incorporated. Care instructions adapted under license by [facility] ([facility]). If you have questions about a medical condition or this instruction, always ask your healthcare professional. [address] any warranty or liability for your use of this information.

## 2020-12-08 ENCOUNTER — ROUTINE PRENATAL (OUTPATIENT)
Dept: OBGYN | Age: 35
End: 2020-12-08
Payer: MEDICARE

## 2020-12-08 VITALS
WEIGHT: 188 LBS | BODY MASS INDEX: 36.72 KG/M2 | SYSTOLIC BLOOD PRESSURE: 126 MMHG | DIASTOLIC BLOOD PRESSURE: 86 MMHG | HEART RATE: 84 BPM

## 2020-12-08 PROBLEM — Z3A.22 22 WEEKS GESTATION OF PREGNANCY: Status: RESOLVED | Noted: 2020-10-22 | Resolved: 2020-12-08

## 2020-12-08 PROCEDURE — G8482 FLU IMMUNIZE ORDER/ADMIN: HCPCS | Performed by: ADVANCED PRACTICE MIDWIFE

## 2020-12-08 PROCEDURE — G8427 DOCREV CUR MEDS BY ELIG CLIN: HCPCS | Performed by: ADVANCED PRACTICE MIDWIFE

## 2020-12-08 PROCEDURE — 1036F TOBACCO NON-USER: CPT | Performed by: ADVANCED PRACTICE MIDWIFE

## 2020-12-08 PROCEDURE — G8417 CALC BMI ABV UP PARAM F/U: HCPCS | Performed by: ADVANCED PRACTICE MIDWIFE

## 2020-12-08 PROCEDURE — 99213 OFFICE O/P EST LOW 20 MIN: CPT | Performed by: ADVANCED PRACTICE MIDWIFE

## 2020-12-08 RX ORDER — LANCETS
1 EACH MISCELLANEOUS 3 TIMES DAILY
Qty: 100 EACH | Refills: 3 | Status: ON HOLD | OUTPATIENT
Start: 2020-12-08 | End: 2021-02-10 | Stop reason: HOSPADM

## 2020-12-08 RX ORDER — BLOOD-GLUCOSE METER
1 EACH MISCELLANEOUS DAILY
Qty: 1 KIT | Refills: 3 | Status: ON HOLD | OUTPATIENT
Start: 2020-12-08 | End: 2021-02-10 | Stop reason: HOSPADM

## 2020-12-08 NOTE — PROGRESS NOTES
Pt denies any vaginal leaking bleeding or contractions. + Fetal movement. Patient saw cardiology two weeks and they put her on medicine, its the same medicine, they just upped it to twice daily. Thinks she had a few contractions the other day, \"probably because she got upset. \" Has had yenni escobar with all of her other pregnancies.

## 2020-12-08 NOTE — PROGRESS NOTES
CNM Prenatal Office Note  Subjective:  Joanne Ruiz is here for a return obstetrical visit. Today she is 28w6d weeks EGA. She is doing well, taking her PNV as directed, and has no complaints. She  does not have vaginal bleeding, n/v, syncope, or headaches. Pt does feel fetal movement regularly. Failed 1 hour glucose, declines 3 hour - has had cardiology and MFM consult. Objective: Mother's Prenatal Vitals  BP: 126/86  Weight: 188 lb (85.3 kg)  Pulse: 84  Patient Position: Sitting  Prenatal Fetal Information  Fetal Heart Rate: 162  Movement: Present  Pt is A&Ox3, in no acute distress. Normocephalic, atraumatic. PERRL. Resp even and non-labored. Skin pink, warm & dry. Gravid abdomen. ESTRADA's well. Gait steady. Assessment:    IUP at 28w6d wks      Diagnosis Orders   1. High-risk pregnancy in second trimester     2. Moderate episode of recurrent major depressive disorder ([facility] [place] [address]     3. Advanced maternal age in multigravida, third trimester       Plan:   Pt counseled on balanced nutrition, adequate fluid intake, taking PNV daily, and exercise along with GHTN precautions, Kick count and  labor   Continue with routine prenatal care.   surveillance not indicated   RTC in 2 wks for prenatal visit   Planning to start glucose checking- will also see OB around 30-32 weeks    MEDICATIONS:  No orders of the defined types were placed in this encounter. ORDERS:  No orders of the defined types were placed in this encounter. More than 50% of this 20 min visit was education and counseling.

## 2020-12-15 ENCOUNTER — ROUTINE PRENATAL (OUTPATIENT)
Dept: OBGYN | Age: 35
End: 2020-12-15
Payer: MEDICARE

## 2020-12-15 VITALS
HEART RATE: 126 BPM | WEIGHT: 187 LBS | DIASTOLIC BLOOD PRESSURE: 68 MMHG | SYSTOLIC BLOOD PRESSURE: 120 MMHG | BODY MASS INDEX: 36.52 KG/M2

## 2020-12-15 PROCEDURE — G8417 CALC BMI ABV UP PARAM F/U: HCPCS | Performed by: ADVANCED PRACTICE MIDWIFE

## 2020-12-15 PROCEDURE — 1036F TOBACCO NON-USER: CPT | Performed by: ADVANCED PRACTICE MIDWIFE

## 2020-12-15 PROCEDURE — 99213 OFFICE O/P EST LOW 20 MIN: CPT | Performed by: ADVANCED PRACTICE MIDWIFE

## 2020-12-15 PROCEDURE — G8482 FLU IMMUNIZE ORDER/ADMIN: HCPCS | Performed by: ADVANCED PRACTICE MIDWIFE

## 2020-12-15 PROCEDURE — G8427 DOCREV CUR MEDS BY ELIG CLIN: HCPCS | Performed by: ADVANCED PRACTICE MIDWIFE

## 2020-12-15 NOTE — PATIENT INSTRUCTIONS
Patient Education        Counting Your Baby's Kicks: Care Instructions  Your Care Instructions     Counting your baby's kicks is one way your doctor can tell that your baby is healthy. Most women--especially in a first pregnancy--feel their baby move for the first time between 16 and 22 weeks. The movement may feel like flutters rather than kicks. Your baby may move more at certain times of the day. When you are active, you may notice less kicking than when you are resting. At your prenatal visits, your doctor will ask whether the baby is active. In your last trimester, your doctor may ask you to count the number of times you feel your baby move. Follow-up care is a key part of your treatment and safety. Be sure to make and go to all appointments, and call your doctor if you are having problems. It's also a good idea to know your test results and keep a list of the medicines you take. How do you count fetal kicks? · A common method of checking your baby's movement is to count the number of kicks or moves you feel in 1 hour. Ten movements (such as kicks, flutters, or rolls) in 1 hour are normal. Some doctors suggest that you count in the morning until you get to 10 movements. Then you can quit for that day and start again the next day. · Pick your baby's most active time of day to count. This may be any time from morning to evening. · If you do not feel 10 movements in an hour, your baby may be sleeping. Wait for the next hour and count again. When should you call for help? Call your doctor now or seek immediate medical care if:    · You noticed that your baby has stopped moving or is moving much less than normal.   Watch closely for changes in your health, and be sure to contact your doctor if you have any problems. Where can you learn more? Go to https://chgabriel.Viewbix. org and sign in to your Managed Systems account.  Enter M686 in the CO2Nexus box to learn more about \"Counting Your Baby's Kicks: Care Instructions. \"     If you do not have an account, please click on the \"Sign Up Now\" link. Current as of: February 11, 2020               Content Version: 12.6  © 8233-8054 Guvera, Incorporated. Care instructions adapted under license by [facility] ([facility]). If you have questions about a medical condition or this instruction, always ask your healthcare professional. [place] [address] any warranty or liability for your use of this information.

## 2020-12-29 ENCOUNTER — ROUTINE PRENATAL (OUTPATIENT)
Dept: OBGYN | Age: 35
End: 2020-12-29
Payer: MEDICARE

## 2020-12-29 VITALS
WEIGHT: 187 LBS | DIASTOLIC BLOOD PRESSURE: 72 MMHG | BODY MASS INDEX: 36.52 KG/M2 | HEART RATE: 117 BPM | SYSTOLIC BLOOD PRESSURE: 132 MMHG

## 2020-12-29 PROCEDURE — G8427 DOCREV CUR MEDS BY ELIG CLIN: HCPCS | Performed by: NURSE PRACTITIONER

## 2020-12-29 PROCEDURE — G8482 FLU IMMUNIZE ORDER/ADMIN: HCPCS | Performed by: NURSE PRACTITIONER

## 2020-12-29 PROCEDURE — 1036F TOBACCO NON-USER: CPT | Performed by: NURSE PRACTITIONER

## 2020-12-29 PROCEDURE — G8417 CALC BMI ABV UP PARAM F/U: HCPCS | Performed by: NURSE PRACTITIONER

## 2020-12-29 PROCEDURE — 59025 FETAL NON-STRESS TEST: CPT | Performed by: NURSE PRACTITIONER

## 2020-12-29 PROCEDURE — 99213 OFFICE O/P EST LOW 20 MIN: CPT | Performed by: NURSE PRACTITIONER

## 2020-12-29 NOTE — PATIENT INSTRUCTIONS
Patient Education        Weeks 30 to 28 of Your Pregnancy: Care Instructions  Your Care Instructions     You have made it to the final months of your pregnancy. By now, your baby is really starting to look like a baby, with hair and plump skin. As you enter the final weeks of pregnancy, the reality of having a baby may start to set in. This is the time to settle on a name, get your household in order, set up a safe nursery, and find quality  if needed. Doing these things in advance will allow you to focus on caring for and enjoying your new baby. You may also want to have a tour of your hospital's labor and delivery unit to get a better idea of what to expect while you are in the hospital.  During these last months, it is very important to take good care of yourself and pay attention to what your body needs. If your doctor says it is okay for you to work, don't push yourself too hard. Use the tips provided in this care sheet to ease heartburn and care for varicose veins. If you haven't already had the Tdap shot during this pregnancy, talk to your doctor about getting it. It will help protect your  against pertussis infection. Follow-up care is a key part of your treatment and safety. Be sure to make and go to all appointments, and call your doctor if you are having problems. It's also a good idea to know your test results and keep a list of the medicines you take. How can you care for yourself at home? Pay attention to your baby's movements  · You should feel your baby move several times every day. · Your baby now turns less, and kicks and jabs more. · Your baby sleeps 20 to 45 minutes at a time and is more active at certain times of day. · If your doctor wants you to count your baby's kicks:  ? Empty your bladder, and lie on your side or relax in a comfortable chair. ? Write down your start time. ? Pay attention only to your baby's movements. Count any movement except hiccups. ?  After you have counted 10 movements, write down your stop time. ? Write down how many minutes it took for your baby to move 10 times. ? If an hour goes by and you have not recorded 10 movements, have something to eat or drink and then count for another hour. If you do not record 10 movements in either hour, call your doctor. Ease heartburn  · Eat small, frequent meals. · Do not eat chocolate, peppermint, or very spicy foods. Avoid drinks with caffeine, such as coffee, tea, and sodas. · Avoid bending over or lying down after meals. · Talk a short walk after you eat. · If heartburn is a problem at night, do not eat for 2 hours before bedtime. · Take antacids like Mylanta, Maalox, Rolaids, or Tums. Do not take antacids that have sodium bicarbonate. Care for varicose veins  · Varicose veins are blood vessels that stretch out with the extra blood during pregnancy. Your legs may ache or throb. Most varicose veins will go away after the birth. · Avoid standing for long periods of time. Sit with your legs crossed at the ankles, not the knees. · Sit with your feet propped up. · Avoid tight clothing or stockings. Wear support hose. · Exercise regularly. Try walking for at least 30 minutes a day. Where can you learn more? Go to [email or web address]://ConvivachanainVentiv Health.[email or web address] org and sign in to your [employer] account. Enter D014 in the [facility] box to learn more about \"Weeks 30 to 32 of Your Pregnancy: Care Instructions. \"     If you do not have an account, please click on the \"Sign Up Now\" link. Current as of: February 11, 2020               Content Version: 12.6  © 4468-4032 [employer], [employer]. Care instructions adapted under license by [facility] ([facility]). If you have questions about a medical condition or this instruction, always ask your healthcare professional. [address] any warranty or liability for your use of this information.

## 2021-01-05 DIAGNOSIS — R00.0 TACHYCARDIA: ICD-10-CM

## 2021-01-05 DIAGNOSIS — Z95.818 STATUS POST PLACEMENT OF IMPLANTABLE LOOP RECORDER: Primary | ICD-10-CM

## 2021-01-05 DIAGNOSIS — R55 SYNCOPE, UNSPECIFIED SYNCOPE TYPE: ICD-10-CM

## 2021-01-05 PROCEDURE — 93298 REM INTERROG DEV EVAL SCRMS: CPT | Performed by: CLINICAL NURSE SPECIALIST

## 2021-01-05 PROCEDURE — G2066 INTER DEVC REMOTE 30D: HCPCS | Performed by: CLINICAL NURSE SPECIALIST

## 2021-01-12 ENCOUNTER — ROUTINE PRENATAL (OUTPATIENT)
Dept: OBGYN CLINIC | Age: 36
End: 2021-01-12
Payer: MEDICARE

## 2021-01-12 VITALS
WEIGHT: 188 LBS | BODY MASS INDEX: 36.72 KG/M2 | DIASTOLIC BLOOD PRESSURE: 78 MMHG | HEART RATE: 102 BPM | SYSTOLIC BLOOD PRESSURE: 125 MMHG

## 2021-01-12 DIAGNOSIS — Z86.73 HISTORY OF CVA (CEREBROVASCULAR ACCIDENT): ICD-10-CM

## 2021-01-12 DIAGNOSIS — O24.410 DIET CONTROLLED GESTATIONAL DIABETES MELLITUS (GDM) IN THIRD TRIMESTER: ICD-10-CM

## 2021-01-12 DIAGNOSIS — O09.93 HIGH-RISK PREGNANCY IN THIRD TRIMESTER: Primary | ICD-10-CM

## 2021-01-12 DIAGNOSIS — I25.2 HISTORY OF MI (MYOCARDIAL INFARCTION): ICD-10-CM

## 2021-01-12 DIAGNOSIS — O09.523 ADVANCED MATERNAL AGE IN MULTIGRAVIDA, THIRD TRIMESTER: ICD-10-CM

## 2021-01-12 PROCEDURE — G8427 DOCREV CUR MEDS BY ELIG CLIN: HCPCS | Performed by: OBSTETRICS & GYNECOLOGY

## 2021-01-12 PROCEDURE — G8417 CALC BMI ABV UP PARAM F/U: HCPCS | Performed by: OBSTETRICS & GYNECOLOGY

## 2021-01-12 PROCEDURE — 1036F TOBACCO NON-USER: CPT | Performed by: OBSTETRICS & GYNECOLOGY

## 2021-01-12 PROCEDURE — 99213 OFFICE O/P EST LOW 20 MIN: CPT | Performed by: OBSTETRICS & GYNECOLOGY

## 2021-01-12 PROCEDURE — G8482 FLU IMMUNIZE ORDER/ADMIN: HCPCS | Performed by: OBSTETRICS & GYNECOLOGY

## 2021-01-12 NOTE — PROGRESS NOTES
I, Purnima Sloan RN, am scribing for and in the presence of Dr. Linda Carter 2021/4:00 PM/sign      Subjective:  Tc Morrissey is here for a return obstetrical visit. Today she is 33w6d weeks EGA. She states she missed her appt with MFM this week. She  does not have vaginal bleeding, leaking, contractions, syncope, or headaches. Pt does feel fetal movement regularly. Objective: Mother's Prenatal Vitals  BP: 125/78  Weight: 188 lb (85.3 kg)  Pulse: 102  Patient Position: Sitting  Prenatal Fetal Information  Fetal Heart Rate: 154/NST  Movement: Present  Pt is A&Ox3, in no acute distress. Normocephalic, atraumatic. PERRL. Resp even and non-labored. Skin pink, warm & dry. Gravid abdomen. ESTRADA's well. Gait steady. NST today was reactive. Assessment:    IUP at 33w6d wks      Diagnosis Orders   1. High-risk pregnancy in third trimester     2. Advanced maternal age in multigravida, third trimester     3. History of MI (myocardial infarction)     4. History of CVA (cerebrovascular accident)     5. Diet controlled gestational diabetes mellitus (GDM) in third trimester       Plan:  Pt counseled on balanced nutrition, adequate fluid intake, taking PNV daily, and exercise along with GHTN precautions, Kick count and  labor  Continue with routine prenatal care. Discussed medical hx with pt. Fasting FF are 90's. M appt jacy'd with Bonine for    surveillance indicated for GD, AMA  RTC in 1 wks for prenatal visit with NST    MEDICATIONS:  No orders of the defined types were placed in this encounter. ORDERS:  No orders of the defined types were placed in this encounter. Dorys Sevilla MD, personally performed services described in this document as scribed by Janae Butler RN in my presence, and it is both accurate and complete.

## 2021-01-12 NOTE — PATIENT INSTRUCTIONS
Patient Education        Weeks 32 to 29 of Your Pregnancy: Care Instructions  Your Care Instructions     During the last few weeks of your pregnancy, you may have more aches and pains. It's important to rest when you can. Your growing baby is putting more pressure on your bladder. So you may need to urinate more often. Hemorrhoids are also common. These are painful, itchy veins in the rectal area. In the 36th week, most women have a test for group B streptococcus (GBS). GBS is a common bacteria that can live in the vagina and rectum. It can make your baby sick after birth. If you test positive, you will get antibiotics during labor. These will keep your baby from getting the bacteria. You may want to talk with your doctor about banking your baby's umbilical cord blood. This is the blood left in the cord after birth. If you want to save this blood, you must arrange it ahead of time. You can't decide at the last minute. If you haven't already had the Tdap shot during this pregnancy, talk to your doctor about getting it. It will help protect your  against pertussis infection. Follow-up care is a key part of your treatment and safety. Be sure to make and go to all appointments, and call your doctor if you are having problems. It's also a good idea to know your test results and keep a list of the medicines you take. How can you care for yourself at home? Ease hemorrhoids  · Get more liquids, fruits, vegetables, and fiber in your diet. This will help keep your stools soft. · Avoid sitting for too long. Lie on your left side several times a day. · Clean yourself with soft, moist toilet paper. Or you can use witch hazel pads or personal hygiene pads. · If you are uncomfortable, try ice packs. Or you can sit in a warm sitz bath. Do these for 20 minutes at a time, as needed. · Use hydrocortisone cream for pain and itching. Two examples are Anusol and Preparation H Hydrocortisone.   · Ask your doctor about taking an over-the-counter stool softener. Consider breastfeeding  · Experts recommend that women breastfeed for 1 year or longer. · Breast milk may help protect your child from some health problems.  babies are less likely than formula-fed babies to:  ? Get ear infections, colds, diarrhea, and pneumonia. ? Be obese or get diabetes later in life. · Women who breastfeed have less bleeding after the birth. Their uteruses also shrink back faster. · Some women who breastfeed lose weight faster. Making milk burns calories. · Breastfeeding can lower your risk of breast cancer, ovarian cancer, and osteoporosis. Decide about circumcision for boys  · As you make this decision, it may help to think about your personal, Nondenominational, and family traditions. You get to decide if you will keep your son's penis natural or if he will be circumcised. · If you decide that you would like to have your baby circumcised, talk with your doctor. You can share your concerns about pain. And you can discuss your preferences for anesthesia. Where can you learn more? Go to [email or web address] and sign in to your [employer] account. Enter K538 in the Getable box to learn more about \"Weeks 32 to 34 of Your Pregnancy: Care Instructions. \"     If you do not have an account, please click on the \"Sign Up Now\" link. Current as of: February 11, 2020               Content Version: 12.6  © 0714-3729 [employer]. Care instructions adapted under license by [facility] ([facility]). If you have questions about a medical condition or this instruction, always ask your healthcare professional. [place] [address] any warranty or liability for your use of this information.

## 2021-01-22 ENCOUNTER — ROUTINE PRENATAL (OUTPATIENT)
Dept: OBGYN CLINIC | Age: 36
End: 2021-01-22
Payer: MEDICARE

## 2021-01-22 VITALS
HEART RATE: 125 BPM | WEIGHT: 188 LBS | BODY MASS INDEX: 36.72 KG/M2 | DIASTOLIC BLOOD PRESSURE: 61 MMHG | SYSTOLIC BLOOD PRESSURE: 109 MMHG

## 2021-01-22 DIAGNOSIS — O09.523 ADVANCED MATERNAL AGE IN MULTIGRAVIDA, THIRD TRIMESTER: ICD-10-CM

## 2021-01-22 DIAGNOSIS — Z3A.35 35 WEEKS GESTATION OF PREGNANCY: ICD-10-CM

## 2021-01-22 DIAGNOSIS — O24.410 DIET CONTROLLED GESTATIONAL DIABETES MELLITUS (GDM) IN THIRD TRIMESTER: ICD-10-CM

## 2021-01-22 DIAGNOSIS — O09.93 HIGH-RISK PREGNANCY IN THIRD TRIMESTER: Primary | ICD-10-CM

## 2021-01-22 DIAGNOSIS — Z86.73 HISTORY OF CVA (CEREBROVASCULAR ACCIDENT): ICD-10-CM

## 2021-01-22 DIAGNOSIS — I25.2 HISTORY OF MI (MYOCARDIAL INFARCTION): ICD-10-CM

## 2021-01-22 PROCEDURE — 99213 OFFICE O/P EST LOW 20 MIN: CPT | Performed by: OBSTETRICS & GYNECOLOGY

## 2021-01-22 PROCEDURE — G8427 DOCREV CUR MEDS BY ELIG CLIN: HCPCS | Performed by: OBSTETRICS & GYNECOLOGY

## 2021-01-22 PROCEDURE — 1036F TOBACCO NON-USER: CPT | Performed by: OBSTETRICS & GYNECOLOGY

## 2021-01-22 PROCEDURE — 59025 FETAL NON-STRESS TEST: CPT | Performed by: OBSTETRICS & GYNECOLOGY

## 2021-01-22 PROCEDURE — G8482 FLU IMMUNIZE ORDER/ADMIN: HCPCS | Performed by: OBSTETRICS & GYNECOLOGY

## 2021-01-22 PROCEDURE — G8417 CALC BMI ABV UP PARAM F/U: HCPCS | Performed by: OBSTETRICS & GYNECOLOGY

## 2021-01-22 NOTE — PROGRESS NOTES
Purnima GUTIÉRREZ RN, am scribing for and in the presence of Dr. Remigio Caballero 2021/11:03 AM/sign      Subjective:  Aaliyah Fleming is here for a return obstetrical visit. Today she is 35w2d weeks EGA. She is doing well, taking her PNV as directed, and has no complaints. She  does not have vaginal bleeding, leaking, contractions, syncope, or headaches. Pt does feel fetal movement regularly. Pt states FS are WNL's  Objective: Mother's Prenatal Vitals  BP: 109/61  Weight: 188 lb (85.3 kg)  Pulse: 125  Patient Position: Sitting  Prenatal Fetal Information  Fetal Heart Rate: 144/NST  Movement: Present  Pt is A&Ox3, in no acute distress. Normocephalic, atraumatic. PERRL. Resp even and non-labored. Skin pink, warm & dry. Gravid abdomen. ESTRADA's well. Gait steady. GBS obtained. NST today reactive  Assessment:    IUP at 35w2d wks      Diagnosis Orders   1. High-risk pregnancy in third trimester  73755 - [place] FETAL NON-STRESS TEST   2. Advanced maternal age in multigravida, third trimester  0 - [place] FETAL NON-STRESS TEST   3. History of MI (myocardial infarction)  31027 - [place] FETAL NON-STRESS TEST   4. History of CVA (cerebrovascular accident)  68520 - [place] FETAL NON-STRESS TEST   5. Diet controlled gestational diabetes mellitus (GDM) in third trimester  59512 - [place] FETAL NON-STRESS TEST   6. 35 weeks gestation of pregnancy  Culture, Strep B Screen, Vaginal/Rectal     Plan:  Pt counseled on balanced nutrition, adequate fluid intake, taking PNV daily, and exercise along with GHTN precautions, Kick count and  labor  Continue with routine prenatal care. Continue to see MFM weekly   surveillance indicated for GDM  RTC in 1 wks for prenatal visit    MEDICATIONS:  No orders of the defined types were placed in this encounter.     ORDERS:  Orders Placed This Encounter   Procedures    Culture, Strep B Screen, Vaginal/Rectal    10573 - [place] FETAL NON-STRESS TEST         Remigio GUTIÉRREZ MD, personally performed services described in this document as scribed by Jesse Alexandra RN in my presence, and it is both accurate and complete.

## 2021-01-22 NOTE — PATIENT INSTRUCTIONS
Patient Education        Weeks 34 to 39 of Your Pregnancy: Care Instructions  Your Care Instructions     By now, your baby and your belly have grown quite large. It is almost time to give birth. Your baby's lungs are almost ready to breathe air. The bones in your baby's head are now firm enough to protect it, but soft enough to move down through the birth canal.  You may feel excited, happy, anxious, or scared. You may wonder how you will know if you are in labor or what to expect during labor. Try to be flexible in your expectations of the birth. Because each birth is different, there is no way to know exactly what childbirth will be like for you. This care sheet will help you know what to expect and how to prepare. This may make your childbirth easier. If you haven't already had the Tdap shot during this pregnancy, talk to your doctor about getting it. It will help protect your  against pertussis infection. In the 36th week, most women have a test for group B streptococcus (GBS). GBS is a common bacteria that can live in the vagina and rectum. It can make your baby sick after birth. If you test positive, you will get antibiotics during labor. The medicine will keep your baby from getting the bacteria. Follow-up care is a key part of your treatment and safety. Be sure to make and go to all appointments, and call your doctor if you are having problems. It's also a good idea to know your test results and keep a list of the medicines you take. How can you care for yourself at home? Learn about pain relief choices  · Pain is different for every woman. Talk with your doctor about your feelings about pain. · You can choose from several types of pain relief. These include medicine or breathing techniques, as well as comfort measures. You can use more than one option. · If you choose to have pain medicine during labor, talk to your doctor about your options.  Some medicines lower anxiety and help with some of the pain. Others make your lower body numb so that you won't feel pain. · Be sure to tell your doctor about your pain medicine choice before you start labor or very early in your labor. You may be able to change your mind as labor progresses. · Rarely, a woman is put to sleep by medicine given through a mask or an IV. Labor and delivery  · The first stage of labor has three parts: early, active, and transition. ? Most women have early labor at home. You can stay busy or rest, eat light snacks, drink clear fluids, and start counting contractions. ? When talking during a contraction gets hard, you may be moving to active labor. During active labor, you should head for the hospital if you are not there already. ? You are in active labor when contractions come every 3 to 4 minutes and last about 60 seconds. Your cervix is opening more rapidly. ? If your water breaks, contractions will come faster and stronger. ? During transition, your cervix is stretching, and contractions are coming more rapidly. ? You may want to push, but your cervix might not be ready. Your doctor will tell you when to push. · The second stage starts when your cervix is completely opened and you are ready to push. ? Contractions are very strong to push the baby down the birth canal.  ? You will feel the urge to push. You may feel like you need to have a bowel movement. ? You may be coached to push with contractions. These contractions will be very strong, but you will not have them as often. You can get a little rest between contractions. ? You may be emotional and irritable. You may not be aware of what is going on around you.  ? One last push, and your baby is born. · The third stage is when a few more contractions push out the placenta. This may take 30 minutes or less. · The fourth stage is the welcome recovery. You may feel overwhelmed with emotions and exhausted but alert. This is a good time to start breastfeeding.   Where can you learn more? Go to [email or web address] and sign in to your [employer] account. Enter N354 in the [facility] box to learn more about \"Weeks 34 to 36 of Your Pregnancy: Care Instructions. \"     If you do not have an account, please click on the \"Sign Up Now\" link. Current as of: February 11, 2020               Content Version: 12.6  © 1240-4887 [employer]orporated. Care instructions adapted under license by [facility] ([facility]). If you have questions about a medical condition or this instruction, always ask your healthcare professional. [address] any warranty or liability for your use of this information.

## 2021-01-25 LAB — GROUP B STREP CULTURE: NORMAL

## 2021-01-26 ENCOUNTER — ROUTINE PRENATAL (OUTPATIENT)
Dept: OBGYN CLINIC | Age: 36
End: 2021-01-26
Payer: MEDICARE

## 2021-01-26 VITALS
HEART RATE: 113 BPM | DIASTOLIC BLOOD PRESSURE: 80 MMHG | WEIGHT: 186 LBS | BODY MASS INDEX: 36.33 KG/M2 | SYSTOLIC BLOOD PRESSURE: 131 MMHG

## 2021-01-26 DIAGNOSIS — O09.523 ADVANCED MATERNAL AGE IN MULTIGRAVIDA, THIRD TRIMESTER: ICD-10-CM

## 2021-01-26 DIAGNOSIS — Z3A.35 35 WEEKS GESTATION OF PREGNANCY: ICD-10-CM

## 2021-01-26 DIAGNOSIS — I25.2 HISTORY OF MI (MYOCARDIAL INFARCTION): ICD-10-CM

## 2021-01-26 DIAGNOSIS — O26.892 RH NEGATIVE STATUS DURING PREGNANCY IN SECOND TRIMESTER, ANTEPARTUM: ICD-10-CM

## 2021-01-26 DIAGNOSIS — Z67.91 RH NEGATIVE STATUS DURING PREGNANCY IN SECOND TRIMESTER, ANTEPARTUM: ICD-10-CM

## 2021-01-26 DIAGNOSIS — Z86.73 HISTORY OF CVA (CEREBROVASCULAR ACCIDENT): ICD-10-CM

## 2021-01-26 DIAGNOSIS — O09.93 HIGH-RISK PREGNANCY IN THIRD TRIMESTER: Primary | ICD-10-CM

## 2021-01-26 DIAGNOSIS — O24.410 DIET CONTROLLED GESTATIONAL DIABETES MELLITUS (GDM) IN THIRD TRIMESTER: ICD-10-CM

## 2021-01-26 PROCEDURE — 99213 OFFICE O/P EST LOW 20 MIN: CPT | Performed by: NURSE PRACTITIONER

## 2021-01-26 PROCEDURE — G8417 CALC BMI ABV UP PARAM F/U: HCPCS | Performed by: NURSE PRACTITIONER

## 2021-01-26 PROCEDURE — 1036F TOBACCO NON-USER: CPT | Performed by: NURSE PRACTITIONER

## 2021-01-26 PROCEDURE — 59025 FETAL NON-STRESS TEST: CPT | Performed by: NURSE PRACTITIONER

## 2021-01-26 PROCEDURE — G8427 DOCREV CUR MEDS BY ELIG CLIN: HCPCS | Performed by: NURSE PRACTITIONER

## 2021-01-26 PROCEDURE — G8482 FLU IMMUNIZE ORDER/ADMIN: HCPCS | Performed by: NURSE PRACTITIONER

## 2021-01-26 NOTE — PROGRESS NOTES
Patient presents today for routine prenatal visit. Pt denies any vaginal leaking bleeding or contractions. + Fetal movement. NST today for AMA and GDM  appt at [facility] at 330 today   Patient states she is unable to keep meds down at all and she felt like she had a few contractions yesterday  Dread Leavitt is here for a return obstetrical visit. Today she is 35w6d weeks EGA. She is not sure how has both appt's here and mfm today. We discussed these need to be spread out so twice weekly. NST reactive. 1 small contraction. States sugars are highs and lows depending on her sleeping patterns. She  does not have vaginal bleeding, leaking of fluid, contractions. She does not have blurred vision, SOB, or increased swelling in legs or face. Pt does feel fetal movement regularly. O:   Vitals:    21 1402   BP: 131/80   Pulse: 113   Weight: 186 lb (84.4 kg)     Pt is A&Ox3, in no acute distress. Normocephalic, atraumatic. PERRL. Resp even and non-labored. Skin pink, warm & dry. Gravid abdomen. ESTRADA's well. Gait steady. See OB flowsheet. A: Normal IUP at 35w6d wks      Diagnosis Orders   1. High-risk pregnancy in third trimester  28954 - [place] FETAL NON-STRESS TEST   2. Advanced maternal age in multigravida, third trimester     3. History of MI (myocardial infarction)     4. History of CVA (cerebrovascular accident)     5. Diet controlled gestational diabetes mellitus (GDM) in third trimester  71309 - [place] FETAL NON-STRESS TEST   6. Rh negative status during pregnancy in second trimester, antepartum     7. 35 weeks gestation of pregnancy         P:   Pt counseled on PIH precautions, Kick count and  labor  Continue with routine prenatal care. RTC in 1 wk for prenatal visit    MEDICATIONS:  No orders of the defined types were placed in this encounter.       ORDERS:  Orders Placed This Encounter   Procedures    [id number] - [place] FETAL NON-STRESS TEST

## 2021-01-30 ENCOUNTER — HOSPITAL ENCOUNTER (OUTPATIENT)
Age: 36
Discharge: HOME | End: 2021-01-30
Attending: ADVANCED PRACTICE MIDWIFE | Admitting: ADVANCED PRACTICE MIDWIFE
Payer: MEDICARE

## 2021-01-30 VITALS
RESPIRATION RATE: 16 BRPM | DIASTOLIC BLOOD PRESSURE: 76 MMHG | SYSTOLIC BLOOD PRESSURE: 104 MMHG | HEIGHT: 60 IN | BODY MASS INDEX: 36.52 KG/M2 | TEMPERATURE: 98.7 F | HEART RATE: 97 BPM | WEIGHT: 186 LBS

## 2021-01-30 PROBLEM — Z3A.36 36 WEEKS GESTATION OF PREGNANCY: Status: ACTIVE | Noted: 2021-01-30

## 2021-01-30 LAB
BACTERIA: ABNORMAL /HPF
BILIRUBIN URINE: NEGATIVE
BLOOD, URINE: NEGATIVE
CLARITY: CLEAR
COLOR: YELLOW
CRYSTALS, UA: ABNORMAL /HPF
EPITHELIAL CELLS, UA: 3 /HPF (ref 0–5)
GLUCOSE URINE: NEGATIVE MG/DL
HYALINE CASTS: 1 /HPF (ref 0–8)
KETONES, URINE: NEGATIVE MG/DL
LEUKOCYTE ESTERASE, URINE: ABNORMAL
NITRITE, URINE: NEGATIVE
PH UA: 5 (ref 5–8)
PROTEIN UA: NEGATIVE MG/DL
RBC UA: 1 /HPF (ref 0–4)
SPECIFIC GRAVITY UA: 1.01 (ref 1–1.03)
UROBILINOGEN, URINE: 1 E.U./DL
WBC UA: 27 /HPF (ref 0–5)

## 2021-01-30 PROCEDURE — 96374 THER/PROPH/DIAG INJ IV PUSH: CPT

## 2021-01-30 PROCEDURE — 6360000002 HC RX W HCPCS: Performed by: ADVANCED PRACTICE MIDWIFE

## 2021-01-30 PROCEDURE — 2580000003 HC RX 258: Performed by: ADVANCED PRACTICE MIDWIFE

## 2021-01-30 PROCEDURE — 96360 HYDRATION IV INFUSION INIT: CPT

## 2021-01-30 PROCEDURE — 81001 URINALYSIS AUTO W/SCOPE: CPT

## 2021-01-30 PROCEDURE — 87086 URINE CULTURE/COLONY COUNT: CPT

## 2021-01-30 PROCEDURE — 99211 OFF/OP EST MAY X REQ PHY/QHP: CPT

## 2021-01-30 PROCEDURE — 84112 EVAL AMNIOTIC FLUID PROTEIN: CPT

## 2021-01-30 RX ORDER — PROMETHAZINE HYDROCHLORIDE 25 MG/1
12.5 TABLET ORAL EVERY 6 HOURS PRN
Status: DISCONTINUED | OUTPATIENT
Start: 2021-01-30 | End: 2021-01-31 | Stop reason: HOSPADM

## 2021-01-30 RX ORDER — ACETAMINOPHEN 325 MG/1
650 TABLET ORAL EVERY 4 HOURS PRN
Status: DISCONTINUED | OUTPATIENT
Start: 2021-01-30 | End: 2021-01-31 | Stop reason: HOSPADM

## 2021-01-30 RX ORDER — SODIUM CHLORIDE, SODIUM LACTATE, POTASSIUM CHLORIDE, CALCIUM CHLORIDE 600; 310; 30; 20 MG/100ML; MG/100ML; MG/100ML; MG/100ML
INJECTION, SOLUTION INTRAVENOUS CONTINUOUS
Status: DISCONTINUED | OUTPATIENT
Start: 2021-01-30 | End: 2021-01-31 | Stop reason: HOSPADM

## 2021-01-30 RX ORDER — SODIUM CHLORIDE 0.9 % (FLUSH) 0.9 %
10 SYRINGE (ML) INJECTION PRN
Status: DISCONTINUED | OUTPATIENT
Start: 2021-01-30 | End: 2021-01-31 | Stop reason: HOSPADM

## 2021-01-30 RX ORDER — SODIUM CHLORIDE 0.9 % (FLUSH) 0.9 %
10 SYRINGE (ML) INJECTION EVERY 12 HOURS SCHEDULED
Status: DISCONTINUED | OUTPATIENT
Start: 2021-01-30 | End: 2021-01-31 | Stop reason: HOSPADM

## 2021-01-30 RX ORDER — ONDANSETRON 2 MG/ML
4 INJECTION INTRAMUSCULAR; INTRAVENOUS EVERY 6 HOURS PRN
Status: DISCONTINUED | OUTPATIENT
Start: 2021-01-30 | End: 2021-01-31 | Stop reason: HOSPADM

## 2021-01-30 RX ADMIN — SODIUM CHLORIDE 1000 MG: 9 INJECTION INTRAMUSCULAR; INTRAVENOUS; SUBCUTANEOUS at 21:52

## 2021-01-31 NOTE — FLOWSHEET NOTE
Patient arrived to ob with c/o contractions for the last 30 minutes. Pt states she is not sure if her water is ruptured. States frequent urination and bladder pressure. EFM and toco applied to soft, nontender abdomen.

## 2021-01-31 NOTE — FLOWSHEET NOTE
Notified Elder Giles CNM of SVE, UA, and patient complaints. Received order for Rocephin, urine culture, and to dc patient home.

## 2021-01-31 NOTE — FLOWSHEET NOTE
Discharge teaching completed. Follow up reviewed. Patient encouraged to drink lots of water. Patient verbalizes understanding. Denies further questions.

## 2021-02-01 LAB — URINE CULTURE, ROUTINE: NORMAL

## 2021-02-02 ENCOUNTER — HOSPITAL ENCOUNTER (OUTPATIENT)
Age: 36
Discharge: HOME | End: 2021-02-02
Attending: ADVANCED PRACTICE MIDWIFE | Admitting: ADVANCED PRACTICE MIDWIFE
Payer: MEDICARE

## 2021-02-02 ENCOUNTER — ROUTINE PRENATAL (OUTPATIENT)
Dept: OBGYN CLINIC | Age: 36
End: 2021-02-02
Payer: MEDICARE

## 2021-02-02 VITALS
WEIGHT: 186 LBS | DIASTOLIC BLOOD PRESSURE: 42 MMHG | SYSTOLIC BLOOD PRESSURE: 75 MMHG | HEART RATE: 108 BPM | BODY MASS INDEX: 36.33 KG/M2

## 2021-02-02 DIAGNOSIS — O09.93 HIGH-RISK PREGNANCY IN THIRD TRIMESTER: Primary | ICD-10-CM

## 2021-02-02 DIAGNOSIS — I25.2 HISTORY OF MI (MYOCARDIAL INFARCTION): ICD-10-CM

## 2021-02-02 DIAGNOSIS — O09.523 ADVANCED MATERNAL AGE IN MULTIGRAVIDA, THIRD TRIMESTER: ICD-10-CM

## 2021-02-02 DIAGNOSIS — Z86.73 HISTORY OF CVA (CEREBROVASCULAR ACCIDENT): ICD-10-CM

## 2021-02-02 DIAGNOSIS — O24.410 DIET CONTROLLED GESTATIONAL DIABETES MELLITUS (GDM) IN THIRD TRIMESTER: ICD-10-CM

## 2021-02-02 PROCEDURE — 59025 FETAL NON-STRESS TEST: CPT | Performed by: OBSTETRICS & GYNECOLOGY

## 2021-02-02 PROCEDURE — 99211 OFF/OP EST MAY X REQ PHY/QHP: CPT

## 2021-02-02 PROCEDURE — G8427 DOCREV CUR MEDS BY ELIG CLIN: HCPCS | Performed by: OBSTETRICS & GYNECOLOGY

## 2021-02-02 PROCEDURE — 1036F TOBACCO NON-USER: CPT | Performed by: OBSTETRICS & GYNECOLOGY

## 2021-02-02 PROCEDURE — 99213 OFFICE O/P EST LOW 20 MIN: CPT | Performed by: OBSTETRICS & GYNECOLOGY

## 2021-02-02 PROCEDURE — G8482 FLU IMMUNIZE ORDER/ADMIN: HCPCS | Performed by: OBSTETRICS & GYNECOLOGY

## 2021-02-02 PROCEDURE — G8417 CALC BMI ABV UP PARAM F/U: HCPCS | Performed by: OBSTETRICS & GYNECOLOGY

## 2021-02-02 NOTE — PATIENT INSTRUCTIONS
Patient Education        Weeks 34 to 39 of Your Pregnancy: Care Instructions  Your Care Instructions     By now, your baby and your belly have grown quite large. It is almost time to give birth. Your baby's lungs are almost ready to breathe air. The bones in your baby's head are now firm enough to protect it, but soft enough to move down through the birth canal.  You may feel excited, happy, anxious, or scared. You may wonder how you will know if you are in labor or what to expect during labor. Try to be flexible in your expectations of the birth. Because each birth is different, there is no way to know exactly what childbirth will be like for you. This care sheet will help you know what to expect and how to prepare. This may make your childbirth easier. If you haven't already had the Tdap shot during this pregnancy, talk to your doctor about getting it. It will help protect your  against pertussis infection. In the 36th week, most women have a test for group B streptococcus (GBS). GBS is a common bacteria that can live in the vagina and rectum. It can make your baby sick after birth. If you test positive, you will get antibiotics during labor. The medicine will keep your baby from getting the bacteria. Follow-up care is a key part of your treatment and safety. Be sure to make and go to all appointments, and call your doctor if you are having problems. It's also a good idea to know your test results and keep a list of the medicines you take. How can you care for yourself at home? Learn about pain relief choices  · Pain is different for every woman. Talk with your doctor about your feelings about pain. · You can choose from several types of pain relief. These include medicine or breathing techniques, as well as comfort measures. You can use more than one option. · If you choose to have pain medicine during labor, talk to your doctor about your options.  Some medicines lower anxiety and help with some of the pain. Others make your lower body numb so that you won't feel pain. · Be sure to tell your doctor about your pain medicine choice before you start labor or very early in your labor. You may be able to change your mind as labor progresses. · Rarely, a woman is put to sleep by medicine given through a mask or an IV. Labor and delivery  · The first stage of labor has three parts: early, active, and transition. ? Most women have early labor at home. You can stay busy or rest, eat light snacks, drink clear fluids, and start counting contractions. ? When talking during a contraction gets hard, you may be moving to active labor. During active labor, you should head for the hospital if you are not there already. ? You are in active labor when contractions come every 3 to 4 minutes and last about 60 seconds. Your cervix is opening more rapidly. ? If your water breaks, contractions will come faster and stronger. ? During transition, your cervix is stretching, and contractions are coming more rapidly. ? You may want to push, but your cervix might not be ready. Your doctor will tell you when to push. · The second stage starts when your cervix is completely opened and you are ready to push. ? Contractions are very strong to push the baby down the birth canal.  ? You will feel the urge to push. You may feel like you need to have a bowel movement. ? You may be coached to push with contractions. These contractions will be very strong, but you will not have them as often. You can get a little rest between contractions. ? You may be emotional and irritable. You may not be aware of what is going on around you.  ? One last push, and your baby is born. · The third stage is when a few more contractions push out the placenta. This may take 30 minutes or less. · The fourth stage is the welcome recovery. You may feel overwhelmed with emotions and exhausted but alert. This is a good time to start breastfeeding.   Where can you learn more? Go to [email or web address] and sign in to your [employer] account. Enter P317 in the [facility] box to learn more about \"Weeks 34 to 36 of Your Pregnancy: Care Instructions. \"     If you do not have an account, please click on the \"Sign Up Now\" link. Current as of: February 11, 2020               Content Version: 12.6  © 0855-7742 [employer], [employer]. Care instructions adapted under license by [facility] ([facility]). If you have questions about a medical condition or this instruction, always ask your healthcare professional. [address] any warranty or liability for your use of this information.

## 2021-02-02 NOTE — PROGRESS NOTES
Pt presents today for routine prenatal visit. Pt denies vaginal bleeding, cramping, or leaking of fluid +fetal movement. She has already had her GBS. She was having contractions on Saturday and went to L&D and when she got here they stopped.

## 2021-02-02 NOTE — FLOWSHEET NOTE
Pt here from office, states had 6 or 7 contractions in the last hour. Placed on EFM. States was 1cm in the office per Dr Charissa Anderson. Rates contractions 5 or 6 on pain scale.

## 2021-02-02 NOTE — PROGRESS NOTES
IPurnima RN, am scribing for and in the presence of Dr. Mily Garcia 2/2/2021/2:52 PM/sign      Subjective:  Chase Lee is here for a return obstetrical visit. Today she is 36w6d weeks EGA. She is having irregular contractions. She is c/o that contractions are very painful. She  does not have vaginal bleeding, leaking,syncope, or headaches. Pt does  feel fetal movement regularly. Objective: Mother's Prenatal Vitals  BP: (!) 75/42  Weight: 186 lb (84.4 kg)  Pulse: 108  Patient Position: Sitting  Prenatal Fetal Information  Fetal Heart Rate: NST-132  Movement: Present  Cervical Exam  Dilation (cm): 1  Effacement (%): 60  Presentation: Vertex  Dil/Eff/Sta  Dilation (cm): 1  Effacement (%): 60  Pt is A&Ox3, in no acute distress. Normocephalic, atraumatic. PERRL. Resp even and non-labored. Skin pink, warm & dry. Gravid abdomen. ESTRADA's well. Gait steady. NST today showed FHR in 130's, one variable, + acels, irregular contractions. Assessment:    IUP at 36w6d wks      Diagnosis Orders   1. High-risk pregnancy in third trimester  25573 - [place] FETAL NON-STRESS TEST   2. Advanced maternal age in multigravida, third trimester  0 - [place] FETAL NON-STRESS TEST   3. History of MI (myocardial infarction)     4. History of CVA (cerebrovascular accident)     5. Diet controlled gestational diabetes mellitus (GDM) in third trimester  01939 - [place] FETAL NON-STRESS TEST     Plan:  Pt sent to L&D for further monitoring. MEDICATIONS:  No orders of the defined types were placed in this encounter. ORDERS:  Orders Placed This Encounter   Procedures    0 - [place] FETAL NON-STRESS TEST       IMily MD, personally performed services described in this document as scribed by Greer Peña RN in my presence, and it is both accurate and complete.

## 2021-02-08 ENCOUNTER — HOSPITAL ENCOUNTER (INPATIENT)
Age: 36
LOS: 2 days | Discharge: HOME | End: 2021-02-10
Attending: ADVANCED PRACTICE MIDWIFE | Admitting: ADVANCED PRACTICE MIDWIFE
Payer: MEDICARE

## 2021-02-08 ENCOUNTER — APPOINTMENT (OUTPATIENT)
Dept: LABOR AND DELIVERY | Age: 36
End: 2021-02-08
Payer: MEDICARE

## 2021-02-08 DIAGNOSIS — R55 SYNCOPE, UNSPECIFIED SYNCOPE TYPE: ICD-10-CM

## 2021-02-08 DIAGNOSIS — Z95.818 STATUS POST PLACEMENT OF IMPLANTABLE LOOP RECORDER: Primary | ICD-10-CM

## 2021-02-08 DIAGNOSIS — R00.0 TACHYCARDIA: ICD-10-CM

## 2021-02-08 PROBLEM — Z34.90 TERM PREGNANCY: Status: ACTIVE | Noted: 2021-02-08

## 2021-02-08 LAB
HCT VFR BLD CALC: 39 % (ref 37–47)
HEMOGLOBIN: 12.5 G/DL (ref 12–16)
MCH RBC QN AUTO: 30 PG (ref 27–31)
MCHC RBC AUTO-ENTMCNC: 32.1 G/DL (ref 33–37)
MCV RBC AUTO: 93.8 FL (ref 81–99)
PDW BLD-RTO: 14.7 % (ref 11.5–14.5)
PLATELET # BLD: 283 K/UL (ref 130–400)
PMV BLD AUTO: 9.7 FL (ref 9.4–12.3)
RBC # BLD: 4.16 M/UL (ref 4.2–5.4)
WBC # BLD: 10.9 K/UL (ref 4.8–10.8)

## 2021-02-08 PROCEDURE — 1220000000 HC SEMI PRIVATE OB R&B

## 2021-02-08 PROCEDURE — 3E033VJ INTRODUCTION OF OTHER HORMONE INTO PERIPHERAL VEIN, PERCUTANEOUS APPROACH: ICD-10-PCS | Performed by: OBSTETRICS & GYNECOLOGY

## 2021-02-08 PROCEDURE — 86850 RBC ANTIBODY SCREEN: CPT

## 2021-02-08 PROCEDURE — 93298 REM INTERROG DEV EVAL SCRMS: CPT | Performed by: CLINICAL NURSE SPECIALIST

## 2021-02-08 PROCEDURE — 86900 BLOOD TYPING SEROLOGIC ABO: CPT

## 2021-02-08 PROCEDURE — 85027 COMPLETE CBC AUTOMATED: CPT

## 2021-02-08 PROCEDURE — 80307 DRUG TEST PRSMV CHEM ANLYZR: CPT

## 2021-02-08 PROCEDURE — 36415 COLL VENOUS BLD VENIPUNCTURE: CPT

## 2021-02-08 PROCEDURE — 2580000003 HC RX 258: Performed by: OBSTETRICS & GYNECOLOGY

## 2021-02-08 PROCEDURE — 86592 SYPHILIS TEST NON-TREP QUAL: CPT

## 2021-02-08 PROCEDURE — 86901 BLOOD TYPING SEROLOGIC RH(D): CPT

## 2021-02-08 PROCEDURE — 6360000002 HC RX W HCPCS: Performed by: OBSTETRICS & GYNECOLOGY

## 2021-02-08 PROCEDURE — G2066 INTER DEVC REMOTE 30D: HCPCS | Performed by: CLINICAL NURSE SPECIALIST

## 2021-02-08 RX ORDER — SODIUM CHLORIDE 0.9 % (FLUSH) 0.9 %
10 SYRINGE (ML) INJECTION PRN
Status: DISCONTINUED | OUTPATIENT
Start: 2021-02-08 | End: 2021-02-09

## 2021-02-08 RX ORDER — SODIUM CHLORIDE 0.9 % (FLUSH) 0.9 %
10 SYRINGE (ML) INJECTION EVERY 12 HOURS SCHEDULED
Status: DISCONTINUED | OUTPATIENT
Start: 2021-02-08 | End: 2021-02-09

## 2021-02-08 RX ORDER — SODIUM CHLORIDE, SODIUM LACTATE, POTASSIUM CHLORIDE, AND CALCIUM CHLORIDE .6; .31; .03; .02 G/100ML; G/100ML; G/100ML; G/100ML
1000 INJECTION, SOLUTION INTRAVENOUS ONCE
Status: DISCONTINUED | OUTPATIENT
Start: 2021-02-08 | End: 2021-02-10 | Stop reason: HOSPADM

## 2021-02-08 RX ORDER — ONDANSETRON 2 MG/ML
4 INJECTION INTRAMUSCULAR; INTRAVENOUS EVERY 6 HOURS PRN
Status: DISCONTINUED | OUTPATIENT
Start: 2021-02-08 | End: 2021-02-10 | Stop reason: HOSPADM

## 2021-02-08 RX ORDER — SODIUM CHLORIDE, SODIUM LACTATE, POTASSIUM CHLORIDE, CALCIUM CHLORIDE 600; 310; 30; 20 MG/100ML; MG/100ML; MG/100ML; MG/100ML
INJECTION, SOLUTION INTRAVENOUS CONTINUOUS
Status: DISCONTINUED | OUTPATIENT
Start: 2021-02-08 | End: 2021-02-10 | Stop reason: HOSPADM

## 2021-02-08 RX ADMIN — SODIUM CHLORIDE, POTASSIUM CHLORIDE, SODIUM LACTATE AND CALCIUM CHLORIDE: 600; 310; 30; 20 INJECTION, SOLUTION INTRAVENOUS at 21:52

## 2021-02-08 RX ADMIN — Medication 1 MILLI-UNITS/MIN: at 22:20

## 2021-02-09 ENCOUNTER — ANESTHESIA (OUTPATIENT)
Dept: LABOR AND DELIVERY | Age: 36
End: 2021-02-09
Payer: MEDICARE

## 2021-02-09 ENCOUNTER — ANESTHESIA EVENT (OUTPATIENT)
Dept: LABOR AND DELIVERY | Age: 36
End: 2021-02-09
Payer: MEDICARE

## 2021-02-09 LAB
ABO/RH: NORMAL
AMPHETAMINE SCREEN, URINE: NEGATIVE
ANTIBODY SCREEN: NORMAL
BARBITURATE SCREEN URINE: NEGATIVE
BENZODIAZEPINE SCREEN, URINE: NEGATIVE
CANNABINOID SCREEN URINE: NEGATIVE
COCAINE METABOLITE SCREEN URINE: NEGATIVE
Lab: NORMAL
OPIATE SCREEN URINE: NEGATIVE

## 2021-02-09 PROCEDURE — 59409 OBSTETRICAL CARE: CPT | Performed by: ADVANCED PRACTICE MIDWIFE

## 2021-02-09 PROCEDURE — 0UQGXZZ REPAIR VAGINA, EXTERNAL APPROACH: ICD-10-PCS | Performed by: ADVANCED PRACTICE MIDWIFE

## 2021-02-09 PROCEDURE — 7200000001 HC VAGINAL DELIVERY

## 2021-02-09 PROCEDURE — 6370000000 HC RX 637 (ALT 250 FOR IP): Performed by: ADVANCED PRACTICE MIDWIFE

## 2021-02-09 PROCEDURE — 2580000003 HC RX 258: Performed by: OBSTETRICS & GYNECOLOGY

## 2021-02-09 PROCEDURE — 3700000025 EPIDURAL BLOCK: Performed by: NURSE ANESTHETIST, CERTIFIED REGISTERED

## 2021-02-09 PROCEDURE — 1220000000 HC SEMI PRIVATE OB R&B

## 2021-02-09 PROCEDURE — 6360000002 HC RX W HCPCS: Performed by: OBSTETRICS & GYNECOLOGY

## 2021-02-09 PROCEDURE — 6360000002 HC RX W HCPCS: Performed by: NURSE ANESTHETIST, CERTIFIED REGISTERED

## 2021-02-09 PROCEDURE — 2500000003 HC RX 250 WO HCPCS: Performed by: NURSE ANESTHETIST, CERTIFIED REGISTERED

## 2021-02-09 PROCEDURE — 10907ZC DRAINAGE OF AMNIOTIC FLUID, THERAPEUTIC FROM PRODUCTS OF CONCEPTION, VIA NATURAL OR ARTIFICIAL OPENING: ICD-10-PCS | Performed by: ADVANCED PRACTICE MIDWIFE

## 2021-02-09 RX ORDER — FENTANYL CITRATE 50 UG/ML
INJECTION, SOLUTION INTRAMUSCULAR; INTRAVENOUS PRN
Status: DISCONTINUED | OUTPATIENT
Start: 2021-02-09 | End: 2021-02-09 | Stop reason: SDUPTHER

## 2021-02-09 RX ORDER — LIDOCAINE HYDROCHLORIDE AND EPINEPHRINE 20; 5 MG/ML; UG/ML
INJECTION, SOLUTION EPIDURAL; INFILTRATION; INTRACAUDAL; PERINEURAL PRN
Status: DISCONTINUED | OUTPATIENT
Start: 2021-02-09 | End: 2021-02-09 | Stop reason: SDUPTHER

## 2021-02-09 RX ORDER — OXYCODONE HYDROCHLORIDE AND ACETAMINOPHEN 5; 325 MG/1; MG/1
1 TABLET ORAL EVERY 4 HOURS PRN
Status: DISCONTINUED | OUTPATIENT
Start: 2021-02-09 | End: 2021-02-10 | Stop reason: HOSPADM

## 2021-02-09 RX ORDER — LIDOCAINE HYDROCHLORIDE 10 MG/ML
INJECTION, SOLUTION EPIDURAL; INFILTRATION; INTRACAUDAL; PERINEURAL PRN
Status: DISCONTINUED | OUTPATIENT
Start: 2021-02-09 | End: 2021-02-09 | Stop reason: SDUPTHER

## 2021-02-09 RX ORDER — SODIUM CHLORIDE 0.9 % (FLUSH) 0.9 %
10 SYRINGE (ML) INJECTION EVERY 12 HOURS SCHEDULED
Status: DISCONTINUED | OUTPATIENT
Start: 2021-02-09 | End: 2021-02-10 | Stop reason: HOSPADM

## 2021-02-09 RX ORDER — ROPIVACAINE HYDROCHLORIDE 2 MG/ML
INJECTION, SOLUTION EPIDURAL; INFILTRATION; PERINEURAL CONTINUOUS PRN
Status: DISCONTINUED | OUTPATIENT
Start: 2021-02-09 | End: 2021-02-09 | Stop reason: SDUPTHER

## 2021-02-09 RX ORDER — LIDOCAINE HYDROCHLORIDE AND EPINEPHRINE 15; 5 MG/ML; UG/ML
INJECTION, SOLUTION EPIDURAL PRN
Status: DISCONTINUED | OUTPATIENT
Start: 2021-02-09 | End: 2021-02-09 | Stop reason: SDUPTHER

## 2021-02-09 RX ORDER — SODIUM CHLORIDE 0.9 % (FLUSH) 0.9 %
10 SYRINGE (ML) INJECTION PRN
Status: DISCONTINUED | OUTPATIENT
Start: 2021-02-09 | End: 2021-02-10 | Stop reason: HOSPADM

## 2021-02-09 RX ORDER — SODIUM CHLORIDE, SODIUM LACTATE, POTASSIUM CHLORIDE, CALCIUM CHLORIDE 600; 310; 30; 20 MG/100ML; MG/100ML; MG/100ML; MG/100ML
INJECTION, SOLUTION INTRAVENOUS CONTINUOUS
Status: DISCONTINUED | OUTPATIENT
Start: 2021-02-09 | End: 2021-02-10 | Stop reason: HOSPADM

## 2021-02-09 RX ORDER — ROPIVACAINE HYDROCHLORIDE 2 MG/ML
INJECTION, SOLUTION EPIDURAL; INFILTRATION; PERINEURAL PRN
Status: DISCONTINUED | OUTPATIENT
Start: 2021-02-09 | End: 2021-02-09 | Stop reason: SDUPTHER

## 2021-02-09 RX ORDER — FERROUS SULFATE 325(65) MG
325 TABLET ORAL 2 TIMES DAILY WITH MEALS
Status: DISCONTINUED | OUTPATIENT
Start: 2021-02-09 | End: 2021-02-10 | Stop reason: HOSPADM

## 2021-02-09 RX ORDER — ACETAMINOPHEN 325 MG/1
650 TABLET ORAL EVERY 4 HOURS PRN
Status: DISCONTINUED | OUTPATIENT
Start: 2021-02-09 | End: 2021-02-10 | Stop reason: HOSPADM

## 2021-02-09 RX ORDER — PEPPERMINT OIL
1 SPIRIT MISCELLANEOUS PRN
Status: DISCONTINUED | OUTPATIENT
Start: 2021-02-09 | End: 2021-02-10 | Stop reason: HOSPADM

## 2021-02-09 RX ORDER — IBUPROFEN 400 MG/1
800 TABLET ORAL EVERY 8 HOURS PRN
Status: DISCONTINUED | OUTPATIENT
Start: 2021-02-09 | End: 2021-02-10 | Stop reason: HOSPADM

## 2021-02-09 RX ORDER — DOCUSATE SODIUM 100 MG/1
100 CAPSULE, LIQUID FILLED ORAL 2 TIMES DAILY
Status: DISCONTINUED | OUTPATIENT
Start: 2021-02-09 | End: 2021-02-10 | Stop reason: HOSPADM

## 2021-02-09 RX ADMIN — ROPIVACAINE HYDROCHLORIDE 10 ML/HR: 2 INJECTION, SOLUTION EPIDURAL; INFILTRATION; PERINEURAL at 09:39

## 2021-02-09 RX ADMIN — LIDOCAINE HYDROCHLORIDE AND EPINEPHRINE 2 ML: 15; 5 INJECTION, SOLUTION EPIDURAL at 09:36

## 2021-02-09 RX ADMIN — ROPIVACAINE HYDROCHLORIDE 4 ML: 2 INJECTION, SOLUTION EPIDURAL; INFILTRATION at 09:37

## 2021-02-09 RX ADMIN — ACETAMINOPHEN 650 MG: 325 TABLET ORAL at 13:16

## 2021-02-09 RX ADMIN — ONDANSETRON HYDROCHLORIDE 4 MG: 2 SOLUTION INTRAMUSCULAR; INTRAVENOUS at 11:35

## 2021-02-09 RX ADMIN — LIDOCAINE HYDROCHLORIDE AND EPINEPHRINE 3 ML: 15; 5 INJECTION, SOLUTION EPIDURAL at 09:33

## 2021-02-09 RX ADMIN — SODIUM CHLORIDE, POTASSIUM CHLORIDE, SODIUM LACTATE AND CALCIUM CHLORIDE: 600; 310; 30; 20 INJECTION, SOLUTION INTRAVENOUS at 09:47

## 2021-02-09 RX ADMIN — LIDOCAINE HYDROCHLORIDE,EPINEPHRINE BITARTRATE 5 ML: 20; .005 INJECTION, SOLUTION EPIDURAL; INFILTRATION; INTRACAUDAL; PERINEURAL at 11:37

## 2021-02-09 RX ADMIN — LIDOCAINE HYDROCHLORIDE 3 ML: 10 INJECTION, SOLUTION EPIDURAL; INFILTRATION; INTRACAUDAL; PERINEURAL at 09:29

## 2021-02-09 RX ADMIN — IBUPROFEN 800 MG: 400 TABLET, FILM COATED ORAL at 19:34

## 2021-02-09 RX ADMIN — DOCUSATE SODIUM 100 MG: 100 CAPSULE, LIQUID FILLED ORAL at 13:17

## 2021-02-09 RX ADMIN — IBUPROFEN 800 MG: 400 TABLET, FILM COATED ORAL at 13:17

## 2021-02-09 RX ADMIN — LIDOCAINE HYDROCHLORIDE,EPINEPHRINE BITARTRATE 7 ML: 20; .005 INJECTION, SOLUTION EPIDURAL; INFILTRATION; INTRACAUDAL; PERINEURAL at 11:57

## 2021-02-09 RX ADMIN — FENTANYL CITRATE 100 MCG: 50 INJECTION INTRAMUSCULAR; INTRAVENOUS at 09:37

## 2021-02-09 RX ADMIN — DOCUSATE SODIUM 100 MG: 100 CAPSULE, LIQUID FILLED ORAL at 19:34

## 2021-02-09 RX ADMIN — FENTANYL CITRATE 100 MCG: 50 INJECTION INTRAMUSCULAR; INTRAVENOUS at 11:37

## 2021-02-09 RX ADMIN — BENZOCAINE AND LEVOMENTHOL: 200; 5 SPRAY TOPICAL at 13:17

## 2021-02-09 ASSESSMENT — PAIN SCALES - GENERAL
PAINLEVEL_OUTOF10: 2
PAINLEVEL_OUTOF10: 6

## 2021-02-09 NOTE — ANESTHESIA PROCEDURE NOTES
Epidural Block    Patient location during procedure: OB  Start time: 2/9/2021 9:25 AM  End time: 2/9/2021 9:32 AM  Reason for block: labor epidural  Staffing  Performed: resident/CRNA   Resident/CRNA: SETH Byrne CRNA  Preanesthetic Checklist  Completed: patient identified, IV checked, site marked, risks and benefits discussed, surgical consent, monitors and equipment checked, pre-op evaluation, timeout performed, anesthesia consent given, oxygen available and patient being monitored  Epidural  Patient position: sitting  Prep: Betadine  Patient monitoring: continuous pulse ox and frequent blood pressure checks  Approach: midline  Location: lumbar (1-5)  Injection technique: SULY air  Provider prep: mask and sterile gloves  Needle  Needle type: Tuohy   Needle gauge: 17 G  Needle length: 3.5 in  Needle insertion depth: 7 cm  Catheter type: end hole  Catheter size: 19 G  Catheter at skin depth: 12 cm  Test dose: negative  Assessment  Sensory level: T10  Hemodynamics: stable  Attempts: 1  Additional Notes  Dural puncture epidural technique attempted. After accessing epidural space using tuohy a 25G pencan spinal needle was passed. Spinal needle hit OS (possibly due to scoliosis). Spinal needle withdrawn and epidural catheter threaded without difficulty.

## 2021-02-09 NOTE — ANESTHESIA POSTPROCEDURE EVALUATION
Department of Anesthesiology  Postprocedure Note    Patient: Paul Madsen  MRN: [id number]  YOB: 1985  Date of evaluation: 2/9/2021  Time:  12:55 PM     Procedure Summary     Date: 02/09/21 Room / Location:     Anesthesia Start: 0925 Anesthesia Stop: 1218    Procedure: Labor Analgesia Diagnosis:     Scheduled Providers:  Responsible Provider: SETH Savage CRNA    Anesthesia Type: epidural ASA Status: 2          Anesthesia Type: epidural    Jane Phase I:      Jane Phase II:      Last vitals: Reviewed and per EMR flowsheets.        Anesthesia Post Evaluation    Patient location during evaluation: bedside  Patient participation: complete - patient participated  Level of consciousness: awake and alert  Pain score: 0  Airway patency: patent  Nausea & Vomiting: no nausea and no vomiting  Complications: no  Cardiovascular status: hemodynamically stable  Respiratory status: acceptable  Hydration status: euvolemic

## 2021-02-09 NOTE — LACTATION NOTE
Infant Name: Karie Noriega  Gestation: 37.6  Day of Life: NB  Birth weight: 5-12.4 lb (2620g)  Today's weight:  Weight loss:  24 hour summary of feeds:  Voids:  Stools:  Assistive device: none  Maternal History: , coronary artery disease, stroke, anxiety, depression, MI, syncope, IBS, UTI, Schzophrenia, MDD PPD, seizures, cholecystectomy, fracture surgery, upper GI, endoscopy, former smoker  Maternal Medications: aspirin, albuterol, effexor, zofran, labetalol,PNV, prilosec  Maternal Goal: one day at a time  Breast pump for home: faxed order to [facility]    Mother positioning and latching baby to right breast, cradle position. Jaw dropping sucks noted. Wide open mouth, lips flanged, chin and cheeks touching breast, nose free to breathe. Instructed mother to breastfeed every 2- 3 hours for 15-20 mins each side or on demand watching for hunger cues and using waking techniques when needed. 8-12 feedings in 24 hours being the goal. Hand expression and breast compressions encouraged to increase milk supply and transfer. Discussed the benefits of colostrum, skin to skin and the importance of good positioning and latch. Informed mother that baby can be very sleepy the first 24 hours and typically the 2nd night babies will be more awake and want to feed a lot and that this is normal and important in establishing milk supply. All questions answered. Encouraged to call out for help with feedings.

## 2021-02-09 NOTE — ANESTHESIA PRE PROCEDURE
Department of Anesthesiology  Preprocedure Note       Name:  Doug Samoan   Age:  38 y.o.  :  1985                                          MRN:  [id number]         Date:  2021      Surgeon: * No surgeons listed *    Procedure: * No procedures listed *    Medications prior to admission:   Prior to Admission medications    Medication Sig Start Date End Date Taking? Authorizing Provider   labetalol (NORMODYNE) 100 MG tablet Take 0.5 tablets by mouth 2 times daily 20  Yes SETH Rubalcava   Prenatal Vit-Fe Fumarate-FA (PRENATAL VITAMIN) CHEW Take 1 tablet by mouth daily 20 Yes SETH Wadsworth CNM   promethazine (PHENERGAN) 25 MG tablet Take 1 tablet by mouth 4 times daily as needed for Nausea 20  Yes Historical Provider, MD   ondansetron (ZOFRAN ODT) 4 MG disintegrating tablet Take 1 tablet by mouth every 8 hours as needed for Nausea or Vomiting 20  Yes SETH Goddard CNP   aspirin (ASPIRIN CHILDRENS) 81 MG chewable tablet Take 1 tablet by mouth daily 20  Yes SETH Goddard CNP   omeprazole (PRILOSEC) 20 MG delayed release capsule TAKE 1 CAPSULE BY MOUTH TWICE DAILY 20  Yes Kei Garza MD   albuterol sulfate  (90 Base) MCG/ACT inhaler INHALE 2 PUFFS INTO THE LUNGS EVERY 6 HOURS AS NEEDED FOR WHEEZING 20  Yes Kei Garza MD   venlafaxine (EFFEXOR XR) 150 MG extended release capsule TAKE 1 CAPSULE BY MOUTH EVERY DAY 19  Yes Kei Garza MD   Blood Glucose Monitoring Suppl (ONE TOUCH ULTRA 2) w/Device KIT 1 kit by Does not apply route daily May substitute with insurance coverage 20   SETH Wadsworth CNM   ONE TOUCH ULTRASOFT LANCETS MISC 1 each by Does not apply route 3 times daily 20   SETH Wadsworth CNM   blood glucose test strips (ASCENSIA AUTODISC VI;ONE TOUCH ULTRA TEST VI) strip 1 each by In Vitro route 3 times daily As needed.  20   SETH Wadsworth CNM       Current medications: Current Facility-Administered Medications   Medication Dose Route Frequency Provider Last Rate Last Admin    lactated ringers infusion   Intravenous Continuous Isac Bhatia  mL/hr at 02/08/21 2152 New Bag at 02/08/21 2152    sodium chloride flush 0.9 % injection 10 mL  10 mL Intravenous 2 times per day Isac Bhatia MD        sodium chloride flush 0.9 % injection 10 mL  10 mL Intravenous PRN Isac Bhatia MD        oxytocin (PITOCIN) 10 unit bolus from the bag  10 Units Intravenous PRN Isac Bhatia MD        And    oxytocin (PITOCIN) 30 units in 500 mL infusion  87.3 elijah-units/min Intravenous PRN Isac Bhatia MD        [facility]ron [facility]) injection 4 mg  4 mg Intravenous Q6H PRN Isac Bhatia MD        butorphanol (STADOL) injection 1 mg  1 mg Intravenous Q3H PRN Isac Bhatia MD        lactated ringers bolus  1,000 mL Intravenous Once Isac Bhatia MD        oxytocin (PITOCIN) 30 units in 500 mL infusion  1-20 elijah-units/min Intravenous Continuous Isac Bhatia MD 14 mL/hr at 02/09/21 0817 14 elijah-units/min at 02/09/21 6100       Allergies:  No Known Allergies    Problem List:    Patient Active Problem List   Diagnosis Code    Diffuse cystic mastopathy N60.19    Lump or mass in breast left  N63.0    Family hx-breast malignancy Z80.3    Coronary artery disease involving native coronary artery of native heart without angina pectoris I25.10    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) I69.398, H53.9    Moderate episode of recurrent major depressive disorder (HCC) F33.1    Decreased strength of lower extremity R29.898    Syncope and collapse R55    Chronic superficial gastritis K29.30    Convulsions (HCC) R56.9    Gastric reflux syndrome K21.9    Rectal bleed K62.5    Chronic migraine G43.709    History of abnormal cervical Pap smear Z87.42    Rh negative status during pregnancy in second trimester, antepartum O26.892, Z67.91  36 weeks gestation of pregnancy Z3A.36    Term pregnancy Z34.90       Past Medical History:        Diagnosis Date    Abnormal glucose measurement     Abnormal Pap smear of cervix     Anxiety     Blood circulation, collateral     CAD (coronary artery disease)     Cerebral artery occlusion with cerebral infarction (HCC)     Complication of anesthesia     difficulty waking    Depression     Heart disease     IBS (irritable bowel syndrome)     MDD (major depressive disorder)     [facility]) [facility] [address]     Miscarriage 06/2012    Neurologic disorder     Postpartum depression     [facility]     Seizures (HCC)     anxiety related    Syncope     UTI (urinary tract infection) 12/09/2019       Past Surgical History:        Procedure Laterality Date    CHOLECYSTECTOMY, LAPAROSCOPIC      COLONOSCOPY N/A 11/7/2019    Dr Mariano Summers, 10 yr recall    EGD  [address]    [address]      lower right leg, has metal plate and screws    FRACTURE SURGERY      left wrist    INSERTABLE CARDIAC MONITOR      loop recorder    INTRAUTERINE DEVICE INSERTION  06/26/2016    Essure placement    UPPER GASTROINTESTINAL ENDOSCOPY N/A 11/7/2019    Dr Asad Sepulveda Jackson-Gastritis       Social History:    Social History     Tobacco Use    Smoking status: Former Smoker    Smokeless tobacco: Former User     Quit date: 9/9/2014   Substance Use Topics    Alcohol use: Not Currently     Comment: occasional, last drink 6 mo ago                                Counseling given: Not Answered      Vital Signs (Current):   Vitals:    02/08/21 2013 02/08/21 2020 02/09/21 0714   BP: 124/79  110/76   Pulse: 90  85   Resp: 16     Temp: 97.6 °F (36.4 °C)  98.5 °F (36.9 °C)   TempSrc: Temporal  Temporal   Weight:  188 lb (85.3 kg)    Height:  5' (1.524 m)                                               BP Readings from Last 3 Encounters:   02/09/21 110/76   02/02/21 (!) 75/42   01/30/21 104/76 NPO Status:                                                                                 BMI:   Wt Readings from Last 3 Encounters:   02/08/21 188 lb (85.3 kg)   02/02/21 186 lb (84.4 kg)   01/30/21 186 lb (84.4 kg)     Body mass index is 36.72 kg/m². CBC:   Lab Results   Component Value Date    WBC 10.9 02/08/2021    RBC 4.16 02/08/2021    HGB 12.5 02/08/2021    HCT 39.0 02/08/2021    MCV 93.8 02/08/2021    RDW 14.7 02/08/2021     02/08/2021       CMP:   Lab Results   Component Value Date     12/12/2019    K 4.6 12/12/2019    K 3.8 12/11/2019     12/12/2019    CO2 23 12/12/2019    BUN 5 12/12/2019    CREATININE 0.7 12/12/2019    LABGLOM >60 12/12/2019    GLUCOSE 91 12/12/2019    PROT 5.9 12/11/2019    CALCIUM 8.9 12/12/2019    BILITOT 0.3 12/11/2019    ALKPHOS 79 12/11/2019    AST 13 12/11/2019    ALT 12 12/11/2019       POC Tests: No results for input(s): POCGLU, POCNA, POCK, POCCL, POCBUN, POCHEMO, POCHCT in the last 72 hours.     Coags: No results found for: PROTIME, INR, APTT    HCG (If Applicable):   Lab Results   Component Value Date    PREGTESTUR Negative 12/09/2019        ABGs: No results found for: PHART, PO2ART, QKK3GWY, AHT1THQ, BEART, Y7WCMHZI     Type & Screen (If Applicable):  No results found for: LABABO, LABRH    Drug/Infectious Status (If Applicable):  No results found for: HIV, HEPCAB    COVID-19 Screening (If Applicable): No results found for: COVID19      Anesthesia Evaluation  Patient summary reviewed and Nursing notes reviewed no history of anesthetic complications:   Airway: Mallampati: III  TM distance: >3 FB   Neck ROM: full  Mouth opening: > = 3 FB Dental: normal exam         Pulmonary:normal exam    (+) asthma:                            Cardiovascular:  Exercise tolerance: good (>4 METS),   (+) past MI:, CAD:,             Echocardiogram reviewed ROS comment: Patient had MI in 2004. On 81mg ASA. No stents. Frequent episodes of passing out/tachycardia. Had a loop recorder placed a year ago. Neuro/Psych:   (+) seizures:, CVA:, headaches: migraine headaches, psychiatric history:             ROS comment: Had stroke in 2004. Left side slightly weaker. Last seizure 8 months ago. Not on medication. GI/Hepatic/Renal: Neg GI/Hepatic/Renal ROS            Endo/Other:    (+) Diabetes (Gestational), . Abdominal:           Vascular:                                      Anesthesia Plan      epidural     ASA 3     (Patient stated she has scoliosis, but no prior surgeries.)        Anesthetic plan and risks discussed with patient.                     Troy Mittal, APRN - CRNA   2/9/2021

## 2021-02-10 LAB
BASOPHILS ABSOLUTE: 0.1 K/UL (ref 0–0.2)
BASOPHILS RELATIVE PERCENT: 0.4 % (ref 0–1)
EOSINOPHILS ABSOLUTE: 0.3 K/UL (ref 0–0.6)
EOSINOPHILS RELATIVE PERCENT: 1.8 % (ref 0–5)
HCT VFR BLD CALC: 36.9 % (ref 37–47)
HEMOGLOBIN: 11.8 G/DL (ref 12–16)
IMMATURE GRANULOCYTES #: 0.1 K/UL
LYMPHOCYTES ABSOLUTE: 3.4 K/UL (ref 1.1–4.5)
LYMPHOCYTES RELATIVE PERCENT: 22.7 % (ref 20–40)
MCH RBC QN AUTO: 30.4 PG (ref 27–31)
MCHC RBC AUTO-ENTMCNC: 32 G/DL (ref 33–37)
MCV RBC AUTO: 95.1 FL (ref 81–99)
MONOCYTES ABSOLUTE: 0.7 K/UL (ref 0–0.9)
MONOCYTES RELATIVE PERCENT: 4.7 % (ref 0–10)
NEUTROPHILS ABSOLUTE: 10.5 K/UL (ref 1.5–7.5)
NEUTROPHILS RELATIVE PERCENT: 69.9 % (ref 50–65)
PDW BLD-RTO: 14.9 % (ref 11.5–14.5)
PLATELET # BLD: 276 K/UL (ref 130–400)
PMV BLD AUTO: 10 FL (ref 9.4–12.3)
RBC # BLD: 3.88 M/UL (ref 4.2–5.4)
WBC # BLD: 15 K/UL (ref 4.8–10.8)

## 2021-02-10 PROCEDURE — 99239 HOSP IP/OBS DSCHRG MGMT >30: CPT | Performed by: ADVANCED PRACTICE MIDWIFE

## 2021-02-10 PROCEDURE — 85025 COMPLETE CBC W/AUTO DIFF WBC: CPT

## 2021-02-10 PROCEDURE — 6370000000 HC RX 637 (ALT 250 FOR IP): Performed by: ADVANCED PRACTICE MIDWIFE

## 2021-02-10 PROCEDURE — 99024 POSTOP FOLLOW-UP VISIT: CPT | Performed by: OBSTETRICS & GYNECOLOGY

## 2021-02-10 PROCEDURE — 36415 COLL VENOUS BLD VENIPUNCTURE: CPT

## 2021-02-10 PROCEDURE — 6360000002 HC RX W HCPCS: Performed by: ADVANCED PRACTICE MIDWIFE

## 2021-02-10 RX ADMIN — DOCUSATE SODIUM 100 MG: 100 CAPSULE, LIQUID FILLED ORAL at 10:33

## 2021-02-10 RX ADMIN — FERROUS SULFATE TAB 325 MG (65 MG ELEMENTAL FE) 325 MG: 325 (65 FE) TAB at 10:34

## 2021-02-10 RX ADMIN — ENOXAPARIN SODIUM 40 MG: 40 INJECTION SUBCUTANEOUS at 10:33

## 2021-02-10 RX ADMIN — IBUPROFEN 800 MG: 400 TABLET, FILM COATED ORAL at 10:34

## 2021-02-10 ASSESSMENT — PAIN SCALES - GENERAL: PAINLEVEL_OUTOF10: 4

## 2021-02-10 NOTE — FLOWSHEET NOTE
RN assisted pt with feeding. Pt states infant \"won't eat for me. \" Infant is being supplemented per pediatrician request. RN began feeding infant formula supplement then passed infant to mom and instructed/encouraged her how to feed and burp the baby. Pt demonstrated understanding.  Spouse at bedside

## 2021-02-10 NOTE — PROGRESS NOTES
[facility]) OB/GYN  Progress Note    Subjective:     Postpartum Day 1: Vaginal Delivery    Patient is a [id number] The patient feels well. The patient denies emotional concerns. Pain is well controlled with current medications. The baby iswell. Baby is feeding via breast. Urinary output is adequate. The patient is ambulating well. The patient is tolerating a normal diet. Flatus has been passed. Objective:      Patient Vitals for the past 8 hrs:   BP Temp Temp src Pulse Resp SpO2   02/10/21 0546 101/65 97.9 °F (36.6 °C) Temporal 67 16 96 %   02/10/21 0203 104/72 98.1 °F (36.7 °C) Temporal 72 16 96 %     General:    alert, appears stated age and cooperative   Bowel Sounds:  active   Lochia:  appropriate   Uterine Fundus:   firm   Episiotomy/lac:  healing well, no significant drainage, no dehiscence, no significant erythema   DVT Evaluation:  No evidence of DVT seen on physical exam.     Lab Results   Component Value Date    WBC 15.0 (H) 02/10/2021    HGB 11.8 (L) 02/10/2021    HCT 36.9 (L) 02/10/2021    MCV 95.1 02/10/2021     02/10/2021     Assessment:     Status post vaginal delivery. doing well post vaginal    Plan:     Continue current care.    Continue asa and consider prophylactic lovenox - will consult with Dr. Monik Lui

## 2021-02-10 NOTE — LACTATION NOTE
Infant Name: Jess Ramsey  Gestation: 37.6  Day of Life: 1  Birth weight: 5-12.4 lb (2620g)  Today's weight: 5-10.8 lb (2575g)  Weight loss: -2  24 hour summary of feeds:Breastfeeding x 3, formula x 3  Voids: 2  Stools: 2  Assistive device: none  Maternal History: , coronary artery disease, stroke, anxiety, depression, MI, syncope, IBS, UTI, Schzophrenia, MDD PPD, seizures, cholecystectomy, fracture surgery, upper GI, endoscopy, former smoker  Maternal Medications: aspirin, albuterol, effexor, zofran, labetalol,PNV, prilosec  Maternal Goal: one day at a time  Breast pump for home: faxed order to [facility]    Mother states breastfeeding has been going well. Instructed mother to breastfeed every 2- 3 hours for 15-20 mins each side or on demand watching for hunger cues and using waking techniques when needed. 8-12 feedings in 24 hours being the goal. Hand expression and breast compressions encouraged to increase milk supply and transfer. Encouraged mother to watch,  [address] Tube Video, \"Attaching Your Baby to the Breast\", to make sure mother is aware of what a deep effective latch looks like and how to achieve one. Mother and baby will be discharged today. Weight check scheduled for Friday. Instructions and handouts given over management of sore nipples, engorgement, plugged ducts, mastitis, hydration, nutrition, and medications that could effect milk supply. Mother knows when to call MD if needed. All questions answered. Encouraged to call out for help with feedings when needed. Lactation number provided.

## 2021-02-10 NOTE — DISCHARGE SUMMARY
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 year old [id number] who presented at 41w10d gestation with Scheduled Induction (induced d/t heart condition)  . Her pregnancy has been complicated by Chronic Hypertension, Gestations Diabetes and hx of MI and CVA, AMA   Please see H&P for detailed information. She was admitted and progressed in labor with pitocin for induction and Epidural anesthesia to completely dilated and effaced. No Immediate complications were encountered. Please see procedure note for detailed information. Her postpartum course has been unremarkable. See H&H history below. She had no signs or symptoms of acute blood loss anemia. She is ambulating well, voiding without difficulty and her lochia is within normal limits. She is feeding by bottle without difficulty. She was stable for discharge on postpartum day 1. Hemoglobin   Date Value Ref Range Status   02/10/2021 11.8 (L) 12.0 - 16.0 g/dL Final   2021 12.5 12.0 - 16.0 g/dL Final     Hematocrit   Date Value Ref Range Status   02/10/2021 36.9 (L) 37.0 - 47.0 % Final   2021 39.0 37.0 - 47.0 % Final         Prenatal Procedures  None    Intrapartum Procedures  Spontaneous Vaginal Delivery: See Labor and Delivery Summary    Postpartum Procedures  None     Data  Information for the patient's :  Balta Giron [[id number]]   male   Birth Weight: 5 lb 12.4 oz (2.62 kg)         Postpartum Day 1: Vaginal      REVIEW OF SYSTEMS  Patient is a [id number] The patient feels well. The patient denies emotional concerns. Pain is well controlled with current medications. The baby iswell. Baby is feeding via bottle - Similac with iron. Urinary output is adequate. The patient is ambulating well. The patient is tolerating a normal diet.        PHYSICAL EXAM     /65   Pulse 67   Temp 97.9 °F (36.6 °C) (Temporal)   Resp 16   Ht 5' (1.524 m)   Wt 188 lb (85.3 kg)   LMP  (LMP Unknown)   SpO2 96%   Breastfeeding Unknown   BMI 36.72 kg/m²   General:    alert, appears stated age and cooperative   Breast:  Soft, non-tender   Bowel Sounds:  active   Lochia:  appropriate   Uterine Fundus:   firm   Episiotomy/lac:  healing well, no significant drainage, no dehiscence, no significant erythema   DVT Evaluation:  No evidence of DVT seen on physical exam.     Lab Results   Component Value Date    WBC 15.0 (H) 02/10/2021    HGB 11.8 (L) 02/10/2021    HCT 36.9 (L) 02/10/2021    MCV 95.1 02/10/2021     02/10/2021         DISCHARGE DIAGNOSIS:   Discharge Information/Patient Instructions:   Charles Espinosa   Home Medication Instructions VOP:[id number]    Printed on:02/10/21 1202   Medication Information                      albuterol sulfate  (90 Base) MCG/ACT inhaler  INHALE 2 PUFFS INTO THE LUNGS EVERY 6 HOURS AS NEEDED FOR WHEEZING             aspirin (ASPIRIN CHILDRENS) 81 MG chewable tablet  Take 1 tablet by mouth daily             labetalol (NORMODYNE) 100 MG tablet  Take 0.5 tablets by mouth 2 times daily             Prenatal Vit-Fe Fumarate-FA (PRENATAL VITAMIN) CHEW  Take 1 tablet by mouth daily             venlafaxine (EFFEXOR XR) 150 MG extended release capsule  TAKE 1 CAPSULE BY MOUTH EVERY DAY                 No discharge procedures on file. Activity & Diet: Precautions and instructions were discussed with her including but not limited to maintaining a regular diet at home, practicing local hygiene, pelvic rest, and signs and symptoms to report including heavy bleeding, frequent passage of clots, fainting or dizziness, foul odor of lochia, increased pain, fever, or any other concerns. Wound Care: N/A    Discharge to: Home in stable condition  She was asked to follow up in the office in 2 weeks. Discharge Date: 2/10/2021      SETH Hodges CNM      Comments: Follow-up care and birth control were reviewed. Signs and symptoms of mastitis and Post Partum Depression were reviewed. The patient is to notify her physician if any of these occur. The patient was counseled on secondary smoke risks and the increased risk of sudden infant death syndrome and respiratory problems to her baby with exposure. She was counseled on various alternate recommendations to decrease the exposure to secondary smoke to her children. Total time spent on discharge and coordination of care is >30 minutes. All questions were answered and the patient voiced understanding.

## 2021-02-11 LAB — RPR: NORMAL

## 2021-02-12 ENCOUNTER — HOSPITAL ENCOUNTER (OUTPATIENT)
Dept: LABOR AND DELIVERY | Age: 36
Discharge: HOME | End: 2021-02-12
Payer: MEDICARE

## 2021-02-12 PROCEDURE — G0463 HOSPITAL OUTPT CLINIC VISIT: HCPCS

## 2021-02-12 PROCEDURE — S9443 LACTATION CLASS: HCPCS

## 2021-02-12 PROCEDURE — 99078 GROUP HEALTH EDUCATION: CPT

## 2021-02-15 RX ORDER — LABETALOL 100 MG/1
50 TABLET, FILM COATED ORAL 2 TIMES DAILY
Qty: 30 TABLET | Refills: 5 | Status: SHIPPED | OUTPATIENT
Start: 2021-02-15 | End: 2021-04-13 | Stop reason: ALTCHOICE

## 2021-02-15 RX ORDER — ASPIRIN 81 MG/1
81 TABLET, CHEWABLE ORAL DAILY
Qty: 30 TABLET | Refills: 9 | Status: SHIPPED | OUTPATIENT
Start: 2021-02-15 | End: 2021-09-23

## 2021-02-16 VITALS
OXYGEN SATURATION: 99 % | HEIGHT: 60 IN | BODY MASS INDEX: 36.91 KG/M2 | DIASTOLIC BLOOD PRESSURE: 73 MMHG | WEIGHT: 188 LBS | SYSTOLIC BLOOD PRESSURE: 112 MMHG | RESPIRATION RATE: 16 BRPM | TEMPERATURE: 98.7 F | HEART RATE: 88 BPM

## 2021-02-17 NOTE — H&P
OBSTETRICAL HISTORY AND PHYSICAL    Provider:  Elpidio Lugo   Date: 2021  Time: 3:21 PM    Patient Name: Aaliyah Fleming  Patient : 1985  Room/Bed: [facility]    Primary Care Physician: Hayden Neff MD      Chief Complaint:  contractions    HPI: Aaliyah Fleming is a 38 y.o. [id number] at 37w6d weeks who presents for IOL. 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)    Moderate episode of recurrent major depressive disorder (HCC)    Decreased strength of lower extremity    Syncope and collapse    Chronic superficial gastritis    Convulsions (HCC)    Gastric reflux syndrome    Rectal bleed    Chronic migraine    History of abnormal cervical Pap smear    Rh negative status during pregnancy in second trimester, antepartum    36 weeks gestation of pregnancy    Term pregnancy     (normal spontaneous vaginal delivery)    37 weeks gestation of pregnancy       Allergies: Allergies as of 2021    (No Known Allergies)       Medications:  No current facility-administered medications on file prior to encounter.       Current Outpatient Medications on File Prior to Encounter   Medication Sig Dispense Refill    Prenatal Vit-Fe Fumarate-FA (PRENATAL VITAMIN) CHEW Take 1 tablet by mouth daily 30 tablet 11    albuterol sulfate  (90 Base) MCG/ACT inhaler INHALE 2 PUFFS INTO THE LUNGS EVERY 6 HOURS AS NEEDED FOR WHEEZING 18 g 3    venlafaxine (EFFEXOR XR) 150 MG extended release capsule TAKE 1 CAPSULE BY MOUTH EVERY DAY 30 capsule 5           Past Medical History:   Diagnosis Date    Abnormal glucose measurement  Liver Disease Neg Hx     Rectal Cancer Neg Hx        Social History     Socioeconomic History    Marital status:      Spouse name: Not on file    Number of children: Not on file    Years of education: Not on file    Highest education level: Not on file   Occupational History    Not on file   Social Needs    Financial resource strain: Not on file    Food insecurity     Worry: Not on file     Inability: Not on file    Transportation needs     Medical: Not on file     Non-medical: Not on file   Tobacco Use    Smoking status: Former Smoker    Smokeless tobacco: Former User     Quit date: 9/9/2014   Substance and Sexual Activity    Alcohol use: Not Currently     Comment: occasional, last drink 6 mo ago    Drug use: No    Sexual activity: Yes     Partners: Male   Lifestyle    Physical activity     Days per week: Not on file     Minutes per session: Not on file    Stress: Not on file   Relationships    Social connections     Talks on phone: Not on file     Gets together: Not on file     Attends Spiritism service: Not on file     Active member of club or organization: Not on file     Attends meetings of clubs or organizations: Not on file     Relationship status: Not on file    Intimate partner violence     Fear of current or ex partner: Not on file     Emotionally abused: Not on file     Physically abused: Not on file     Forced sexual activity: Not on file   Other Topics Concern    Not on file   Social History Narrative    Not on file       Review Of Systems:  A comprehensive review of systems was negative except for what was noted in the HPI.     Physical Exam:  Vitals:    02/09/21 2225 02/10/21 0203 02/10/21 0546 02/10/21 1339   BP: 116/64 104/72 101/65 112/73   Pulse: 82 72 67 88   Resp: 16 16 16 16   Temp: 98 °F (36.7 °C) 98.1 °F (36.7 °C) 97.9 °F (36.6 °C) 98.7 °F (37.1 °C)   TempSrc: Temporal Temporal Temporal Temporal   SpO2: 96% 96% 96% 99%   Weight:       Height: General appearance:  awake, alert, cooperative, no apparent distress, and appears stated age  Neurologic:  Awake, alert, oriented to name, place and time. Motor is 5 out of 5 bilaterally. Lungs:  No increased work of breathing, good air exchange, clear to auscultation bilaterally, no crackles or wheezing  Heart:  Normal apical impulse, regular rate and rhythm  Abdomen: Gravid, soft, non-tender, non-distended    Pelvic Exam:  Cervix Check:   DILATION:  4 cm   EFFACEMENT:   75%   STATION:  -2 cm   CONSISTENCY:  soft   POSITION:  mid        Lab Results:   Blood Type/Rh:    ABO/Rh   Date Value Ref Range Status   02/08/2021 O NEG  Final     Antibody Screen:    Antibody Screen   Date Value Ref Range Status   02/08/2021 NEG  Final     Hemoglobin:   Hemoglobin   Date Value Ref Range Status   02/10/2021 11.8 (L) 12.0 - 16.0 g/dL Final     Hematocrit:   Hematocrit   Date Value Ref Range Status   02/10/2021 36.9 (L) 37.0 - 47.0 % Final     Platelet Count:   Platelets   Date Value Ref Range Status   02/10/2021 276 130 - 400 K/uL Final       Assessment:  Meryle Piedmont is a 38 y.o. female  [id number] At 41w10d   IOL per MFM deliver at term  Heart Hx    Plan:  Admit for delivery  Pain management  GBS Prophylaxis  Risks, benefits, alternatives and possible complications have been discussed in detail with the patient. Admission, and post admission procedures and expectations were discussed in detail. All questions were answered.       Nan Smith MD

## 2021-02-19 ENCOUNTER — HOSPITAL ENCOUNTER (OUTPATIENT)
Dept: LABOR AND DELIVERY | Age: 36
Discharge: HOME | End: 2021-02-19
Payer: MEDICARE

## 2021-02-19 PROCEDURE — 99078 GROUP HEALTH EDUCATION: CPT

## 2021-02-19 PROCEDURE — 98960 EDU&TRN PT SELF-MGMT NQHP 1: CPT

## 2021-02-19 PROCEDURE — S9443 LACTATION CLASS: HCPCS

## 2021-02-19 PROCEDURE — G0463 HOSPITAL OUTPT CLINIC VISIT: HCPCS

## 2021-02-25 ENCOUNTER — POSTPARTUM VISIT (OUTPATIENT)
Dept: OBGYN CLINIC | Age: 36
End: 2021-02-25

## 2021-02-25 VITALS
WEIGHT: 172 LBS | DIASTOLIC BLOOD PRESSURE: 78 MMHG | SYSTOLIC BLOOD PRESSURE: 120 MMHG | HEIGHT: 60 IN | BODY MASS INDEX: 33.77 KG/M2

## 2021-02-25 DIAGNOSIS — N64.89 OTHER SPECIFIED DISORDERS OF BREAST: ICD-10-CM

## 2021-02-25 DIAGNOSIS — N63.20 LEFT BREAST LUMP: ICD-10-CM

## 2021-02-25 PROBLEM — Z67.91 RH NEGATIVE STATUS DURING PREGNANCY IN SECOND TRIMESTER, ANTEPARTUM: Status: RESOLVED | Noted: 2020-11-24 | Resolved: 2021-02-25

## 2021-02-25 PROBLEM — Z3A.36 36 WEEKS GESTATION OF PREGNANCY: Status: RESOLVED | Noted: 2021-01-30 | Resolved: 2021-02-25

## 2021-02-25 PROBLEM — O26.892 RH NEGATIVE STATUS DURING PREGNANCY IN SECOND TRIMESTER, ANTEPARTUM: Status: RESOLVED | Noted: 2020-11-24 | Resolved: 2021-02-25

## 2021-02-25 PROBLEM — Z34.90 TERM PREGNANCY: Status: RESOLVED | Noted: 2021-02-08 | Resolved: 2021-02-25

## 2021-02-25 PROCEDURE — 1111F DSCHRG MED/CURRENT MED MERGE: CPT | Performed by: ADVANCED PRACTICE MIDWIFE

## 2021-02-25 PROCEDURE — G8482 FLU IMMUNIZE ORDER/ADMIN: HCPCS | Performed by: ADVANCED PRACTICE MIDWIFE

## 2021-02-25 PROCEDURE — G8417 CALC BMI ABV UP PARAM F/U: HCPCS | Performed by: ADVANCED PRACTICE MIDWIFE

## 2021-02-25 PROCEDURE — 1036F TOBACCO NON-USER: CPT | Performed by: ADVANCED PRACTICE MIDWIFE

## 2021-02-25 PROCEDURE — G8427 DOCREV CUR MEDS BY ELIG CLIN: HCPCS | Performed by: ADVANCED PRACTICE MIDWIFE

## 2021-02-25 PROCEDURE — 0503F POSTPARTUM CARE VISIT: CPT | Performed by: ADVANCED PRACTICE MIDWIFE

## 2021-02-25 ASSESSMENT — ENCOUNTER SYMPTOMS
ALLERGIC/IMMUNOLOGIC NEGATIVE: 1
VOMITING: 1
NAUSEA: 1
EYES NEGATIVE: 1
RESPIRATORY NEGATIVE: 1

## 2021-02-25 NOTE — PATIENT INSTRUCTIONS
Patient Education        Vaginal Childbirth: Care Instructions  Overview     Vaginal birth means delivering a baby through the birth canal (vagina). During labor, the uterus tightens (contracts) regularly to thin and open the cervix and to push the baby out through the birth canal.  Your body will slowly heal in the next few weeks. It's easy to get too tired and overwhelmed during the first weeks after your baby is born. Changes in your hormones can shift your mood without warning. You may find it hard to meet the extra demands on your energy and time. Take it easy on yourself. Follow-up care is a key part of your treatment and safety. Be sure to make and go to all appointments, and call your doctor if you are having problems. It's also a good idea to know your test results and keep a list of the medicines you take. How can you care for yourself at home? Vaginal bleeding and cramps  · After delivery, you will have a bloody discharge from your vagina. This will turn pink within a week and then white or yellow after about 10 days. It may last for 2 to 4 weeks or longer, until the uterus has healed. Use pads instead of tampons until you stop bleeding. · Don't worry if you pass some blood clots, as long as they are smaller than a golf ball. If you have a tear or stitches in your vaginal area, change the pad at least every 4 hours. This will help prevent soreness and infection. · You may have cramps for the first few days after childbirth. These are normal and occur as the uterus shrinks to normal size. Take an over-the-counter pain medicine, such as acetaminophen (Tylenol), ibuprofen (Advil, Motrin), or naproxen (Aleve), for cramps. Read and follow all instructions on the label. Do not take aspirin, because it can cause more bleeding. · Do not take two or more pain medicines at the same time unless the doctor told you to. Many pain medicines have acetaminophen, which is Tylenol. Too much acetaminophen (Tylenol) can be harmful. Stitches  · If you have stitches, they will dissolve on their own and don't need to be removed. Follow your doctor's instructions for cleaning the stitched area. · Put ice or a cold pack on your painful area for 10 to 20 minutes at a time, several times a day, for the first few days. Put a thin cloth between the ice and your skin. · Sit in a few inches of warm water (sitz bath) 3 times a day and after bowel movements. The warm water helps with pain and itching. If you don't have a tub, a warm shower might help. Breast fullness  · Your breasts may overfill (engorge) in the first few days after delivery. To help milk flow and to relieve pain, warm your breasts in the shower or by using warm, moist towels before nursing. · If you aren't nursing, don't put warmth on your breasts or touch your breasts. Wear a tight bra or sports bra and use ice until the fullness goes away. This usually takes 2 to 3 days. · Put ice or a cold pack on your breast after nursing to reduce swelling and pain. Put a thin cloth between the ice and your skin. Activity  · Eat a balanced diet. Don't try to lose weight by cutting calories. Keep taking your prenatal vitamins, or take a multivitamin. · Get as much rest as you can. Try to take naps when your baby sleeps during the day. · Get some exercise every day. But don't do any heavy exercise until your doctor says it is okay. · Wait until you are healed (about 4 to 6 weeks) before you have sexual intercourse. Your doctor will tell you when it is okay to have sex. · Talk to your doctor about birth control. You can get pregnant even before your period returns. Also, you can get pregnant while you are breastfeeding.   Mental health · It's normal to have some sadness, anxiety, sleeplessness, and mood swings after you go home. If you feel upset or hopeless for more than a few days or are having trouble doing the things you need to do, talk to your doctor. Constipation and hemorrhoids  · Drink plenty of fluids, enough so that your urine is light yellow or clear like water. If you have kidney, heart, or liver disease and have to limit fluids, talk with your doctor before you increase the amount of fluids you drink. · Eat plenty of fiber each day. Have a bran muffin or bran cereal for breakfast. Try eating a piece of fruit for a mid-afternoon snack. · For painful, itchy hemorrhoids, put ice or a cold pack on the area several times a day for 10 minutes at a time. Follow this by putting a warm compress on the area for another 10 to 20 minutes or by sitting in a shallow, warm bath. When should you call for help? Call  911 anytime you think you may need emergency care. For example, call if:    · You have thoughts of harming yourself, your baby, or another person.     · You passed out (lost consciousness).     · You have chest pain, are short of breath, or cough up blood.     · You have a seizure. Call your doctor now or seek immediate medical care if:    · You have severe vaginal bleeding.     · You are dizzy or lightheaded, or you feel like you may faint.     · You have a fever.     · You have new or more pain in your belly or pelvis.     · You have symptoms of a blood clot in your leg (called a deep vein thrombosis), such as:  ? Pain in the calf, back of the knee, thigh, or groin. ? Redness and swelling in your leg or groin.     · You have signs of preeclampsia, such as:  ? Sudden swelling of your face, hands, or feet. ? New vision problems (such as dimness, blurring, or seeing spots). ? A severe headache.    Watch closely for changes in your health, and be sure to contact your doctor if:   · Your vaginal bleeding seems to be getting heavier.     · You have new or worse vaginal discharge.     · You feel sad, anxious, or hopeless for more than a few days.     · You do not get better as expected. Where can you learn more? Go to https://harlan.healthWine Nation. org and sign in to your UrbanBound account. Enter V747 in the Yummy Food box to learn more about \"Vaginal Childbirth: Care Instructions. \"     If you do not have an account, please click on the \"Sign Up Now\" link. Current as of: 2020               Content Version: 12.6  © 8954-3644 [employer], [employer]. Care instructions adapted under license by [facility] ([facility]). If you have questions about a medical condition or this instruction, always ask your healthcare professional. [address] any warranty or liability for your use of this information. Patient Education        After Your Delivery (the Postpartum Period): Care Instructions  Your Care Instructions     Congratulations on the birth of your baby. Like pregnancy, the  period can be a time of excitement, toni, and exhaustion. You may look at your wondrous little baby and feel happy. You may also be overwhelmed by your new sleep hours and new responsibilities. At first, babies often sleep during the days and are awake at night. They do not have a pattern or routine. They may make sudden gasps, jerk themselves awake, or look like they have crossed eyes. These are all normal, and they may even make you smile. In these first weeks after delivery, try to take good care of yourself. It may take 4 to 6 weeks to feel like yourself again, and possibly longer if you had a  birth. You will likely feel very tired for several weeks. Your days will be full of ups and downs, but lots of toni as well. Follow-up care is a key part of your treatment and safety. Be sure to make and go to all appointments, and call your doctor if you are having problems. It's also a good idea to know your test results and keep a list of the medicines you take. How can you care for yourself at home? Take care of your body after delivery  · Use pads instead of tampons for the bloody flow that may last as long as 2 weeks. · Ease cramps with ibuprofen (Advil, Motrin). · Ease soreness of hemorrhoids and the area between your vagina and rectum with ice compresses or witch hazel pads. · Ease constipation by drinking lots of fluid and eating high-fiber foods. Ask your doctor about over-the-counter stool softeners. · Cleanse yourself with a gentle squeeze of warm water from a bottle instead of wiping with toilet paper. · Take a sitz bath in warm water several times a day. · Wear a good nursing bra. Ease sore and swollen breasts with warm, wet washcloths. · If you are not breastfeeding, use ice rather than heat for breast soreness. · Your period may not start for several months if you are breastfeeding. You may bleed more, and longer at first, than you did before you got pregnant. · Wait until you are healed (about 4 to 6 weeks) before you have sexual intercourse. Your doctor will tell you when it is okay to have sex. · Try not to travel with your baby for 5 or 6 weeks. If you take a long car trip, make frequent stops to walk around and stretch. Avoid exhaustion  · Rest every day. Try to nap when your baby naps. · Ask another adult to be with you for a few days after delivery. · Plan for  if you have other children. · Stay flexible so you can eat at odd hours and sleep when you need to. Both you and your baby are making new schedules. · Plan small trips to get out of the house. Change can make you feel less tired. · Ask for help with housework, cooking, and shopping. Remind yourself that your job is to care for your baby. Know about help for postpartum depression  · \"Baby blues\" are common for the first 1 to 2 weeks after birth. You may cry or feel sad or irritable for no reason. · Rest whenever you can. Being tired makes it harder to handle your emotions. · Go for walks with your baby. · Talk to your partner, friends, and family about your feelings. · If your symptoms last for more than a few weeks, or if you feel very depressed, ask your doctor for help. · Postpartum depression can be treated. Support groups and counseling can help. Sometimes medicine can also help. Stay healthy  · Eat healthy foods so you have more energy and lose extra baby pounds. · If you breastfeed, avoid drugs. If you quit smoking during pregnancy, try to stay smoke-free. If you choose to have a drink now and then, have only one drink, and limit the number of occasions that you have a drink. Wait to breastfeed at least 2 hours after you have a drink to reduce the amount of alcohol the baby may get in the milk. · Start daily exercise after 4 to 6 weeks, but rest when you feel tired. · Learn exercises to tone your belly. Do Kegel exercises to regain strength in your pelvic muscles. You can do these exercises while you stand or sit. ? Squeeze the same muscles you would use to stop your urine. Your belly and thighs should not move. ? Hold the squeeze for 3 seconds, and then relax for 3 seconds. ? Start with 3 seconds. Then add 1 second each week until you are able to squeeze for 10 seconds. ? Repeat the exercise 10 to 15 times for each session. Do three or more sessions each day. · Find a class for new mothers and new babies that has an exercise time. · If you had a  birth, give yourself a bit more time before you exercise, and be careful. When should you call for help? Call  911 anytime you think you may need emergency care. For example, call if:    · You have thoughts of harming yourself, your baby, or another person.     · You passed out (lost consciousness).     · You have chest pain, are short of breath, or cough up blood.     · You have a seizure. Call your doctor now or seek immediate medical care if:    · You have severe vaginal bleeding. This means you are passing blood clots and soaking through a pad each hour for 2 or more hours.     · You are dizzy or lightheaded, or you feel like you may faint.     · You have a fever.     · You have new or more belly pain.     · You have signs of a blood clot in your leg (called a deep vein thrombosis), such as:  ? Pain in the calf, back of the knee, thigh, or groin. ? Redness and swelling in your leg or groin.     · You have signs of preeclampsia, such as:  ? Sudden swelling of your face, hands, or feet. ? New vision problems (such as dimness, blurring, or seeing spots). ? A severe headache. Watch closely for changes in your health, and be sure to contact your doctor if:    · Your vaginal bleeding seems to be getting heavier.     · You have new or worse vaginal discharge.     · You feel sad, anxious, or hopeless for more than a few days.     · You do not get better as expected. Where can you learn more? Go to https://DA Relm Collectiblesgabriel.ClickMagic. org and sign in to your Leftronic account. Enter H723 in the [facility] box to learn more about \"After Your Delivery (the Postpartum Period): Care Instructions. \"     If you do not have an account, please click on the \"Sign Up Now\" link. Current as of: February 11, 2020               Content Version: 12.6  © 6607-5470 [employer]. Care instructions adapted under license by [facility] ([facility]). If you have questions about a medical condition or this instruction, always ask your healthcare professional. [address] any warranty or liability for your use of this information. Patient Education        Depression After Childbirth: Care Instructions  Your Care Instructions     Many women get the \"baby blues\" during the first few days after childbirth. You may lose sleep, feel irritable, and cry easily. You may feel happy one minute and sad the next. Hormone changes are one cause of these emotional changes. Also, the demands of a new baby, along with visits from relatives or other family needs, add to a mother's stress. The \"baby blues\" often peak around the fourth day. Then they ease up in less than 2 weeks. If your moodiness or anxiety lasts for more than 2 weeks, or if you feel like life is not worth living, you may have postpartum depression. This is different for each mother. Some mothers with serious depression may worry intensely about their infant's well-being. Others may feel distant from their child. Some mothers might even feel that they might harm their baby. A mother may have signs of paranoia, wondering if someone is watching her. Depression is not a sign of weakness. It is a medical condition that requires treatment. Medicine and counseling often work well to reduce depression. Talk to your doctor about taking antidepressant medicine while breastfeeding. Follow-up care is a key part of your treatment and safety. Be sure to make and go to all appointments, and call your doctor if you are having problems. It's also a good idea to know your test results and keep a list of the medicines you take. How do you know if you are depressed? With all the changes in your life, you may not know if you are depressed. Pregnancy sometimes causes changes in how you feel that are similar to the symptoms of depression. Symptoms of depression include:  · Feeling sad or hopeless and losing interest in daily activities. These are the most common symptoms of depression. · Sleeping too much or not enough. · Feeling tired. You may feel as if you have no energy. · Eating too much or too little. · Writing or talking about death, such as writing suicide notes or talking about guns, knives, or pills. Keep the numbers for these national suicide hotlines: -TALK (4-517.645.7111) and 9-535-RTHSOIO (6-664.931.4959). If you or someone you know talks about suicide or feeling hopeless, get help right away. How can you care for yourself at home? · Be safe with medicines. Take your medicines exactly as prescribed. Call your doctor if you think you are having a problem with your medicine. · Eat a healthy diet so that you can keep up your energy. · Get regular daily exercise, such as walks, to help improve your mood. · Get as much sunlight as possible. Keep your shades and curtains open. Get outside as much as you can. · Avoid using alcohol or other substances to feel better. · Get as much rest and sleep as possible. Avoid doing too much. Being too tired can increase depression. · Play stimulating music throughout your day and soothing music at night. · Schedule outings and visits with friends and family. Ask them to call you regularly, so that you do not feel alone. · Ask for help with preparing food and other daily tasks. Family and friends are often happy to help a mother with a . · Be honest with yourself and those who care about you. Tell them about your struggle. · Join a support group of new mothers. No one can better understand the challenges of caring for a  than other new mothers. · If you feel like life is not worth living or are feeling hopeless, get help right away. Keep the numbers for these national suicide hotlines: -TALK (5-500.334.3655) and 6-997-UBIUJJO (4-141.103.2364). When should you call for help? Call 911 anytime you think you may need emergency care. For example, call if:    · You feel you cannot stop from hurting yourself, your baby, or someone else. Call your doctor now or seek immediate medical care if:    · You are having trouble caring for yourself or your baby.     · You hear voices. Watch closely for changes in your health, and be sure to contact your doctor if:    · You have problems with your depression medicine.     · You do not get better as expected. Where can you learn more? Go to https://Zigswitchpepiceweb.Innovation Fuels. org and sign in to your Dovetail account. Enter R296 in the brands4friends box to learn more about \"Depression After Childbirth: Care Instructions. \"     If you do not have an account, please click on the \"Sign Up Now\" link. Current as of: January 31, 2020               Content Version: 12.6  © 5566-9925 [employer]. Care instructions adapted under license by [facility] ([facility]). If you have questions about a medical condition or this instruction, always ask your healthcare professional. [place] [address] any warranty or liability for your use of this information. Patient Education        Breastfeeding: Care Instructions  Overview     Breastfeeding has many benefits. It may lower your baby's chances of getting an infection. It also may make it less likely that your baby will have problems such as diabetes and obesity later in life. Breastfeeding also helps you bond with your baby. In the first days after birth, your breasts make a thick, yellow liquid called colostrum. This liquid gives your baby nutrients and antibodies against infection. It is all that babies need in the first days after birth. Your breasts will fill with milk a few days after the birth. ? Support and narrow your breast with one hand using a \"U hold,\" with your thumb on the outer side of your breast and your fingers on the inner side. You can also use a \"C hold,\" with all your fingers below the nipple and your thumb above it. Try the different holds to get the deepest latch for whichever breastfeeding position you use. Your other arm is behind your baby's back, with your hand supporting the base of the baby's head. Position your fingers and thumb to point toward your baby's ears. ? You can touch your baby's lower lip with your nipple to get your baby to open his or her mouth. Wait until your baby opens up really wide, like a big yawn. Then be sure to bring the baby quickly to your breastnot your breast to the baby. As you bring your baby toward your breast, use your other hand to support the breast and guide it into his or her mouth. ? Both the nipple and a large portion of the darker area around the nipple (areola) should be in the baby's mouth. The baby's lips should be flared outward, not folded in (inverted). ? Listen for a regular sucking and swallowing pattern while the baby is feeding. If you cannot see or hear a swallowing pattern, watch the baby's ears, which will wiggle slightly when the baby swallows. If the baby's nose appears to be blocked by your breast, bring your baby's body closer to you. This will help tilt the baby's head back slightly, so just the edge of one nostril is clear for breathing. ? When your baby is latched, you can usually remove your hand from supporting your breast and bring it under your baby to cradle him or her. Now just relax and breastfeed your baby. · You will know that your baby is feeding well when:  ? His or her mouth covers a lot of the areola, and the lips are flared out.  ? His or her chin and nose rest against your breast.  ? Sucking is deep and rhythmic, with short pauses. ? You are able to see and hear your baby swallowing. ? You do not feel pain in your nipple. · Offer both breasts to your baby at each feeding. Each time you breastfeed, switch which breast you start with. · Anytime you need to remove your baby from the breast, put one finger in the corner of his or her mouth. Push your finger between your baby's gums to gently break the seal. If you do not break the tight seal before you remove your baby, your nipples can become sore, cracked, or bruised. · After feeding your baby, gently pat his or her back to let out any swallowed air. After your baby burps, offer the breast again, or offer the other breast. Sometimes a baby will want to keep feeding after being burped. When should you call for help? Call your doctor now or seek immediate medical care if:    · You have symptoms of a breast infection, such as:  ? Increased pain, swelling, redness, or warmth around a breast.  ? Red streaks extending from the breast.  ? Pus draining from a breast.  ? A fever.     · Your baby has no wet diapers for 6 hours. Watch closely for changes in your health, and be sure to contact your doctor if:    · Your baby has trouble latching on to your breast.     · You continue to have pain or discomfort when breastfeeding.     · You have other questions or concerns. Where can you learn more? Go to https://AGV Media.irisnote. org and sign in to your Nubefy account. Enter P492 in the [facility] box to learn more about \"Breastfeeding: Care Instructions. \"     If you do not have an account, please click on the \"Sign Up Now\" link. Current as of: February 11, 2020               Content Version: 12.6  © 8131-9228 [employer]. Care instructions adapted under license by [facility] ([facility]). If you have questions about a medical condition or this instruction, always ask your healthcare professional. [place] [address] any warranty or liability for your use of this information.

## 2021-02-25 NOTE — PROGRESS NOTES
[facility] BENJAMÍN NUÑEZ OB/GYN  CNM Office Note    Francesca Levin is a 38 y.o. female who presents today for her medical conditions/ complaints as noted below. Chief Complaint   Patient presents with    Postpartum Care     patient presents today for her 2 week PP         HPI  Opel presents for 2 week PP visit. She reports minimal bleeding, no breast symptoms, stable mood, and minimal pain. She c/o begin tired, otherwise is transitioning well. 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 (Menstrual status: Postpartum).   [id number]    Past Medical History:   Diagnosis Date    Abnormal glucose measurement     Abnormal Pap smear of cervix     Anxiety     Blood circulation, collateral     CAD (coronary artery disease)     Cerebral artery occlusion with cerebral infarction (HCC)     Complication of anesthesia     difficulty waking    Depression     Heart disease     IBS (irritable bowel syndrome)     MDD (major depressive disorder)     [facility]) [facility] [address]     Miscarriage 06/2012    Neurologic disorder     Postpartum depression     Schizophrenia ([facility] [address]     Seizures (HCC)     anxiety related    Syncope     UTI (urinary tract infection) 12/09/2019     Past Surgical History:   Procedure Laterality Date    CHOLECYSTECTOMY, LAPAROSCOPIC      COLONOSCOPY N/A 11/7/2019    Dr Tayo Hill, 10 yr recall  EGD  2016    [facility]      lower right leg, has metal plate and screws    FRACTURE SURGERY      left wrist    INSERTABLE CARDIAC MONITOR      loop recorder    INTRAUTERINE DEVICE INSERTION  06/26/2016    Essure placement    UPPER GASTROINTESTINAL ENDOSCOPY N/A 11/7/2019    Dr Palmira Baxter     Family History   Problem Relation Age of Onset    High Blood Pressure Mother     Diabetes Father     Heart Disease Father     Stomach Cancer Father     Breast Cancer Other         maternal great aunt    Colon Cancer Paternal Grandmother     Colon Polyps Neg Hx     Esophageal Cancer Neg Hx     Liver Cancer Neg Hx     Liver Disease Neg Hx     Rectal Cancer Neg Hx      Social History     Tobacco Use    Smoking status: Former Smoker    Smokeless tobacco: Former User     Quit date: 9/9/2014   Substance Use Topics    Alcohol use: Not Currently     Comment: occasional, last drink 6 mo ago       Current Outpatient Medications   Medication Sig Dispense Refill    labetalol (NORMODYNE) 100 MG tablet Take 0.5 tablets by mouth 2 times daily (Patient not taking: Reported on 2/25/2021) 30 tablet 5    aspirin (ASPIRIN CHILDRENS) 81 MG chewable tablet Take 1 tablet by mouth daily (Patient not taking: Reported on 2/25/2021) 30 tablet 9    Prenatal Vit-Fe Fumarate-FA (PRENATAL VITAMIN) CHEW Take 1 tablet by mouth daily (Patient not taking: Reported on 2/25/2021) 30 tablet 11    albuterol sulfate  (90 Base) MCG/ACT inhaler INHALE 2 PUFFS INTO THE LUNGS EVERY 6 HOURS AS NEEDED FOR WHEEZING 18 g 3    venlafaxine (EFFEXOR XR) 150 MG extended release capsule TAKE 1 CAPSULE BY MOUTH EVERY DAY (Patient not taking: Reported on 2/25/2021) 30 capsule 5     No current facility-administered medications for this visit. No Known Allergies  Vitals:    02/25/21 1018   BP: 120/78     Body mass index is 33.59 kg/m². Review of Systems   Constitutional: Negative. HENT: Negative. Eyes: Negative. Respiratory: Negative. Cardiovascular: Negative. Gastrointestinal: Positive for nausea and vomiting. Endocrine: Negative. Genitourinary: Negative. Musculoskeletal: Negative. Skin: Negative. Allergic/Immunologic: Negative. Neurological: Negative. Hematological: Negative. Psychiatric/Behavioral: Negative. Physical Exam  Constitutional:       Appearance: She is well-developed. She is not diaphoretic. HENT:      Head: Normocephalic and atraumatic. Nose: Nose normal.   Eyes:      Conjunctiva/sclera: Conjunctivae normal.      Pupils: Pupils are equal, round, and reactive to light. Neck:      Musculoskeletal: Normal range of motion and neck supple. Thyroid: No thyromegaly. Trachea: No tracheal deviation. Pulmonary:      Effort: Pulmonary effort is normal. No respiratory distress. Musculoskeletal: Normal range of motion. Comments: Normal ROM for upper and lower extremities. Gait steady. Skin:     General: Skin is warm and dry. Findings: No lesion or rash. Neurological:      Mental Status: She is alert and oriented to person, place, and time. Psychiatric:         Speech: Speech normal.         Behavior: Behavior normal.          Diagnosis Orders   1. 2 weeks postpartum follow-up  DE EVAL SELF-ASSESS DEPRESSION   2. Left breast lump     3. Other specified disorders of breast          MEDICATIONS:  No orders of the defined types were placed in this encounter. ORDERS:  Orders Placed This Encounter   Procedures    DE EVAL SELF-ASSESS DEPRESSION       PLAN:  1.  PP - continue PP care, PCP visit within next 4 weeks, mammogram ordered for hx of breast lump

## 2021-02-25 NOTE — PROGRESS NOTES
The patient returns for her post-partum visit. All information below was reviewed with her.     Visit Reason:  Post-Partum Visit       Baby's Name: Armando Alvarez       Delivery Date: 2/9/2021        Type of Delivery: vaginal       Feeding: breast and formula       LMP: unknown       Contraceptive Choices: no sex       Last PAP: 8/11/2020       Depression: \"always\"    Problems: She is not on her medicine still, still can't keep it down

## 2021-03-12 ENCOUNTER — TELEPHONE (OUTPATIENT)
Dept: CARDIOLOGY CLINIC | Age: 36
End: 2021-03-12

## 2021-03-16 DIAGNOSIS — R00.0 TACHYCARDIA: ICD-10-CM

## 2021-03-16 DIAGNOSIS — R55 SYNCOPE, UNSPECIFIED SYNCOPE TYPE: ICD-10-CM

## 2021-03-16 DIAGNOSIS — Z95.818 STATUS POST PLACEMENT OF IMPLANTABLE LOOP RECORDER: Primary | ICD-10-CM

## 2021-03-16 PROCEDURE — 93298 REM INTERROG DEV EVAL SCRMS: CPT | Performed by: CLINICAL NURSE SPECIALIST

## 2021-03-16 PROCEDURE — G2066 INTER DEVC REMOTE 30D: HCPCS | Performed by: CLINICAL NURSE SPECIALIST

## 2021-03-23 ENCOUNTER — POSTPARTUM VISIT (OUTPATIENT)
Dept: OBGYN CLINIC | Age: 36
End: 2021-03-23
Payer: MEDICARE

## 2021-03-23 VITALS
WEIGHT: 175 LBS | HEART RATE: 69 BPM | HEIGHT: 60 IN | DIASTOLIC BLOOD PRESSURE: 76 MMHG | BODY MASS INDEX: 34.36 KG/M2 | SYSTOLIC BLOOD PRESSURE: 109 MMHG

## 2021-03-23 DIAGNOSIS — F32.4 MAJOR DEPRESSIVE DISORDER WITH SINGLE EPISODE, IN PARTIAL REMISSION (HCC): ICD-10-CM

## 2021-03-23 PROCEDURE — G8482 FLU IMMUNIZE ORDER/ADMIN: HCPCS | Performed by: ADVANCED PRACTICE MIDWIFE

## 2021-03-23 PROCEDURE — 1036F TOBACCO NON-USER: CPT | Performed by: ADVANCED PRACTICE MIDWIFE

## 2021-03-23 PROCEDURE — G8417 CALC BMI ABV UP PARAM F/U: HCPCS | Performed by: ADVANCED PRACTICE MIDWIFE

## 2021-03-23 PROCEDURE — 99213 OFFICE O/P EST LOW 20 MIN: CPT | Performed by: ADVANCED PRACTICE MIDWIFE

## 2021-03-23 PROCEDURE — G8427 DOCREV CUR MEDS BY ELIG CLIN: HCPCS | Performed by: ADVANCED PRACTICE MIDWIFE

## 2021-03-23 RX ORDER — VENLAFAXINE HYDROCHLORIDE 75 MG/1
75 CAPSULE, EXTENDED RELEASE ORAL DAILY
Qty: 90 CAPSULE | Refills: 1 | Status: SHIPPED | OUTPATIENT
Start: 2021-03-23 | End: 2021-05-10

## 2021-03-23 ASSESSMENT — ENCOUNTER SYMPTOMS
ALLERGIC/IMMUNOLOGIC NEGATIVE: 1
EYES NEGATIVE: 1
GASTROINTESTINAL NEGATIVE: 1
RESPIRATORY NEGATIVE: 1

## 2021-03-23 NOTE — PROGRESS NOTES
The patient returns for her post-partum visit. All information below was reviewed with her.     Visit Reason:  Post-Partum Visit       Baby's Name: Matthew Rivers       Delivery Date: 2/9/2021        Type of Delivery: vaginal       Feeding: breast and formula       LMP: unknown       Contraceptive Choices: no sex, supposed to be discussing getting a hysterectomy today?        Last PAP: 8/11/2020       Depression: \"always\"     Problems: no

## 2021-03-23 NOTE — PATIENT INSTRUCTIONS
Patient Education        After Your Delivery (the Postpartum Period): Care Instructions  Your Care Instructions     Congratulations on the birth of your baby. Like pregnancy, the  period can be a time of excitement, toni, and exhaustion. You may look at your wondrous little baby and feel happy. You may also be overwhelmed by your new sleep hours and new responsibilities. At first, babies often sleep during the days and are awake at night. They do not have a pattern or routine. They may make sudden gasps, jerk themselves awake, or look like they have crossed eyes. These are all normal, and they may even make you smile. In these first weeks after delivery, try to take good care of yourself. It may take 4 to 6 weeks to feel like yourself again, and possibly longer if you had a  birth. You will likely feel very tired for several weeks. Your days will be full of ups and downs, but lots of toni as well. Follow-up care is a key part of your treatment and safety. Be sure to make and go to all appointments, and call your doctor if you are having problems. It's also a good idea to know your test results and keep a list of the medicines you take. How can you care for yourself at home? Take care of your body after delivery  · Use pads instead of tampons for the bloody flow that may last as long as 2 weeks. · Ease cramps with ibuprofen (Advil, Motrin). · Ease soreness of hemorrhoids and the area between your vagina and rectum with ice compresses or witch hazel pads. · Ease constipation by drinking lots of fluid and eating high-fiber foods. Ask your doctor about over-the-counter stool softeners. · Cleanse yourself with a gentle squeeze of warm water from a bottle instead of wiping with toilet paper. · Take a sitz bath in warm water several times a day. · Wear a good nursing bra. Ease sore and swollen breasts with warm, wet washcloths.   · If you are not breastfeeding, use ice rather than heat for breast soreness. · Your period may not start for several months if you are breastfeeding. You may bleed more, and longer at first, than you did before you got pregnant. · Wait until you are healed (about 4 to 6 weeks) before you have sexual intercourse. Your doctor will tell you when it is okay to have sex. · Try not to travel with your baby for 5 or 6 weeks. If you take a long car trip, make frequent stops to walk around and stretch. Avoid exhaustion  · Rest every day. Try to nap when your baby naps. · Ask another adult to be with you for a few days after delivery. · Plan for  if you have other children. · Stay flexible so you can eat at odd hours and sleep when you need to. Both you and your baby are making new schedules. · Plan small trips to get out of the house. Change can make you feel less tired. · Ask for help with housework, cooking, and shopping. Remind yourself that your job is to care for your baby. Know about help for postpartum depression  · \"Baby blues\" are common for the first 1 to 2 weeks after birth. You may cry or feel sad or irritable for no reason. · Rest whenever you can. Being tired makes it harder to handle your emotions. · Go for walks with your baby. · Talk to your partner, friends, and family about your feelings. · If your symptoms last for more than a few weeks, or if you feel very depressed, ask your doctor for help. · Postpartum depression can be treated. Support groups and counseling can help. Sometimes medicine can also help. Stay healthy  · Eat healthy foods so you have more energy and lose extra baby pounds. · If you breastfeed, avoid drugs. If you quit smoking during pregnancy, try to stay smoke-free. If you choose to have a drink now and then, have only one drink, and limit the number of occasions that you have a drink. Wait to breastfeed at least 2 hours after you have a drink to reduce the amount of alcohol the baby may get in the milk.   · Start daily exercise after 4 to 6 weeks, but rest when you feel tired. · Learn exercises to tone your belly. Do Kegel exercises to regain strength in your pelvic muscles. You can do these exercises while you stand or sit. ? Squeeze the same muscles you would use to stop your urine. Your belly and thighs should not move. ? Hold the squeeze for 3 seconds, and then relax for 3 seconds. ? Start with 3 seconds. Then add 1 second each week until you are able to squeeze for 10 seconds. ? Repeat the exercise 10 to 15 times for each session. Do three or more sessions each day. · Find a class for new mothers and new babies that has an exercise time. · If you had a  birth, give yourself a bit more time before you exercise, and be careful. When should you call for help? Call  911 anytime you think you may need emergency care. For example, call if:    · You have thoughts of harming yourself, your baby, or another person.     · You passed out (lost consciousness).     · You have chest pain, are short of breath, or cough up blood.     · You have a seizure. Call your doctor now or seek immediate medical care if:    · You have severe vaginal bleeding. This means you are passing blood clots and soaking through a pad each hour for 2 or more hours.     · You are dizzy or lightheaded, or you feel like you may faint.     · You have a fever.     · You have new or more belly pain.     · You have signs of a blood clot in your leg (called a deep vein thrombosis), such as:  ? Pain in the calf, back of the knee, thigh, or groin. ? Redness and swelling in your leg or groin.     · You have signs of preeclampsia, such as:  ? Sudden swelling of your face, hands, or feet. ? New vision problems (such as dimness, blurring, or seeing spots). ? A severe headache.    Watch closely for changes in your health, and be sure to contact your doctor if:    · Your vaginal bleeding seems to be getting heavier.     · You have new or worse vaginal discharge.     · You feel sad, anxious, or hopeless for more than a few days.     · You do not get better as expected. Where can you learn more? Go to https://chpelashay.[employer]. org and sign in to your [employer] account. Enter Y071 in the [facility] box to learn more about \"After Your Delivery (the Postpartum Period): Care Instructions. \"     If you do not have an account, please click on the \"Sign Up Now\" link. Current as of: October 8, 2020               Content Version: 12.8  © 4045-5176 [employer]. Care instructions adapted under license by [facility] ([facility]). If you have questions about a medical condition or this instruction, always ask your healthcare professional. [place] [address] any warranty or liability for your use of this information. Patient Education        Depression After Childbirth: Care Instructions  Overview     Many women get the \"baby blues\" during the first few days after childbirth. You may lose sleep, feel irritable, and cry easily. You may feel happy one minute and sad the next. Hormone changes are one cause of these emotional changes. Also, the demands of a new baby, along with visits from relatives or other family needs, can add to the stress. The \"baby blues\" often peak around the fourth day. Then they ease up in less than 2 weeks. If your moodiness or anxiety lasts for more than 2 weeks, or if you feel like life is not worth living, you may have postpartum depression. This is different for each person. Some mothers with serious depression may worry intensely about their infant's well-being. Others may feel distant from their child. Some mothers may even feel that they might harm their baby. Some may have signs of paranoia, wondering if someone is watching them. Depression is not a sign of weakness. It's a medical condition that requires treatment. Medicine and counseling often work well to reduce depression.  Talk to your doctor about taking antidepressant medicine while breastfeeding. Follow-up care is a key part of your treatment and safety. Be sure to make and go to all appointments, and call your doctor if you are having problems. It's also a good idea to know your test results and keep a list of the medicines you take. How do you know if you are depressed? With all the changes in your life, you may not know if you are depressed. Pregnancy sometimes causes changes in how you feel that are similar to the symptoms of depression. Symptoms of depression include:  · Feeling sad or hopeless and losing interest in daily activities. These are the most common symptoms of depression. · Sleeping too much or not enough. · Feeling tired. You may feel as if you have no energy. · Eating too much or too little. · Writing or talking about death, such as writing suicide notes or talking about guns, knives, or pills. Keep the numbers for these national suicide hotlines: 4-392-325-TALK (0-667.936.1544) and 4-067-ASTWGBR (7-375.661.7166). If you or someone you know talks about suicide or feeling hopeless, get help right away. How can you care for yourself at home? · Be safe with medicines. Take your medicines exactly as prescribed. Call your doctor if you think you are having a problem with your medicine. · Eat a healthy diet so that you can keep up your energy. · Get regular daily exercise, such as walks, to help improve your mood. · Get as much sunlight as possible. Keep your shades and curtains open. Get outside as much as you can. · Avoid using alcohol or other substances to feel better. · Get as much rest and sleep as possible. Avoid doing too much. Being too tired can increase depression. · Play stimulating music throughout your day and soothing music at night. · Schedule outings and visits with friends and family. Ask them to call you regularly, so that you don't feel alone.   · Ask for help with preparing food and other daily and obesity later in life. Breastfeeding also helps you bond with your baby. In the first days after birth, your breasts make a thick, yellow liquid called colostrum. This liquid gives your baby nutrients and antibodies against infection. It is all that babies need in the first days after birth. Your breasts will fill with milk a few days after the birth. Breastfeeding is a skill that gets better with practice. Be patient with yourself and your baby. If you have trouble, you can get help and keep breastfeeding. Follow-up care is a key part of your treatment and safety. Be sure to make and go to all appointments, and call your doctor if you are having problems. It's also a good idea to know your test results and keep a list of the medicines you take. How can you care for yourself at home? · Breastfeed your baby whenever he or she is hungry. In the first 2 weeks, your baby will breastfeed at least 8 times in a 24-hour period. This will help you keep up your supply of milk. Signs that your baby is hungry include:  ? Sucking on his or her hands. ? [place] his or her lips. ? Turning his or her head toward your breast.  · Put a bed pillow or a nursing pillow on your lap to support your arms and your baby. · Hold your baby in a comfortable position. ? You can hold your baby in several ways. One of the most common positions is the cradle hold. One arm supports your baby, with his or her head in the bend of your elbow. Your open hand supports your baby's bottom or back. Your baby's belly lies against yours. ? If you had your baby by , or , try the football hold. This position keeps your baby off your belly. Tuck your baby under your arm, with his or her body along the side you will be feeding on. Support your baby's upper body with your arm. With that hand you can control your baby's head to bring his or her mouth to your breast.  ? Try different positions with each feeding.  If you are having problems, ask for help from your doctor or a lactation consultant. · To get your baby to latch on:  ? Support and narrow your breast with one hand using a \"U hold,\" with your thumb on the outer side of your breast and your fingers on the inner side. You can also use a \"C hold,\" with all your fingers below the nipple and your thumb above it. Try the different holds to get the deepest latch for whichever breastfeeding position you use. Your other arm is behind your baby's back, with your hand supporting the base of the baby's head. Position your fingers and thumb to point toward your baby's ears. ? You can touch your baby's lower lip with your nipple to get your baby to open his or her mouth. Wait until your baby opens up really wide, like a big yawn. Then be sure to bring the baby quickly to your breastnot your breast to the baby. As you bring your baby toward your breast, use your other hand to support the breast and guide it into his or her mouth. ? Both the nipple and a large portion of the darker area around the nipple (areola) should be in the baby's mouth. The baby's lips should be flared outward, not folded in (inverted). ? Listen for a regular sucking and swallowing pattern while the baby is feeding. If you cannot see or hear a swallowing pattern, watch the baby's ears, which will wiggle slightly when the baby swallows. If the baby's nose appears to be blocked by your breast, bring your baby's body closer to you. This will help tilt the baby's head back slightly, so just the edge of one nostril is clear for breathing. ? When your baby is latched, you can usually remove your hand from supporting your breast and bring it under your baby to cradle him or her. Now just relax and breastfeed your baby. · You will know that your baby is feeding well when:  ? His or her mouth covers a lot of the areola, and the lips are flared out.  ?  His or her chin and nose rest against your breast.  ? Sucking is deep and rhythmic, with short pauses. ? You are able to see and hear your baby swallowing. ? You do not feel pain in your nipple. · Offer both breasts to your baby at each feeding. Each time you breastfeed, switch which breast you start with. · Anytime you need to remove your baby from the breast, put one finger in the corner of his or her mouth. Push your finger between your baby's gums to gently break the seal. If you do not break the tight seal before you remove your baby, your nipples can become sore, cracked, or bruised. · After feeding your baby, gently pat his or her back to let out any swallowed air. After your baby burps, offer the breast again, or offer the other breast. Sometimes a baby will want to keep feeding after being burped. When should you call for help? Call your doctor now or seek immediate medical care if:    · You have symptoms of a breast infection, such as:  ? Increased pain, swelling, redness, or warmth around a breast.  ? Red streaks extending from the breast.  ? Pus draining from a breast.  ? A fever.     · Your baby has no wet diapers for 6 hours. Watch closely for changes in your health, and be sure to contact your doctor if:    · Your baby has trouble latching on to your breast.     · You continue to have pain or discomfort when breastfeeding.     · You have other questions or concerns. Where can you learn more? Go to https://Wetpaintpebritteb.Tintri. org and sign in to your [employer] account. Enter P492 in the [facility] box to learn more about \"Breastfeeding: Care Instructions. \"     If you do not have an account, please click on the \"Sign Up Now\" link. Current as of: October 8, 2020               Content Version: 12.8  © 7762-9402 Healthwise, Incorporated. Care instructions adapted under license by [facility] ([facility]).  If you have questions about a medical condition or this instruction, always ask your healthcare professional. Celso Tejada any caffeine is safe for you. Limit alcohol. Alcohol can pass through breast milk to your baby. Talk to your doctor if you have questions about drinking alcohol while breastfeeding. Be safe with supplements. Talk with your doctor before taking any vitamins, minerals, and herbal or other dietary supplements. When should you call for help? Watch closely for changes in your health, and be sure to contact your doctor if you have any problems. Where can you learn more? Go to https://Choggerpekellyeweb.L[employer]. org and sign in to your [employer] account. Enter P234 in the [employer] box to learn more about \"Nutrition for Breastfeeding Mothers: Care Instructions. \"     If you do not have an account, please click on the \"Sign Up Now\" link. Current as of: October 8, 2020               Content Version: 12.8  © 3975-9791 Healthwise, Incorporated. Care instructions adapted under license by [facility] ([facility]). If you have questions about a medical condition or this instruction, always ask your healthcare professional. [place] [address] any warranty or liability for your use of this information.

## 2021-03-23 NOTE — PROGRESS NOTES
[facility]ENJAM[facility]N NUÑEZ OB/GYN  CNM Office Note    Doug Zazueta is a 38 y.o. female who presents today for her medical conditions/ complaints as noted below. Chief Complaint   Patient presents with    Postpartum Care     patient presents today for her 6 week PP         HPI  Opel presents for PP 6 week PP visit. She c/o persistent depression. She was on effexor in the past and desires to restart. She is not suicidal. She also requests hysterectomy. She has had essure in the past and still conceived. She desires hyst.  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 (Menstrual status: Postpartum).   [id number]    Past Medical History:   Diagnosis Date    Abnormal glucose measurement     Abnormal Pap smear of cervix     Anxiety     Blood circulation, collateral     CAD (coronary artery disease)     Cerebral artery occlusion with cerebral infarction (HCC)     Complication of anesthesia     difficulty waking    Depression     Heart disease     IBS (irritable bowel syndrome)     MDD (major depressive disorder)     MI (myocardial infarction) ([address]     Miscarriage 06/2012    Neurologic disorder     Postpartum depression     Schizophrenia ([address]     Seizures (HCC)     anxiety related    Syncope     UTI (urinary tract infection) 12/09/2019     Past Surgical History:   Procedure Laterality Date    CHOLECYSTECTOMY, LAPAROSCOPIC  COLONOSCOPY N/A 11/7/2019    Dr Evin Fonseca, 10 yr recall    EGD  2016    [address]      lower right leg, has metal plate and screws    FRACTURE SURGERY      left wrist    INSERTABLE CARDIAC MONITOR      loop recorder    INTRAUTERINE DEVICE INSERTION  06/26/2016    Essure placement    UPPER GASTROINTESTINAL ENDOSCOPY N/A 11/7/2019    Dr Connor Hendrickson     Family History   Problem Relation Age of Onset    High Blood Pressure Mother     Diabetes Father     Heart Disease Father     Stomach Cancer Father     Breast Cancer Other         maternal great aunt    Colon Cancer Paternal Grandmother     Colon Polyps Neg Hx     Esophageal Cancer Neg Hx     Liver Cancer Neg Hx     Liver Disease Neg Hx     Rectal Cancer Neg Hx      Social History     Tobacco Use    Smoking status: Former Smoker    Smokeless tobacco: Former User     Quit date: 9/9/2014   Substance Use Topics    Alcohol use: Not Currently     Comment: occasional, last drink 6 mo ago       Current Outpatient Medications   Medication Sig Dispense Refill    venlafaxine (EFFEXOR XR) 75 MG extended release capsule Take 1 capsule by mouth daily 90 capsule 1    labetalol (NORMODYNE) 100 MG tablet Take 0.5 tablets by mouth 2 times daily (Patient not taking: Reported on 2/25/2021) 30 tablet 5    aspirin (ASPIRIN CHILDRENS) 81 MG chewable tablet Take 1 tablet by mouth daily (Patient not taking: Reported on 2/25/2021) 30 tablet 9    Prenatal Vit-Fe Fumarate-FA (PRENATAL VITAMIN) CHEW Take 1 tablet by mouth daily (Patient not taking: Reported on 2/25/2021) 30 tablet 11    albuterol sulfate  (90 Base) MCG/ACT inhaler INHALE 2 PUFFS INTO THE LUNGS EVERY 6 HOURS AS NEEDED FOR WHEEZING 18 g 3    venlafaxine (EFFEXOR XR) 150 MG extended release capsule TAKE 1 CAPSULE BY MOUTH EVERY DAY (Patient not taking: Reported on 2/25/2021) 30 capsule 5     No current facility-administered medications for this visit.       No Known Allergies  Vitals:    03/23/21 1322   BP: 109/76   Pulse: 69     Body mass index is 34.18 kg/m². Review of Systems   Constitutional: Negative. HENT: Negative. Eyes: Negative. Respiratory: Negative. Cardiovascular: Negative. Gastrointestinal: Negative. Endocrine: Negative. Genitourinary: Negative. Musculoskeletal: Negative. Skin: Negative. Allergic/Immunologic: Negative. Neurological: Negative. Hematological: Negative. Psychiatric/Behavioral: Positive for dysphoric mood. Physical Exam  Constitutional:       Appearance: She is well-developed. She is not diaphoretic. HENT:      Head: Normocephalic and atraumatic. Nose: Nose normal.   Eyes:      Conjunctiva/sclera: Conjunctivae normal.      Pupils: Pupils are equal, round, and reactive to light. Neck:      Musculoskeletal: Normal range of motion and neck supple. Thyroid: No thyromegaly. Trachea: No tracheal deviation. Pulmonary:      Effort: Pulmonary effort is normal. No respiratory distress. Musculoskeletal: Normal range of motion. Comments: Normal ROM for upper and lower extremities. Gait steady. Skin:     General: Skin is warm and dry. Findings: No lesion or rash. Neurological:      Mental Status: She is alert and oriented to person, place, and time. Psychiatric:         Speech: Speech normal.         Behavior: Behavior normal.          Diagnosis Orders   1. 6 weeks postpartum follow-up     2. Major depressive disorder with single episode, in partial remission ([facility])         MEDICATIONS:  Orders Placed This Encounter   Medications    venlafaxine (EFFEXOR XR) 75 MG extended release capsule     Sig: Take 1 capsule by mouth daily     Dispense:  90 capsule     Refill:  1     Please consider 90 day supplies to promote better adherence       ORDERS:  No orders of the defined types were placed in this encounter. PLAN:  1. PP - restart effexor  2.  Surgical consult requested for hyst

## 2021-03-30 DIAGNOSIS — Z95.818 STATUS POST PLACEMENT OF IMPLANTABLE LOOP RECORDER: Primary | ICD-10-CM

## 2021-03-30 DIAGNOSIS — R55 SYNCOPE, UNSPECIFIED SYNCOPE TYPE: ICD-10-CM

## 2021-04-12 DIAGNOSIS — Z95.818 STATUS POST PLACEMENT OF IMPLANTABLE LOOP RECORDER: Primary | ICD-10-CM

## 2021-04-12 DIAGNOSIS — R00.0 TACHYCARDIA: ICD-10-CM

## 2021-04-12 DIAGNOSIS — R55 SYNCOPE, UNSPECIFIED SYNCOPE TYPE: ICD-10-CM

## 2021-04-13 RX ORDER — BISOPROLOL FUMARATE 5 MG/1
5 TABLET ORAL DAILY
Qty: 30 TABLET | Refills: 5 | Status: SHIPPED | OUTPATIENT
Start: 2021-04-13 | End: 2021-05-25 | Stop reason: SDUPTHER

## 2021-04-19 DIAGNOSIS — Z95.818 STATUS POST PLACEMENT OF IMPLANTABLE LOOP RECORDER: Primary | ICD-10-CM

## 2021-04-19 DIAGNOSIS — R55 SYNCOPE, UNSPECIFIED SYNCOPE TYPE: ICD-10-CM

## 2021-04-19 DIAGNOSIS — R00.0 TACHYCARDIA: ICD-10-CM

## 2021-04-19 PROCEDURE — G2066 INTER DEVC REMOTE 30D: HCPCS | Performed by: CLINICAL NURSE SPECIALIST

## 2021-04-19 PROCEDURE — 93298 REM INTERROG DEV EVAL SCRMS: CPT | Performed by: CLINICAL NURSE SPECIALIST

## 2021-05-04 DIAGNOSIS — R55 SYNCOPE, UNSPECIFIED SYNCOPE TYPE: ICD-10-CM

## 2021-05-04 DIAGNOSIS — R00.0 TACHYCARDIA: ICD-10-CM

## 2021-05-04 DIAGNOSIS — Z95.818 STATUS POST PLACEMENT OF IMPLANTABLE LOOP RECORDER: Primary | ICD-10-CM

## 2021-05-04 NOTE — PROGRESS NOTES
Remote monitoring for follow up on patient's implantable loop recorder -Event report. Battery ok  Observations:  1 monitored patient activated symptom episode  Next remote check 05/20/21    I left a message for the patient to call me back regarding his/her test results.

## 2021-05-05 ENCOUNTER — TELEMEDICINE (OUTPATIENT)
Dept: OBGYN CLINIC | Age: 36
End: 2021-05-05
Payer: MEDICARE

## 2021-05-05 DIAGNOSIS — N94.6 DYSMENORRHEA: ICD-10-CM

## 2021-05-05 DIAGNOSIS — Z98.890 HISTORY OF FEMALE STERILIZATION: ICD-10-CM

## 2021-05-05 DIAGNOSIS — N94.10 DYSPAREUNIA IN FEMALE: ICD-10-CM

## 2021-05-05 DIAGNOSIS — R10.2 PELVIC PAIN IN FEMALE: ICD-10-CM

## 2021-05-05 DIAGNOSIS — Z86.73 HISTORY OF CVA (CEREBROVASCULAR ACCIDENT): ICD-10-CM

## 2021-05-05 DIAGNOSIS — N92.1 MENORRHAGIA WITH IRREGULAR CYCLE: Primary | ICD-10-CM

## 2021-05-05 PROCEDURE — 99213 OFFICE O/P EST LOW 20 MIN: CPT | Performed by: OBSTETRICS & GYNECOLOGY

## 2021-05-05 PROCEDURE — G8427 DOCREV CUR MEDS BY ELIG CLIN: HCPCS | Performed by: OBSTETRICS & GYNECOLOGY

## 2021-05-05 ASSESSMENT — ENCOUNTER SYMPTOMS
EYES NEGATIVE: 1
RESPIRATORY NEGATIVE: 1
GASTROINTESTINAL NEGATIVE: 1

## 2021-05-05 NOTE — PROGRESS NOTES
I, Purnima Sloan RN, am scribing for and in the presence of Dr. Jayde Pollock 2021/2:29 PM/sign    Phillip Farmer (:  1985) is a 38 y.o. female,Established patient, here for evaluation of the following chief complaint(s): Pelvic Pain      ASSESSMENT/PLAN:  1. Menorrhagia with irregular cycle  2. Dysmenorrhea  3. Pelvic pain in female  4. Dyspareunia in female  5. History of female sterilization  6. History of CVA (cerebrovascular accident)    Discussed treatment options for cycle and pain control, pt has failed hormonal options. Recommend hysterectomy. TLH scheduled on  and pt will RTO for preop education visit. Pt aware of Covid testing policy. All questions answered. Return if symptoms worsen or fail to improve. SUBJECTIVE/OBJECTIVE:  HPI    Patient is wanting to discuss surgical options regarding heavy, irregular, and painful periods. She is also having pelvic pain when not on cycles along with painful intercourse. She does have a hx of essure procedure. She has tried Depo Provera in the past and had to stop due to bone loss. She has also tried several times of OCP's and she had to stop them due to mood changes or no improvement with cycle control. She is no longer a candidate for estrogen due to hx of stroke and heart disease. She is desiring definitive treatment. She does have a hx of having the unsuccessful Essure procedure. She has been having these issues for the past several years. Review of Systems   Constitutional: Negative. HENT: Negative. Eyes: Negative. Respiratory: Negative. Cardiovascular: Negative. Gastrointestinal: Negative. Genitourinary: Positive for dyspareunia, menstrual problem and pelvic pain. Negative for difficulty urinating, dysuria, enuresis, frequency, hematuria, urgency and vaginal discharge. Musculoskeletal: Negative. Skin: Negative. Neurological: Negative. Psychiatric/Behavioral: Negative. No flowsheet data found. Physical Exam    [INSTRUCTIONS:  \"[x]\" Indicates a positive item  \"[]\" Indicates a negative item  -- DELETE ALL ITEMS NOT EXAMINED]    Constitutional: [x] Appears well-developed and well-nourished [x] No apparent distress      [] Abnormal -     Mental status: [x] Alert and awake  [x] Oriented to person/place/time [x] Able to follow commands    [] Abnormal -     Eyes:   EOM    [x]  Normal    [] Abnormal -   Sclera  [x]  Normal    [] Abnormal -          Discharge [x]  None visible   [] Abnormal -     HENT: [x] Normocephalic, atraumatic  [] Abnormal -   [x] Mouth/Throat: Mucous membranes are moist    External Ears [x] Normal  [] Abnormal -    Neck: [x] No visualized mass [] Abnormal -     Pulmonary/Chest: [x] Respiratory effort normal   [x] No visualized signs of difficulty breathing or respiratory distress        [] Abnormal -      Musculoskeletal:   [x] Normal gait with no signs of ataxia         [x] Normal range of motion of neck        [] Abnormal -     Neurological:        [x] No Facial Asymmetry (Cranial nerve 7 motor function) (limited exam due to video visit)          [x] No gaze palsy        [] Abnormal -          Skin:        [x] No significant exanthematous lesions or discoloration noted on facial skin         [] Abnormal -            Psychiatric:       [x] Normal Affect [] Abnormal -        [x] No Hallucinations    Other pertinent observable physical exam findings:-          On this date 5/5/2021 I have spent 10   minutes reviewing previous notes, test results and face to face (virtual) with the patient discussing the diagnosis and importance of compliance with the treatment plan as well as documenting on the day of the visit. Rubén Timmons, was evaluated through a synchronous (real-time) audio-video encounter. The patient (or guardian if applicable) is aware that this is a billable service. Verbal consent to proceed has been obtained within the past 12 months.  The visit was conducted pursuant to the

## 2021-05-06 ENCOUNTER — HOSPITAL ENCOUNTER (OUTPATIENT)
Age: 36
Setting detail: OUTPATIENT SURGERY
End: 2021-05-06
Attending: OBSTETRICS & GYNECOLOGY | Admitting: OBSTETRICS & GYNECOLOGY
Payer: MEDICARE

## 2021-05-10 ENCOUNTER — OFFICE VISIT (OUTPATIENT)
Dept: OBGYN CLINIC | Age: 36
End: 2021-05-10
Payer: MEDICARE

## 2021-05-10 VITALS — WEIGHT: 182 LBS | BODY MASS INDEX: 35.73 KG/M2 | HEIGHT: 60 IN

## 2021-05-10 DIAGNOSIS — Z86.73 HISTORY OF CVA (CEREBROVASCULAR ACCIDENT): ICD-10-CM

## 2021-05-10 DIAGNOSIS — R10.2 PELVIC PAIN IN FEMALE: ICD-10-CM

## 2021-05-10 DIAGNOSIS — N94.6 DYSMENORRHEA: ICD-10-CM

## 2021-05-10 DIAGNOSIS — N94.10 DYSPAREUNIA IN FEMALE: ICD-10-CM

## 2021-05-10 DIAGNOSIS — N92.1 MENORRHAGIA WITH IRREGULAR CYCLE: Primary | ICD-10-CM

## 2021-05-10 DIAGNOSIS — Z98.890 HISTORY OF FEMALE STERILIZATION: ICD-10-CM

## 2021-05-10 PROCEDURE — 99211 OFF/OP EST MAY X REQ PHY/QHP: CPT | Performed by: NURSE PRACTITIONER

## 2021-05-10 RX ORDER — VENLAFAXINE HYDROCHLORIDE 150 MG/1
150 CAPSULE, EXTENDED RELEASE ORAL DAILY
Qty: 90 CAPSULE | Refills: 3 | Status: SHIPPED | OUTPATIENT
Start: 2021-05-10 | End: 2021-09-20 | Stop reason: ALTCHOICE

## 2021-05-10 NOTE — PROGRESS NOTES
Patient is scheduled to have a [address] with Dene[facility]  for menorrhagia, dysmenorrhea, pelvic pain, hx of Essure, and hx of CVA on 5/13/21    She is having heavy, irregular, and painful periods. She is also having pelvic pain when not on cycles along with painful intercourse. She does have a hx of essure procedure. She has tried Depo Provera in the past and had to stop due to bone loss. She has also tried several times of OCP's and she had to stop them due to mood changes or no improvement with cycle control. She is no longer a candidate for estrogen due to hx of stroke and heart disease. She is desiring definitive treatment. She does have a hx of having the unsuccessful Essure procedure. She has been having these issues for the past several years. pt is needing refill on her Effexor.  she is a Mechele Profit is a 38 y.o. 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) 09/04/2018    Moderate episode of recurrent major depressive disorder ([address] 09/04/2018    Decreased strength of lower extremity 09/04/2018    Syncope and collapse 09/04/2018    Chronic superficial gastritis 09/04/2018    Family hx-breast malignancy 07/18/2012    Diffuse cystic mastopathy 04/16/2012    Lump or mass in breast left  04/16/2012     Current Outpatient Medications   Medication Sig Dispense Refill    venlafaxine (EFFEXOR XR) 150 MG extended release capsule Take 1 capsule by mouth daily 90 capsule 3    bisoprolol (ZEBETA) 5 MG tablet Take 1 tablet by mouth daily 30 tablet 5    aspirin (ASPIRIN CHILDRENS) 81 MG chewable tablet Topics    Alcohol use: Not Currently     Comment: occasional, last drink 6 mo ago       Ht 5' (1.524 m)   Wt 182 lb (82.6 kg)   LMP 04/26/2021   Breastfeeding No   BMI 35.54 kg/m²        Diagnosis Orders   1. Menorrhagia with irregular cycle     2. Dysmenorrhea     3. Pelvic pain in female     4. Dyspareunia in female     5. History of female sterilization     6. History of CVA (cerebrovascular accident)           Counseling was offered and accepted by patient. Dr. Jose C Shea discussed at length the risks, benefits and alternatives to surgery including medical management and no intervention at patient's last office visit. Discussed the surgical procedure in detail with patient. Some patients will need a full medical clearance. Preop testing ordered will be done this week along with Covid swab. Effexor refilled per Dr. Jose C Shea. Consents for surgery signed and all questions proceeding. Reviewed signs and symptoms of postoperative infection and complications with patient. All questions answered and  patient voiced understanding of above.

## 2021-05-11 ENCOUNTER — HOSPITAL ENCOUNTER (OUTPATIENT)
Dept: PREADMISSION TESTING | Age: 36
Discharge: HOME | End: 2021-05-15
Payer: MEDICARE

## 2021-05-11 VITALS — HEIGHT: 60 IN | BODY MASS INDEX: 35.93 KG/M2 | WEIGHT: 183 LBS

## 2021-05-11 LAB
ABO/RH: NORMAL
ANION GAP SERPL CALCULATED.3IONS-SCNC: 9 MMOL/L (ref 7–19)
ANTIBODY SCREEN: NORMAL
BASOPHILS ABSOLUTE: 0.1 K/UL (ref 0–0.2)
BASOPHILS RELATIVE PERCENT: 0.8 % (ref 0–1)
BUN BLDV-MCNC: 13 MG/DL (ref 6–20)
CALCIUM SERPL-MCNC: 9.3 MG/DL (ref 8.6–10)
CHLORIDE BLD-SCNC: 104 MMOL/L (ref 98–111)
CO2: 27 MMOL/L (ref 22–29)
CREAT SERPL-MCNC: 0.7 MG/DL (ref 0.5–0.9)
EKG P AXIS: 46 DEGREES
EKG P-R INTERVAL: 126 MS
EKG Q-T INTERVAL: 426 MS
EKG QRS DURATION: 88 MS
EKG QTC CALCULATION (BAZETT): 429 MS
EKG T AXIS: 34 DEGREES
EOSINOPHILS ABSOLUTE: 0.2 K/UL (ref 0–0.6)
EOSINOPHILS RELATIVE PERCENT: 2.7 % (ref 0–5)
GFR AFRICAN AMERICAN: >59
GFR NON-AFRICAN AMERICAN: >60
GLUCOSE BLD-MCNC: 82 MG/DL (ref 74–109)
HCT VFR BLD CALC: 43.8 % (ref 37–47)
HEMOGLOBIN: 13.6 G/DL (ref 12–16)
IMMATURE GRANULOCYTES #: 0 K/UL
LYMPHOCYTES ABSOLUTE: 1.9 K/UL (ref 1.1–4.5)
LYMPHOCYTES RELATIVE PERCENT: 30.4 % (ref 20–40)
MCH RBC QN AUTO: 30.2 PG (ref 27–31)
MCHC RBC AUTO-ENTMCNC: 31.1 G/DL (ref 33–37)
MCV RBC AUTO: 97.1 FL (ref 81–99)
MONOCYTES ABSOLUTE: 0.4 K/UL (ref 0–0.9)
MONOCYTES RELATIVE PERCENT: 6.3 % (ref 0–10)
NEUTROPHILS ABSOLUTE: 3.7 K/UL (ref 1.5–7.5)
NEUTROPHILS RELATIVE PERCENT: 59.6 % (ref 50–65)
PDW BLD-RTO: 14.1 % (ref 11.5–14.5)
PLATELET # BLD: 344 K/UL (ref 130–400)
PMV BLD AUTO: 9 FL (ref 9.4–12.3)
POTASSIUM SERPL-SCNC: 4.1 MMOL/L (ref 3.5–5)
RBC # BLD: 4.51 M/UL (ref 4.2–5.4)
SARS-COV-2, PCR: NOT DETECTED
SODIUM BLD-SCNC: 140 MMOL/L (ref 136–145)
WBC # BLD: 6.2 K/UL (ref 4.8–10.8)

## 2021-05-11 PROCEDURE — 86900 BLOOD TYPING SEROLOGIC ABO: CPT

## 2021-05-11 PROCEDURE — 85025 COMPLETE CBC W/AUTO DIFF WBC: CPT

## 2021-05-11 PROCEDURE — U0005 INFEC AGEN DETEC AMPLI PROBE: HCPCS

## 2021-05-11 PROCEDURE — 86850 RBC ANTIBODY SCREEN: CPT

## 2021-05-11 PROCEDURE — 80048 BASIC METABOLIC PNL TOTAL CA: CPT

## 2021-05-11 PROCEDURE — 86901 BLOOD TYPING SEROLOGIC RH(D): CPT

## 2021-05-11 PROCEDURE — U0003 INFECTIOUS AGENT DETECTION BY NUCLEIC ACID (DNA OR RNA); SEVERE ACUTE RESPIRATORY SYNDROME CORONAVIRUS 2 (SARS-COV-2) (CORONAVIRUS DISEASE [COVID-19]), AMPLIFIED PROBE TECHNIQUE, MAKING USE OF HIGH THROUGHPUT TECHNOLOGIES AS DESCRIBED BY CMS-2020-01-R: HCPCS

## 2021-05-11 PROCEDURE — 93010 ELECTROCARDIOGRAM REPORT: CPT | Performed by: INTERNAL MEDICINE

## 2021-05-11 PROCEDURE — 93005 ELECTROCARDIOGRAM TRACING: CPT | Performed by: OBSTETRICS & GYNECOLOGY

## 2021-05-11 RX ORDER — PHENAZOPYRIDINE HYDROCHLORIDE 100 MG/1
100 TABLET, FILM COATED ORAL ONCE
Status: CANCELLED | OUTPATIENT
Start: 2021-05-13

## 2021-05-13 ENCOUNTER — TELEPHONE (OUTPATIENT)
Dept: OBGYN CLINIC | Age: 36
End: 2021-05-13

## 2021-05-20 DIAGNOSIS — R00.0 TACHYCARDIA: ICD-10-CM

## 2021-05-20 DIAGNOSIS — R55 SYNCOPE, UNSPECIFIED SYNCOPE TYPE: ICD-10-CM

## 2021-05-20 DIAGNOSIS — Z95.818 STATUS POST PLACEMENT OF IMPLANTABLE LOOP RECORDER: Primary | ICD-10-CM

## 2021-05-20 PROCEDURE — 93298 REM INTERROG DEV EVAL SCRMS: CPT | Performed by: CLINICAL NURSE SPECIALIST

## 2021-05-20 PROCEDURE — G2066 INTER DEVC REMOTE 30D: HCPCS | Performed by: CLINICAL NURSE SPECIALIST

## 2021-05-25 ENCOUNTER — OFFICE VISIT (OUTPATIENT)
Dept: CARDIOLOGY CLINIC | Age: 36
End: 2021-05-25
Payer: MEDICARE

## 2021-05-25 VITALS
WEIGHT: 182 LBS | DIASTOLIC BLOOD PRESSURE: 80 MMHG | SYSTOLIC BLOOD PRESSURE: 120 MMHG | BODY MASS INDEX: 35.73 KG/M2 | HEART RATE: 67 BPM | HEIGHT: 60 IN

## 2021-05-25 DIAGNOSIS — R55 SYNCOPE, UNSPECIFIED SYNCOPE TYPE: ICD-10-CM

## 2021-05-25 DIAGNOSIS — Z95.818 STATUS POST PLACEMENT OF IMPLANTABLE LOOP RECORDER: ICD-10-CM

## 2021-05-25 DIAGNOSIS — R00.0 TACHYCARDIA: Primary | ICD-10-CM

## 2021-05-25 PROCEDURE — 93291 INTERROG DEV EVAL SCRMS IP: CPT | Performed by: CLINICAL NURSE SPECIALIST

## 2021-05-25 PROCEDURE — 99213 OFFICE O/P EST LOW 20 MIN: CPT | Performed by: CLINICAL NURSE SPECIALIST

## 2021-05-25 RX ORDER — BISOPROLOL FUMARATE 5 MG/1
5 TABLET ORAL DAILY
Qty: 30 TABLET | Refills: 5 | Status: SHIPPED | OUTPATIENT
Start: 2021-05-25 | End: 2021-09-20 | Stop reason: ALTCHOICE

## 2021-05-25 NOTE — PATIENT INSTRUCTIONS
[facility] will send in 1 month  Follow up in 6 mos With loop check   Call with any questions or concerns  Follow up with Sigrid Wesley MD for non cardiac problems  Report any new problems  Cardiovascular Fitness-Exercise as tolerated. Strive for 30 minutes of exercise most days of the week. Cardiac / Healthy Diet  Continue current medications as directed  Continue plan of treatment  It is always recommended that you bring your medications bottles with you to each visit - this is for your safety!

## 2021-05-25 NOTE — PROGRESS NOTES
[facility]  [facility] [id number]  [id number]  Phone: [phone]  Fax: [phone]    OFFICE VISIT:  2021    Dago Sisi - : 1985    Reason For Visit:  Joe Huang is a 38 y.o. female who is here for 6 Month Follow-Up (no cardiac symptoms) and Loss of Consciousness (LOOP)  Patient has had history of syncopal spells with cardiac and neurological work-up. Normal carotids, negative stress test and normal 2D echo  Underwent placement of loop recorder due to recurrent syncope  Thus far the recorder has failed to show any correlation of her syncopal spells with arrhythmia or bradycardia  Patient's tachycardia has been treated with beta-blocker. Was last seen in the office in November-at that time was 26 weeks pregnant. Delivered her daughter in January    Patient returns today for follow-up and loop recorder interrogation  Has not had any recurrent syncope or presyncope spells. She is little tired with a 1month-old    Having hysterectomy next month. States her last period lasted 2 weeks      Subjective  Opal denies exertional chest pain, shortness of breath, orthopnea, paroxysmal nocturnal dyspnea, syncope, presyncope, arrhythmia, edema and fatigue. The patient denies numbness or weakness to suggest cerebrovascular accident or transient ischemic attack. Pearl Ureña MD is PCP.   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) 2018    Moderate episode of recurrent major depressive disorder ([address] 2018    Decreased strength of lower extremity 2018    Syncope and collapse 09/04/2018    Chronic superficial gastritis 09/04/2018    Family hx-breast malignancy 07/18/2012    Diffuse cystic mastopathy 04/16/2012    Lump or mass in breast left  04/16/2012     Past Medical History:   Diagnosis Date    Abnormal glucose measurement     Abnormal Pap smear of cervix     Anxiety     Blood circulation, collateral     CAD (coronary artery disease)     Cerebral artery occlusion with cerebral infarction (HCC)     Complication of anesthesia     difficulty waking    Depression     Heart disease     IBS (irritable bowel syndrome)     MDD (major depressive disorder)     [facility]) [address]     Miscarriage 06/2012    Neurologic disorder     Postpartum depression     Schizophrenia ([facility]     Seizures (HCC)     anxiety related    Syncope     UTI (urinary tract infection) 12/09/2019     Past Surgical History:   Procedure Laterality Date    CHOLECYSTECTOMY, LAPAROSCOPIC      COLONOSCOPY N/A 11/7/2019    Dr Sherma Dubin, 10 yr recall    EGD  2016    [address] [facility]      lower right leg, has metal plate and screws    FRACTURE SURGERY      left wrist    INSERTABLE CARDIAC MONITOR      loop recorder    INTRAUTERINE DEVICE INSERTION  06/26/2016    Essure placement    UPPER GASTROINTESTINAL ENDOSCOPY N/A 11/7/2019    Dr Mary Jackson-Gastritis     Family History   Problem Relation Age of Onset    High Blood Pressure Mother     Diabetes Father     Heart Disease Father     Stomach Cancer Father     Breast Cancer Other         maternal great aunt    Colon Cancer Paternal Grandmother     Colon Polyps Neg Hx     Esophageal Cancer Neg Hx     Liver Cancer Neg Hx     Liver Disease Neg Hx     Rectal Cancer Neg Hx      Social History     Tobacco Use    Smoking status: Former Smoker    Smokeless tobacco: Former User     Quit date: 9/9/2014   Substance Use Topics    Alcohol use: Not Currently     Comment: occasional, last drink 6 mo ago      Current Outpatient Medications   Medication Sig Dispense Refill    venlafaxine (EFFEXOR XR) 150 MG extended release capsule Take 1 capsule by mouth daily 90 capsule 3    bisoprolol (ZEBETA) 5 MG tablet Take 1 tablet by mouth daily 30 tablet 5    aspirin (ASPIRIN CHILDRENS) 81 MG chewable tablet Take 1 tablet by mouth daily 30 tablet 9    albuterol sulfate  (90 Base) MCG/ACT inhaler INHALE 2 PUFFS INTO THE LUNGS EVERY 6 HOURS AS NEEDED FOR WHEEZING 18 g 3     No current facility-administered medications for this visit. Allergies: Patient has no known allergies. Review of Systems  Constitutional  no significant activity change, appetite change, or unexpected weight change. No fever, chills or diaphoresis. No fatigue. HEENT  no significant rhinorrhea or epistaxis. No tinnitus or significant hearing loss. Eyes  no sudden vision change or amaurosis. Respiratory  no significant wheezing, stridor, apnea or cough. No dyspnea on exertion or shortness of breath. Cardiovascular  no exertional chest pain, orthopnea or PND. No sensation of arrhythmia or slow heart rate. No claudication or leg edema. Gastrointestinal  no abdominal swelling or pain. No blood in stool. No severe constipation, diarrhea, nausea, or vomiting. Genitourinary  no difficulty urinating, dysuria, frequency, or urgency. No flank pain or hematuria. Musculoskeletal  no back pain, gait disturbance, or myalgia. Skin  no color change or rash. No pallor. No new surgical incision. Neurologic  no speech difficulty, facial asymmetry or lateralizing weakness. No seizures, presyncope, syncope, or significant dizziness. Hematologic  no easy bruising or excessive bleeding. Psychiatric  no severe anxiety or insomnia. No confusion. All other review of systems are negative.       Objective  Vital Signs - /80   Pulse 67   Ht 5' (1.524 m)   Wt 182 lb (82.6 kg)   LMP 04/26/2021   BMI 35.54 kg/m² General - Sushil Gage is alert, cooperative, and pleasant. Well groomed. No acute distress. Body habitus is obese. HEENT  The head is normocephalic. No circumoral cyanosis. Dentition is normal.   EYES -  No Xanthelasma, no arcus senilis, no conjunctival hemorrhages or discharge. Neck - Supple, without increased jugular venous pressures. No carotid bruits. No mass. Respiratory - Lungs are clear bilaterally. No wheezes or rales. Normal effort without use of accessory muscles. Cardiovascular  Heart has regular rhythm and rate. No murmurs, rubs or gallops. + pedal pulses and no varicosities. Abdominal -  Soft, nontender, nondistended. Bowel sounds are intact. Extremities - No clubbing, cyanosis, or  edema. Musculoskeletal -  No clubbing . No Osler's nodes. Gait normal .  No kyphosis or scoliosis. Skin -  no statis ulcers or dermatitis. Neurological - No focal signs are identified. Oriented to person, place and time. Psychiatric -  Appropriate affect and mood. Assessment:     Diagnosis Orders   1. Tachycardia     2. Syncope, unspecified syncope type     3. Status post placement of implantable loop recorder       Data:  BP Readings from Last 3 Encounters:   05/25/21 120/80   03/23/21 109/76   02/25/21 120/78    Pulse Readings from Last 3 Encounters:   05/25/21 67   03/23/21 69   02/10/21 88        Wt Readings from Last 3 Encounters:   05/25/21 182 lb (82.6 kg)   05/11/21 183 lb (83 kg)   05/10/21 182 lb (82.6 kg)     Loop recorder interrogation today shows short SVT episodes. Usually occur in the evening and last 30 seconds or less. Symptom episodes associated with sinus rhythm. Blood pressure and heart rate controlled. Current medical management includes low-dose bisoprolol-at this time would leave dose the same.   If we see an increase in tachycardic episodes in duration or frequency may consider uptitrating but higher doses could cause some hypotension   Reviewed PCP recent notes   Reviewed recent labs     States taking medications as prescribed  Stable cardiovascular status. No evidence of overt heart failure, angina or dysrhythmia. 20 minutes were spent preparing, reviewing and seeing patient. All questions answered    Plan  [facility] will send in 1 month  Follow up in 6 mos With loop check   Call with any questions or concerns  Follow up with Ruby Ramos MD for non cardiac problems  Report any new problems  Cardiovascular Fitness-Exercise as tolerated. Strive for 30 minutes of exercise most days of the week. Cardiac / Healthy Diet  Continue current medications as directed  Continue plan of treatment  It is always recommended that you bring your medications bottles with you to each visit - this is for your safety! SETH Castano    EMR dragon/transcription disclaimer: Much of this encounter note is electronic transcription/translation of spoken language to printed tach. Electronic translation of spoken language may be erroneous, or at times, nonsensical words or phrases may be inadvertently transcribed.  Although, I have reviewed the note for such errors, some may still exist.

## 2021-06-02 ENCOUNTER — HOSPITAL ENCOUNTER (OUTPATIENT)
Dept: PREADMISSION TESTING | Age: 36
Discharge: HOME | End: 2021-06-06
Payer: MEDICARE

## 2021-06-02 ENCOUNTER — ANESTHESIA EVENT (OUTPATIENT)
Dept: OPERATING ROOM | Age: 36
End: 2021-06-02
Payer: MEDICARE

## 2021-06-02 VITALS — HEIGHT: 60 IN | BODY MASS INDEX: 35.93 KG/M2 | WEIGHT: 183 LBS

## 2021-06-02 LAB — SARS-COV-2, NAAT: NOT DETECTED

## 2021-06-02 PROCEDURE — 87635 SARS-COV-2 COVID-19 AMP PRB: CPT

## 2021-06-02 RX ORDER — BUTALBITAL, ACETAMINOPHEN AND CAFFEINE 300; 40; 50 MG/1; MG/1; MG/1
1 CAPSULE ORAL EVERY 4 HOURS PRN
COMMUNITY
End: 2022-04-19 | Stop reason: SDUPTHER

## 2021-06-02 RX ORDER — OMEPRAZOLE 20 MG/1
20 CAPSULE, DELAYED RELEASE ORAL DAILY
COMMUNITY
End: 2021-09-20 | Stop reason: ALTCHOICE

## 2021-06-02 RX ORDER — PROMETHAZINE HYDROCHLORIDE 25 MG/1
25 TABLET ORAL EVERY 6 HOURS PRN
COMMUNITY
End: 2021-09-20 | Stop reason: ALTCHOICE

## 2021-06-03 ENCOUNTER — ANESTHESIA (OUTPATIENT)
Dept: OPERATING ROOM | Age: 36
End: 2021-06-03
Payer: MEDICARE

## 2021-06-03 ENCOUNTER — HOSPITAL ENCOUNTER (OUTPATIENT)
Age: 36
Setting detail: OUTPATIENT SURGERY
Discharge: HOME | End: 2021-06-03
Attending: OBSTETRICS & GYNECOLOGY | Admitting: OBSTETRICS & GYNECOLOGY
Payer: MEDICARE

## 2021-06-03 VITALS
BODY MASS INDEX: 35.93 KG/M2 | TEMPERATURE: 97.5 F | HEIGHT: 60 IN | OXYGEN SATURATION: 97 % | HEART RATE: 78 BPM | SYSTOLIC BLOOD PRESSURE: 113 MMHG | WEIGHT: 183 LBS | RESPIRATION RATE: 16 BRPM | DIASTOLIC BLOOD PRESSURE: 70 MMHG

## 2021-06-03 VITALS — DIASTOLIC BLOOD PRESSURE: 68 MMHG | TEMPERATURE: 96.1 F | OXYGEN SATURATION: 100 % | SYSTOLIC BLOOD PRESSURE: 104 MMHG

## 2021-06-03 DIAGNOSIS — N93.9 ABNORMAL UTERINE BLEEDING (AUB): Primary | ICD-10-CM

## 2021-06-03 LAB
ABO/RH: NORMAL
ANTIBODY SCREEN: NORMAL
HCG(URINE) PREGNANCY TEST: NEGATIVE

## 2021-06-03 PROCEDURE — S2900 ROBOTIC SURGICAL SYSTEM: HCPCS | Performed by: OBSTETRICS & GYNECOLOGY

## 2021-06-03 PROCEDURE — 86901 BLOOD TYPING SEROLOGIC RH(D): CPT

## 2021-06-03 PROCEDURE — 3600000009 HC SURGERY ROBOT BASE: Performed by: OBSTETRICS & GYNECOLOGY

## 2021-06-03 PROCEDURE — 6370000000 HC RX 637 (ALT 250 FOR IP): Performed by: OBSTETRICS & GYNECOLOGY

## 2021-06-03 PROCEDURE — 2500000003 HC RX 250 WO HCPCS: Performed by: NURSE ANESTHETIST, CERTIFIED REGISTERED

## 2021-06-03 PROCEDURE — 3600000019 HC SURGERY ROBOT ADDTL 15MIN: Performed by: OBSTETRICS & GYNECOLOGY

## 2021-06-03 PROCEDURE — 6360000002 HC RX W HCPCS: Performed by: NURSE ANESTHETIST, CERTIFIED REGISTERED

## 2021-06-03 PROCEDURE — 2580000003 HC RX 258: Performed by: ANESTHESIOLOGY

## 2021-06-03 PROCEDURE — 86900 BLOOD TYPING SEROLOGIC ABO: CPT

## 2021-06-03 PROCEDURE — 86850 RBC ANTIBODY SCREEN: CPT

## 2021-06-03 PROCEDURE — 2720000010 HC SURG SUPPLY STERILE: Performed by: OBSTETRICS & GYNECOLOGY

## 2021-06-03 PROCEDURE — 2500000003 HC RX 250 WO HCPCS: Performed by: ANESTHESIOLOGY

## 2021-06-03 PROCEDURE — 7100000010 HC PHASE II RECOVERY - FIRST 15 MIN: Performed by: OBSTETRICS & GYNECOLOGY

## 2021-06-03 PROCEDURE — 6360000002 HC RX W HCPCS: Performed by: ANESTHESIOLOGY

## 2021-06-03 PROCEDURE — 88307 TISSUE EXAM BY PATHOLOGIST: CPT

## 2021-06-03 PROCEDURE — 3700000000 HC ANESTHESIA ATTENDED CARE: Performed by: OBSTETRICS & GYNECOLOGY

## 2021-06-03 PROCEDURE — 7100000000 HC PACU RECOVERY - FIRST 15 MIN: Performed by: OBSTETRICS & GYNECOLOGY

## 2021-06-03 PROCEDURE — 7100000001 HC PACU RECOVERY - ADDTL 15 MIN: Performed by: OBSTETRICS & GYNECOLOGY

## 2021-06-03 PROCEDURE — 58571 TLH W/T/O 250 G OR LESS: CPT | Performed by: OBSTETRICS & GYNECOLOGY

## 2021-06-03 PROCEDURE — 84703 CHORIONIC GONADOTROPIN ASSAY: CPT

## 2021-06-03 PROCEDURE — 36415 COLL VENOUS BLD VENIPUNCTURE: CPT

## 2021-06-03 PROCEDURE — 6370000000 HC RX 637 (ALT 250 FOR IP): Performed by: ANESTHESIOLOGY

## 2021-06-03 PROCEDURE — 3700000001 HC ADD 15 MINUTES (ANESTHESIA): Performed by: OBSTETRICS & GYNECOLOGY

## 2021-06-03 PROCEDURE — 2580000003 HC RX 258: Performed by: NURSE ANESTHETIST, CERTIFIED REGISTERED

## 2021-06-03 PROCEDURE — 7100000011 HC PHASE II RECOVERY - ADDTL 15 MIN: Performed by: OBSTETRICS & GYNECOLOGY

## 2021-06-03 PROCEDURE — 2709999900 HC NON-CHARGEABLE SUPPLY: Performed by: OBSTETRICS & GYNECOLOGY

## 2021-06-03 RX ORDER — ONDANSETRON 2 MG/ML
INJECTION INTRAMUSCULAR; INTRAVENOUS PRN
Status: DISCONTINUED | OUTPATIENT
Start: 2021-06-03 | End: 2021-06-03 | Stop reason: SDUPTHER

## 2021-06-03 RX ORDER — FENTANYL CITRATE 50 UG/ML
INJECTION, SOLUTION INTRAMUSCULAR; INTRAVENOUS PRN
Status: DISCONTINUED | OUTPATIENT
Start: 2021-06-03 | End: 2021-06-03 | Stop reason: SDUPTHER

## 2021-06-03 RX ORDER — SODIUM CHLORIDE, SODIUM LACTATE, POTASSIUM CHLORIDE, CALCIUM CHLORIDE 600; 310; 30; 20 MG/100ML; MG/100ML; MG/100ML; MG/100ML
INJECTION, SOLUTION INTRAVENOUS CONTINUOUS
Status: DISCONTINUED | OUTPATIENT
Start: 2021-06-03 | End: 2021-06-03 | Stop reason: HOSPADM

## 2021-06-03 RX ORDER — SCOLOPAMINE TRANSDERMAL SYSTEM 1 MG/1
1 PATCH, EXTENDED RELEASE TRANSDERMAL
Status: DISCONTINUED | OUTPATIENT
Start: 2021-06-03 | End: 2021-06-03 | Stop reason: HOSPADM

## 2021-06-03 RX ORDER — FUROSEMIDE 10 MG/ML
INJECTION INTRAMUSCULAR; INTRAVENOUS PRN
Status: DISCONTINUED | OUTPATIENT
Start: 2021-06-03 | End: 2021-06-03 | Stop reason: SDUPTHER

## 2021-06-03 RX ORDER — CEFAZOLIN SODIUM 1 G/3ML
INJECTION, POWDER, FOR SOLUTION INTRAMUSCULAR; INTRAVENOUS PRN
Status: DISCONTINUED | OUTPATIENT
Start: 2021-06-03 | End: 2021-06-03 | Stop reason: SDUPTHER

## 2021-06-03 RX ORDER — METOPROLOL TARTRATE 5 MG/5ML
1 INJECTION INTRAVENOUS ONCE
Status: COMPLETED | OUTPATIENT
Start: 2021-06-03 | End: 2021-06-03

## 2021-06-03 RX ORDER — GLYCOPYRROLATE 0.2 MG/ML
INJECTION INTRAMUSCULAR; INTRAVENOUS PRN
Status: DISCONTINUED | OUTPATIENT
Start: 2021-06-03 | End: 2021-06-03 | Stop reason: SDUPTHER

## 2021-06-03 RX ORDER — PHENAZOPYRIDINE HYDROCHLORIDE 100 MG/1
100 TABLET, FILM COATED ORAL ONCE
Status: COMPLETED | OUTPATIENT
Start: 2021-06-03 | End: 2021-06-03

## 2021-06-03 RX ORDER — HYDROMORPHONE HYDROCHLORIDE 1 MG/ML
0.5 INJECTION, SOLUTION INTRAMUSCULAR; INTRAVENOUS; SUBCUTANEOUS EVERY 5 MIN PRN
Status: DISCONTINUED | OUTPATIENT
Start: 2021-06-03 | End: 2021-06-03 | Stop reason: HOSPADM

## 2021-06-03 RX ORDER — MIDAZOLAM HYDROCHLORIDE 1 MG/ML
INJECTION INTRAMUSCULAR; INTRAVENOUS PRN
Status: DISCONTINUED | OUTPATIENT
Start: 2021-06-03 | End: 2021-06-03 | Stop reason: SDUPTHER

## 2021-06-03 RX ORDER — LIDOCAINE HYDROCHLORIDE 10 MG/ML
INJECTION, SOLUTION INFILTRATION; PERINEURAL PRN
Status: DISCONTINUED | OUTPATIENT
Start: 2021-06-03 | End: 2021-06-03 | Stop reason: SDUPTHER

## 2021-06-03 RX ORDER — HYDROCODONE BITARTRATE AND ACETAMINOPHEN 5; 325 MG/1; MG/1
1 TABLET ORAL EVERY 6 HOURS PRN
Qty: 28 TABLET | Refills: 0 | Status: SHIPPED | OUTPATIENT
Start: 2021-06-03 | End: 2021-06-10

## 2021-06-03 RX ORDER — SODIUM CHLORIDE 0.9 % (FLUSH) 0.9 %
10 SYRINGE (ML) INJECTION EVERY 12 HOURS SCHEDULED
Status: DISCONTINUED | OUTPATIENT
Start: 2021-06-03 | End: 2021-06-03 | Stop reason: HOSPADM

## 2021-06-03 RX ORDER — DEXAMETHASONE SODIUM PHOSPHATE 4 MG/ML
4 INJECTION, SOLUTION INTRA-ARTICULAR; INTRALESIONAL; INTRAMUSCULAR; INTRAVENOUS; SOFT TISSUE ONCE
Status: COMPLETED | OUTPATIENT
Start: 2021-06-03 | End: 2021-06-03

## 2021-06-03 RX ORDER — HYDROCODONE BITARTRATE AND ACETAMINOPHEN 5; 325 MG/1; MG/1
1 TABLET ORAL
Status: COMPLETED | OUTPATIENT
Start: 2021-06-03 | End: 2021-06-03

## 2021-06-03 RX ORDER — ONDANSETRON 2 MG/ML
4 INJECTION INTRAMUSCULAR; INTRAVENOUS
Status: DISCONTINUED | OUTPATIENT
Start: 2021-06-03 | End: 2021-06-03 | Stop reason: HOSPADM

## 2021-06-03 RX ORDER — DEXAMETHASONE SODIUM PHOSPHATE 10 MG/ML
INJECTION, SOLUTION INTRAMUSCULAR; INTRAVENOUS PRN
Status: DISCONTINUED | OUTPATIENT
Start: 2021-06-03 | End: 2021-06-03 | Stop reason: SDUPTHER

## 2021-06-03 RX ORDER — SODIUM CHLORIDE 9 MG/ML
25 INJECTION, SOLUTION INTRAVENOUS PRN
Status: DISCONTINUED | OUTPATIENT
Start: 2021-06-03 | End: 2021-06-03 | Stop reason: HOSPADM

## 2021-06-03 RX ORDER — HYDROMORPHONE HYDROCHLORIDE 1 MG/ML
0.25 INJECTION, SOLUTION INTRAMUSCULAR; INTRAVENOUS; SUBCUTANEOUS EVERY 5 MIN PRN
Status: DISCONTINUED | OUTPATIENT
Start: 2021-06-03 | End: 2021-06-03 | Stop reason: HOSPADM

## 2021-06-03 RX ORDER — SODIUM CHLORIDE, SODIUM LACTATE, POTASSIUM CHLORIDE, CALCIUM CHLORIDE 600; 310; 30; 20 MG/100ML; MG/100ML; MG/100ML; MG/100ML
INJECTION, SOLUTION INTRAVENOUS CONTINUOUS PRN
Status: DISCONTINUED | OUTPATIENT
Start: 2021-06-03 | End: 2021-06-03 | Stop reason: SDUPTHER

## 2021-06-03 RX ORDER — FENTANYL CITRATE 50 UG/ML
50 INJECTION, SOLUTION INTRAMUSCULAR; INTRAVENOUS EVERY 5 MIN PRN
Status: DISCONTINUED | OUTPATIENT
Start: 2021-06-03 | End: 2021-06-03 | Stop reason: HOSPADM

## 2021-06-03 RX ORDER — PROPOFOL 10 MG/ML
INJECTION, EMULSION INTRAVENOUS PRN
Status: DISCONTINUED | OUTPATIENT
Start: 2021-06-03 | End: 2021-06-03 | Stop reason: SDUPTHER

## 2021-06-03 RX ORDER — SODIUM CHLORIDE 0.9 % (FLUSH) 0.9 %
10 SYRINGE (ML) INJECTION PRN
Status: DISCONTINUED | OUTPATIENT
Start: 2021-06-03 | End: 2021-06-03 | Stop reason: HOSPADM

## 2021-06-03 RX ADMIN — SODIUM CHLORIDE, POTASSIUM CHLORIDE, SODIUM LACTATE AND CALCIUM CHLORIDE: 600; 310; 30; 20 INJECTION, SOLUTION INTRAVENOUS at 08:45

## 2021-06-03 RX ADMIN — ONDANSETRON HYDROCHLORIDE 4 MG: 2 INJECTION, SOLUTION INTRAMUSCULAR; INTRAVENOUS at 11:15

## 2021-06-03 RX ADMIN — PHENYLEPHRINE HYDROCHLORIDE 100 MCG: 10 INJECTION INTRAVENOUS at 11:07

## 2021-06-03 RX ADMIN — DEXAMETHASONE SODIUM PHOSPHATE 4 MG: 4 INJECTION, SOLUTION INTRAMUSCULAR; INTRAVENOUS at 09:16

## 2021-06-03 RX ADMIN — FENTANYL CITRATE 100 MCG: 50 INJECTION, SOLUTION INTRAMUSCULAR; INTRAVENOUS at 10:12

## 2021-06-03 RX ADMIN — PHENAZOPYRIDINE HYDROCHLORIDE 100 MG: 100 TABLET ORAL at 09:17

## 2021-06-03 RX ADMIN — MIDAZOLAM 2 MG: 1 INJECTION INTRAMUSCULAR; INTRAVENOUS at 10:08

## 2021-06-03 RX ADMIN — HYDROCODONE BITARTRATE AND ACETAMINOPHEN 1 TABLET: 5; 325 TABLET ORAL at 13:12

## 2021-06-03 RX ADMIN — GLYCOPYRROLATE 0.2 MG: 0.2 INJECTION, SOLUTION INTRAMUSCULAR; INTRAVENOUS at 10:38

## 2021-06-03 RX ADMIN — SODIUM CHLORIDE, SODIUM LACTATE, POTASSIUM CHLORIDE, AND CALCIUM CHLORIDE: 600; 310; 30; 20 INJECTION, SOLUTION INTRAVENOUS at 10:08

## 2021-06-03 RX ADMIN — SUGAMMADEX 200 MG: 100 INJECTION, SOLUTION INTRAVENOUS at 11:26

## 2021-06-03 RX ADMIN — PROPOFOL 20 MG: 10 INJECTION, EMULSION INTRAVENOUS at 11:51

## 2021-06-03 RX ADMIN — CEFAZOLIN SODIUM 2000 MG: 1 INJECTION, POWDER, FOR SOLUTION INTRAMUSCULAR; INTRAVENOUS at 10:30

## 2021-06-03 RX ADMIN — GLYCOPYRROLATE 0.2 MG: 0.2 INJECTION, SOLUTION INTRAMUSCULAR; INTRAVENOUS at 10:29

## 2021-06-03 RX ADMIN — HYDROMORPHONE HYDROCHLORIDE 0.5 MG: 1 INJECTION, SOLUTION INTRAMUSCULAR; INTRAVENOUS; SUBCUTANEOUS at 12:10

## 2021-06-03 RX ADMIN — SODIUM CHLORIDE, SODIUM LACTATE, POTASSIUM CHLORIDE, AND CALCIUM CHLORIDE: 600; 310; 30; 20 INJECTION, SOLUTION INTRAVENOUS at 11:21

## 2021-06-03 RX ADMIN — PROPOFOL 180 MG: 10 INJECTION, EMULSION INTRAVENOUS at 10:13

## 2021-06-03 RX ADMIN — FUROSEMIDE 20 MG: 10 INJECTION, SOLUTION INTRAVENOUS at 11:43

## 2021-06-03 RX ADMIN — PHENYLEPHRINE HYDROCHLORIDE 100 MCG: 10 INJECTION INTRAVENOUS at 10:26

## 2021-06-03 RX ADMIN — PHENYLEPHRINE HYDROCHLORIDE 100 MCG: 10 INJECTION INTRAVENOUS at 10:35

## 2021-06-03 RX ADMIN — LIDOCAINE HYDROCHLORIDE 50 MG: 10 INJECTION, SOLUTION INFILTRATION; PERINEURAL at 10:12

## 2021-06-03 RX ADMIN — FAMOTIDINE 20 MG: 10 INJECTION, SOLUTION INTRAVENOUS at 09:16

## 2021-06-03 RX ADMIN — FENTANYL CITRATE 50 MCG: 50 INJECTION, SOLUTION INTRAMUSCULAR; INTRAVENOUS at 10:42

## 2021-06-03 RX ADMIN — METOPROLOL TARTRATE 1 MG: 5 INJECTION INTRAVENOUS at 09:43

## 2021-06-03 RX ADMIN — FENTANYL CITRATE 50 MCG: 50 INJECTION, SOLUTION INTRAMUSCULAR; INTRAVENOUS at 10:59

## 2021-06-03 RX ADMIN — DEXAMETHASONE SODIUM PHOSPHATE 4 MG: 10 INJECTION, SOLUTION INTRAMUSCULAR; INTRAVENOUS at 10:37

## 2021-06-03 ASSESSMENT — PAIN SCALES - GENERAL
PAINLEVEL_OUTOF10: 0
PAINLEVEL_OUTOF10: 8

## 2021-06-03 ASSESSMENT — PAIN DESCRIPTION - PAIN TYPE
TYPE: SURGICAL PAIN
TYPE: SURGICAL PAIN

## 2021-06-03 ASSESSMENT — LIFESTYLE VARIABLES: SMOKING_STATUS: 1

## 2021-06-03 NOTE — OP NOTE
OPERATIVE NOTE        Date of Service: 06/03/21     Pre-operative Diagnosis: Ma Gravel, PELVIC PAIN    Post-operative Diagnosis:  Same    Procedure: Procedure(s):  TOTAL LAPAROSCOPIC HYSTERECTOMY WITH [address] CYSTOSCOPY    Anesthesia: General    Surgeon: Alicia Cantu MD    Assistants: First Assistant: Edwige Armstrong; Purnima Mcrae; III Annmarie Schuster    Procedure Note:  After informed consents obtained, patient taken to the OR, given general endotracheal anesthesia and positioned dorsal lithotomy position. Patient prepped and draped in usual sterile fashion. Salcido catheter placed and manipulator placed into uterus. An incision made above the umbulicus with the knife. Skin elevated up with penetrating towel clip and veress needle placed through the fascia. Placement confirmed with syringe and sterile water. Abdomen insuflated with CO2 and 8mm port placed using optivue obturator and 5mm 0 degree storz scope. Once entty into abdomen confirmed 8mm ports placed 10cm right and left of the umbulicus and an 8mm assistant port placed in the LLQ. Switched to [facility]. Patient placed into trendelenburg and the robot docked. I turned my attention to the surgeon's console. Instruments used are monopolar scissor right hand and bipolar maryland left hand. Both ovaries appear normal on inspection. Bilateral IPL cauterized and cut. Round ligaments cauterized and cut and broad ligaments opened with blunt dissection. Bladder flap is created with sharp dissection and cautery. Uterine arteries cauterized with bipolar cautery and cut with the monopolar scissor. Colpotomy made anteriorly and carried around circumferentially until uterus  and removed vaginally. Vaginal cuff repaired with 2-0 stratafix. Good hemostasis achieved. Pelvis irrigated/washed and inspected closely. Evicel placed over pelvic floor through assistant port. Robot undocked and removed from patient side.  Salcido removed and cysto revealed intact

## 2021-06-03 NOTE — H&P
Patient is scheduled to have a TL with Deneen  for menorrhagia, dysmenorrhea, pelvic pain, hx of Essure, and hx of CVA.     She is having heavy, irregular, and painful periods. She is also having pelvic pain when not on cycles along with painful intercourse. She does have a hx of essure procedure. She has tried Depo Provera in the past and had to stop due to bone loss. She has also tried several times of OCP's and she had to stop them due to mood changes or no improvement with cycle control. She is no longer a candidate for estrogen due to hx of stroke and heart disease. She is desiring definitive treatment. She does have a hx of having the unsuccessful Essure procedure.  She has been having these issues for the past several years. pt is needing refill on her Effexor. she is a Raul Chang is a 38 y.o. 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) 09/04/2018    Moderate episode of recurrent major depressive disorder ([address] 09/04/2018    Decreased strength of lower extremity 09/04/2018    Syncope and collapse 09/04/2018    Chronic superficial gastritis 09/04/2018    Family hx-breast malignancy 07/18/2012    Diffuse cystic mastopathy 04/16/2012    Lump or mass in breast left  04/16/2012      Current Facility-Administered Medications          Current Outpatient Medications   Medication Sig Dispense Refill    venlafaxine (EFFEXOR XR) 150 MG extended release capsule Take 1 capsule by mouth daily 90 capsule 3    bisoprolol (ZEBETA) 5 MG tablet Take 1 tablet by mouth daily 30 tablet 5    aspirin (ASPIRIN CHILDRENS) 81 MG chewable tablet Take 1 tablet by mouth daily 30 tablet 9    albuterol sulfate  (90 Base) MCG/ACT inhaler INHALE 2 PUFFS INTO THE LUNGS EVERY 6 HOURS AS NEEDED FOR WHEEZING 18 g 3      No current facility-administered medications for this visit.          Allergies: Patient has no known allergies.   Past Medical History        Past Medical History:   Diagnosis Date    Abnormal glucose measurement      Abnormal Pap smear of cervix      Anxiety      Blood circulation, collateral      CAD (coronary artery disease)      Cerebral artery occlusion with cerebral infarction (HCC)      Complication of anesthesia       difficulty waking    Depression      Heart disease      IBS (irritable bowel syndrome)      MDD (major depressive disorder)      [facility]      Miscarriage 06/2012    Neurologic disorder      Postpartum depression      Schizophrenia (HCC)      Seizures (HCC)       anxiety related    Syncope      UTI (urinary tract infection) 12/09/2019         Past Surgical History         Past Surgical History:   Procedure Laterality Date    CHOLECYSTECTOMY, LAPAROSCOPIC        COLONOSCOPY N/A 11/7/2019     Dr Eloina Luis, 10 yr recall    EGD   2016     [facility]         lower right leg, has metal plate and screws    FRACTURE SURGERY         left wrist    INSERTABLE CARDIAC MONITOR         loop recorder    INTRAUTERINE DEVICE INSERTION   06/26/2016     Essure placement    UPPER GASTROINTESTINAL ENDOSCOPY N/A 11/7/2019     Dr Ayleen Jackson-Gastritis         Family History         Family History   Problem Relation Age of Onset    High Blood Pressure Mother      Diabetes Father      Heart Disease Father      Stomach Cancer Father      Breast Cancer Other           maternal great aunt    Colon Cancer Paternal Grandmother      Colon Polyps Neg Hx      Esophageal Cancer Neg Hx      Liver Cancer Neg Hx      Liver Disease Neg Hx      Rectal Cancer Neg Hx           Social History            Tobacco Use    Smoking status: Former Smoker    Smokeless tobacco: Former User       Quit date: 9/9/2014   Substance Use Topics    Alcohol use: Not Currently       Comment: occasional, last drink 6 mo ago         /87   Pulse 78   Temp 98.4 °F (36.9 °C) (Temporal)   Resp 16   Ht 5' (1.524 m)   Wt 183 lb (83 kg)   LMP 05/23/2021 (Approximate)   SpO2 100%   BMI 35.74 kg/m²   Physical Exam  Constitutional:       Appearance: Normal appearance. She is normal weight. HENT:      Head: Normocephalic and atraumatic. Nose: Nose normal.      Mouth/Throat:      Mouth: Mucous membranes are moist.   Eyes:      Pupils: Pupils are equal, round, and reactive to light. Cardiovascular:      Pulses: Normal pulses. Pulmonary:      Effort: Pulmonary effort is normal.   Abdominal:      General: Abdomen is flat. Palpations: Abdomen is soft. Musculoskeletal:         General: Normal range of motion. Cervical back: Normal range of motion. Neurological:      General: No focal deficit present. Mental Status: She is alert and oriented to person, place, and time. Skin:     General: Skin is warm and dry. Psychiatric:         Mood and Affect: Mood normal.         Behavior: Behavior normal.   Vitals and nursing note reviewed.                 Diagnosis Orders   1. Menorrhagia with irregular cycle      2. Dysmenorrhea      3. Pelvic pain in female      4. Dyspareunia in female      5. History of female sterilization      6. History of CVA (cerebrovascular accident)               [facility]  Counseling was offered and accepted by patient. Discussed at length the risks, benefits and alternatives to surgery including medical management and no intervention. Pt is in agreement with surgery. Consents for surgery signed.  All questions answered and patient voiced understanding of above.

## 2021-06-03 NOTE — ANESTHESIA PRE PROCEDURE
Department of Anesthesiology  Preprocedure Note       Name:  Chase Gaucher   Age:  43 y.o.  :  1985                                          MRN:  [id number]         Date:  6/3/2021      Surgeon: Sirena Pratt):  Matt Larkin MD    Procedure: Procedure(s):  TOTAL LAPAROSCOPIC HYSTERECTOMY WITH DAVINCI    Medications prior to admission:   Prior to Admission medications    Medication Sig Start Date End Date Taking?  Authorizing Provider   omeprazole (PRILOSEC) 20 MG delayed release capsule Take 20 mg by mouth daily Indications: Reflux Gastritis    Historical Provider, MD   butalbital-APAP-caffeine -40 MG CAPS per capsule Take 1 capsule by mouth every 4 hours as needed for Headaches    Historical Provider, MD   promethazine (PHENERGAN) 25 MG tablet Take 25 mg by mouth every 6 hours as needed for Nausea    Historical Provider, MD   bisoprolol (ZEBETA) 5 MG tablet Take 1 tablet by mouth daily 21   SETH Pascual   venlafaxine (EFFEXOR XR) 150 MG extended release capsule Take 1 capsule by mouth daily 5/10/21   SETH Magana   aspirin (ASPIRIN CHILDRENS) 81 MG chewable tablet Take 1 tablet by mouth daily 2/15/21   SETH Arizmendi - CNP   albuterol sulfate  (90 Base) MCG/ACT inhaler INHALE 2 PUFFS INTO THE LUNGS EVERY 6 HOURS AS NEEDED FOR WHEEZING 20   Octavia Sever, MD       Current medications:    Current Facility-Administered Medications   Medication Dose Route Frequency Provider Last Rate Last Admin    lactated ringers infusion   Intravenous Continuous Brenda Kraft MD           Allergies:  No Known Allergies    Problem List:    Patient Active Problem List   Diagnosis Code    Diffuse cystic mastopathy N60.19    Lump or mass in breast left  N63.0    Family hx-breast malignancy Z80.3    Coronary artery disease involving native coronary artery of native heart without angina pectoris I25.10    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) I69.398, H53.9    Moderate episode of recurrent major depressive disorder (HCC) F33.1    Decreased strength of lower extremity R29.898    Syncope and collapse R55    Chronic superficial gastritis K29.30    Convulsions (HCC) R56.9    Gastric reflux syndrome K21.9    Rectal bleed K62.5    Chronic migraine G43.709    History of abnormal cervical Pap smear Z87.42       Past Medical History:        Diagnosis Date    Abnormal glucose measurement     Abnormal Pap smear of cervix     Anxiety     Blood circulation, collateral     CAD (coronary artery disease)     Cerebral artery occlusion with cerebral infarction (HCC)     Complication of anesthesia     difficulty waking    Depression     Heart disease     IBS (irritable bowel syndrome)     MDD (major depressive disorder)     [facility]     Miscarriage 06/2012    Neurologic disorder     Postpartum depression     Prolonged emergence from general anesthesia     Schizophrenia ([facility]     Seizures (HCC)     anxiety related    Syncope     Tachycardia     UTI (urinary tract infection) 12/09/2019       Past Surgical History:        Procedure Laterality Date    CHOLECYSTECTOMY, LAPAROSCOPIC      COLONOSCOPY N/A 11/7/2019    Dr Delvin Harvey, 10 yr recall    EGD  2016    [facility]      lower right leg, has metal plate and screws    FRACTURE SURGERY      left wrist    INSERTABLE CARDIAC MONITOR      loop recorder    INTRAUTERINE DEVICE INSERTION  06/26/2016    Essure placement    UPPER GASTROINTESTINAL ENDOSCOPY N/A 11/7/2019    Dr Bonnie Jackson-Gastritis       Social History:    Social History     Tobacco Use    Smoking status: Former Smoker    Smokeless tobacco: Former User     Quit date: 9/9/2014   Substance Use Topics    Alcohol use: Not Currently     Comment: occasional, last drink 6 mo ago                                Counseling given: Not Answered      Vital Signs (Current):  There were no vitals filed for this visit.                                           BP Readings from Last 3 Encounters:   05/25/21 120/80   03/23/21 109/76   02/25/21 120/78       NPO Status:                                                                                 BMI:   Wt Readings from Last 3 Encounters:   06/02/21 183 lb (83 kg)   05/25/21 182 lb (82.6 kg)   05/11/21 183 lb (83 kg)     There is no height or weight on file to calculate BMI.    CBC:   Lab Results   Component Value Date    WBC 6.2 05/11/2021    RBC 4.51 05/11/2021    HGB 13.6 05/11/2021    HCT 43.8 05/11/2021    MCV 97.1 05/11/2021    RDW 14.1 05/11/2021     05/11/2021       CMP:   Lab Results   Component Value Date     05/11/2021    K 4.1 05/11/2021    K 3.8 12/11/2019     05/11/2021    CO2 27 05/11/2021    BUN 13 05/11/2021    CREATININE 0.7 05/11/2021    GFRAA >59 05/11/2021    LABGLOM >60 05/11/2021    GLUCOSE 82 05/11/2021    PROT 5.9 12/11/2019    CALCIUM 9.3 05/11/2021    BILITOT 0.3 12/11/2019    ALKPHOS 79 12/11/2019    AST 13 12/11/2019    ALT 12 12/11/2019       POC Tests: No results for input(s): POCGLU, POCNA, POCK, POCCL, POCBUN, POCHEMO, POCHCT in the last 72 hours. Coags: No results found for: PROTIME, INR, APTT    HCG (If Applicable):   Lab Results   Component Value Date    PREGTESTUR Negative 12/09/2019        ABGs: No results found for: PHART, PO2ART, YGK2QUW, QPN3VUK, BEART, A7DUAVAG     Type & Screen (If Applicable):  No results found for: LABABO, LABRH    Drug/Infectious Status (If Applicable):  No results found for: HIV, HEPCAB    COVID-19 Screening (If Applicable):   Lab Results   Component Value Date    COVID19 Not Detected 06/02/2021    COVID19 Not Detected 05/11/2021           Anesthesia Evaluation  Patient summary reviewed history of anesthetic complications (Prolonged awakening, did not require prolonged intubation):    Airway: Mallampati: III  TM distance: >3 FB   Neck ROM: full  Mouth opening: > = 3 FB Dental: Pulmonary:normal exam  breath sounds clear to auscultation  (+) sleep apnea: on noncompliant,  asthma: current smoker (e-cigarette)    (-) recent URI          Patient did not smoke on day of surgery. Cardiovascular:  Exercise tolerance: good (>4 METS),   (+) past MI (2014, \"stress-induced\"):, CAD:, dysrhythmias: SVT,     (-) pacemaker, hypertension, CABG/stent and  angina    ECG reviewed  Rhythm: regular  Rate: normal  Echocardiogram reviewed  Stress test reviewed       Beta Blocker:  Dose within 24 Hrs      ROS comment: Implanted cardiac monitor, placed for syncope, no cardiac etiology noted  Follows with Cardiology here  Negative stress test and TTE wnl noted on most recent cardiology note     Neuro/Psych:   (+) seizures (>1 yr ago):, CVA (2014, right-hand occasional weakness):, headaches: migraine headaches, psychiatric history (Schizophrenia, depression):   (-) TIA           GI/Hepatic/Renal:   (+) GERD: well controlled,      (-) liver disease and no renal disease       Endo/Other:        (-) diabetes mellitus, hypothyroidism, hyperthyroidism               Abdominal:           Vascular:                                      Anesthesia Plan      general     ASA 3     (Preop dexamethasone, famotidine, scopolamine)  Induction: intravenous. MIPS: Postoperative opioids intended and Prophylactic antiemetics administered. Anesthetic plan and risks discussed with patient. Use of blood products discussed with patient whom consented to blood products.                  Krissy Storey MD   6/3/2021

## 2021-06-18 ENCOUNTER — OFFICE VISIT (OUTPATIENT)
Dept: OBGYN CLINIC | Age: 36
End: 2021-06-18

## 2021-06-18 VITALS
WEIGHT: 179 LBS | BODY MASS INDEX: 35.14 KG/M2 | HEART RATE: 84 BPM | DIASTOLIC BLOOD PRESSURE: 80 MMHG | HEIGHT: 60 IN | TEMPERATURE: 98 F | SYSTOLIC BLOOD PRESSURE: 136 MMHG

## 2021-06-18 DIAGNOSIS — Z09 POSTOPERATIVE FOLLOW-UP: Primary | ICD-10-CM

## 2021-06-18 PROCEDURE — 99024 POSTOP FOLLOW-UP VISIT: CPT | Performed by: OBSTETRICS & GYNECOLOGY

## 2021-06-18 ASSESSMENT — ENCOUNTER SYMPTOMS
RESPIRATORY NEGATIVE: 1
EYES NEGATIVE: 1
GASTROINTESTINAL NEGATIVE: 1

## 2021-06-18 NOTE — PROGRESS NOTES
mouth every 6 hours as needed for Nausea  Historical Provider, MD   bisoprolol (ZEBETA) 5 MG tablet Take 1 tablet by mouth daily  Patient taking differently: Take 5 mg by mouth daily As not started as of 6/3/2021  SETH Aguirre   venlafaxine (EFFEXOR XR) 150 MG extended release capsule Take 1 capsule by mouth daily  Achilles Sep, APRN   aspirin (ASPIRIN CHILDRENS) 81 MG chewable tablet Take 1 tablet by mouth daily  SETH Miles - CNP   albuterol sulfate  (90 Base) MCG/ACT inhaler INHALE 2 PUFFS INTO THE LUNGS EVERY 6 HOURS AS NEEDED FOR WHEEZING  Leandro Pinto MD        No Known Allergies    Past Medical History:   Diagnosis Date    Abnormal glucose measurement     Abnormal Pap smear of cervix     Anxiety     Blood circulation, collateral     CAD (coronary artery disease)     Cerebral artery occlusion with cerebral infarction ([address] 2014    right side    Complication of anesthesia     difficulty waking    Depression     Heart disease 06/2020    LOOP recorder     IBS (irritable bowel syndrome)     MDD (major depressive disorder)     MI (myocardial infarction) ([address] 2014    Miscarriage 06/2012    Neurologic disorder     Postpartum depression     Prolonged emergence from general anesthesia     Schizophrenia ([address]     Seizures ([address]     anxiety related (last 6/2020)    Syncope     Tachycardia     UTI (urinary tract infection) 12/09/2019       Past Surgical History:   Procedure Laterality Date    CHOLECYSTECTOMY, LAPAROSCOPIC      COLONOSCOPY N/A 11/7/2019    Dr Hall Monday, 10 yr recall    EGD  2016    [address] [facility] [address]      lower right leg, has metal plate and screws    FRACTURE SURGERY      left wrist    HYSTERECTOMY N/A 6/3/2021    TOTAL LAPAROSCOPIC HYSTERECTOMY WITH DAVINCI CYSTOSCOPY performed by Qi Saeed MD at [address] [place] [address]  2014    loop recorder    INTRAUTERINE DEVICE INSERTION  06/26/2016    Essure placement    UPPER GASTROINTESTINAL ENDOSCOPY N/A 2019    Dr Vianca Rogers       Social History     Socioeconomic History    Marital status:      Spouse name: Not on file    Number of children: Not on file    Years of education: Not on file    Highest education level: Not on file   Occupational History    Not on file   Tobacco Use    Smoking status: Former Smoker     Packs/day: 0.50     Years: 3.00     Pack years: 1.50     Types: Cigarettes     Quit date:      Years since quittin.4    Smokeless tobacco: Former User     Quit date: 2014   Vaping Use    Vaping Use: Some days    Start date: 2019    Substances: Flavoring    Devices: Refillable tank   Substance and Sexual Activity    Alcohol use: Not Currently     Comment: 2020    Drug use: No    Sexual activity: Yes     Partners: Male   Other Topics Concern    Not on file   Social History Narrative    Not on file     Social Determinants of Health     Financial Resource Strain:     Difficulty of Paying Living Expenses:    Food Insecurity:     Worried About Running Out of Food in the Last Year:     [address] in the Last Year:    Transportation Needs:     Lack of Transportation (Medical):      Lack of Transportation (Non-Medical):    Physical Activity:     Days of Exercise per Week:     Minutes of Exercise per Session:    Stress:     Feeling of Stress :    Social Connections:     Frequency of Communication with Friends and Family:     Frequency of Social Gatherings with Friends and Family:     Attends Yarsani Services:     Active Member of Clubs or Organizations:     Attends Club or Organization Meetings:     Marital Status:    Intimate Partner Violence:     Fear of Current or Ex-Partner:     Emotionally Abused:     Physically Abused:     Sexually Abused:         Family History   Problem Relation Age of Onset    High Blood Pressure Mother     Diabetes Father     Heart Disease Father    Jazzmine Em Father  Breast Cancer Other         maternal great aunt    Colon Cancer Paternal Grandmother     Colon Polyps Neg Hx     Esophageal Cancer Neg Hx     Liver Cancer Neg Hx     Liver Disease Neg Hx     Rectal Cancer Neg Hx        ADVANCE DIRECTIVE: N, <no information>    Vitals:    06/18/21 1304   BP: 136/80   Site: Left Upper Arm   Position: Sitting   Cuff Size: Large Adult   Pulse: 84   Temp: 98 °F (36.7 °C)   TempSrc: Temporal   Weight: 179 lb (81.2 kg)   Height: 5' (1.524 m)     Estimated body mass index is 34.96 kg/m² as calculated from the following:    Height as of this encounter: 5' (1.524 m). Weight as of this encounter: 179 lb (81.2 kg). Physical Exam  Vitals and nursing note reviewed. Constitutional:       General: She is not in acute distress. Appearance: She is well-developed. She is not diaphoretic. HENT:      Head: Normocephalic and atraumatic. Eyes:      Conjunctiva/sclera: Conjunctivae normal.      Pupils: Pupils are equal, round, and reactive to light. Pulmonary:      Effort: Pulmonary effort is normal.   Abdominal:      Tenderness: There is no guarding. Comments: Incisions healing well   Musculoskeletal:         General: Normal range of motion. Cervical back: Normal range of motion. Comments: Normal ROM in all 4 extremities; normal gait   Skin:     General: Skin is warm and dry. Neurological:      Mental Status: She is alert and oriented to person, place, and time. Motor: No abnormal muscle tone. Coordination: Coordination normal.   Psychiatric:         Behavior: Behavior normal.         No flowsheet data found. Lab Results   Component Value Date    CHOL 177 09/06/2018    TRIG 79 09/06/2018    HDL 49 09/06/2018    LDLCALC 112 09/06/2018    GLUCOSE 82 05/11/2021       The ASCVD Risk score (Georgiana Martinez, et al., 2013) failed to calculate for the following reasons:     The 2013 ASCVD risk score is only valid for ages 36 to 78    Immunization History

## 2021-06-18 NOTE — PATIENT INSTRUCTIONS
of: February 11, 2021               Content Version: 12.9  © 1902-3105 [employer]. Care instructions adapted under license by [facility] ([facility]). If you have questions about a medical condition or this instruction, always ask your healthcare professional. [address] any warranty or liability for your use of this information.

## 2021-06-29 DIAGNOSIS — Z95.818 STATUS POST PLACEMENT OF IMPLANTABLE LOOP RECORDER: Primary | ICD-10-CM

## 2021-06-29 DIAGNOSIS — R55 SYNCOPE, UNSPECIFIED SYNCOPE TYPE: ICD-10-CM

## 2021-06-29 DIAGNOSIS — R00.0 TACHYCARDIA: ICD-10-CM

## 2021-06-29 PROCEDURE — 93298 REM INTERROG DEV EVAL SCRMS: CPT | Performed by: CLINICAL NURSE SPECIALIST

## 2021-06-29 PROCEDURE — G2066 INTER DEVC REMOTE 30D: HCPCS | Performed by: CLINICAL NURSE SPECIALIST

## 2021-07-16 ENCOUNTER — OFFICE VISIT (OUTPATIENT)
Dept: OBGYN CLINIC | Age: 36
End: 2021-07-16

## 2021-07-16 VITALS
SYSTOLIC BLOOD PRESSURE: 105 MMHG | WEIGHT: 184 LBS | HEIGHT: 60 IN | DIASTOLIC BLOOD PRESSURE: 70 MMHG | HEART RATE: 75 BPM | TEMPERATURE: 98 F | BODY MASS INDEX: 36.12 KG/M2

## 2021-07-16 DIAGNOSIS — Z09 POSTOPERATIVE FOLLOW-UP: Primary | ICD-10-CM

## 2021-07-16 PROCEDURE — 99024 POSTOP FOLLOW-UP VISIT: CPT | Performed by: OBSTETRICS & GYNECOLOGY

## 2021-07-16 ASSESSMENT — ENCOUNTER SYMPTOMS
EYES NEGATIVE: 1
RESPIRATORY NEGATIVE: 1
GASTROINTESTINAL NEGATIVE: 1

## 2021-07-16 NOTE — PATIENT INSTRUCTIONS
Patient Education        Laparoscopically Assisted Vaginal Hysterectomy: Before Your Surgery  What is a laparoscopically assisted vaginal hysterectomy? Laparoscopically assisted vaginal hysterectomy (LAVH) removes the uterus through the vagina. In some cases, the ovaries and fallopian tubes are taken out at the same time. The doctor makes one or more small cuts in the belly. These cuts are called incisions. They let the doctor insert tools to do the surgery. One of these tools is a tube with a light on it. It's called a laparoscope, or scope. The scope and the other tools allow the doctor to free the uterus. Then the doctor makes a small cut in the vagina. The uterus is taken out through this cut. After the surgery, you will not have periods. You won't be able to get pregnant. If there's a chance that you want to have a baby, talk to your doctor about treatment options. Some women go home the day of surgery. Others will stay in the hospital 1 to 2 days after surgery. You will need about 4 to 6 weeks to fully recover. The recovery time may be shorter for some people. Follow-up care is a key part of your treatment and safety. Be sure to make and go to all appointments, and call your doctor if you are having problems. It's also a good idea to know your test results and keep a list of the medicines you take. How do you prepare for surgery? Surgery can be stressful. This information will help you understand what you can expect. And it will help you safely prepare for surgery. Preparing for surgery    · Be sure you have someone to take you home. Anesthesia and pain medicine will make it unsafe for you to drive or get home on your own.     · Understand exactly what surgery is planned, along with the risks, benefits, and other options.     · If you take aspirin or some other blood thinner, ask your doctor if you should stop taking it before your surgery.  Make sure that you understand exactly what your doctor wants you to do. These medicines increase the risk of bleeding.     · Tell your doctor ALL the medicines, vitamins, supplements, and herbal remedies you take. Some may increase the risk of problems during your surgery. Your doctor will tell you if you should stop taking any of them before the surgery and how soon to do it.     · Make sure your doctor and the hospital have a copy of your advance directive. If you don't have one, you may want to prepare one. It lets others know your health care wishes. It's a good thing to have before any type of surgery or procedure. What happens on the day of surgery? · Follow the instructions exactly about when to stop eating and drinking. If you don't, your surgery may be canceled. If your doctor told you to take your medicines on the day of surgery, please take them with only a sip of water.     · Take a bath or shower before you come in for your surgery. Do not apply lotions, perfumes, deodorants, or nail polish.     · Do not shave the surgical site yourself.     · Take off all jewelry and piercings. And take out contact lenses, if you wear them. At the hospital or surgery center   · Bring a picture ID.     · You will be kept comfortable and safe by your anesthesia provider. You will be asleep during the surgery.     · The surgery will take about 2 to 4 hours. When should you call your doctor? · You have questions or concerns.     · You don't understand how to prepare for your surgery.     · You become ill before the surgery (such as fever, flu, or a cold).     · You need to reschedule or have changed your mind about having the surgery. Where can you learn more? Go to [email or web address] and sign in to your [employer] account. Enter D669 in the [employer] box to learn more about \"Laparoscopically Assisted Vaginal Hysterectomy: Before Your Surgery. \"     If you do not have an account, please click on the \"Sign Up Now\" link.   Current as of: February 11, 2021               Content Version: 12.9  © 1323-6650 Healthwise, Incorporated. Care instructions adapted under license by [facility] [place] ([facility]). If you have questions about a medical condition or this instruction, always ask your healthcare professional. [address] any warranty or liability for your use of this information.

## 2021-07-16 NOTE — PROGRESS NOTES
Purnima GUTIÉRREZ RN, am scribing for and in the presence of Dr. Gaudencio Ford 2021/3:22 PM/sign         2021    Salma Monday (:  1985) is a 43 y.o. female, here for a postop visit. Post-Operative Follow-up: Patient here for post-op follow-up. Patient is 6 week status post TLH. The patient reports no problems with eating, bowel movements, voiding, or their wound. The patient is not having any pain. 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 HENT: Negative. Eyes: Negative. Respiratory: Negative. Cardiovascular: Negative. Gastrointestinal: Negative. Genitourinary: Negative for difficulty urinating, dyspareunia, dysuria, enuresis, frequency, hematuria, menstrual problem, pelvic pain, urgency and vaginal discharge. Musculoskeletal: Negative. Skin: Negative. Neurological: Negative. Psychiatric/Behavioral: Negative. Prior to Visit Medications    Medication Sig Taking?  Authorizing Provider   omeprazole (PRILOSEC) 20 MG delayed release capsule Take 20 mg by mouth Daily Indications: Reflux Gastritis  Yes Historical Provider, MD   butalbital-APAP-caffeine -40 MG CAPS per capsule Take 1 capsule by mouth every 4 hours as needed for Headaches Yes Historical Provider, MD   promethazine (PHENERGAN) 25 MG tablet Take 25 mg by mouth every 6 hours as needed for Nausea Yes Historical Provider, MD   bisoprolol (ZEBETA) 5 MG tablet Take 1 tablet by mouth daily  Patient taking differently: Take 5 mg by mouth daily As not started as of 6/3/2021 Yes SETH Bonner   venlafaxine (EFFEXOR XR) 150 MG extended release capsule Take 1 capsule by mouth daily Yes SETH Beltran   aspirin (ASPIRIN CHILDRENS) 81 MG chewable tablet Take 1 tablet by mouth daily Yes SETH Morfin - CNP   albuterol sulfate  (90 Base) MCG/ACT inhaler INHALE 2 PUFFS INTO THE LUNGS EVERY 6 HOURS AS NEEDED FOR WHEEZING Yes Murtaza Santos MD        No Known Allergies    Past Medical History:   Diagnosis Date    Abnormal glucose measurement     Abnormal Pap smear of cervix     Anxiety     Blood circulation, collateral     CAD (coronary artery disease)     Cerebral artery occlusion with cerebral infarction ([address] 2014    right side    Complication of anesthesia     difficulty waking    Depression     Heart disease 06/2020    LOOP recorder     IBS (irritable bowel syndrome)     MDD (major depressive disorder)     MI (myocardial infarction) ([address] 2014    Miscarriage 06/2012    Neurologic disorder     Postpartum depression     Prolonged emergence from general anesthesia     Schizophrenia ([address]     Seizures ([address]     anxiety related (last 6/2020)    Syncope     Tachycardia     UTI (urinary tract infection) 12/09/2019       Past Surgical History:   Procedure Laterality Date    CHOLECYSTECTOMY, LAPAROSCOPIC      COLONOSCOPY N/A 11/7/2019    Dr Danny Cain, 10 yr recall    EGD  [address]    [address]      lower right leg, has metal plate and screws    FRACTURE SURGERY      left wrist    HYSTERECTOMY N/A 6/3/2021    TOTAL LAPAROSCOPIC HYSTERECTOMY WITH DAVINCI CYSTOSCOPY performed by Mark Garcia MD at [address] [place] [address]  2014    loop recorder    INTRAUTERINE DEVICE INSERTION  06/26/2016 Essure placement    UPPER GASTROINTESTINAL ENDOSCOPY N/A 2019    Dr Blanca Roche       Social History     Socioeconomic History    Marital status:      Spouse name: Not on file    Number of children: Not on file    Years of education: Not on file    Highest education level: Not on file   Occupational History    Not on file   Tobacco Use    Smoking status: Former Smoker     Packs/day: 0.50     Years: 3.00     Pack years: 1.50     Types: Cigarettes     Quit date:      Years since quittin.5    Smokeless tobacco: Former User     Quit date: 2014   Vaping Use    Vaping Use: Some days    Start date: 2019    Substances: Flavoring    Devices: Refillable tank   Substance and Sexual Activity    Alcohol use: Not Currently     Comment: 2020    Drug use: No    Sexual activity: Yes     Partners: Male   Other Topics Concern    Not on file   Social History Narrative    Not on file     Social Determinants of Health     Financial Resource Strain:     Difficulty of Paying Living Expenses:    Food Insecurity:     Worried About Running Out of Food in the Last Year:     [address] in the Last Year:    Transportation Needs:     Lack of Transportation (Medical):      Lack of Transportation (Non-Medical):    Physical Activity:     Days of Exercise per Week:     Minutes of Exercise per Session:    Stress:     Feeling of Stress :    Social Connections:     Frequency of Communication with Friends and Family:     Frequency of Social Gatherings with Friends and Family:     Attends Che[employer] Services:     Active Member of Clubs or Organizations:     Attends Club or Organization Meetings:     Marital Status:    Intimate Partner Violence:     Fear of Current or Ex-Partner:     Emotionally Abused:     Physically Abused:     Sexually Abused:         Family History   Problem Relation Age of Onset    High Blood Pressure Mother     Diabetes Father     Heart Disease Father    Jose Sol Stomach Cancer Father     Breast Cancer Other         maternal great aunt    Colon Cancer Paternal Grandmother     Colon Polyps Neg Hx     Esophageal Cancer Neg Hx     Liver Cancer Neg Hx     Liver Disease Neg Hx     Rectal Cancer Neg Hx        ADVANCE DIRECTIVE: N, <no information>    Vitals:    07/16/21 1442   BP: 105/70   Site: Left Upper Arm   Position: Sitting   Cuff Size: Medium Adult   Pulse: 75   Temp: 98 °F (36.7 °C)   TempSrc: Temporal   Weight: 184 lb (83.5 kg)   Height: 5' (1.524 m)     Estimated body mass index is 35.94 kg/m² as calculated from the following:    Height as of this encounter: 5' (1.524 m). Weight as of this encounter: 184 lb (83.5 kg). Physical Exam  Vitals and nursing note reviewed. Constitutional:       General: She is not in acute distress. Appearance: She is well-developed. She is not diaphoretic. HENT:      Head: Normocephalic and atraumatic. Eyes:      Conjunctiva/sclera: Conjunctivae normal.      Pupils: Pupils are equal, round, and reactive to light. Pulmonary:      Effort: Pulmonary effort is normal.   Abdominal:      Tenderness: There is no guarding. Comments: Incisions healed    Musculoskeletal:         General: Normal range of motion. Cervical back: Normal range of motion. Comments: Normal ROM in all 4 extremities; normal gait   Skin:     General: Skin is warm and dry. Neurological:      Mental Status: She is alert and oriented to person, place, and time. Motor: No abnormal muscle tone. Coordination: Coordination normal.   Psychiatric:         Behavior: Behavior normal.         No flowsheet data found. Lab Results   Component Value Date    CHOL 177 09/06/2018    TRIG 79 09/06/2018    HDL 49 09/06/2018    LDLCALC 112 09/06/2018    GLUCOSE 82 05/11/2021       The ASCVD Risk score (Gurpreet Navarrete., et al., 2013) failed to calculate for the following reasons:     The 2013 ASCVD risk score is only valid for ages 36 to 78    Immunization History   Administered Date(s) Administered    Influenza, Quadv, IM, PF (6 mo and older Fluzone, Flulaval, Fluarix, and 3 yrs and older Afluria) 11/02/2018, 11/06/2019, 10/13/2020    Pneumococcal Polysaccharide ([id number]) 03/22/2019       Health Maintenance   Topic Date Due    Hepatitis C screen  Never done    Varicella vaccine (1 of 2 - 2-dose childhood series) Never done    COVID-19 Vaccine (1) Never done    HIV screen  Never done    DTaP/Tdap/Td vaccine (1 - Tdap) Never done    Annual Wellness Visit (AWV)  Never done    Flu vaccine (1) 09/01/2021    Cervical cancer screen  08/11/2025    Colon cancer screen colonoscopy  11/07/2029    Hepatitis A vaccine  Aged Out    Hepatitis B vaccine  Aged Out    Hib vaccine  Aged Out    Meningococcal (ACWY) vaccine  Aged Out    Pneumococcal 0-64 years Vaccine  Aged Out          ASSESSMENT/PLAN:  1. Postoperative follow-up    Counseling was offered and accepted by patient. The operative report and surgical findings were discussed with the patient. She was instructed to gradually resume normal activity. She was also instructed to call or return to the office if complications occur. Patient voiced understanding of above. Return in about 1 year (around 7/16/2022), or if symptoms worsen or fail to improve. An electronic signature was used to authenticate this note. Gio Tenorio MD, personally performed services described in this document as scribed by Joselyn Rush RN in my presence, and it is both accurate and complete.

## 2021-07-30 DIAGNOSIS — R00.0 TACHYCARDIA: ICD-10-CM

## 2021-07-30 DIAGNOSIS — Z95.818 STATUS POST PLACEMENT OF IMPLANTABLE LOOP RECORDER: Primary | ICD-10-CM

## 2021-07-30 DIAGNOSIS — R55 SYNCOPE, UNSPECIFIED SYNCOPE TYPE: ICD-10-CM

## 2021-07-30 PROCEDURE — 93298 REM INTERROG DEV EVAL SCRMS: CPT | Performed by: CLINICAL NURSE SPECIALIST

## 2021-07-30 PROCEDURE — G2066 INTER DEVC REMOTE 30D: HCPCS | Performed by: CLINICAL NURSE SPECIALIST

## 2021-09-01 ENCOUNTER — TELEPHONE (OUTPATIENT)
Dept: CARDIOLOGY CLINIC | Age: 36
End: 2021-09-01

## 2021-09-17 ENCOUNTER — NURSE TRIAGE (OUTPATIENT)
Dept: OTHER | Facility: CLINIC | Age: 36
End: 2021-09-17

## 2021-09-17 NOTE — TELEPHONE ENCOUNTER
[facility]      Reason for Disposition   All other earaches (Exceptions: earache lasting < 1 hour, and earache from air travel)    Answer Assessment - Initial Assessment Questions  1. LOCATION: \"Which ear is involved? \"      \"going back and forth between right and left ears\"    2. ONSET: \"When did the ear start hurting\"      One month    3. SEVERITY: \"How bad is the pain? \"  (Scale 1-10; mild, moderate or severe)    - MILD (1-3): doesn't interfere with normal activities     - MODERATE (4-7): interferes with normal activities or awakens from sleep     - SEVERE (8-10): excruciating pain, unable to do any normal activities      5/10- sharp pains    4. URI SYMPTOMS: \"Do you have a runny nose or cough? \"      Bad cough- two weeks    5. FEVER: \"Do you have a fever? \" If so, ask: \"What is your temperature, how was it measured, and when did it start? \"      Denies    6. CAUSE: \"Have you been swimming recently? \", \"How often do you use Q-TIPS? \", \"Have you had any recent air travel or scuba diving? \"    N/A    7. OTHER SYMPTOMS: \"Do you have any other symptoms? \" (e.g., headache, stiff neck, dizziness, vomiting, runny nose, decreased hearing)      Dizziness from \"ears\"- no dizziness currently, nausea    8. PREGNANCY: \"Is there any chance you are pregnant? \" \"When was your last menstrual period? \"      Had a baby 7 months ago    Protocols used: EARACHE-ADULT-[place]    Received call from ANITA at [facility] with [employer]. Brief description of triage: See above. Triage indicates for patient to be seen today or tomorrow. Care advice provided, patient verbalizes understanding; denies any other questions or concerns; instructed to call back for any new or worsening symptoms. Writer provided warm transfer to [facility] AND [employer] for appointment scheduling. Attention Provider: Thank you for allowing me to participate in the care of your patient.   The patient was connected to triage in response to information provided to the [facility]. Please do not respond through this encounter as the response is not directed to a shared pool.

## 2021-09-20 ENCOUNTER — VIRTUAL VISIT (OUTPATIENT)
Dept: PRIMARY CARE CLINIC | Age: 36
End: 2021-09-20
Payer: MEDICARE

## 2021-09-20 DIAGNOSIS — R05.9 COUGH: ICD-10-CM

## 2021-09-20 DIAGNOSIS — H69.80 DYSFUNCTION OF EUSTACHIAN TUBE, UNSPECIFIED LATERALITY: ICD-10-CM

## 2021-09-20 DIAGNOSIS — J30.2 SEASONAL ALLERGIES: ICD-10-CM

## 2021-09-20 DIAGNOSIS — H92.03 OTALGIA OF BOTH EARS: Primary | ICD-10-CM

## 2021-09-20 PROCEDURE — 99422 OL DIG E/M SVC 11-20 MIN: CPT | Performed by: FAMILY MEDICINE

## 2021-09-20 RX ORDER — FLUTICASONE PROPIONATE 50 MCG
1 SPRAY, SUSPENSION (ML) NASAL DAILY
Qty: 32 G | Refills: 1 | Status: SHIPPED | OUTPATIENT
Start: 2021-09-20 | End: 2021-11-18

## 2021-09-20 RX ORDER — MONTELUKAST SODIUM 10 MG/1
10 TABLET ORAL DAILY
Qty: 30 TABLET | Refills: 3 | Status: SHIPPED | OUTPATIENT
Start: 2021-09-20 | End: 2021-11-18

## 2021-09-20 SDOH — ECONOMIC STABILITY: FOOD INSECURITY: WITHIN THE PAST 12 MONTHS, THE FOOD YOU BOUGHT JUST DIDN'T LAST AND YOU DIDN'T HAVE MONEY TO GET MORE.: NEVER TRUE

## 2021-09-20 SDOH — ECONOMIC STABILITY: FOOD INSECURITY: WITHIN THE PAST 12 MONTHS, YOU WORRIED THAT YOUR FOOD WOULD RUN OUT BEFORE YOU GOT MONEY TO BUY MORE.: NEVER TRUE

## 2021-09-20 ASSESSMENT — SOCIAL DETERMINANTS OF HEALTH (SDOH): HOW HARD IS IT FOR YOU TO PAY FOR THE VERY BASICS LIKE FOOD, HOUSING, MEDICAL CARE, AND HEATING?: NOT HARD AT ALL

## 2021-09-20 NOTE — PROGRESS NOTES
Salma Monday (:  1985) is a 43 y.o. female,Established patient, here for evaluation of the following chief complaint(s): Otalgia (bilateral ear pain, started almost one month ago) and Cough (started about 2 weeks ago. )         ASSESSMENT/PLAN:  Otalgia  Suspect Eustachian tube dysfunction  Cough  Seasonal allergie    Increase oral fluids  Trial of Singulair and Flonase  If no improvement, need to come in for in person office viist  RTO as needed      SUBJECTIVE/OBJECTIVE:  HPI  This is a virtual visit utilizing the doxy. me platform. Pt understands the limitations of this type of visit. She presents with ear ache x 4 weeks. She admits occ ringing in the ear but denies change in hearing. She also has been having cough x 2 weeks but is not responding to OTC cough medication ( Robitussin & Coricidin). She c/o mild nasal congestion but no fever/chills, HA or dizziness. Appetite normal. No one else at home has similar problems. Pt is a known asthmatic but she states she only uses her inhaler as needed.       Review of Systems     as per HPI    Patient-Reported Vitals 2021   Patient-Reported Weight 185 lbs   Patient-Reported Height 5'0        Physical Exam    [INSTRUCTIONS:  \"[x]\" Indicates a positive item  \"[]\" Indicates a negative item  -- DELETE ALL ITEMS NOT EXAMINED]    Constitutional: [x] Appears well-developed and well-nourished [x] No apparent distress      [] Abnormal -     Mental status: [x] Alert and awake  [x] Oriented to person/place/time [x] Able to follow commands    [] Abnormal -     Eyes:   EOM    []  Normal    [] Abnormal -   Sclera  []  Normal    [] Abnormal -          Discharge []  None visible   [] Abnormal -     HENT: [x] Normocephalic, atraumatic  [] Abnormal -   [] Mouth/Throat: Mucous membranes are moist    External Ears [] Normal  [] Abnormal -    Neck: [] No visualized mass [] Abnormal -     Pulmonary/Chest: [x] Respiratory effort normal   [x] No visualized signs of difficulty

## 2021-11-03 ENCOUNTER — OFFICE VISIT (OUTPATIENT)
Dept: PRIMARY CARE CLINIC | Age: 36
End: 2021-11-03
Payer: MEDICARE

## 2021-11-03 VITALS
DIASTOLIC BLOOD PRESSURE: 75 MMHG | BODY MASS INDEX: 35.93 KG/M2 | SYSTOLIC BLOOD PRESSURE: 110 MMHG | HEIGHT: 60 IN | HEART RATE: 91 BPM | TEMPERATURE: 98.1 F | WEIGHT: 183 LBS | OXYGEN SATURATION: 98 %

## 2021-11-03 DIAGNOSIS — Z23 FLU VACCINE NEED: ICD-10-CM

## 2021-11-03 DIAGNOSIS — Z72.89 DELIBERATE SELF-CUTTING: ICD-10-CM

## 2021-11-03 DIAGNOSIS — Z91.51 HISTORY OF SUICIDAL BEHAVIOR: ICD-10-CM

## 2021-11-03 DIAGNOSIS — F25.0 SCHIZOAFFECTIVE DISORDER, BIPOLAR TYPE (HCC): Primary | ICD-10-CM

## 2021-11-03 PROCEDURE — 99215 OFFICE O/P EST HI 40 MIN: CPT | Performed by: FAMILY MEDICINE

## 2021-11-03 PROCEDURE — 90674 CCIIV4 VAC NO PRSV 0.5 ML IM: CPT | Performed by: FAMILY MEDICINE

## 2021-11-03 PROCEDURE — G0008 ADMIN INFLUENZA VIRUS VAC: HCPCS | Performed by: FAMILY MEDICINE

## 2021-11-03 RX ORDER — OLANZAPINE 5 MG/1
TABLET ORAL
Qty: 30 TABLET | Refills: 1 | Status: SHIPPED | OUTPATIENT
Start: 2021-11-03 | End: 2022-01-13

## 2021-11-03 RX ORDER — LAMOTRIGINE 25 MG/1
TABLET ORAL
Qty: 77 TABLET | Refills: 0 | Status: SHIPPED | OUTPATIENT
Start: 2021-11-03 | End: 2021-11-18

## 2021-11-03 NOTE — PROGRESS NOTES
Prolonged emergence from general anesthesia     Schizophrenia ([address]     Seizures ([address]     anxiety related (last 2020)    Syncope     Tachycardia     UTI (urinary tract infection) 2019       Current Outpatient Medications   Medication Sig Dispense Refill    OLANZapine (ZYPREXA) 5 MG tablet Start 1/2 tab nightly for 3 nights, then start full tab nightly 30 tablet 1    butalbital-APAP-caffeine -40 MG CAPS per capsule Take 1 capsule by mouth every 4 hours as needed for Headaches       lamoTRIgine (LAMICTAL) 100 MG tablet Take 1 tablet by mouth daily 30 tablet 3    bisoprolol (ZEBETA) 5 MG tablet Take 1 tablet by mouth daily 90 tablet 3     No current facility-administered medications for this visit.        No Known Allergies    Past Surgical History:   Procedure Laterality Date    CHOLECYSTECTOMY, LAPAROSCOPIC      COLONOSCOPY N/A 2019    Dr Deion Trejo, 10 yr recall    EGD      [facility]      lower right leg, has metal plate and screws    FRACTURE SURGERY      left wrist    HYSTERECTOMY N/A 6/3/2021    TOTAL LAPAROSCOPIC HYSTERECTOMY WITH DAVINCI CYSTOSCOPY performed by Yaron Suero MD at [address] [place] [address]      loop recorder    INTRAUTERINE DEVICE INSERTION  2016    Essure placement    UPPER GASTROINTESTINAL ENDOSCOPY N/A 2019    Dr Nataliia Taveras Jackson-Gastritis       Social History     Tobacco Use    Smoking status: Former Smoker     Packs/day: 0.50     Years: 3.00     Pack years: 1.50     Types: Cigarettes     Quit date:      Years since quittin.9    Smokeless tobacco: Former User     Quit date: 2014   Vaping Use    Vaping Use: Some days    Start date: 2019    Substances: Flavoring    Devices: LedgerX tank   Substance Use Topics    Alcohol use: Not Currently     Comment: 2020    Drug use: No       Family History   Problem Relation Age of Onset    High Blood Pressure Mother     Diabetes MDCK QUADV, 2 YRS AND OLDER, IM, PF, PREFILL SYR OR SDV, 0.5ML (FLUCELVAX QUADV, PF)   3. Deliberate self-cutting  [address] [facility] - [facility], [address] [place] [address]   4. History of suicidal behavior  Z[address], [place], [address] [address]:   Needs to have counseling. Titration of medications. Contact suicide hotline number given her if any issues and also contacted 911. Short-term follow-up recommended. She is at risk of morbidity and mortality and I have set up an acute and urgent visit as well with behavioral therapist and provided a handout for this. This is a [id number] visit based on the recent suicidal ideations and significant risk and high risk treatment plan at this time. Patient refuses to see psychiatry acutely or to be hospitalized at this time  Orders Placed This Encounter   Procedures    INFLUENZA, MDCK QUADV, 2 YRS AND OLDER, IM, PF, PREFILL SYR OR SDV, 0.5ML ([place], PF)   [address]     Referral Priority:   Urgent     Referral Type:   Eval and Treat     Referral Reason:   Specialty Services Required     Referred to Provider:   Tania Walker LCSW     Requested Specialty:   Licensed Clinical      Number of Visits Requested:   1     Orders Placed This Encounter   Medications    DISCONTD: lamoTRIgine (LAMICTAL) 25 MG tablet     Sig: Take 1 tab for 2 weeks, then 2 tabs for 1 week, then 3 tabs for 1 week, then 4 tabs for 1 week. Dispense:  77 tablet     Refill:  0    OLANZapine (ZYPREXA) 5 MG tablet     Sig: Start 1/2 tab nightly for 3 nights, then start full tab nightly     Dispense:  30 tablet     Refill:  1     There are no discontinued medications. There are no Patient Instructions on file for this visit. Patient given educational handouts and has had all questions answered. Patient voices understanding and agrees to plans along with risks and benefits of plan.  Patient isinstructed to continue prior meds, diet, and exercise plans unless instructed otherwise. Patient agrees to follow up as instructed and sooner if needed. Patient agrees to go to ER if condition becomes emergent. Notesmay be completed with dictation device and spelling errors may occur. Materials may be copied and pasted from a notepad outside of EMR, all of which, I, Dr. Mary Sol MD, take sole intellectual ownership of and have approved adding to my note.         Return in about 1 week (around 11/10/2021) for vv - phone - f/u SI.

## 2021-11-04 ENCOUNTER — TELEPHONE (OUTPATIENT)
Dept: CARDIOLOGY CLINIC | Age: 36
End: 2021-11-04

## 2021-11-04 ENCOUNTER — OFFICE VISIT (OUTPATIENT)
Dept: PSYCHOLOGY | Age: 36
End: 2021-11-04
Payer: MEDICAID

## 2021-11-04 DIAGNOSIS — F25.0 SCHIZOAFFECTIVE DISORDER, BIPOLAR TYPE (HCC): Primary | ICD-10-CM

## 2021-11-04 PROBLEM — F33.2 SEVERE EPISODE OF RECURRENT MAJOR DEPRESSIVE DISORDER, WITHOUT PSYCHOTIC FEATURES (HCC): Status: ACTIVE | Noted: 2021-11-04

## 2021-11-04 PROBLEM — F41.1 GAD (GENERALIZED ANXIETY DISORDER): Status: ACTIVE | Noted: 2021-11-04

## 2021-11-04 PROCEDURE — 90791 PSYCH DIAGNOSTIC EVALUATION: CPT | Performed by: SOCIAL WORKER

## 2021-11-04 ASSESSMENT — PATIENT HEALTH QUESTIONNAIRE - PHQ9
6. FEELING BAD ABOUT YOURSELF - OR THAT YOU ARE A FAILURE OR HAVE LET YOURSELF OR YOUR FAMILY DOWN: 3
10. IF YOU CHECKED OFF ANY PROBLEMS, HOW DIFFICULT HAVE THESE PROBLEMS MADE IT FOR YOU TO DO YOUR WORK, TAKE CARE OF THINGS AT HOME, OR GET ALONG WITH OTHER PEOPLE: 2
SUM OF ALL RESPONSES TO PHQ QUESTIONS 1-9: 23
1. LITTLE INTEREST OR PLEASURE IN DOING THINGS: 3
2. FEELING DOWN, DEPRESSED OR HOPELESS: 3
4. FEELING TIRED OR HAVING LITTLE ENERGY: 3
8. MOVING OR SPEAKING SO SLOWLY THAT OTHER PEOPLE COULD HAVE NOTICED. OR THE OPPOSITE, BEING SO FIGETY OR RESTLESS THAT YOU HAVE BEEN MOVING AROUND A LOT MORE THAN USUAL: 0
5. POOR APPETITE OR OVEREATING: 3
SUM OF ALL RESPONSES TO PHQ QUESTIONS 1-9: 20
SUM OF ALL RESPONSES TO PHQ QUESTIONS 1-9: 23
9. THOUGHTS THAT YOU WOULD BE BETTER OFF DEAD, OR OF HURTING YOURSELF: 3
3. TROUBLE FALLING OR STAYING ASLEEP: 3
SUM OF ALL RESPONSES TO PHQ9 QUESTIONS 1 & 2: 6
7. TROUBLE CONCENTRATING ON THINGS, SUCH AS READING THE NEWSPAPER OR WATCHING TELEVISION: 2

## 2021-11-04 ASSESSMENT — ANXIETY QUESTIONNAIRES
6. BECOMING EASILY ANNOYED OR IRRITABLE: 3-NEARLY EVERY DAY
GAD7 TOTAL SCORE: 21
1. FEELING NERVOUS, ANXIOUS, OR ON EDGE: 3-NEARLY EVERY DAY
3. WORRYING TOO MUCH ABOUT DIFFERENT THINGS: 3-NEARLY EVERY DAY
5. BEING SO RESTLESS THAT IT IS HARD TO SIT STILL: 3-NEARLY EVERY DAY
4. TROUBLE RELAXING: 3-NEARLY EVERY DAY
2. NOT BEING ABLE TO STOP OR CONTROL WORRYING: 3-NEARLY EVERY DAY
7. FEELING AFRAID AS IF SOMETHING AWFUL MIGHT HAPPEN: 3-NEARLY EVERY DAY

## 2021-11-04 ASSESSMENT — COLUMBIA-SUICIDE SEVERITY RATING SCALE - C-SSRS
2. HAVE YOU ACTUALLY HAD ANY THOUGHTS OF KILLING YOURSELF?: NO
6. HAVE YOU EVER DONE ANYTHING, STARTED TO DO ANYTHING, OR PREPARED TO DO ANYTHING TO END YOUR LIFE?: YES
7. DID THIS OCCUR IN THE LAST THREE MONTHS: NO
1. WITHIN THE PAST MONTH, HAVE YOU WISHED YOU WERE DEAD OR WISHED YOU COULD GO TO SLEEP AND NOT WAKE UP?: YES

## 2021-11-04 NOTE — PROGRESS NOTES
Selena Aldana, was evaluated through a synchronous (real-time) audio-video encounter. The patient (or guardian if applicable) is aware that this is a billable service. Verbal consent to proceed has been obtained within the past 12 months. The visit was conducted pursuant to the emergency declaration under the [facility], [facility] authority and the [employer] General Act. Patient identification was verified, and a caregiver was present when appropriate. The patient was located in a state where the provider was credentialed to provide care. Total time spent for this encounter: 45    --Gray So LCSW on 11/4/2021 at 3:13 PM    An electronic signature was used to authenticate this note. Behavioral Health Consultation  Gray GARCIA LCSW  11/4/2021  2:55 PM      Time spent with Patient: 30 45 minutes  This is patient's first  [facility] appointment. Reason for Consult:    Chief Complaint   Patient presents with    New Patient    Depression    Suicidal     History    Other     self-harm     Referring Provider: Zain Marr MD  [address] [place] [address]   [address] [place], [place] [address]T[place]a     Pt provided informed consent for the behavioral health program. Discussed with patient model of service to include the limits of confidentiality (i.e. abuse reporting, suicide intervention, etc.) and short-term intervention focused approach. Advised patient/parent to guard AVS and file at home to protect private information. Advised patient/parent to only hand in excuse if needed and not AVS.  Pt indicated understanding. Feedback given to PCP. S:  Patient reports problems with feeling suicidal thoughts (no plan/no intent/children are reason to live), depressed, and anxious. Pt shared she has struggled with depression and anxiety her entire life.  Pt reported she was stable on medication until needing to come off the medication due to birthcontrol failure/unexpected pregnancy. Pt reported she has not began her new medication due to forgetting last night, is suicidal, however  and mother-in-law are aware and pt has no plan and no intent. Discussed safety plan, cognitive distortions, and untwisting cognitive distortions. 25 people  within a few months in , including miscarrying twins, heart attack, and stroke. O:  MSE:    Mood    Anxious  Depressed  Affect    depressed affect  Appetite abnormal: low  Sleep disturbance Yes, 5month old  Fatigue Yes  Loss of pleasure Yes  Attention/Concentration    intact  Morbid ideation No  Suicide Assessment    suicidal ideation with no plan or intent      History:    Medications:   Current Outpatient Medications   Medication Sig Dispense Refill    lamoTRIgine (LAMICTAL) 25 MG tablet Take 1 tab for 2 weeks, then 2 tabs for 1 week, then 3 tabs for 1 week, then 4 tabs for 1 week. 77 tablet 0    OLANZapine (ZYPREXA) 5 MG tablet Start 1/2 tab nightly for 3 nights, then start full tab nightly 30 tablet 1    montelukast (SINGULAIR) 10 MG tablet Take 1 tablet by mouth daily (Patient not taking: Reported on 11/3/2021) 30 tablet 3    fluticasone (FLONASE) 50 MCG/ACT nasal spray 1 spray by Each Nostril route daily (Patient not taking: Reported on 11/3/2021) 32 g 1    butalbital-APAP-caffeine -40 MG CAPS per capsule Take 1 capsule by mouth every 4 hours as needed for Headaches        No current facility-administered medications for this visit.        Social History:   Social History     Socioeconomic History    Marital status:      Spouse name: Not on file    Number of children: Not on file    Years of education: Not on file    Highest education level: Not on file   Occupational History    Not on file   Tobacco Use    Smoking status: Former Smoker     Packs/day: 0.50     Years: 3.00     Pack years: 1.50     Types: Cigarettes     Quit date:      Years since quittin.8    Smokeless tobacco: Former User     Quit date: 9/9/2014   Vaping Use    Vaping Use: Some days    Start date: 1/1/2019    Substances: Flavoring    Devices: Refillable tank   Substance and Sexual Activity    Alcohol use: Not Currently     Comment: 6/2020    Drug use: No    Sexual activity: Yes     Partners: Male   Other Topics Concern    Not on file   Social History Narrative    Not on file     Social Determinants of Health     Financial Resource Strain: Low Risk     Difficulty of Paying Living Expenses: Not hard at all   Food Insecurity: No Food Insecurity    Worried About [facility] in the Last Year: Never true    [facility] in the Last Year: Never true   Transportation Needs:     Lack of Transportation (Medical):  Lack of Transportation (Non-Medical):    Physical Activity:     Days of Exercise per Week:     Minutes of Exercise per Session:    Stress:     Feeling of Stress :    Social Connections:     Frequency of Communication with Friends and Family:     Frequency of Social Gatherings with Friends and Family:     Attends Muslim Services:     Active Member of Clubs or Organizations:     Attends Club or Organization Meetings:     Marital Status:    Intimate Partner Violence:     Fear of Current or Ex-Partner:     Emotionally Abused:     Physically Abused:     Sexually Abused:        TOBACCO:   reports that she quit smoking about 7 years ago. Her smoking use included cigarettes. She has a 1.50 pack-year smoking history. She quit smokeless tobacco use about 7 years ago. ETOH:   reports previous alcohol use.     Family History:   Family History   Problem Relation Age of Onset    High Blood Pressure Mother     Diabetes Father     Heart Disease Father     Stomach Cancer Father     Breast Cancer Other         maternal great aunt    Colon Cancer Paternal Grandmother     Colon Polyps Neg Hx     Esophageal Cancer Neg Hx     Liver Cancer Neg Hx     Liver Disease Neg Hx  Rectal Cancer Neg Hx          A:  Patient presents for consult due to problems with suicidal thoughts (no plan/no intent/children are reason to live), depression, and anxiety. Continued consultation is clinically/medically necessary to support in learning new skills and build confidence to deal better with these issues. Patient response to consults, finds new strategies helpful.      PHQ Scores 11/4/2021 9/4/2018   PHQ2 Score 6 2   PHQ9 Score 23 2     Interpretation of Total Score Depression Severity: 1-4 = Minimal depression, 5-9 = Mild depression, 10-14 = Moderate depression, 15-19 = Moderately severe depression, 20-27 = Severe depression    HEMANTH-7  Feeling nervous, anxious, or on edge: 3-Nearly every day  Not able to stop or control worrying: 3-Nearly every day  Worrying too much about different things: 3-Nearly every day  Trouble relaxing: 3-Nearly every day  Being so restless that it's hard to sit still: 3-Nearly every day  Becoming easily annoyed or irritable: 3-Nearly every day  Feeling afraid as if something awful might happen: 3-Nearly every day  HEMANTH-7 Total Score: 21    HEMANTH-7 score interpretation:  0-4 Subclinical, 5-9 Mild, 10-14 Moderate, 15-21 Severe    Diagnosis:    1. Schizoaffective disorder, bipolar type ([address]          Diagnosis Date    Abnormal glucose measurement     Abnormal Pap smear of cervix     Anxiety     Blood circulation, collateral     CAD (coronary artery disease)     Cerebral artery occlusion with cerebral infarction ([address] 2014    right side    Complication of anesthesia     difficulty waking    Depression     Heart disease 06/2020    LOOP recorder     IBS (irritable bowel syndrome)     MDD (major depressive disorder)     MI (myocardial infarction) ([address] 2014    Miscarriage 06/2012    Neurologic disorder     Postpartum depression     Prolonged emergence from general anesthesia     Schizophrenia ([address]     Seizures ([address]     anxiety related (last 6/2020)    Syncope  Tachycardia     UTI (urinary tract infection) 12/09/2019         Plan:  Pt interventions:  Trained in strategies for increasing balanced thinking, Provided education, Established rapport, [place]-setting to identify pt's primary goals for [facility] visit / overall health, Supportive techniques, Emphasized self-care as important for managing overall health and Identified maladaptive thoughts      Pt Behavioral Change Plan:    See patient instructions.

## 2021-11-04 NOTE — PATIENT INSTRUCTIONS
Nhi Keller, [phone] and choose the option for behavioral health  You can also send her a message on My Chart. Be aware that all my messages are linked to her. SAFETY PLAN    A suicide Safety Plan is a document that supports someone when they are having thoughts of suicide. Warning Signs that indicate a suicidal crisis may be developing: What (situations, thoughts, feelings, body sensations, behaviors, etc.) do you experience that lets you know you are beginning to think about suicide? 1. Stop eating/drinking  2. Laying in bed throughout the day  3. Crying uncontrollably throughout the day    Internal Coping Strategies:  What things can I do (relaxation techniques, hobbies, physical activities, etc.) to take my mind off my problems without contacting another person? 1. You can't do it because of the kids   2. Deep breathing  3. Being around the kids    People and social settings that provide distraction: Who can I call or where can I go to distract me? 1. Name: Prosper Medley  Phone: in phone  2. Name: __________  Phone: __________   3. Place: visit friend at work            4. Place: go to mother's home    People whom I can ask for help: Who can I call when I need help - for example, friends, family, clergy, someone else? 1. Name:                 Phone: in phone  2. Name: Mother-in-law  Phone: in phone  3. Name: mother  Phone: in phone    Professionals or [address] [place] [address] I can contact during a crisis: Who can I call for help - for example, my doctor, my psychiatrist, my psychologist, a mental health provider, a suicide hotline? 1. If you feel overwhelmed, unable to cope, extremely anxious, or have thoughts of wanting to harm yourself or someone else, go to the nearest Emergency Room.   [facility] Emergency Room:  [phone]  Crisis line:  [phone]  [address] [place] (inpatient psych):  [phone] option 1  Call 911- they will send help and get you to the negative events and fail to see that certain events are also caused by others. \"The marriage ended because I failed\"    6. Blaming: You focus on the other person as the source of your negative feelings and you refuse to take responsibility for changing yourself. \"She's to blame for the way I feel now\" or \"My parents caused all my problems. \"    12. Unfair comparisons: You interpret events in terms of standards that are unrealistic-for example, you focus primarily on others who do better than you and find yourself inferior in the comparison. \"She's more successful than I am\" or \"Others did better than I did on the test.\"    13. Regret orientation: You focus on the idea that you could have done better in the past, rather on what you can do better now. \"I could have had a better job if I had tried\". (best friends with the [employer])     15. What if?: You keep asking a series of questions about \"What if\" something happens and fail to be satisfied with any of the answers. \"Yeah, but what if I get anxious? Or what if I can't catch my breath? \"    15. Emotional reasoning: You let your feelings guide your interpretation of reality-for example, \"I feel depressed, therefore my marriage is not working out. \"    16. Inability to disconfirm: You reject any evidence or arguments that might contradict your negative thoughts. For example, when you have the thought \"I'm unlovable\", you reject as irrelevant any evidence that people like you. Consequently, your thought cannot be refuted. \"That's not the real issue. There are deeper problems. There are other factors. \"    17. Judgment Focus: You view yourself, others and events in terms of evaluations of good, bad or superior-inferior, rather than simply describing, accepting, or understanding. You are continually measuring yourself and others according to arbitrary standards, finding that you and others fall short.  You are focused on the judgments of others as well as your own judgments of yourself. \"I didn't perform well in college\" or \"If I take up tennis, I won't do well\" or \"Look how successful she is. I'm not successful\". 15 Ways to Untwist Your Thinking    1. Identify the Distortions. Write down the negative thought and the distortions in each negative thought using the Cognitive Distortions Checklist.    2. The Straight-Forward Approach. Substitute a more positive and realistic thought. 3. The Cost-Benefit Analysis. List the advantages and disadvantages of a negative feeling, thought, belief or behavior. 4. Examine the Evidence. Instead of assuming that a negative thought is true, examine the actual evidence for it. 5. The Survey Method. Do a survey to find out if your thoughts and attitudes are realistic. 6. The Experimental Technique. Do an experiment to test the validity of your negative thought. 7. The Double-Standard Technique. Talk to yourself in the same compassionate way you might talk to a dear friend who was upset. 8. The Pleasure Predicting Method. Predict how satisfying activities will be from 0% to 100%. Record how satisfying they turn out. 9. The Vertical Arrow Technique. Draw a vertical arrow under your negative thought and ask why it would be upsetting if it were true. 10. Thinking in Shade of Gray. Instead of thinking about your problems in black-or-white categories, evaluate things in shades of gray. 11. Define Terms. When you label yourself as \"inferior\" or \"a loser,\" ask yourself what you mean by these labels. 12. Be Specific. Stick with reality and avoid judgments about reality. 13. The Semantic Method. Substitute language that is less emotionally loaded (useful for \"should\" statements and labeling). 14. Re-Attribution. Instead of blaming yourself for a problem, think about all the factors that may have contributed to it. 15. The Acceptance Paradox.  Instead of defending yourself against your own self-criticisms, find truth in them and accept them.

## 2021-11-10 DIAGNOSIS — R55 SYNCOPE, UNSPECIFIED SYNCOPE TYPE: ICD-10-CM

## 2021-11-10 DIAGNOSIS — Z95.818 STATUS POST PLACEMENT OF IMPLANTABLE LOOP RECORDER: Primary | ICD-10-CM

## 2021-11-10 DIAGNOSIS — R00.0 TACHYCARDIA: ICD-10-CM

## 2021-11-10 PROCEDURE — 93298 REM INTERROG DEV EVAL SCRMS: CPT | Performed by: CLINICAL NURSE SPECIALIST

## 2021-11-10 PROCEDURE — G2066 INTER DEVC REMOTE 30D: HCPCS | Performed by: CLINICAL NURSE SPECIALIST

## 2021-11-11 DIAGNOSIS — R00.0 TACHYCARDIA: ICD-10-CM

## 2021-11-11 DIAGNOSIS — Z95.818 STATUS POST PLACEMENT OF IMPLANTABLE LOOP RECORDER: Primary | ICD-10-CM

## 2021-11-11 DIAGNOSIS — R55 SYNCOPE, UNSPECIFIED SYNCOPE TYPE: ICD-10-CM

## 2021-11-15 DIAGNOSIS — R00.0 TACHYCARDIA: ICD-10-CM

## 2021-11-15 DIAGNOSIS — Z95.818 STATUS POST PLACEMENT OF IMPLANTABLE LOOP RECORDER: Primary | ICD-10-CM

## 2021-11-15 DIAGNOSIS — R55 SYNCOPE, UNSPECIFIED SYNCOPE TYPE: ICD-10-CM

## 2021-11-18 ENCOUNTER — OFFICE VISIT (OUTPATIENT)
Dept: CARDIOLOGY CLINIC | Age: 36
End: 2021-11-18
Payer: MEDICARE

## 2021-11-18 ENCOUNTER — VIRTUAL VISIT (OUTPATIENT)
Dept: PRIMARY CARE CLINIC | Age: 36
End: 2021-11-18
Payer: MEDICARE

## 2021-11-18 VITALS
WEIGHT: 184 LBS | HEART RATE: 84 BPM | DIASTOLIC BLOOD PRESSURE: 80 MMHG | SYSTOLIC BLOOD PRESSURE: 130 MMHG | HEIGHT: 60 IN | BODY MASS INDEX: 36.12 KG/M2

## 2021-11-18 DIAGNOSIS — Z88.1 ALLERGIC REACTION TO AUGMENTIN: ICD-10-CM

## 2021-11-18 DIAGNOSIS — H93.13 BILATERAL TINNITUS: ICD-10-CM

## 2021-11-18 DIAGNOSIS — R00.0 TACHYCARDIA: ICD-10-CM

## 2021-11-18 DIAGNOSIS — R55 SYNCOPE, UNSPECIFIED SYNCOPE TYPE: Primary | ICD-10-CM

## 2021-11-18 DIAGNOSIS — F25.0 SCHIZOAFFECTIVE DISORDER, BIPOLAR TYPE (HCC): Primary | ICD-10-CM

## 2021-11-18 DIAGNOSIS — H65.03 ACUTE SEROUS OTITIS MEDIA WITHOUT RUPTURE, BILATERAL: ICD-10-CM

## 2021-11-18 DIAGNOSIS — Z95.818 STATUS POST PLACEMENT OF IMPLANTABLE LOOP RECORDER: ICD-10-CM

## 2021-11-18 PROCEDURE — 99214 OFFICE O/P EST MOD 30 MIN: CPT | Performed by: CLINICAL NURSE SPECIALIST

## 2021-11-18 PROCEDURE — 99214 OFFICE O/P EST MOD 30 MIN: CPT | Performed by: FAMILY MEDICINE

## 2021-11-18 PROCEDURE — 93285 PRGRMG DEV EVAL SCRMS IP: CPT | Performed by: CLINICAL NURSE SPECIALIST

## 2021-11-18 RX ORDER — BISOPROLOL FUMARATE 5 MG/1
5 TABLET ORAL DAILY
Qty: 90 TABLET | Refills: 3 | Status: SHIPPED | OUTPATIENT
Start: 2021-11-18 | End: 2022-05-18 | Stop reason: SDUPTHER

## 2021-11-18 RX ORDER — LAMOTRIGINE 100 MG/1
100 TABLET ORAL DAILY
Qty: 30 TABLET | Refills: 3 | Status: SHIPPED | OUTPATIENT
Start: 2021-11-18 | End: 2022-01-13 | Stop reason: SDUPTHER

## 2021-11-18 RX ORDER — AMOXICILLIN AND CLAVULANATE POTASSIUM 875; 125 MG/1; MG/1
1 TABLET, FILM COATED ORAL 2 TIMES DAILY
Qty: 20 TABLET | Refills: 0 | Status: SHIPPED | OUTPATIENT
Start: 2021-11-18 | End: 2021-11-28

## 2021-11-18 NOTE — PATIENT INSTRUCTIONS
Plan  Restart your bisoprolol 5mg daily- this should help with the fast rates   carelink will send in a month to check your rates   Follow up in X months with loop recorder  Call with any questions or concerns  Follow up with Laurence Jeffers MD for non cardiac problems  Report any new problems  Cardiovascular Fitness-Exercise as tolerated. Strive for 30 minutes of exercise most days of the week. Cardiac / Healthy Diet  Continue current medications as directed  Continue plan of treatment  It is always recommended that you bring your medications bottles with you to each visit - this is for your safety!

## 2021-11-18 NOTE — PROGRESS NOTES
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) 09/04/2018    Moderate episode of recurrent major depressive disorder ([address] 09/04/2018    Decreased strength of lower extremity 09/04/2018    Syncope and collapse 09/04/2018    Chronic superficial gastritis 09/04/2018    Family hx-breast malignancy 07/18/2012    Diffuse cystic mastopathy 04/16/2012    Lump or mass in breast left  04/16/2012     Past Medical History:   Diagnosis Date    Abnormal glucose measurement     Abnormal Pap smear of cervix     Anxiety     Blood circulation, collateral     CAD (coronary artery disease)     Cerebral artery occlusion with cerebral infarction ([address] 2014    right side    Complication of anesthesia     difficulty waking    Depression     Heart disease 06/2020    LOOP recorder     IBS (irritable bowel syndrome)     MDD (major depressive disorder)     MI (myocardial infarction) ([address] 2014    Miscarriage 06/2012    Neurologic disorder     Postpartum depression     Prolonged emergence from general anesthesia     Schizophrenia ([address]     Seizures ([address]     anxiety related (last 6/2020)    Syncope     Tachycardia     UTI (urinary tract infection) 12/09/2019     Past Surgical History:   Procedure Laterality Date    CHOLECYSTECTOMY, LAPAROSCOPIC      COLONOSCOPY N/A 11/7/2019    Dr Bob Briggs, 10 yr recall    EGD  2016    [facility]      lower right leg, has metal plate and screws    FRACTURE SURGERY      left wrist    HYSTERECTOMY N/A 6/3/2021    TOTAL LAPAROSCOPIC HYSTERECTOMY WITH DAVINCI CYSTOSCOPY performed by Khoi Lazaro MD at [address] [place] [address] INSERTABLE CARDIAC MONITOR      loop recorder    INTRAUTERINE DEVICE INSERTION  2016    Essure placement    UPPER GASTROINTESTINAL ENDOSCOPY N/A 2019    Dr Michael Santo Jackson-Gastritis     Family History   Problem Relation Age of Onset    High Blood Pressure Mother     Diabetes Father     Heart Disease Father     Stomach Cancer Father     Breast Cancer Other         maternal great aunt    Colon Cancer Paternal Grandmother     Colon Polyps Neg Hx     Esophageal Cancer Neg Hx     Liver Cancer Neg Hx     Liver Disease Neg Hx     Rectal Cancer Neg Hx      Social History     Tobacco Use    Smoking status: Former Smoker     Packs/day: 0.50     Years: 3.00     Pack years: 1.50     Types: Cigarettes     Quit date:      Years since quittin.8    Smokeless tobacco: Former User     Quit date: 2014   Substance Use Topics    Alcohol use: Not Currently     Comment: 2020      Current Outpatient Medications   Medication Sig Dispense Refill    lamoTRIgine (LAMICTAL) 100 MG tablet Take 1 tablet by mouth daily 30 tablet 3    bisoprolol (ZEBETA) 5 MG tablet Take 1 tablet by mouth daily 90 tablet 3    OLANZapine (ZYPREXA) 5 MG tablet Start 1/2 tab nightly for 3 nights, then start full tab nightly 30 tablet 1    butalbital-APAP-caffeine -40 MG CAPS per capsule Take 1 capsule by mouth every 4 hours as needed for Headaches       amoxicillin-clavulanate (AUGMENTIN) 875-125 MG per tablet Take 1 tablet by mouth 2 times daily for 10 days (Patient not taking: Reported on 2021) 20 tablet 0     No current facility-administered medications for this visit. Allergies: Patient has no known allergies. Review of Systems  Constitutional  no significant activity change, appetite change, or unexpected weight change. No fever, chills or diaphoresis. No fatigue. HEENT  no significant rhinorrhea or epistaxis. No tinnitus or significant hearing loss. Eyes  no sudden vision change or amaurosis. Respiratory  no significant wheezing, stridor, apnea or cough. No dyspnea on exertion or shortness of breath. Cardiovascular  no exertional chest pain, orthopnea or PND. + sensation of arrhythmia no r slow heart rate. No claudication or leg edema. Gastrointestinal  no abdominal swelling or pain. No blood in stool. No severe constipation, diarrhea, nausea, or vomiting. Genitourinary  no difficulty urinating, dysuria, frequency, or urgency. No flank pain or hematuria. Musculoskeletal  no back pain, gait disturbance, or myalgia. Skin  no color change or rash. No pallor. No new surgical incision. Neurologic  no speech difficulty, facial asymmetry or lateralizing weakness. No seizures, presyncope, +syncope, no significant dizziness. Hematologic  no easy bruising or excessive bleeding. Psychiatric  no severe anxiety or insomnia. No confusion. All other review of systems are negative. Objective  Vital Signs - /80   Pulse 84   Ht 5' (1.524 m)   Wt 184 lb (83.5 kg)   LMP 05/23/2021 (Approximate)   BMI 35.94 kg/m²   General - Opal is alert, cooperative, and pleasant. Well groomed. No acute distress. Body habitus is obese. HEENT  The head is normocephalic. No circumoral cyanosis. Dentition is normal.   EYES -  No Xanthelasma, no arcus senilis, no conjunctival hemorrhages or discharge. Neck - Supple, without increased jugular venous pressures. No carotid bruits. No mass. Respiratory - Lungs are clear bilaterally. No wheezes or rales. Normal effort without use of accessory muscles. Cardiovascular  Heart has regular rhythm and rate. No murmurs, rubs or gallops. + pedal pulses and no varicosities. Abdominal -  Soft, nontender, nondistended. Bowel sounds are intact. Extremities - No clubbing, cyanosis, or  edema. Musculoskeletal -  No clubbing . No Osler's nodes. Gait normal .  No kyphosis or scoliosis.    Skin -  no statis ulcers or dermatitis. Neurological - No focal signs are identified. Oriented to person, place and time. Psychiatric -  Appropriate affect and mood. Assessment:     Diagnosis Orders   1. Syncope, unspecified syncope type     2. Status post placement of implantable loop recorder     3. Tachycardia       Data:  BP Readings from Last 3 Encounters:   11/18/21 130/80   11/03/21 110/75   07/16/21 105/70    Pulse Readings from Last 3 Encounters:   11/18/21 84   11/03/21 91   07/16/21 75        Wt Readings from Last 3 Encounters:   11/18/21 184 lb (83.5 kg)   11/03/21 183 lb (83 kg)   07/16/21 184 lb (83.5 kg)   Loop recorder interrogation today shows several symptom episodes that shows sinus rhythm. She did have some tachycardia around 160 that correlated with her episode of chest pain radiating to her left arm. No episodes correlated with her reported syncopal spell a while back at her mother's  Increase in tachycardia over the last couple months. May be multifactorial with worsening anxiety and depression  Does have some bradycardia during sleeping times. Previously had tolerated bisoprolol but it was discontinued with pregnancy. We will restart that and see if that helps with the fast rates. Since having her loop recorder from April 2019 rate or rhythm has not correlated with any of her syncopal episodes     Reviewed recent notes   Reviewed recent labs     States taking medications as prescribed  Stable cardiovascular status. No evidence of overt heart failure, angina or dysrhythmia. 30minutes were spent preparing, reviewing and seeing patient. All questions answered    Plan  Restart your bisoprolol 5mg daily- this should help with the fast rates   carelink will send in a month to check your rates   Follow up in X months with loop recorder  Call with any questions or concerns  Follow up with Parish Bobby MD for non cardiac problems  Report any new problems  Cardiovascular Fitness-Exercise as tolerated. Strive for 30 minutes of exercise most days of the week. Cardiac / Healthy Diet  Continue current medications as directed  Continue plan of treatment  It is always recommended that you bring your medications bottles with you to each visit - this is for your safety! SETH Zacarias dragon/transcription disclaimer: Much of this encounter note is electronic transcription/translation of spoken language to printed tach. Electronic translation of spoken language may be erroneous, or at times, nonsensical words or phrases may be inadvertently transcribed.  Although, I have reviewed the note for such errors, some may still exist.

## 2021-11-18 NOTE — PROGRESS NOTES
2021    TELEHEALTH EVALUATION -- Audio/Visual (During [facility]GAW-71 public health emergency)    HPI:    [place] (:  1985) has requested an audio/video evaluation for the following concern(s):    Patient presents today via video visit for follow-up for depression. Previously she had a lot of suicidal ideations without any suicidal plan. That has improved overall but she still does have some ongoing depression. She notes as well that bilaterally her ears are hurting at this time. This is ongoing for about 7 days and worsening. Review of Systems   Constitutional: Positive for fatigue and fever. Negative for chills. HENT: Positive for congestion and ear pain. Negative for hearing loss (ringing in the ear worse than baseline w/o hearing loss). Respiratory: Negative for cough, chest tightness, shortness of breath and wheezing. Cardiovascular: Negative for chest pain, palpitations and leg swelling. Gastrointestinal: Negative for abdominal pain, constipation, diarrhea, nausea and vomiting. Genitourinary: Negative for difficulty urinating, dysuria and frequency. Psychiatric/Behavioral: Positive for dysphoric mood. Negative for agitation, self-injury, sleep disturbance and suicidal ideas. The patient is not nervous/anxious. Prior to Visit Medications    Medication Sig Taking?  Authorizing Provider   lamoTRIgine (LAMICTAL) 100 MG tablet Take 1 tablet by mouth daily Yes Fahad Medrano MD   bisoprolol (ZEBETA) 5 MG tablet Take 1 tablet by mouth daily  SETH Dye   OLANZapine (ZYPREXA) 5 MG tablet Start 1/2 tab nightly for 3 nights, then start full tab nightly  Fahad Medrano MD   butalbital-APAP-caffeine -40 MG CAPS per capsule Take 1 capsule by mouth every 4 hours as needed for Headaches   Historical Provider, MD       Social History     Tobacco Use    Smoking status: Former Smoker     Packs/day: 0.50     Years: 3.00     Pack years: 1.50     Types: Cigarettes     Quit date: 2014     Years since quittin.9    Smokeless tobacco: Former User     Quit date: 2014   Vaping Use    Vaping Use: Some days    Start date: 2019    Substances: Flavoring    Devices: Refillable tank   Substance Use Topics    Alcohol use: Not Currently     Comment: 2020    Drug use: No        No Known Allergies,   Past Medical History:   Diagnosis Date    Abnormal glucose measurement     Abnormal Pap smear of cervix     Anxiety     Blood circulation, collateral     CAD (coronary artery disease)     Cerebral artery occlusion with cerebral infarction ([address]     right side    Complication of anesthesia     difficulty waking    Depression     Heart disease 2020    LOOP recorder     IBS (irritable bowel syndrome)     MDD (major depressive disorder)     MI (myocardial infarction) ([address] 2014    Miscarriage 2012    Neurologic disorder     Postpartum depression     Prolonged emergence from general anesthesia     Schizophrenia ([address]     Seizures ([address]     anxiety related (last 2020)    Syncope     Tachycardia     UTI (urinary tract infection) 2019   ,   Past Surgical History:   Procedure Laterality Date    CHOLECYSTECTOMY, LAPAROSCOPIC      COLONOSCOPY N/A 2019    Dr Arabella Morgan, 10 yr recall    EGD      [facility]      lower right leg, has metal plate and screws    FRACTURE SURGERY      left wrist    HYSTERECTOMY N/A 6/3/2021    TOTAL LAPAROSCOPIC HYSTERECTOMY WITH DAVINCI CYSTOSCOPY performed by Praveena Jovel MD at [address] [place] [address]      loop recorder    INTRAUTERINE DEVICE INSERTION  2016    Essure placement    UPPER GASTROINTESTINAL ENDOSCOPY N/A 2019    Dr Lorri Mtz   ,   Social History     Tobacco Use    Smoking status: Former Smoker     Packs/day: 0.50     Years: 3.00     Pack years: 1.50     Types: Cigarettes     Quit date:      Years since quittin.9    Smokeless tobacco: Former User     Quit date: 9/9/2014   Vaping Use    Vaping Use: Some days    Start date: 1/1/2019    Substances: Flavoring    Devices: Snowman   Substance Use Topics    Alcohol use: Not Currently     Comment: 6/2020    Drug use: No   ,   Family History   Problem Relation Age of Onset    High Blood Pressure Mother     Diabetes Father     Heart Disease Father     Stomach Cancer Father     Breast Cancer Other         maternal great aunt    Colon Cancer Paternal Grandmother     Colon Polyps Neg Hx     Esophageal Cancer Neg Hx     Liver Cancer Neg Hx     Liver Disease Neg Hx     Rectal Cancer Neg Hx    ,   Immunization History   Administered Date(s) Administered    COVID-19, [employer], PF, 30mcg/0.3mL 05/06/2021, 06/10/2021    Influenza, MDCK Quadv, IM, PF (Flucelvax 2 yrs and older) 11/03/2021    Influenza, Quadv, IM, PF (6 mo and older Fluzone, Flulaval, Fluarix, and 3 yrs and older Afluria) 11/02/2018, 11/06/2019, 10/13/2020    Pneumococcal Polysaccharide (Cyodktwta25) 03/22/2019   ,   Health Maintenance   Topic Date Due    Hepatitis C screen  Never done    Varicella vaccine (1 of 2 - 2-dose childhood series) Never done    HIV screen  Never done    DTaP/Tdap/Td vaccine (1 - Tdap) Never done    Colon cancer screen colonoscopy  11/07/2029    Flu vaccine  Completed    COVID-19 Vaccine  Completed    Hepatitis A vaccine  Aged Out    Hepatitis B vaccine  Aged Out    Hib vaccine  Aged Out    Meningococcal (ACWY) vaccine  Aged Out    Pneumococcal 0-64 years Vaccine  Aged Out       PHYSICAL EXAMINATION:  [ INSTRUCTIONS:  \"[x]\" Indicates a positive item  \"[]\" Indicates a negative item  -- DELETE ALL ITEMS NOT EXAMINED]  Vital Signs: (As obtained by patient/caregiver or practitioner observation)    Blood pressure-  Heart rate-    Respiratory rate-    Temperature-  Pulse oximetry-     Constitutional: [x] Appears well-developed and well-nourished [] No apparent distress      [] Abnormal-   Mental status  [x] Alert and awake  [x] Oriented to person/place/time [x]Able to follow commands      Eyes:  EOM    [x]  Normal  [] Abnormal-  Sclera  [x]  Normal  [] Abnormal -         Discharge []  None visible  [] Abnormal -    HENT:   [x] Normocephalic, atraumatic. [] Abnormal   [x] Mouth/Throat: Mucous membranes are moist.     External Ears [x] Normal  [] Abnormal-     Neck: [x] No visualized mass     Pulmonary/Chest: [x] Respiratory effort normal.  [x] No visualized signs of difficulty breathing or respiratory distress        [] Abnormal-      Musculoskeletal:   [x] Normal gait with no signs of ataxia         [x] Normal range of motion of neck        [] Abnormal-       Neurological:        [x] No Facial Asymmetry (Cranial nerve 7 motor function) (limited exam to video visit)          [x] No gaze palsy        [] Abnormal-         Skin:        [x] No significant exanthematous lesions or discoloration noted on facial skin         [] Abnormal-            Psychiatric:       [] Normal Affect [x] No Hallucinations        [x] Abnormal-improved affect with less depression on prior visits, no suicidal or homicidal ideation or plan  Other pertinent observable physical exam findings-     ASSESSMENT/PLAN:    ICD-10-CM    1. Schizoaffective disorder, bipolar type ([facility]  F25.0    2. Bilateral tinnitus  H93.13    3. Acute serous otitis media without rupture, bilateral  H65.03    4. Allergic reaction to Augmentin  Z88.1        Suspect bilateral otitis media versus a central sinusitis, start treatment. Patient is to have titration up on Lamictal as depression remains uncontrolled.   I have recommended psychiatry but she wants to hold off at this time    Orders Placed This Encounter   Medications    amoxicillin-clavulanate (AUGMENTIN) 875-125 MG per tablet     Sig: Take 1 tablet by mouth 2 times daily for 10 days     Dispense:  20 tablet     Refill:  0    lamoTRIgine (LAMICTAL) 100 MG tablet     Sig: Take 1 tablet by mouth daily     Dispense:  30 tablet     Refill:  3       No orders of the defined types were placed in this encounter. Return in about 4 weeks (around 12/16/2021) for vv f/u lamicta. Irasema Sales is a 43 y.o. female being evaluated by a Virtual Visit (video visit) encounter to address concerns as mentioned above. A caregiver was present when appropriate. Due to this being a TeleHealth encounter (During [facility] public health emergency), evaluation of the following organ systems was limited: Vitals/Constitutional/EENT/Resp/CV/GI//MS/Neuro/Skin/Heme-Lymph-Imm. Pursuant to the emergency declaration under the [address] [place] [address], [address] [place] [address] authority and the [employer] Act, this Virtual Visit was conducted with patient's (and/or legal guardian's) consent, to reduce the patient's risk of exposure to COVID-19 and provide necessary medical care. The patient (and/or legal guardian) has also been advised to contact this office for worsening conditions or problems, and seek emergency medical treatment and/or call 911 if deemed necessary. Services were provided through a video synchronous discussion virtually to substitute for in-person clinic visit. Patient and provider were located at their individual homes or provider at secure site for business. It is possible that material may be posted from a notepad that is used for a Dragon dictation device for dictating notes outside the EMR and I am the original author of all of this content. --Warren Sawyer MD on 12/2/2021 at 9:39 AM    An electronic signature was used to authenticate this note.

## 2021-11-24 ENCOUNTER — APPOINTMENT (OUTPATIENT)
Dept: CT IMAGING | Facility: HOSPITAL | Age: 36
End: 2021-11-24

## 2021-11-24 ENCOUNTER — HOSPITAL ENCOUNTER (EMERGENCY)
Facility: HOSPITAL | Age: 36
Discharge: HOME | End: 2021-11-25
Attending: FAMILY MEDICINE | Admitting: FAMILY MEDICINE

## 2021-11-24 ENCOUNTER — APPOINTMENT (OUTPATIENT)
Dept: GENERAL RADIOLOGY | Facility: HOSPITAL | Age: 36
End: 2021-11-24

## 2021-11-24 DIAGNOSIS — R10.9 ABDOMINAL PAIN, UNSPECIFIED ABDOMINAL LOCATION: ICD-10-CM

## 2021-11-24 DIAGNOSIS — R07.9 CHEST PAIN, UNSPECIFIED TYPE: Primary | ICD-10-CM

## 2021-11-24 LAB
ALBUMIN SERPL-MCNC: 4 G/DL (ref 3.5–5.2)
ALBUMIN/GLOB SERPL: 1.2 G/DL
ALP SERPL-CCNC: 104 U/L (ref 39–117)
ALT SERPL W P-5'-P-CCNC: 17 U/L (ref 1–33)
ANION GAP SERPL CALCULATED.3IONS-SCNC: 10 MMOL/L (ref 5–15)
AST SERPL-CCNC: 20 U/L (ref 1–32)
BACTERIA UR QL AUTO: ABNORMAL /HPF
BASOPHILS # BLD AUTO: 0.03 10*3/MM3 (ref 0–0.2)
BASOPHILS NFR BLD AUTO: 0.5 % (ref 0–1.5)
BILIRUB SERPL-MCNC: 0.2 MG/DL (ref 0–1.2)
BILIRUB UR QL STRIP: NEGATIVE
BUN SERPL-MCNC: 9 MG/DL (ref 6–20)
BUN/CREAT SERPL: 13.2 (ref 7–25)
CALCIUM SPEC-SCNC: 9.2 MG/DL (ref 8.6–10.5)
CHLORIDE SERPL-SCNC: 101 MMOL/L (ref 98–107)
CLARITY UR: CLEAR
CO2 SERPL-SCNC: 27 MMOL/L (ref 22–29)
COLOR UR: YELLOW
CREAT SERPL-MCNC: 0.68 MG/DL (ref 0.57–1)
D DIMER PPP FEU-MCNC: 0.95 MG/L (FEU) (ref 0–0.5)
D-LACTATE SERPL-SCNC: 1 MMOL/L (ref 0.5–2)
DEPRECATED RDW RBC AUTO: 46.9 FL (ref 37–54)
EOSINOPHIL # BLD AUTO: 0.21 10*3/MM3 (ref 0–0.4)
EOSINOPHIL NFR BLD AUTO: 3.4 % (ref 0.3–6.2)
ERYTHROCYTE [DISTWIDTH] IN BLOOD BY AUTOMATED COUNT: 13.4 % (ref 12.3–15.4)
GFR SERPL CREATININE-BSD FRML MDRD: 97 ML/MIN/1.73
GLOBULIN UR ELPH-MCNC: 3.4 GM/DL
GLUCOSE SERPL-MCNC: 83 MG/DL (ref 65–99)
GLUCOSE UR STRIP-MCNC: NEGATIVE MG/DL
HCT VFR BLD AUTO: 40.8 % (ref 34–46.6)
HGB BLD-MCNC: 13.1 G/DL (ref 12–15.9)
HGB UR QL STRIP.AUTO: ABNORMAL
HYALINE CASTS UR QL AUTO: ABNORMAL /LPF
IMM GRANULOCYTES # BLD AUTO: 0.03 10*3/MM3 (ref 0–0.05)
IMM GRANULOCYTES NFR BLD AUTO: 0.5 % (ref 0–0.5)
KETONES UR QL STRIP: NEGATIVE
LEUKOCYTE ESTERASE UR QL STRIP.AUTO: ABNORMAL
LIPASE SERPL-CCNC: 27 U/L (ref 13–60)
LYMPHOCYTES # BLD AUTO: 1.71 10*3/MM3 (ref 0.7–3.1)
LYMPHOCYTES NFR BLD AUTO: 28 % (ref 19.6–45.3)
MCH RBC QN AUTO: 30.4 PG (ref 26.6–33)
MCHC RBC AUTO-ENTMCNC: 32.1 G/DL (ref 31.5–35.7)
MCV RBC AUTO: 94.7 FL (ref 79–97)
MONOCYTES # BLD AUTO: 0.55 10*3/MM3 (ref 0.1–0.9)
MONOCYTES NFR BLD AUTO: 9 % (ref 5–12)
NEUTROPHILS NFR BLD AUTO: 3.57 10*3/MM3 (ref 1.7–7)
NEUTROPHILS NFR BLD AUTO: 58.6 % (ref 42.7–76)
NITRITE UR QL STRIP: NEGATIVE
NRBC BLD AUTO-RTO: 0 /100 WBC (ref 0–0.2)
NT-PROBNP SERPL-MCNC: 24.8 PG/ML (ref 0–450)
PH UR STRIP.AUTO: 6.5 [PH] (ref 5–8)
PLATELET # BLD AUTO: 255 10*3/MM3 (ref 140–450)
PMV BLD AUTO: 8.7 FL (ref 6–12)
POTASSIUM SERPL-SCNC: 3.7 MMOL/L (ref 3.5–5.2)
PROT SERPL-MCNC: 7.4 G/DL (ref 6–8.5)
PROT UR QL STRIP: NEGATIVE
RBC # BLD AUTO: 4.31 10*6/MM3 (ref 3.77–5.28)
RBC # UR STRIP: ABNORMAL /HPF
REF LAB TEST METHOD: ABNORMAL
SARS-COV-2 RNA PNL SPEC NAA+PROBE: NOT DETECTED
SODIUM SERPL-SCNC: 138 MMOL/L (ref 136–145)
SP GR UR STRIP: <=1.005 (ref 1–1.03)
SQUAMOUS #/AREA URNS HPF: ABNORMAL /HPF
TROPONIN T SERPL-MCNC: <0.01 NG/ML (ref 0–0.03)
TROPONIN T SERPL-MCNC: <0.01 NG/ML (ref 0–0.03)
UROBILINOGEN UR QL STRIP: ABNORMAL
WBC # UR STRIP: ABNORMAL /HPF
WBC NRBC COR # BLD: 6.1 10*3/MM3 (ref 3.4–10.8)

## 2021-11-24 PROCEDURE — C9803 HOPD COVID-19 SPEC COLLECT: HCPCS | Performed by: FAMILY MEDICINE

## 2021-11-24 PROCEDURE — 83605 ASSAY OF LACTIC ACID: CPT | Performed by: FAMILY MEDICINE

## 2021-11-24 PROCEDURE — 84484 ASSAY OF TROPONIN QUANT: CPT | Performed by: FAMILY MEDICINE

## 2021-11-24 PROCEDURE — 85379 FIBRIN DEGRADATION QUANT: CPT | Performed by: FAMILY MEDICINE

## 2021-11-24 PROCEDURE — 83690 ASSAY OF LIPASE: CPT | Performed by: FAMILY MEDICINE

## 2021-11-24 PROCEDURE — 85025 COMPLETE CBC W/AUTO DIFF WBC: CPT | Performed by: FAMILY MEDICINE

## 2021-11-24 PROCEDURE — 25010000002 ONDANSETRON PER 1 MG: Performed by: FAMILY MEDICINE

## 2021-11-24 PROCEDURE — 71045 X-RAY EXAM CHEST 1 VIEW: CPT

## 2021-11-24 PROCEDURE — 74177 CT ABD & PELVIS W/CONTRAST: CPT

## 2021-11-24 PROCEDURE — 71275 CT ANGIOGRAPHY CHEST: CPT

## 2021-11-24 PROCEDURE — 80053 COMPREHEN METABOLIC PANEL: CPT | Performed by: FAMILY MEDICINE

## 2021-11-24 PROCEDURE — 25010000002 MORPHINE PER 10 MG: Performed by: FAMILY MEDICINE

## 2021-11-24 PROCEDURE — 81001 URINALYSIS AUTO W/SCOPE: CPT | Performed by: FAMILY MEDICINE

## 2021-11-24 PROCEDURE — 93005 ELECTROCARDIOGRAM TRACING: CPT | Performed by: FAMILY MEDICINE

## 2021-11-24 PROCEDURE — 87086 URINE CULTURE/COLONY COUNT: CPT | Performed by: FAMILY MEDICINE

## 2021-11-24 PROCEDURE — 87635 SARS-COV-2 COVID-19 AMP PRB: CPT | Performed by: FAMILY MEDICINE

## 2021-11-24 PROCEDURE — 96361 HYDRATE IV INFUSION ADD-ON: CPT

## 2021-11-24 PROCEDURE — 93010 ELECTROCARDIOGRAM REPORT: CPT | Performed by: INTERNAL MEDICINE

## 2021-11-24 PROCEDURE — 96375 TX/PRO/DX INJ NEW DRUG ADDON: CPT

## 2021-11-24 PROCEDURE — 0 IOPAMIDOL PER 1 ML: Performed by: PHYSICIAN ASSISTANT

## 2021-11-24 PROCEDURE — 83880 ASSAY OF NATRIURETIC PEPTIDE: CPT | Performed by: FAMILY MEDICINE

## 2021-11-24 PROCEDURE — 96374 THER/PROPH/DIAG INJ IV PUSH: CPT

## 2021-11-24 PROCEDURE — 99284 EMERGENCY DEPT VISIT MOD MDM: CPT

## 2021-11-24 RX ORDER — SODIUM CHLORIDE, SODIUM LACTATE, POTASSIUM CHLORIDE, CALCIUM CHLORIDE 600; 310; 30; 20 MG/100ML; MG/100ML; MG/100ML; MG/100ML
100 INJECTION, SOLUTION INTRAVENOUS CONTINUOUS
Status: DISCONTINUED | OUTPATIENT
Start: 2021-11-24 | End: 2021-11-25 | Stop reason: HOSPADM

## 2021-11-24 RX ORDER — SODIUM CHLORIDE 0.9 % (FLUSH) 0.9 %
10 SYRINGE (ML) INJECTION AS NEEDED
Status: DISCONTINUED | OUTPATIENT
Start: 2021-11-24 | End: 2021-11-25 | Stop reason: HOSPADM

## 2021-11-24 RX ORDER — ONDANSETRON 2 MG/ML
4 INJECTION INTRAMUSCULAR; INTRAVENOUS ONCE
Status: COMPLETED | OUTPATIENT
Start: 2021-11-24 | End: 2021-11-24

## 2021-11-24 RX ADMIN — ONDANSETRON 4 MG: 2 INJECTION INTRAMUSCULAR; INTRAVENOUS at 21:33

## 2021-11-24 RX ADMIN — IOPAMIDOL 100 ML: 755 INJECTION, SOLUTION INTRAVENOUS at 23:11

## 2021-11-24 RX ADMIN — SODIUM CHLORIDE, POTASSIUM CHLORIDE, SODIUM LACTATE AND CALCIUM CHLORIDE 100 ML/HR: 600; 310; 30; 20 INJECTION, SOLUTION INTRAVENOUS at 21:33

## 2021-11-24 RX ADMIN — MORPHINE SULFATE 4 MG: 4 INJECTION, SOLUTION INTRAMUSCULAR; INTRAVENOUS at 21:33

## 2021-11-25 VITALS
WEIGHT: 188 LBS | HEIGHT: 60 IN | BODY MASS INDEX: 36.91 KG/M2 | RESPIRATION RATE: 17 BRPM | TEMPERATURE: 97.9 F | SYSTOLIC BLOOD PRESSURE: 109 MMHG | OXYGEN SATURATION: 95 % | HEART RATE: 82 BPM | DIASTOLIC BLOOD PRESSURE: 56 MMHG

## 2021-11-25 LAB
QT INTERVAL: 374 MS
QT INTERVAL: 400 MS
QTC INTERVAL: 434 MS
QTC INTERVAL: 434 MS

## 2021-11-25 NOTE — ED PROVIDER NOTES
Subjective   History of Present Illness    Patient is a 36-year-old female chief complaint of left-sided chest pain that has also moved into her left side of her abdomen.  The patient describes her symptoms first began this morning.  She felt discomfort on the left side of her chest wall that is worse with palpation, deep inspiration, and has worsening pain sporadically.  This has been continuous all morning has progressed to the left side of her abdomen as well.  She continued both pain in her chest and abdomen.  She has been coughing for the past 2 days without any productive phlegm.  She denies any fever, nausea, or vomiting.  She denies any dysuria, hematuria, or flank pain.  She denies abnormal vaginal bleeding or discharge.  She has chronic diarrhea which remains unchanged.  She denies experiencing pain like this before.  She denies any medication related discomfort.  She reports trying go to [facility] but there is a 3-hour wait so she came here to this ER to be further evaluated.  She states that she wanted to be seen at [facility] because her cardiologist is there.  She has a history of recurrent syncope and has had a loop recorder for the past 1 year.  She saw her cardiological group on November 18th in which they started the bisoprolol 5 mg again.    Patient denies any new medications otherwise.  She denies any trauma.  She denies experiencing pain like this before.  Patient reports she did get her Covid vaccines.  She has a history of IBS that presents differently.    Review of Systems   Constitutional: Positive for activity change. Negative for appetite change, fever and unexpected weight change.   HENT: Negative.    Eyes: Negative.    Respiratory: Positive for cough and chest tightness. Negative for shortness of breath.    Cardiovascular: Positive for chest pain.   Gastrointestinal: Positive for abdominal pain and diarrhea. Negative for nausea and vomiting.   Genitourinary: Negative.  Negative for difficulty  urinating, flank pain, frequency, vaginal bleeding and vaginal discharge.   Musculoskeletal: Negative.    Skin: Negative.    Neurological: Negative.    Psychiatric/Behavioral: Negative.    All other systems reviewed and are negative.      Past Medical History:   Diagnosis Date   • Acid reflux    • Anxiety and depression    • Asthma    • Bipolar 1 disorder, mixed, severe (HCC)    • Heart attack (HCC)     Patient reported on 17 that she had stress induced heart attack three years ago.   • Menorrhagia    • Pelvic pain    • Pelvic pain     menorrha   • Stroke (HCC) 3 years ago    Patient reported on 2017 that she had stress induced stroke 3 years ago, but states nothing was done to treat because it was stress induced.       No Known Allergies    Past Surgical History:   Procedure Laterality Date   • CHOLECYSTECTOMY     • ELBOW FUSION     • ELBOW PERCUTANEOUS PINNING     • ENDOSCOPY N/A 2016    Procedure: ESOPHAGOGASTRODUODENOSCOPY WITH ANESTHESIA;  Surgeon: Az Miller DO;  [facility] ENDOSCOPY;  Service:    • ESSURE TUBAL LIGATION     • HYSTEROSCOPY TUBAL STERILIZATION  2016    Essure Sterilization   • ILEAL LOOP CONDUIT     • PERCUTANEOUS PINNING ELBOW FRACTURE     • REPLACEMENT TOTAL KNEE         Family History   Problem Relation Age of Onset   • Cancer Other    • Diabetes Other    • Heart attack Father    • No Known Problems Mother    • No Known Problems Brother    • No Known Problems Sister    • No Known Problems Daughter    • Cancer Paternal Grandmother    • Lead poisoning Maternal Grandmother    • Asthma Daughter        Social History     Socioeconomic History   • Marital status:    Tobacco Use   • Smoking status: Former Smoker     Packs/day: 0.25     Years: 2.00     Pack years: 0.50     Types: Cigarettes     Quit date: 2016     Years since quittin.9   • Smokeless tobacco: Never Used   Substance and Sexual Activity   • Alcohol use: Yes     Comment: OCC   • Drug  "use: No   • Sexual activity: Yes     Partners: Male     Birth control/protection: Other     Comment: Essure       Prior to Admission medications    Medication Sig Start Date End Date Taking? Authorizing Provider   albuterol sulfate HFA (PROVENTIL HFA) 108 (90 Base) MCG/ACT inhaler Inhale 2 puffs. 1/23/19   Bri Hunter MD   aspirin 81 MG EC tablet Take  by mouth Daily. 5/8/19   Bri Hunter MD   Cariprazine HCl (VRAYLAR) 1.5 MG capsule capsule Take 1.5 mg by mouth Daily. 1/17/20   Emergency, Nurse Alcon RN   cephalexin (KEFLEX) 500 MG capsule Take 1 capsule by mouth 3 (Three) Times a Day. 8/31/20   Jose Enrique Kan MD   metoprolol succinate XL (TOPROL-XL) 25 MG 24 hr tablet Take 25 mg by mouth Daily. 11/15/19   Emergency, Nurse Epic, RN   omeprazole (priLOSEC) 20 MG capsule TAKE 1 CAPSULE BY MOUTH TWICE DAILY BEFORE MEALS 5/19/19   Bri Hunter MD   ondansetron (ZOFRAN) 4 MG tablet Take 4 mg by mouth.    ProviderBri MD   promethazine (PHENERGAN) 25 MG tablet TK ONE T PO QID PRF NAUSEA 7/28/20   Emergency, Nurse Alcon RN   venlafaxine XR (EFFEXOR-XR) 150 MG 24 hr capsule Take 150 mg by mouth Daily. 1/8/20   Emergency, Nurse Epic, RN       Medications   sodium chloride 0.9 % flush 10 mL (has no administration in time range)   lactated ringers infusion (0 mL/hr Intravenous Stopped 11/25/21 0018)   morphine injection 4 mg (4 mg Intravenous Given 11/24/21 2133)   ondansetron (ZOFRAN) injection 4 mg (4 mg Intravenous Given 11/24/21 2133)   iopamidol (ISOVUE-370) 76 % injection 100 mL (100 mL Intravenous Given 11/24/21 2311)       /70   Pulse 80   Temp 97.5 °F (36.4 °C) (Oral)   Resp 18   Ht 152.4 cm (60\")   Wt 85.3 kg (188 lb)   LMP 06/15/2020   SpO2 100%   Breastfeeding Unknown   BMI 36.72 kg/m²       Objective   Physical Exam  Vitals and nursing note reviewed.   Constitutional:       General: She is not in acute distress.     Appearance: She is well-developed. She " is obese. She is not diaphoretic.   HENT:      Head: Normocephalic and atraumatic.   Eyes:      Conjunctiva/sclera: Conjunctivae normal.      Pupils: Pupils are equal, round, and reactive to light.   Neck:      Trachea: No tracheal deviation.   Cardiovascular:      Rate and Rhythm: Normal rate and regular rhythm.      Heart sounds: Normal heart sounds. No murmur heard.      Pulmonary:      Effort: Pulmonary effort is normal.      Breath sounds: Normal breath sounds.   Chest:      Chest wall: Tenderness present. No mass, lacerations, swelling, crepitus or edema.       Abdominal:      General: Bowel sounds are normal. There is no distension.      Palpations: Abdomen is soft. There is no mass.      Tenderness: There is abdominal tenderness in the periumbilical area, left upper quadrant and left lower quadrant. There is no guarding or rebound.   Musculoskeletal:         General: Normal range of motion.      Cervical back: Normal range of motion and neck supple.   Skin:     General: Skin is warm and dry.      Capillary Refill: Capillary refill takes less than 2 seconds.   Neurological:      General: No focal deficit present.      Mental Status: She is alert and oriented to person, place, and time.   Psychiatric:         Behavior: Behavior normal.         Thought Content: Thought content normal.         Judgment: Judgment normal.         Procedures         Lab Results (last 24 hours)     Procedure Component Value Units Date/Time    Urinalysis With Culture If Indicated - Urine, Clean Catch [[id number]]  (Abnormal) Collected: 11/24/21 2126    Specimen: Urine, Clean Catch Updated: 11/24/21 2203     Color, UA Yellow     Appearance, UA Clear     pH, UA 6.5     Specific Gravity, UA <=1.005     Glucose, UA Negative     Ketones, UA Negative     Bilirubin, UA Negative     Blood, UA Small (1+)     Protein, UA Negative     Leuk Esterase, UA Large (3+)     Nitrite, UA Negative     Urobilinogen, UA 0.2 E.U./dL    COVID PRE-OP /  PRE-PROCEDURE SCREENING ORDER (NO ISOLATION) - Swab, Nasal Cavity [[id number]]  (Normal) Collected: 11/24/21 2126    Specimen: Swab from Nasal Cavity Updated: 11/24/21 2221    Narrative:      The following orders were created for panel order COVID PRE-OP / PRE-PROCEDURE SCREENING ORDER (NO ISOLATION) - Swab, Nasal Cavity.  Procedure                               Abnormality         Status                     ---------                               -----------         ------                     COVID-19,[facility] IN-PILAR...[[id number]]  Normal              Final result                 Please view results for these tests on the individual orders.    COVID-19,[facility] IN-HOUSE,Nasal Swab No Transport Media 3-4 HR TAT - Swab, Nasal Cavity [[id number]]  (Normal) Collected: 11/24/21 2126    Specimen: Swab from Nasal Cavity Updated: 11/24/21 2221     COVID19 Not Detected    Narrative:      Fact sheet for providers: https://www.fda.gov/media/957297/download     Fact sheet for patients: https://www.fda.gov/media/412598/download    Test performed by PCR.    Consider negative results in combination with clinical observations, patient history, and epidemiological information.    Urinalysis, Microscopic Only - Urine, Clean Catch [[id number]]  (Abnormal) Collected: 11/24/21 2126    Specimen: Urine, Clean Catch Updated: 11/24/21 2203     RBC, UA 0-2 /HPF      WBC, UA 13-20 /HPF      Bacteria, UA 1+ /HPF      Squamous Epithelial Cells, UA 3-6 /HPF      Hyaline Casts, UA 3-6 /LPF      Methodology Manual Light Microscopy    Urine Culture - Urine, Urine, Clean Catch [[id number]] Collected: 11/24/21 2126    Specimen: Urine, Clean Catch Updated: 11/24/21 2203    CBC & Differential [[id number]]  (Normal) Collected: 11/24/21 2147    Specimen: Blood Updated: 11/24/21 2153    Narrative:      The following orders were created for panel order CBC & Differential.  Procedure                               Abnormality         Status                      ---------                               -----------         ------                     CBC Auto Differential[[id number]]        Normal              Final result                 Please view results for these tests on the individual orders.    Comprehensive Metabolic Panel [[id number]] Collected: 11/24/21 2147    Specimen: Blood Updated: 11/24/21 2213     Glucose 83 mg/dL      BUN 9 mg/dL      Creatinine 0.68 mg/dL      Sodium 138 mmol/L      Potassium 3.7 mmol/L      Chloride 101 mmol/L      CO2 27.0 mmol/L      Calcium 9.2 mg/dL      Total Protein 7.4 g/dL      Albumin 4.00 g/dL      ALT (SGPT) 17 U/L      AST (SGOT) 20 U/L      Alkaline Phosphatase 104 U/L      Total Bilirubin 0.2 mg/dL      eGFR Non African Amer 97 mL/min/1.73      Globulin 3.4 gm/dL      A/G Ratio 1.2 g/dL      BUN/Creatinine Ratio 13.2     Anion Gap 10.0 mmol/L     Narrative:      GFR Normal >60  Chronic Kidney Disease <60  Kidney Failure <15      Troponin [[id number]]  (Normal) Collected: 11/24/21 2147    Specimen: Blood Updated: 11/24/21 2211     Troponin T <0.010 ng/mL     Narrative:      Troponin T Reference Range:  <= 0.03 ng/mL-   Negative for AMI  >0.03 ng/mL-     Abnormal for myocardial necrosis.  Clinicians would have to utilize clinical acumen, EKG, Troponin and serial changes to determine if it is an Acute Myocardial Infarction or myocardial injury due to an underlying chronic condition.       Results may be falsely decreased if patient taking Biotin.      BNP [[id number]]  (Normal) Collected: 11/24/21 2147    Specimen: Blood Updated: 11/24/21 2210     proBNP 24.8 pg/mL     Narrative:      Among patients with dyspnea, NT-proBNP is highly sensitive for the detection of acute congestive heart failure. In addition NT-proBNP of <300 pg/ml effectively rules out acute congestive heart failure with 99% negative predictive value.    Results may be falsely decreased if patient taking Biotin.      D-dimer, Quantitative [[id number]]  (Abnormal)  Collected: 11/24/21 2147    Specimen: Blood Updated: 11/24/21 2204     D-Dimer, Quantitative 0.95 mg/L (FEU)     Narrative:      Reference Range is 0-0.50 mg/L FEU. However, results <0.50 mg/L FEU tends to rule out DVT or PE. Results >0.50 mg/L FEU are not useful in predicting absence or presence of DVT or PE.      Lactic Acid, Plasma [[id number]]  (Normal) Collected: 11/24/21 2147    Specimen: Blood Updated: 11/24/21 2211     Lactate 1.0 mmol/L     Lipase [[id number]]  (Normal) Collected: 11/24/21 2147    Specimen: Blood Updated: 11/24/21 2208     Lipase 27 U/L     CBC Auto Differential [[id number]]  (Normal) Collected: 11/24/21 2147    Specimen: Blood Updated: 11/24/21 2153     WBC 6.10 10*3/mm3      RBC 4.31 10*6/mm3      Hemoglobin 13.1 g/dL      Hematocrit 40.8 %      MCV 94.7 fL      MCH 30.4 pg      MCHC 32.1 g/dL      RDW 13.4 %      RDW-SD 46.9 fl      MPV 8.7 fL      Platelets 255 10*3/mm3      Neutrophil % 58.6 %      Lymphocyte % 28.0 %      Monocyte % 9.0 %      Eosinophil % 3.4 %      Basophil % 0.5 %      Immature Grans % 0.5 %      Neutrophils, Absolute 3.57 10*3/mm3      Lymphocytes, Absolute 1.71 10*3/mm3      Monocytes, Absolute 0.55 10*3/mm3      Eosinophils, Absolute 0.21 10*3/mm3      Basophils, Absolute 0.03 10*3/mm3      Immature Grans, Absolute 0.03 10*3/mm3      nRBC 0.0 /100 WBC     Troponin [[id number]]  (Normal) Collected: 11/24/21 2324    Specimen: Blood Updated: 11/24/21 2350     Troponin T <0.010 ng/mL     Narrative:      Troponin T Reference Range:  <= 0.03 ng/mL-   Negative for AMI  >0.03 ng/mL-     Abnormal for myocardial necrosis.  Clinicians would have to utilize clinical acumen, EKG, Troponin and serial changes to determine if it is an Acute Myocardial Infarction or myocardial injury due to an underlying chronic condition.       Results may be falsely decreased if patient taking Biotin.            XR Chest 1 View    Result Date: 11/24/2021  Narrative: EXAMINATION: Chest 1 view  11/24/2021  HISTORY: Chest pain  FINDINGS: Today's exam is compared to previous study 11/20/2019. Lungs are clear. There is no effusion or free air present.      Impression: . No acute disease. This report was finalized on 11/24/2021 21:26 by Dr. Nguyễn Carlos MD.      ED Course  ED Course as of 11/25/21 0133   Thu Nov 25, 2021   0054 I reviewed this case with Dr. aGlarza who will be assuming the patient's care.  [TK]      ED Course User Index  [TK] Helena Price PA          [facility]    Final diagnoses:   Chest pain, unspecified type   Abdominal pain, unspecified abdominal location     I took over the care of this patient awaiting CT results which were both reassuringly negative.  The patient will be discharged home in stable condition.     Joseph Galarza MD  11/25/21 0133

## 2021-11-26 LAB — BACTERIA SPEC AEROBE CULT: NORMAL

## 2021-11-30 ASSESSMENT — ENCOUNTER SYMPTOMS
WHEEZING: 0
DIARRHEA: 0
VOMITING: 0
SHORTNESS OF BREATH: 0
COUGH: 0
CONSTIPATION: 0
CHEST TIGHTNESS: 0
ABDOMINAL PAIN: 0
NAUSEA: 0

## 2021-12-02 ASSESSMENT — ENCOUNTER SYMPTOMS
ABDOMINAL PAIN: 0
VOMITING: 0
COUGH: 0
CHEST TIGHTNESS: 0
WHEEZING: 0
CONSTIPATION: 0
SHORTNESS OF BREATH: 0
DIARRHEA: 0
NAUSEA: 0

## 2021-12-09 DIAGNOSIS — Z95.818 STATUS POST PLACEMENT OF IMPLANTABLE LOOP RECORDER: Primary | ICD-10-CM

## 2021-12-09 DIAGNOSIS — R00.0 TACHYCARDIA: ICD-10-CM

## 2021-12-09 DIAGNOSIS — R55 SYNCOPE, UNSPECIFIED SYNCOPE TYPE: ICD-10-CM

## 2021-12-16 ENCOUNTER — VIRTUAL VISIT (OUTPATIENT)
Dept: PSYCHOLOGY | Age: 36
End: 2021-12-16
Payer: MEDICARE

## 2021-12-16 DIAGNOSIS — F25.0 SCHIZOAFFECTIVE DISORDER, BIPOLAR TYPE (HCC): Primary | ICD-10-CM

## 2021-12-16 PROCEDURE — 90832 PSYTX W PT 30 MINUTES: CPT | Performed by: SOCIAL WORKER

## 2021-12-16 ASSESSMENT — PATIENT HEALTH QUESTIONNAIRE - PHQ9
5. POOR APPETITE OR OVEREATING: 2
1. LITTLE INTEREST OR PLEASURE IN DOING THINGS: 2
3. TROUBLE FALLING OR STAYING ASLEEP: 3
10. IF YOU CHECKED OFF ANY PROBLEMS, HOW DIFFICULT HAVE THESE PROBLEMS MADE IT FOR YOU TO DO YOUR WORK, TAKE CARE OF THINGS AT HOME, OR GET ALONG WITH OTHER PEOPLE: 3
SUM OF ALL RESPONSES TO PHQ QUESTIONS 1-9: 24
SUM OF ALL RESPONSES TO PHQ9 QUESTIONS 1 & 2: 5
9. THOUGHTS THAT YOU WOULD BE BETTER OFF DEAD, OR OF HURTING YOURSELF: 2
7. TROUBLE CONCENTRATING ON THINGS, SUCH AS READING THE NEWSPAPER OR WATCHING TELEVISION: 3
SUM OF ALL RESPONSES TO PHQ QUESTIONS 1-9: 22
4. FEELING TIRED OR HAVING LITTLE ENERGY: 3
SUM OF ALL RESPONSES TO PHQ QUESTIONS 1-9: 24
2. FEELING DOWN, DEPRESSED OR HOPELESS: 3
8. MOVING OR SPEAKING SO SLOWLY THAT OTHER PEOPLE COULD HAVE NOTICED. OR THE OPPOSITE, BEING SO FIGETY OR RESTLESS THAT YOU HAVE BEEN MOVING AROUND A LOT MORE THAN USUAL: 3
6. FEELING BAD ABOUT YOURSELF - OR THAT YOU ARE A FAILURE OR HAVE LET YOURSELF OR YOUR FAMILY DOWN: 3

## 2021-12-16 ASSESSMENT — ANXIETY QUESTIONNAIRES
GAD7 TOTAL SCORE: 21
7. FEELING AFRAID AS IF SOMETHING AWFUL MIGHT HAPPEN: 3-NEARLY EVERY DAY
6. BECOMING EASILY ANNOYED OR IRRITABLE: 3-NEARLY EVERY DAY
4. TROUBLE RELAXING: 3-NEARLY EVERY DAY
2. NOT BEING ABLE TO STOP OR CONTROL WORRYING: 3-NEARLY EVERY DAY
3. WORRYING TOO MUCH ABOUT DIFFERENT THINGS: 3-NEARLY EVERY DAY
1. FEELING NERVOUS, ANXIOUS, OR ON EDGE: 3-NEARLY EVERY DAY
5. BEING SO RESTLESS THAT IT IS HARD TO SIT STILL: 3-NEARLY EVERY DAY

## 2021-12-16 ASSESSMENT — COLUMBIA-SUICIDE SEVERITY RATING SCALE - C-SSRS
2. HAVE YOU ACTUALLY HAD ANY THOUGHTS OF KILLING YOURSELF?: NO
6. HAVE YOU EVER DONE ANYTHING, STARTED TO DO ANYTHING, OR PREPARED TO DO ANYTHING TO END YOUR LIFE?: NO
1. WITHIN THE PAST MONTH, HAVE YOU WISHED YOU WERE DEAD OR WISHED YOU COULD GO TO SLEEP AND NOT WAKE UP?: YES

## 2021-12-16 NOTE — PROGRESS NOTES
Ericka Leroy, was evaluated through a synchronous (real-time) audio-video encounter. The patient (or guardian if applicable) is aware that this is a billable service. Verbal consent to proceed has been obtained within the past 12 months. The visit was conducted pursuant to the emergency declaration under the [facility], [address] [place] [address] authority and the [employer] Act. Patient identification was verified, and a caregiver was present when appropriate. The patient was located in a state where the provider was credentialed to provide care. Total time spent for this encounter: 30 Minutes    --Taiwo Dunlap LCSW on 12/16/2021 at 12:01 PM    An electronic signature was used to authenticate this note. Behavioral Health Consultation  Taiwo GARCIA LCSW  12/16/2021  11:50 AM      Time spent with Patient: 30 minutes  This is patient's second  [facility] appointment. Reason for Consult:    Chief Complaint   Patient presents with    Follow-up    Depression     Referring Provider: No referring provider defined for this encounter. Feedback given to PCP. S:  Patient reports problems with feeling depressed and anxious. Pt reported things were about the same. Shared she had increased her medication and believes it has helped some. Pt reported largest stressor is all the fighting and arguing in her home. Discussed CBT ABCs and assertive communications. Pt reported she would need to wait and have her  explain these concepts so she can understand, however she did not was provider to continue explaining. Addressed current and underlying issues, explored and released associated emotions, explored new ways to deal and cope with these problems.       O:  MSE:    Mood    Anxious  Depressed  Affect    normal affect  Appetite normal  Sleep disturbance Yes  Fatigue Yes  Loss of pleasure Yes  Attention/Concentration    intact  Morbid ideation No  Suicide Assessment    no suicidal ideation        A:  Patient presents for consult due to problems with anxiety and depression. PHQ Scores 12/16/2021 11/4/2021 9/4/2018   PHQ2 Score 5 6 2   PHQ9 Score 24 23 2     Interpretation of Total Score Depression Severity: 1-4 = Minimal depression, 5-9 = Mild depression, 10-14 = Moderate depression, 15-19 = Moderately severe depression, 20-27 = Severe depression    HEMANTH-7  Feeling nervous, anxious, or on edge: 3-Nearly every day  Not able to stop or control worrying: 3-Nearly every day  Worrying too much about different things: 3-Nearly every day  Trouble relaxing: 3-Nearly every day  Being so restless that it's hard to sit still: 3-Nearly every day  Becoming easily annoyed or irritable: 3-Nearly every day  Feeling afraid as if something awful might happen: 3-Nearly every day  HEMANTH-7 Total Score: 21    HEMANTH-7 score interpretation:  0-4 Subclinical, 5-9 Mild, 10-14 Moderate, 15-21 Severe    Continued consultation is clinically/medically necessary to support in learning new skills and build confidence to deal better with these issues. Patient response to consults, finds new strategies helpful.      Diagnosis:    1. Schizoaffective disorder, bipolar type ([address]          Diagnosis Date    Abnormal glucose measurement     Abnormal Pap smear of cervix     Anxiety     Blood circulation, collateral     CAD (coronary artery disease)     Cerebral artery occlusion with cerebral infarction ([address] 2014    right side    Complication of anesthesia     difficulty waking    Depression     Heart disease 06/2020    LOOP recorder     IBS (irritable bowel syndrome)     MDD (major depressive disorder)     MI (myocardial infarction) ([address] 2014    Miscarriage 06/2012    Neurologic disorder     Postpartum depression     Prolonged emergence from general anesthesia     Schizophrenia ([address]     Seizures ([address]     anxiety related (last 6/2020)    Syncope     Tachycardia     UTI (urinary tract infection) 12/09/2019         Plan:  Pt interventions:  Trained in strategies for increasing balanced thinking, Provided education, Established rapport, Leonardo-setting to identify pt's primary goals for [facility] visit / overall health, Supportive techniques, Emphasized self-care as important for managing overall health and Identified maladaptive thoughts      Pt Behavioral Change Plan:  See Pt Instructions

## 2021-12-16 NOTE — PATIENT INSTRUCTIONS
Assertive Communication     Assertiveness Is Simple but HARD    NonAssertive   Assertive   Aggressive     (Passive)            (Tactful)             (Rude)           H onest         X  H onest          X  H onest             X A ppropriate        X  A ppropriate                 A ppropriate             X R espectful        X  R espectful              R espectful      D irect                   X  D irect         X  D irect        Assertiveness involves respecting your rights and the rights of others. Important Facts About Assertiveness      Use I or me statements such as When you do ______, I feel _____.     Voice tone, eye contact, and body posture are important parts of assertive communication.  Use a steady and calm voice, stand or sit up straight, look the other person in the eyes without glaring.  Feelings are usually only one word (e.g. angry, anxious, happy, sad, hurt, frustrated, joyful)    Remember, assertiveness doesnt guarantee that you will get what you want or that the other person will understand your concerns or be happy with what you said. It does improve the chances that the other person will understand what you want or how you feel and thus improve your chances of communicating effectively. Four Essential Steps to Assertive Communication   1. Tell the person what you think about their behavior without accusing them. 2. Tell them how you feel when they behave a certain way. 3. Tell them how their behavior affects you and your relationship with them. 4. Tell them what you would prefer them to do instead. XYZ* Formula for Effective Communication     The goal of the XYZ* formula is to express the way you feel (internal world) in response to others behavior (external world) in specific situations. You are the only person who has access to your feelings. Others have no access to your internal world.  The only way they will know what you are feeling is if you tell them. Similarly, you only have access to other peoples external world. It is very easy to make a mistake when trying to guess what others are feeling or intending. I feel X  when you do Y  in situation Z  and I would like *    I feel angry  when you leave your socks and underwear on the bedroom floor  after work  and I would like you to put them in the hamper. I felt insignificant  when you left me with an empty gas tank  yesterday  and I would like you to leave the car with at least 1/4 tank of gas. I feel angry  when you dont call me  if you are staying late at work  and I would like you to call as soon as you know you will be late. I feel loved  when you kiss me  when you get home  and I would like you to do that everyday.

## 2021-12-21 DIAGNOSIS — R00.0 TACHYCARDIA: ICD-10-CM

## 2021-12-21 DIAGNOSIS — R55 SYNCOPE, UNSPECIFIED SYNCOPE TYPE: ICD-10-CM

## 2021-12-21 DIAGNOSIS — Z95.818 STATUS POST PLACEMENT OF IMPLANTABLE LOOP RECORDER: Primary | ICD-10-CM

## 2021-12-21 PROCEDURE — G2066 INTER DEVC REMOTE 30D: HCPCS | Performed by: CLINICAL NURSE SPECIALIST

## 2021-12-21 PROCEDURE — 93298 REM INTERROG DEV EVAL SCRMS: CPT | Performed by: CLINICAL NURSE SPECIALIST

## 2021-12-23 DIAGNOSIS — R00.0 TACHYCARDIA: ICD-10-CM

## 2021-12-23 DIAGNOSIS — Z95.818 STATUS POST PLACEMENT OF IMPLANTABLE LOOP RECORDER: Primary | ICD-10-CM

## 2021-12-23 DIAGNOSIS — R55 SYNCOPE, UNSPECIFIED SYNCOPE TYPE: ICD-10-CM

## 2022-01-03 DIAGNOSIS — R55 SYNCOPE, UNSPECIFIED SYNCOPE TYPE: ICD-10-CM

## 2022-01-03 DIAGNOSIS — Z95.818 STATUS POST PLACEMENT OF IMPLANTABLE LOOP RECORDER: Primary | ICD-10-CM

## 2022-01-03 DIAGNOSIS — R00.0 TACHYCARDIA: ICD-10-CM

## 2022-01-03 NOTE — ED PROVIDER NOTES
"Subjective   Patient is a 34-year-old female that presents to the ER today with complaint of chest pain and left-sided numbness.  The patient reports this is been occurring for the past 3 days.  The patient states that the numbness began 3 days ago.  She states it is on the left leg and left arm.  The patient reports that she feels that she has had some slurred speech.  She denies any memory issues.  The patient reports to me that she has had a stroke and heart attack in the past.  In review of her chart records it shows in our records that this was diagnosed in August 2017.  I cannot find any records related to this.  In other records I have found where this was documented to have had occurred in 2013 or 2014.  The patient states that these were \"stress-induced close.  The patient states that she did not have any interventions done for either of these.  The patient states that she is normally follows with cardiology at [facility].  She does have a loop recorder in place.  The patient had this placed in April 2019 due to recurrent syncope.  The patient reports that she was told that she was having tachycardia and placed on an unknown medication.  I do so a report from the loop recorder dated September 30, 2019 that showed a mild tachycardia around 100.  I cannot find any other reports on this since that time.  The patient did have a normal echocardiogram in September 2018 and had a negative stress test in December 2018 as well.  The patient reports that nothing seems to bring the chest pain on or make it worse.  Her  reports to me that they have been having some marital issues and some issues with her 16-year-old daughter as well and that there has been a lot of arguing in their home recently.  He believes that this could be contributing to the patient's symptoms.  She reports that she did have a recent EGD performed last week.  She denies any shortness of breath or any new lower extremity swelling or pain.  She " has no previous history of a PE or DVT in the past.  She is not currently on anticoagulants.  The patient presents to the ER today for further evaluation.        History provided by:  Patient   used: No    Chest Pain   Pain location:  Substernal area  Pain quality: aching and dull    Pain radiates to:  Does not radiate  Pain severity:  Mild  Onset quality:  Sudden  Duration:  3 days  Timing:  Intermittent  Progression:  Unchanged  Chronicity:  New  Context: stress    Context: not breathing, not drug use, not eating, not intercourse, not lifting, not movement, not raising an arm, not at rest and not trauma    Relieved by:  Nothing  Worsened by:  Nothing  Ineffective treatments:  None tried  Associated symptoms: numbness    Associated symptoms: no abdominal pain, no AICD problem, no altered mental status, no anorexia, no anxiety, no back pain, no claudication, no cough, no diaphoresis, no dizziness, no dysphagia, no fatigue, no fever, no headache, no heartburn, no lower extremity edema, no nausea, no near-syncope, no orthopnea, no palpitations, no PND, no shortness of breath, no syncope, no vomiting and no weakness    Risk factors: coronary artery disease and obesity    Risk factors: no aortic disease, no birth control, no diabetes mellitus, no Libertad-Danlos syndrome, no high cholesterol, no hypertension, no immobilization, not male, no Marfan's syndrome, not pregnant, no prior DVT/PE, no smoking and no surgery        Review of Systems   Constitutional: Negative for diaphoresis, fatigue and fever.   HENT: Negative for trouble swallowing.    Respiratory: Negative for cough and shortness of breath.    Cardiovascular: Positive for chest pain. Negative for palpitations, orthopnea, claudication, syncope, PND and near-syncope.   Gastrointestinal: Negative for abdominal pain, anorexia, heartburn, nausea and vomiting.   Musculoskeletal: Negative for back pain.   Neurological: Positive for numbness.  Negative for dizziness, weakness and headaches.   All other systems reviewed and are negative.      Past Medical History:   Diagnosis Date   • Acid reflux    • Anxiety and depression    • Asthma    • Bipolar 1 disorder, mixed, severe (CMS/HCC)    • Heart attack (CMS/HCC) 2014    Patient reported on 8/31/17 that she had stress induced heart attack three years ago.   • Menorrhagia    • Pelvic pain    • Pelvic pain     menorrha   • Stroke (CMS/HCC) 3 years ago    Patient reported on 8/31/2017 that she had stress induced stroke 3 years ago, but states nothing was done to treat because it was stress induced.       No Known Allergies    Past Surgical History:   Procedure Laterality Date   • CHOLECYSTECTOMY     • ELBOW FUSION     • ELBOW PERCUTANEOUS PINNING     • ENDOSCOPY N/A 11/22/2016    Procedure: ESOPHAGOGASTRODUODENOSCOPY WITH ANESTHESIA;  Surgeon: Az Miller DO;  Location: [facility] ENDOSCOPY;  Service:    • ESSURE TUBAL LIGATION     • HYSTEROSCOPY TUBAL STERILIZATION  06/07/2016    Essure Sterilization   • PERCUTANEOUS PINNING ELBOW FRACTURE     • REPLACEMENT TOTAL KNEE         Family History   Problem Relation Age of Onset   • Cancer Other    • Diabetes Other    • Heart attack Father    • No Known Problems Mother    • No Known Problems Brother    • No Known Problems Sister    • No Known Problems Daughter    • Cancer Paternal Grandmother    • Lead poisoning Maternal Grandmother    • Asthma Daughter        Social History     Socioeconomic History   • Marital status:      Spouse name: Not on file   • Number of children: Not on file   • Years of education: Not on file   • Highest education level: Not on file   Occupational History   • Occupation: [employer]   Tobacco Use   • Smoking status: Former Smoker     Packs/day: 0.25     Years: 2.00     Pack years: 0.50     Types: Cigarettes     Last attempt to quit: 2016     Years since quitting: 3.8   • Smokeless tobacco: Never Used   Substance and Sexual  Activity   • Alcohol use: No   • Drug use: No   • Sexual activity: Yes     Partners: Male     Birth control/protection: Other     Comment: Essure           Objective   Physical Exam   Constitutional: She is oriented to person, place, and time. She appears well-developed and well-nourished.   HENT:   Head: Normocephalic and atraumatic.   Eyes: Conjunctivae are normal. Pupils are equal, round, and reactive to light.   Cardiovascular: Normal rate, regular rhythm and normal heart sounds.   Pulmonary/Chest: Effort normal and breath sounds normal.   Abdominal: Soft. Bowel sounds are normal.   Neurological: She is alert and oriented to person, place, and time.   Skin: Skin is warm and dry. Capillary refill takes less than 2 seconds.   Psychiatric: She has a normal mood and affect.   Nursing note and vitals reviewed.      Procedures           ED Course  ED Course as of Nov 20 2022   Wed Nov 20, 2019   2019 At this time to discuss all results with the patient and her family.  The patient was offered admission however she states she wants to go home.  Patient vital signs have been stable during her stay in the ER.  The patient has been ambulatory in the ER without any difficulty or any noted weakness.  At this time should be discharged home in stable condition.  She does have an appoint with her cardiologist on Friday and advised her to keep this appointment.  Advised her that she may return if any new or worsening symptoms.  To be discharged home at this time stable condition.  [LF]   2019 At this time to discuss all results with the patient and her family.  The patient was offered admission however she states she is going to go home.  [LF]      ED Course User Index  [LF] Mckenna Vega, APRN      CT Angiogram Chest   Final Result   1. No pulmonary emboli.   2. No thoracic aortic aneurysm or dissection.   3. Linear atelectasis or scarring in the anterior right upper lobe with   otherwise clear lungs.    4. Mechanical  device implanted within the anterior inferior left chest   wall.           This report was finalized on 11/20/2019 13:58 by Dr. Bettye Urias MD.      CT Head Without Contrast   Final Result   No hemorrhage, edema or mass effect. No acute findings.       The full report of this exam was immediately signed and available to the   emergency room. The patient is currently in the emergency room.       This report was finalized on 11/20/2019 13:47 by Dr. Zev Fuentes MD.      XR Chest 1 View   Final Result   1. Borderline cardiomegaly which is stable. No visualized acute process.   2. Interval loop recorder placement.           This report was finalized on 11/20/2019 13:19 by Dr Kenneth Frost, .        Labs Reviewed   COMPREHENSIVE METABOLIC PANEL - Abnormal; Notable for the following components:       Result Value    Creatinine 0.56 (*)     Alkaline Phosphatase 119 (*)     All other components within normal limits    Narrative:     GFR Normal >60  Chronic Kidney Disease <60  Kidney Failure <15   PROTIME-INR - Abnormal; Notable for the following components:    Protime 11.5 (*)     INR 0.82 (*)     All other components within normal limits   URINE DRUG SCREEN - Abnormal; Notable for the following components:    Barbiturates Screen, Urine Positive (*)     All other components within normal limits    Narrative:     Cutoff For Drugs Screened:    Amphetamines               500 ng/ml  Barbiturates               200 ng/ml  Benzodiazepines            150 ng/ml  Cocaine                    150 ng/ml  Methadone                  200 ng/ml  Opiates                    100 ng/ml  Phencyclidine               25 ng/ml  THC                            50 ng/ml  Methamphetamine            500 ng/ml  Tricyclic Antidepressants  300 ng/ml  Oxycodone                  100 ng/ml  Propoxyphene               300 ng/ml  Buprenorphine               10 ng/ml    The normal value for all drugs tested is negative. This report includes unconfirmed  screening results, with the cutoff values listed, to be used for medical treatment purposes only.  Unconfirmed results must not be used for non-medical purposes such as employment or legal testing.  Clinical consideration should be applied to any drug of abuse test, particularly when unconfirmed results are used.     APTT - Normal   LIPASE - Normal   HCG, SERUM, QUALITATIVE - Normal   TROPONIN (IN-HOUSE) - Normal    Narrative:     Troponin T Reference Range:  <= 0.03 ng/mL-   Negative for AMI  >0.03 ng/mL-     Abnormal for myocardial necrosis.  Clinicians would have to utilize clinical acumen, EKG, Troponin and serial changes to determine if it is an Acute Myocardial Infarction or myocardial injury due to an underlying chronic condition.    BNP (IN-HOUSE) - Normal    Narrative:     Among patients with dyspnea, NT-proBNP is highly sensitive for the detection of acute congestive heart failure. In addition NT-proBNP of <300 pg/ml effectively rules out acute congestive heart failure with 99% negative predictive value.   CBC WITH AUTO DIFFERENTIAL - Normal   TROPONIN (IN-HOUSE) - Normal    Narrative:     Troponin T Reference Range:  <= 0.03 ng/mL-   Negative for AMI  >0.03 ng/mL-     Abnormal for myocardial necrosis.  Clinicians would have to utilize clinical acumen, EKG, Troponin and serial changes to determine if it is an Acute Myocardial Infarction or myocardial injury due to an underlying chronic condition.    POCT PEFORM URINE PREGNANCY - Normal   CBC AND DIFFERENTIAL    Narrative:     The following orders were created for panel order CBC & Differential.  Procedure                               Abnormality         Status                     ---------                               -----------         ------                     CBC Auto Differential[[id number]]        Normal              Final result                 Please view results for these tests on the individual orders.                 MDM  Number of Diagnoses  or Management Options  Chest wall pain: new and requires workup  Paresthesias: new and requires workup     Amount and/or Complexity of Data Reviewed  Clinical lab tests: ordered and reviewed  Tests in the radiology section of CPT®: ordered and reviewed  Tests in the medicine section of CPT®: ordered and reviewed  Decide to obtain previous medical records or to obtain history from someone other than the patient: yes  Discuss the patient with other providers: yes    Patient Progress  Patient progress: stable      Final diagnoses:   Paresthesias   Chest wall pain              Mckenna Vega, APRN  11/20/19 2022     peripheral

## 2022-01-04 PROCEDURE — 93296 REM INTERROG EVL PM/IDS: CPT | Performed by: CLINICAL NURSE SPECIALIST

## 2022-01-04 PROCEDURE — 93294 REM INTERROG EVL PM/LDLS PM: CPT | Performed by: CLINICAL NURSE SPECIALIST

## 2022-01-13 ENCOUNTER — OFFICE VISIT (OUTPATIENT)
Dept: PRIMARY CARE CLINIC | Age: 37
End: 2022-01-13
Payer: MEDICARE

## 2022-01-13 VITALS
TEMPERATURE: 97.6 F | DIASTOLIC BLOOD PRESSURE: 84 MMHG | BODY MASS INDEX: 34.97 KG/M2 | WEIGHT: 185.2 LBS | SYSTOLIC BLOOD PRESSURE: 128 MMHG | HEIGHT: 61 IN | OXYGEN SATURATION: 99 % | HEART RATE: 83 BPM

## 2022-01-13 DIAGNOSIS — F25.0 SCHIZOAFFECTIVE DISORDER, BIPOLAR TYPE (HCC): Primary | ICD-10-CM

## 2022-01-13 PROCEDURE — 99214 OFFICE O/P EST MOD 30 MIN: CPT | Performed by: FAMILY MEDICINE

## 2022-01-13 RX ORDER — ARIPIPRAZOLE 5 MG/1
5 TABLET ORAL DAILY
Qty: 30 TABLET | Refills: 3 | Status: SHIPPED | OUTPATIENT
Start: 2022-01-13 | End: 2022-02-15 | Stop reason: SDUPTHER

## 2022-01-13 RX ORDER — LAMOTRIGINE 100 MG/1
100 TABLET ORAL DAILY
Qty: 30 TABLET | Refills: 3 | Status: SHIPPED | OUTPATIENT
Start: 2022-01-13 | End: 2022-03-15

## 2022-01-13 NOTE — PROGRESS NOTES
Enma Perla is a 43 y.o. female who presents today for   Chief Complaint   Patient presents with    Medication Check     1 month follow up on lamictal       HPI  Patient is here today for 1 month follow up on Lamictal. She notes she has not seen much of difference. She feels she is more on edge. She notes stress w/ the 23 yr old. Having some depression, not as bad as before. No change in PMH, family, social, or surgical history unless mentioned above. I have reviewed the above chief complaint and HPI details charted by staff and claim ownership of the documentation. Review of Systems   Constitutional: Negative for chills and fever. Respiratory: Negative for cough, chest tightness, shortness of breath and wheezing. Cardiovascular: Negative for chest pain, palpitations and leg swelling. Gastrointestinal: Negative for abdominal pain, constipation, diarrhea, nausea and vomiting. Genitourinary: Negative for difficulty urinating, dysuria and frequency. Psychiatric/Behavioral: Positive for dysphoric mood. Negative for agitation, self-injury, sleep disturbance and suicidal ideas. The patient is nervous/anxious.         Past Medical History:   Diagnosis Date    Abnormal glucose measurement     Abnormal Pap smear of cervix     Anxiety     Blood circulation, collateral     CAD (coronary artery disease)     Cerebral artery occlusion with cerebral infarction ([address] 2014    right side    Complication of anesthesia     difficulty waking    Depression     Heart disease 06/2020    LOOP recorder     IBS (irritable bowel syndrome)     MDD (major depressive disorder)     MI (myocardial infarction) ([address] 2014    Miscarriage 06/2012    Neurologic disorder     Postpartum depression     Prolonged emergence from general anesthesia     Schizophrenia ([address]     Seizures ([address]     anxiety related (last 6/2020)    Syncope     Tachycardia     UTI (urinary tract infection) 12/09/2019       Current Outpatient Medications   Medication Sig Dispense Refill    ARIPiprazole (ABILIFY) 5 MG tablet Take 1 tablet by mouth daily 30 tablet 3    lamoTRIgine (LAMICTAL) 100 MG tablet Take 1 tablet by mouth daily 30 tablet 3    bisoprolol (ZEBETA) 5 MG tablet Take 1 tablet by mouth daily 90 tablet 3    butalbital-APAP-caffeine -40 MG CAPS per capsule Take 1 capsule by mouth every 4 hours as needed for Headaches        No current facility-administered medications for this visit.        No Known Allergies    Past Surgical History:   Procedure Laterality Date    CHOLECYSTECTOMY, LAPAROSCOPIC      COLONOSCOPY N/A 2019    Dr Mina Guo, 10 yr recall    EGD      [address]      lower right leg, has metal plate and screws    FRACTURE SURGERY      left wrist    HYSTERECTOMY N/A 6/3/2021    TOTAL LAPAROSCOPIC HYSTERECTOMY WITH DAVINCI CYSTOSCOPY performed by Marilu Denise MD at [address] [place] [address]      loop recorder    INTRAUTERINE DEVICE INSERTION  2016    Essure placement    UPPER GASTROINTESTINAL ENDOSCOPY N/A 2019    Dr Jaycee Jackson-Gastritis       Social History     Tobacco Use    Smoking status: Former Smoker     Packs/day: 0.50     Years: 3.00     Pack years: 1.50     Types: Cigarettes     Quit date:      Years since quittin.1    Smokeless tobacco: Former User     Quit date: 2014   Vaping Use    Vaping Use: Some days    Start date: 2019    Substances: Flavoring    Devices: Refillable tank   Substance Use Topics    Alcohol use: Not Currently     Comment: 2020    Drug use: No       Family History   Problem Relation Age of Onset    High Blood Pressure Mother     Diabetes Father     Heart Disease Father     Stomach Cancer Father     Breast Cancer Other         maternal great aunt    Colon Cancer Paternal Grandmother     Colon Polyps Neg Hx     Esophageal Cancer Neg Hx     Liver Cancer Neg Hx     Liver Disease Neg Hx     Rectal Cancer Neg Hx        /84 (Site: Right Upper Arm, Position: Sitting)   Pulse 83   Temp 97.6 °F (36.4 °C)   Ht 5' 1\" (1.549 m)   Wt 185 lb 3.2 oz (84 kg)   LMP 05/23/2021 (Approximate)   SpO2 99%   BMI 34.99 kg/m²     Physical Exam  Vitals and nursing note reviewed. Constitutional:       General: She is not in acute distress. Appearance: She is well-developed. She is not diaphoretic. HENT:      Head: Normocephalic and atraumatic. Eyes:      General: No scleral icterus. Cardiovascular:      Rate and Rhythm: Normal rate and regular rhythm. Heart sounds: Normal heart sounds. No murmur heard. Pulmonary:      Effort: Pulmonary effort is normal. No respiratory distress. Breath sounds: Normal breath sounds. No wheezing or rales. Abdominal:      General: Bowel sounds are normal. There is no distension. Palpations: Abdomen is soft. Tenderness: There is no abdominal tenderness. Hernia: No hernia is present. Musculoskeletal:      Right lower leg: No edema. Left lower leg: No edema. Skin:     General: Skin is warm and dry. Neurological:      Mental Status: She is alert and oriented to person, place, and time. Psychiatric:         Mood and Affect: Mood is depressed. Mood is not anxious. Speech: Speech normal.         Behavior: Behavior normal.         Thought Content: Thought content does not include homicidal or suicidal ideation. Cognition and Memory: Cognition is not impaired. Judgment: Judgment normal.         Assessment:    ICD-10-CM    1. Schizoaffective disorder, bipolar type ([facility]  F25.0        Plan:   Change to abilify as well. Continue lamictal.  There is a high complexity and high risk regimen for high risk condition. Monitor closely  No orders of the defined types were placed in this encounter.     Orders Placed This Encounter   Medications    ARIPiprazole (ABILIFY) 5 MG tablet     Sig: Take 1 tablet by mouth daily     Dispense:  30 tablet     Refill:  3    lamoTRIgine (LAMICTAL) 100 MG tablet     Sig: Take 1 tablet by mouth daily     Dispense:  30 tablet     Refill:  3     Medications Discontinued During This Encounter   Medication Reason    OLANZapine (ZYPREXA) 5 MG tablet LIST CLEANUP    lamoTRIgine (LAMICTAL) 100 MG tablet REORDER     Patient Instructions   Stop olanzapaine, replace w/ 1/2 tab abilify nightly for 3 nights then full tab nightly. Patient given educational handouts and has had all questions answered. Patient voices understanding and agrees to plans along with risks and benefits of plan. Patient isinstructed to continue prior meds, diet, and exercise plans unless instructed otherwise. Patient agrees to follow up as instructed and sooner if needed. Patient agrees to go to ER if condition becomes emergent. Notesmay be completed with dictation device and spelling errors may occur. Materials may be copied and pasted from a notepad outside of EMR, all of which, I, Dr. Teressa Mcgraw MD, take sole intellectual ownership of and have approved adding to my note. Return in about 4 weeks (around 2/10/2022) for ov f/u abilify, stopping olanzapine.

## 2022-01-14 ENCOUNTER — VIRTUAL VISIT (OUTPATIENT)
Dept: CARDIOLOGY CLINIC | Age: 37
End: 2022-01-14
Payer: MEDICARE

## 2022-01-14 DIAGNOSIS — R07.89 OTHER CHEST PAIN: ICD-10-CM

## 2022-01-14 DIAGNOSIS — Z95.818 STATUS POST PLACEMENT OF IMPLANTABLE LOOP RECORDER: Primary | ICD-10-CM

## 2022-01-14 DIAGNOSIS — R00.0 TACHYCARDIA: ICD-10-CM

## 2022-01-14 DIAGNOSIS — R55 SYNCOPE, UNSPECIFIED SYNCOPE TYPE: ICD-10-CM

## 2022-01-14 PROCEDURE — 99213 OFFICE O/P EST LOW 20 MIN: CPT | Performed by: CLINICAL NURSE SPECIALIST

## 2022-01-14 NOTE — PROGRESS NOTES
2022    TELEHEALTH EVALUATION -- Audio/Visual (During [id number] public health emergency)  Patient located in their home, provider located at [facility] cardiology office    HPI:    Baljeet Puentes (:  1985) has requested an audio/video evaluation for the following concern(s):    Chief Complaint   Patient presents with    Follow-up     pt has soa with shocking feeling    Tachycardia     LOOP     Patient has had history of syncopal spells with cardiac and neurological work-up. Normal carotids, negative stress test and normal 2D echo  Underwent placement of loop recorder due to recurrent syncope  Thus far the recorder has failed to show any correlation of her syncopal spells with arrhythmia or bradycardia  Patient's tachycardia has been treated with beta-blocker  Patient had pregnancy and delivered last January     Patient has longstanding psychiatric history and follows with psychiatry  Had visit with primary care doctor for adjustments of medications yesterday  Ongoing tachycardia at office visit in November. Her bisoprolol was restarted. Patient was seen in [facility] emergency room 2021 with chest and abdominal pain. No acute findings and discharged    Appointment today due to patient having episodes of sudden jolts and feels like electricity is going through her body. She states this happened about 4 times in the last month or so. Approximately once a week. Pain will come on and last several minutes. Not provoked with any activity or exertion. She states she has not noticed any seizure-like activity. She has had a couple near syncopal episodes but no overt syncope    The addition of the bisoprolol has helped her fast heart rates and feeling better. She did see her primary care doctor yesterday and was put on Abilify but she has not started it yet.   She reports she has not seen neurology since having her baby last January      Review of Systems       Prior to Visit Medications Medication Sig Taking?  Authorizing Provider   ARIPiprazole (ABILIFY) 5 MG tablet Take 1 tablet by mouth daily Yes Marko Haynes MD   lamoTRIgine (LAMICTAL) 100 MG tablet Take 1 tablet by mouth daily Yes Marko Haynes MD   bisoprolol (ZEBETA) 5 MG tablet Take 1 tablet by mouth daily Yes SETH Yepez   butalbital-APAP-caffeine -40 MG CAPS per capsule Take 1 capsule by mouth every 4 hours as needed for Headaches  Yes Historical Provider, MD       Social History     Tobacco Use    Smoking status: Former Smoker     Packs/day: 0.50     Years: 3.00     Pack years: 1.50     Types: Cigarettes     Quit date:      Years since quittin.0    Smokeless tobacco: Former User     Quit date: 2014   Vaping Use    Vaping Use: Some days    Start date: 2019    Substances: Flavoring    Devices: fitaborate   Substance Use Topics    Alcohol use: Not Currently     Comment: 2020    Drug use: No        No Known Allergies,   Past Medical History:   Diagnosis Date    Abnormal glucose measurement     Abnormal Pap smear of cervix     Anxiety     Blood circulation, collateral     CAD (coronary artery disease)     Cerebral artery occlusion with cerebral infarction ([facility]     right side    Complication of anesthesia     difficulty waking    Depression     Heart disease 2020    LOOP recorder     IBS (irritable bowel syndrome)     MDD (major depressive disorder)     MI (myocardial infarction) ([facility] 2014    Miscarriage 2012    Neurologic disorder     Postpartum depression     Prolonged emergence from general anesthesia     Schizophrenia ([facility]     Seizures ([facility]     anxiety related (last 2020)    Syncope     Tachycardia     UTI (urinary tract infection) 2019   ,   Past Surgical History:   Procedure Laterality Date    CHOLECYSTECTOMY, LAPAROSCOPIC      COLONOSCOPY N/A 2019    Dr Lilian Obrien, 10 yr recall    EGD      [address] [facility] lower right leg, has metal plate and screws    FRACTURE SURGERY      left wrist    HYSTERECTOMY N/A 6/3/2021    TOTAL LAPAROSCOPIC HYSTERECTOMY WITH DAVINCI CYSTOSCOPY performed by Sarita Fleming MD at [address] [facility]  2014    loop recorder    INTRAUTERINE DEVICE INSERTION  06/26/2016    Essure placement    UPPER GASTROINTESTINAL ENDOSCOPY N/A 11/7/2019    Dr Manish Howell   ,   Family History   Problem Relation Age of Onset    High Blood Pressure Mother     Diabetes Father     Heart Disease Father     Stomach Cancer Father     Breast Cancer Other         maternal great aunt    Colon Cancer Paternal Grandmother     Colon Polyps Neg Hx     Esophageal Cancer Neg Hx     Liver Cancer Neg Hx     Liver Disease Neg Hx     Rectal Cancer Neg Hx        PHYSICAL EXAMINATION:  [ INSTRUCTIONS:  \"[x]\" Indicates a positive item  \"[]\" Indicates a negative item  -- DELETE ALL ITEMS NOT EXAMINED]  Vital Signs: (As obtained by patient/caregiver or practitioner observation)    Blood pressure-  Heart rate-    Respiratory rate-    Temperature-  Pulse oximetry-     Constitutional: [x] Appears well-developed and well-nourished [x] No apparent distress      [] Abnormal-   Mental status  [x] Alert and awake  [x] Oriented to person/place/time [x]Able to follow commands      Eyes:  EOM    [x]  Normal  [] Abnormal-  Sclera  [x]  Normal  [] Abnormal -         Discharge [x]  None visible  [] Abnormal -    HENT:   [x] Normocephalic, atraumatic.   [] Abnormal   [] Mouth/Throat: Mucous membranes are moist.     External Ears [x] Normal  [] Abnormal-     Neck: [x] No visualized mass     Pulmonary/Chest: [x] Respiratory effort normal.  [x] No visualized signs of difficulty breathing or respiratory distress        [] Abnormal-      Musculoskeletal:   [x] Normal gait with no signs of ataxia         [x] Normal range of motion of neck        [] Abnormal-       Neurological:        [x] No Facial Asymmetry (Cranial nerve 7 motor function) (limited exam to video visit)          [x] No gaze palsy        [] Abnormal-         Skin:        [] No significant exanthematous lesions or discoloration noted on facial skin         [] Abnormal-            Psychiatric:       [x] Normal Affect [] No Hallucinations        [] Abnormal-     Other pertinent observable physical exam findings-     Due to this being a TeleHealth encounter, evaluation of the following organ systems is limited: Vitals/Constitutional/EENT/Resp/CV/GI//MS/Neuro/Skin/Heme-Lymph-Imm. ASSESSMENT/PLAN:  1. Status post placement of implantable loop recorder  Reviewed previous readings. Short tachycardia. Improvement with beta-blocker    2. Syncope, unspecified syncope type  No recurrent overt syncope. 3. Tachycardia  Improving with beta-blocker    4. Other chest pain  Noncardiac chest pain. Not provoked with activity. Shocking jolt like sensation that goes throughout her body. Not thought to be cardiac in nature. Suggest she follow-up with her primary care and neurologist  We will keep her follow-up appointment in May      Return in about 6 months (around 7/14/2022). An  electronic signature was used to authenticate this note. --SETH Higginbotham on 1/14/2022 at 12:18 PM        Pursuant to the emergency declaration under the [facility][note 3][facility] waiver authority and the [employer] Act, this Virtual  Visit was conducted, with patient's consent, to reduce the patient's risk of exposure to COVID-19 and provide continuity of care for an established patient. Medical assistantTae Goes phone patient prior to visit to verify medications and go over complaints and update chart. Services were provided through a video synchronous discussion virtually to substitute for in-person clinic visit.

## 2022-01-21 DIAGNOSIS — Z95.818 STATUS POST PLACEMENT OF IMPLANTABLE LOOP RECORDER: Primary | ICD-10-CM

## 2022-01-21 DIAGNOSIS — R55 SYNCOPE, UNSPECIFIED SYNCOPE TYPE: ICD-10-CM

## 2022-01-21 DIAGNOSIS — R00.0 TACHYCARDIA: ICD-10-CM

## 2022-01-25 PROCEDURE — G2066 INTER DEVC REMOTE 30D: HCPCS | Performed by: CLINICAL NURSE SPECIALIST

## 2022-01-25 PROCEDURE — 93298 REM INTERROG DEV EVAL SCRMS: CPT | Performed by: CLINICAL NURSE SPECIALIST

## 2022-01-26 DIAGNOSIS — Z95.818 STATUS POST PLACEMENT OF IMPLANTABLE LOOP RECORDER: Primary | ICD-10-CM

## 2022-01-26 DIAGNOSIS — R00.0 TACHYCARDIA: ICD-10-CM

## 2022-01-26 DIAGNOSIS — R55 SYNCOPE, UNSPECIFIED SYNCOPE TYPE: ICD-10-CM

## 2022-02-01 ENCOUNTER — VIRTUAL VISIT (OUTPATIENT)
Dept: PSYCHOLOGY | Age: 37
End: 2022-02-01
Payer: MEDICARE

## 2022-02-01 DIAGNOSIS — F25.0 SCHIZOAFFECTIVE DISORDER, BIPOLAR TYPE (HCC): Primary | ICD-10-CM

## 2022-02-01 PROCEDURE — 98968 PH1 ASSMT&MGMT NQHP 21-30: CPT | Performed by: SOCIAL WORKER

## 2022-02-01 ASSESSMENT — PATIENT HEALTH QUESTIONNAIRE - PHQ9
SUM OF ALL RESPONSES TO PHQ QUESTIONS 1-9: 26
4. FEELING TIRED OR HAVING LITTLE ENERGY: 3
SUM OF ALL RESPONSES TO PHQ QUESTIONS 1-9: 26
SUM OF ALL RESPONSES TO PHQ QUESTIONS 1-9: 26
SUM OF ALL RESPONSES TO PHQ9 QUESTIONS 1 & 2: 5
3. TROUBLE FALLING OR STAYING ASLEEP: 3
5. POOR APPETITE OR OVEREATING: 3
2. FEELING DOWN, DEPRESSED OR HOPELESS: 2
10. IF YOU CHECKED OFF ANY PROBLEMS, HOW DIFFICULT HAVE THESE PROBLEMS MADE IT FOR YOU TO DO YOUR WORK, TAKE CARE OF THINGS AT HOME, OR GET ALONG WITH OTHER PEOPLE: 3
6. FEELING BAD ABOUT YOURSELF - OR THAT YOU ARE A FAILURE OR HAVE LET YOURSELF OR YOUR FAMILY DOWN: 3
1. LITTLE INTEREST OR PLEASURE IN DOING THINGS: 3
9. THOUGHTS THAT YOU WOULD BE BETTER OFF DEAD, OR OF HURTING YOURSELF: 3
7. TROUBLE CONCENTRATING ON THINGS, SUCH AS READING THE NEWSPAPER OR WATCHING TELEVISION: 3
SUM OF ALL RESPONSES TO PHQ QUESTIONS 1-9: 23
8. MOVING OR SPEAKING SO SLOWLY THAT OTHER PEOPLE COULD HAVE NOTICED. OR THE OPPOSITE, BEING SO FIGETY OR RESTLESS THAT YOU HAVE BEEN MOVING AROUND A LOT MORE THAN USUAL: 3

## 2022-02-01 ASSESSMENT — ANXIETY QUESTIONNAIRES
GAD7 TOTAL SCORE: 21
5. BEING SO RESTLESS THAT IT IS HARD TO SIT STILL: 3-NEARLY EVERY DAY
3. WORRYING TOO MUCH ABOUT DIFFERENT THINGS: 3-NEARLY EVERY DAY
7. FEELING AFRAID AS IF SOMETHING AWFUL MIGHT HAPPEN: 3-NEARLY EVERY DAY
4. TROUBLE RELAXING: 3-NEARLY EVERY DAY
2. NOT BEING ABLE TO STOP OR CONTROL WORRYING: 3-NEARLY EVERY DAY
6. BECOMING EASILY ANNOYED OR IRRITABLE: 3-NEARLY EVERY DAY
1. FEELING NERVOUS, ANXIOUS, OR ON EDGE: 3-NEARLY EVERY DAY

## 2022-02-01 ASSESSMENT — COLUMBIA-SUICIDE SEVERITY RATING SCALE - C-SSRS
2. HAVE YOU ACTUALLY HAD ANY THOUGHTS OF KILLING YOURSELF?: YES
1. WITHIN THE PAST MONTH, HAVE YOU WISHED YOU WERE DEAD OR WISHED YOU COULD GO TO SLEEP AND NOT WAKE UP?: YES
3. HAVE YOU BEEN THINKING ABOUT HOW YOU MIGHT KILL YOURSELF?: YES
5. HAVE YOU STARTED TO WORK OUT OR WORKED OUT THE DETAILS OF HOW TO KILL YOURSELF? DO YOU INTEND TO CARRY OUT THIS PLAN?: NO
4. HAVE YOU HAD THESE THOUGHTS AND HAD SOME INTENTION OF ACTING ON THEM?: NO
6. HAVE YOU EVER DONE ANYTHING, STARTED TO DO ANYTHING, OR PREPARED TO DO ANYTHING TO END YOUR LIFE?: NO

## 2022-02-01 NOTE — PROGRESS NOTES
Jeet Mcwilliams is a 43 y.o. female evaluated via telephone on 2/1/2022. Consent:  She and/or health care decision maker is aware that that she may receive a bill for this telephone service, which includes applicable co-pays, depending on her insurance coverage, and has provided verbal consent to proceed. Documentation:  I communicated with the patient and/or health care decision maker about see below. Details of this discussion including any medical advice provided: see below      I affirm this is a Patient Initiated Episode with a Patient who has not had a related appointment within my department in the past 7 days or scheduled within the next 24 hours. Patient identification was verified at the start of the visit: Yes    Total Time: minutes: 21-30 minutes    Jeet Mcwilliams was evaluated through a synchronous (real-time) audio encounter. The patient was located at home in a state where the provider was licensed to provide care. Note: not billable if this call serves to triage the patient into an appointment for the relevant concern      [facility] Consultation  Ciro Frias MSW, LCSW  2/1/2022  3:30 PM      Time spent with Patient: 30 minutes  This is patient's third  [facility] appointment. Reason for Consult:    Chief Complaint   Patient presents with    Follow-up    Depression     Referring Provider: No referring provider defined for this encounter. Feedback given to PCP. S:  Patient reports problems with feeling depression and anxiety. Pt reported they have had 2 deaths within 3 days of each other about 2 weeks ago. Pt reported she is struggling with her grief and stress with her children. Pt does not get help from her  with caring for the children. Pt reported her  acts like he is the only one who lost someone. Pt asked about grief counseling for her entire family for her  and 22 y/o daughter.  Pt continues to struggle with suicidal ideations, chronic, without intent. Review safety plan and pt verbalized she was able to keep herself and others safe. Discussed grief information, safety plan, and therapy referrals. Pt is distracted by children throughout session. Pt was driving and needed to be called back once she was home. Discussed scheduling appointments while her young children are at school for less distractions. Addressed current and underlying issues, explored and released associated emotions, explored new ways to deal and cope with these problems. O:  MSE:    Mood    Anxious  Depressed    Appetite normal  Sleep disturbance No  Fatigue Yes  Loss of pleasure Yes  Attention/Concentration    intact  Morbid ideation No  Suicide Assessment   suicidal ideation        A:  Patient presents for consult due to problems with depression and anxiety. PHQ Scores 2/1/2022 12/16/2021 11/4/2021 9/4/2018   PHQ2 Score 5 5 6 2   PHQ9 Score 26 24 23 2     Interpretation of Total Score Depression Severity: 1-4 = Minimal depression, 5-9 = Mild depression, 10-14 = Moderate depression, 15-19 = Moderately severe depression, 20-27 = Severe depression    HEMANTH-7  Feeling nervous, anxious, or on edge: 3-Nearly every day  Not able to stop or control worrying: 3-Nearly every day  Worrying too much about different things: 3-Nearly every day  Trouble relaxing: 3-Nearly every day  Being so restless that it's hard to sit still: 3-Nearly every day  Becoming easily annoyed or irritable: 3-Nearly every day  Feeling afraid as if something awful might happen: 3-Nearly every day  HEMANTH-7 Total Score: 21    HEMANTH-7 score interpretation:  0-4 Subclinical, 5-9 Mild, 10-14 Moderate, 15-21 Severe    Continued consultation is clinically/medically necessary to support in learning new skills and build confidence to deal better with these issues. Patient response to consults, finds new strategies helpful.      Diagnosis:    1. Schizoaffective disorder, bipolar type ([facility] Diagnosis Date    Abnormal glucose measurement     Abnormal Pap smear of cervix     Anxiety     Blood circulation, collateral     CAD (coronary artery disease)     Cerebral artery occlusion with cerebral infarction ([facility] 2014    right side    Complication of anesthesia     difficulty waking    Depression     Heart disease 06/2020    LOOP recorder     IBS (irritable bowel syndrome)     MDD (major depressive disorder)     MI (myocardial infarction) ([facility] 2014    Miscarriage 06/2012    Neurologic disorder     Postpartum depression     Prolonged emergence from general anesthesia     Schizophrenia ([facility]     Seizures ([facility]     anxiety related (last 6/2020)    Syncope     Tachycardia     UTI (urinary tract infection) 12/09/2019         Plan:  Pt interventions:  Trained in strategies for increasing balanced thinking, Provided education, Discussed self-care (sleep, nutrition, rewarding activities, social support, exercise), Established rapport, [place]-setting to identify pt's primary goals for [facility] visit / overall health, Supportive techniques, Emphasized self-care as important for managing overall health and Identified maladaptive thoughts      Pt Behavioral Change Plan:  See Pt Instructions

## 2022-02-01 NOTE — PATIENT INSTRUCTIONS
----- Message from Gentry Turner MD sent at 12/16/2019  5:43 AM CST -----  Notify patient that their thyroid replacement dose appears correct and he had no evidence of hepatitis c . His hgb a1c shows his blood sugar control is now normal    Lab Services on 12/14/2019   Component Date Value Ref Range Status   • HEPATITIS C ANTIBODY 12/14/2019 NEGATIVE  NEGATIVE Final   • Hemoglobin A1C 12/14/2019 5.5  4.5 - 5.6 % Final    Comment:    ----DIABETIC SCREENING---  NON DIABETIC                 <5.7%  INCREASED RISK                5.7-6.4%  DIAGNOSTIC FOR DIABETES      >6.4%     ----DIABETIC CONTROL---     A1C%           eAG mg/dL  6.0            126  6.5            140  7.0            154  7.5            169  8.0            183  8.5            197  9.0            212  9.5            226  10.0           240     • T4, FREE 12/14/2019 0.9  0.8 - 1.5 ng/dL Final   • TSH 12/14/2019 3.750  0.350 - 5.000 mcUnits/mL Final        Grief Tips  Help for Those Who Mourn    The following are many ideas to help people who are mourning a loved ones death. Different kinds of losses dictate different responses, so not all of these ideas will suit everyone. Likewise, no two people grieve alike  what works for one may not work for another. Treat this list for what it is; a gathering of assorted suggestions that various people have tried with success. Perhaps what helped them will help you. The emphasis here is on specific, practical ideas. 1. Talk regularly with a friend. Talking with another about what you think and feel is one of the best things you can do for yourself. It helps relieve some of the pressure you may feel, it can give you a sense of perspective, and it keeps you in touch with others. Look for someone whos a good listener and who has a caring soul. Then speak whats on your mind and in your heart. If this feels one-sided let that be okay for this period of your life. Chances are the other person will find meaning in what theyre doing, and time will come when youll have the chance to be a good listener for someone else. Youll be a better listener then, if youre a good talker now. 2. Walk. Go for walks outside every day if you can. Dont overdo it, but walk briskly enough that it feels invigorating. Sometimes try walking slowly enough so you can look carefully at what you see. Observe what nature has to offer you, what it can teach you. Enjoy as much as you are able to of the sights and sounds that come your way. If you like, walk with another person. 3. Carry or wear a linking object. Carry something in your pocket or purse that reminds you of the one who   a keepsake they gave you perhaps, or small object they once carried or used or a memento you select for just this purpose. You might wear a piece of their jewelry in the same way.   Whenever you want, reach for gaze upon this object and remember what it signifies. 4. Visit the grave. Not all people prefer to do this. But if it feels right to you, then do so. Dont let others convince you this is a morbid thing to do. Spend whatever time feels right there. Stand or sit in the quietness and do what comes naturally: be silent or talk, breathe deeply or cry, recollect or pray. You may wish to add your distinctive touch to the gravesite  straighten it a bit, or add little signs of your love. 5. Create a memory book. Compile photographs, which document your loved ones life. Arrange them into some sort of order so they tell a story. Add other elements if you want: diplomas, newspaper clippings, awards, accomplishments, and reminders of significant events. Put all this in a special binder and keep it for other people to look at if they wish. Go through it on your own if you desire. Reminisce as you do so. 6. Recall your dreams. Your dreams often have important things to say about your feelings and about your relationship with the one who . Your dreams may be scary or sad, especially early on. They may seem weird or crazy to you. You may find that your loved one appears in your dreams. Accept your dreams for what they are and see what you can learn from them. No one knows that better than you. 7. Tell people what helps you and what doesnt. People around you may not understand what you need. So tell them. If hearing your loved ones name spoken aloud by others feels good, say so. If you need more time alone, or assistance with chores youre unable to complete, or an occasional hug, be honest.  People cant read your mind, so youll have to speak it. 8. Write things down. Most people who are grieving become more forgetful than usual.  So help yourself remember what you want by keeping track of it on paper or with whatever system works best for you.   This may include writing down things you want to preserve about the person who has . 9. Ask for a copy of the Bassam's. If the  liturgy or memorial service holds special meaning for you because of what was spoken or read, ask for the words. Whoever participated in that ritual will feel gratified that what they prepared was appreciated. Turn to these words whenever you want. Some people find these thoughts provide even more help weeks and months after the service. 10. Remember the serenity prayer. This prayer is attributed to theologian Brenton Reece, but its actually an ancient  [address] It has brought comfort and support to many that have suffered various kinds of afflictions. 11. Create a memory area at home. In a space that feels appropriate, arrange a small table that honors the person: a framed photograph or two, perhaps a prized possession or award or something they created or something they loved. This might be placed on a small table, a mantel or a desk. Some people like to use a grouping of candles, representing not just the person who  but others who have  as well. In that case a variety of candles can be arranged each representing a unique life. 12. Drink water. Grieving people can easily become dehydrated. Crying can naturally lead to that. And with your normal routines turned upside down, you may simply not drink as much or as regularly as you did before this death. Make this a way you care for yourself. 13. Use your hands. Sometimes theres value in doing repetitive things with your hands, something you dont have to think about very much because it becomes second nature. Knitting and crocheting are like that. So are carving, woodworking, polishing, solving jigsaw puzzles, painting, braiding, shoveling, washing and countless other activities. 15. Give yourself respites from your grief. Just because youre grieving doesnt mean you must always be feeling sad or     forlorn.   Theres value in sometimes consciously deciding that youll think about something else for awhile, or that youll do something youve always enjoyed doing. Sometimes this happens naturally and its only later you realize that your grief has taken a back seat. Let it, this is not an indication you love that person any less or that youre forgetting them. Its a sign that youre human and you need relief from the unrelenting pressure. It can also be a healthy sign youre healing. 15. See a grief counselor. If youre concerned about how youre feeling and how well youre adapting make an appointment   with a counselor who specializes in grief. Often youll learn what you need both about grief and about yourself as a griever in only a few sessions. Ask questions of the counselor before you sign on. What specific training does he or she have? What accreditation? A person who is a family therapist or a psychologist doesnt necessarily understand the unique issues of someone in grief. 12. Begin your day with your loved one. If your grief is young, youll probably wake up thinking of that person anyway. So why not decide that youll include her or him from the start? Focus this time in a positive way. Bring to your mind fulfilling memories. Recall lessons that this person taught you, gifts he or she gave you. Think about how you can spend your day in ways that would be keeping in with your loved ones best self and with your best self. Then carry that best self with you through your day. 16. Invite some one to be your telephone hortencia. If your grief and sadness hit you especially hard at times and you have no   one nearby to turn to, ask someone you trust to be your telephone hortencia. Ask their permission for you to call them whenever you feel youre at loose ends, day or night. Then put their number beside your phone and call them if you need them. Dont abuse the privilege, of course.   And covenant that some day it will be payback time  someday youll make yourself available to help someone else in the same way youve been helped. That will help you accept the care youre receiving. 18. Avoid certain people if you must.  No one likes to be unfriendly or cold. But if there are people in your life who make it  very difficult for you to do your grieving then do what you can to stay out of their way. Some people may lecture you or belittle you. 23. Donate their possessions meaningfully. Whether you give your loved ones personal possessions to someone you know   or to a stranger, find ways to pass these things along so that others might benefit from them. Family members of friends might like to receive keepsakes. They or others might deserve tools, utensils, books or sporting equipment. Backplane can put clothes to good use. Some wish to do this quickly following the death, while others wish to wait awhile. 21. Donate in the others name. Honor the [employer] and spirit by giving a gift or gifts to a cause the other would   appreciate. A favorite mahesh? A local ? A building project? Extend that persons influence even further. 21. Create or commission a memory quilt. Sew or invite others to sew with you, or hire someone to sew for you. However you get it completed, put together a wall hanging or a bedroom quilt that remembers the important life events of the one who . Take your time doing this. Make it what it is, a labor of love. 22. Take a yoga class. People of almost any age can do yoga. More than conditioning your body, it helps you relax and focus your mind. It can be woven into a practice of mediation. Its a gentle art for that time in your life when you deserve gentleness all around you. 23. Connect on the Internet. If youre [employer], search the Internet.   Shin Cole find many resources for people in grief,   as well as the opportunity to chat with fellow grievers. You can link up with others without leaving your home. Tiffany Kumar also find more to expand your horizons as a person who is beginning to grow. 24. Speak to a cleargyperson. If youre searching for answers to the larger questions about life and death, Lutheran and spirituality, consider talking with a representative of your violet, or even anothers violet. Consider becoming a spiritual friend with another and making your time of grieving a time of personal exploring. Read of how others have responded to a loved ones death. You may feel that your own grief is all you can handle. But if youd like to look at the ways others have done it, try:  Beyond Grief:  A Guide for Recovering from the Death of a Loved One by Puja 40 by Kirk Zepeda and Karan Gottlieb  The Grief Recovery Handbook: The Action Program for Moving Beyond Death, Divorce, & Other Losses by Maricruz Lawrence   A Grief Observed by Brendan Alvarez  by Lissa Reyes When Good-Bye Is Forever by Kolby Roberts and Grief by Geovani Del Rio or [address] for a Son. There are many others. Check with a . 22. Learn about your loved one from others. Listen to the stories others have to tell about the one, who , stories youre familiar with and those youve never heard before. Spend time with their friends, schoolmates or colleagues. Invite them into your home. Solicit the writings of others. Preserve whatever you find out. Celebrate your time together. 26. Take a day off. When the mood is just right, take a one-day vacation. Do whatever you want, or dont do whatever you want. Travel somewhere or stay inside by yourself. Be very active or dont do anything at all. Just make it your day, whatever that means for you. 32. Invite someone to give you feedback.   Select someone you trust, preferably someone familiar with the working of grief, to give you his or her reaction when you ask for it. If you want to check out how clearly youre thinking, how accurately youre remembering, how effectively youre coping, go to that person. Pose your questions, then listen to their responses. What you choose to do with that information will be up to you. 28. Vent your anger rather than hold it in. You may feel awkward being angry when youre grieving, but anger is a common reaction. The expression holds true: anger is best out floating rather than in bloating. Even if you feel a bit ashamed as you do it, find ways to get it out of your system. Elk, even if its in an empty house. Cry. Hit something soft. Throw eggs at something hard. Vacuum up a storm. Resist the temptation to be proper. 29. Give thanks every day. Whatever has happened to you, you still have things to be thankful for. Perhaps its your memories, your remaining family, your support, your work; you own health  all sorts of things. Draw your attention to those parts of life that are worth appreciating, then appreciate them. 30. Monitor signs of dependency. While its normal to become more dependent upon others for awhile immediately after a death, it will not be helpful to continue in that role long-term. Watch for signs that youre prolonging your need for assistance. Congratulate yourself when you do things for yourself. Bereavement, Grief & Mourning        Bereavement is the state of having lost a significant other to death. Grief is the personal response to the loss. Mourning is the public expression of that loss. What is Normal Grief? Grief reactions vary depending on who we are, who we lost, our relationship with that person, the circumstances around their passing, and how much their loss affects our day-to-day functioning.   Different people may express grief differently and you may even have different grief responses between one loss and another. Reactions to grief and loss include not just emotional symptoms, but also behavioral and physical symptoms. These reactions can often change over time. All are normal for a short period of time. Emotional Behavioral Physical   Shock, denial,                   numbness Crying unexpectedly Exhaustion/Fatigue      Sadness, anxiety, guilt,       fear Sleep changes (increase or decrease) Decreased energy        Anger (at others or        God), Not eating/Weight changes Memory problems        Irritability, frustration Withdrawing from others Stomach and intestinal upset    Restlessness, difficulty concentrating, trouble making decisions Pain and headaches     Symptoms that are not normal and may signal the need to talk to a professional include:  Use of drugs, alcohol, violence, and thoughts of killing oneself. The duration of grief varies from person to person. Current research shows that the average recovery time is 18 to 24 months. Also, grief reactions can be stronger around anniversary or other significant dates, such as the anniversary of the persons death, birthdays, and holidays. The Stages of Grief  Grief therapists often describe stages of grief outlined by the research of Dr. Tio Storey. These stages do not always go in order. You may move back and forth among some of the stages and may even skip some of them. These stages are meant as a guide to help you understand your reactions and those of others who are grieving.  - Denial:  Denial (not acknowledging the loss) can help contain the shock of loss. Denial can act as a safety mechanism to block out grief until we are ready to handle it. - Sadness and depression:  Deep, intense grief and mourning appear during this stage. When the full understanding of our loss comes, it can seem overwhelming. During this stage, you may cry often and unexpectedly.  You may not want to be around people or to do things that you normally enjoy. During this stage, it is best to remain as active as possible and to seek supportive people who will allow you to say what you need to or to cry when you need to. It is important to allow yourself to work through your full range and experience of emotions. - Anger:  Rage and anger can be intense toward the person who , toward friends and relatives, and even toward God. It is important to have an outlet to release anger through activities such as exercise, hobbies, or through therapy. Guilt, shame, and blame are feelings that need to be addressed, especially if it is toward you. - Acceptance: This stage includes coming to terms with the loss. It does not mean that you have found the answers to your questions or that you stop thinking about the person who is gone. It does signify a reinvestment in life and a willingness to readjust to your new circumstances while carrying the memory of your loved one with you. How to Help Yourself  1. Give yourself time to grieve. It is normal and important to express your grief and to work through the concerns that arise for you at this time. Stuffing your feelings may not be helpful and may delay or prolong your grief. 2. Find supportive people to reach out to during your grief. This is the time when the support of others may be the most helpful. Dont be afraid to tell them how they can best help, even if it means just listening. It is often very helpful to talk about your loss with people who will allow you to express your emotions. 3. Take care of your health. Often after a loss, we stop doing the things we need to for health care, such as exercising, eating correctly, keeping Dr. appointments, or taking prescribed medications. If you are on a health care regimen, it is important to continue to adhere to your treatment. 4. Postpone major life changes.   Give yourself time to adjust to your loss before making plans to change jobs, move or sell your home, remarry, etc.  Grief can sometimes cloud your judgment and ability to make decisions. 5. Consider keeping a journal.  It is often helpful to write or tell the story of your loss and what it means to you as a way to work through your feelings. 6. Participate in activities. Staying active through exercise, enjoyable activities, outings with supportive others, or even starting new hobbies can help us get through tough times while providing opportunities for constructive development and use of energy. 7. Find a way to memorialize your loved one. Planting a tree or garden in the name of your loved one, dedicating a work to their memory, contributing to a mahesh in their name, and other such activities can be helpful. 8. Consider joining grief-support groups or contacting a grief counselor for additional support and help. Remember that depressive symptoms (feeling sad) are a fundamental part of normal bereavement. Staying active and finding support from others can help you to work through the grief process. The sting of grief can be especially painful around the holidays, when memories of bygone times and the stress of managing finances and family gripes when you're grieving can just be too much. Many of us feel pressure to be happy during the holidays, but all of us face holiday seasons during which we are distinctly unhappy. There's no shame in feeling the way you feel, but you might still need a little extra help to get through this allegedly magical time. Rethink Your Traditions  Traditions are a funny thing. On the one hand, they can serve as a source of comfort during difficult times. If you're feeling alone or aggrieved, gathering around the fire with friends and family can give you a bit of relief from near-constant misery. But some traditions can feel utterly terrible when you're coping with a loss.  Perhaps you just can't face [place] morning without your mother, or maybe watching It's a Clabe Blower without your spouse feels weird. Times of grief are a good time to re-evaluate your traditions. Replace the painful ones with new traditions, since this can keep your mind off of your grief. But don't negate the value of indulging in a few old favorites, especially if they help the holidays feel like something worth celebrating. Stay Busy  Grief is exhausting, and if you feel inclined to spend the entire season on the couch binge-watching Netflix shows, it's understandable. Laziness can feel good in the moment, particularly when you can't stop crying or feel overwhelmed by the prospect of getting dressed. Inevitably, though, going out and doing something you enjoyeven if you have to force yourselfcan enliven your spirits. Commit to leaving the house, even if it's just to go to the grocery store or to read at a coffee shop, at least once a day. Then plan a bigger event, such as going to the symphony or a movie, at least once a week. Stick to this commitment, and you might find yourself enjoying a few brief moments of this otherwise painful season. Practice Good Self-Care  When you're in pain, your subconscious mind encourages you to make choices that worsen the pain, such as drinking, binging on unhealthy food, and not getting enough sleep. Self-care is the conscious act of tending to your needs. When you're feeling miserable, try the following strategies to feel a bit better:  -Drink a glass of water.  -Take a nap, but for no longer than 40 minutes.  -Eat a healthy meal rich in protein and low in sugar.  -Call someone you love. -Do something nice for yourselfeven if it's just buying a movie on [address].  -Take 10 deep breaths, taking care to breathe into your stomach, not your chest.  Our minds and bodies are inextricably linked, and by taking care of your body, you can also tend to the unpleasant emotions swirling about in your brain.     Ask for What You Need  People can be strange about grief. They often want to help, but don't know what to do, so they say things like, Let me know if you need anything.  Don't ignore these offers! You cannot expect that loved ones will magically intuit what you need. After all, everyone deals with grief in their own way; some want to be alone, while others want to be surrounded by love and excitement. The people you love the most cannot offer you the help you need if you do not tell them what that help looks like. If someone asks what they can do, tell them what you needbut be prepared for a no, since everyone has limits, and those limits don't mean you are unloved. Spend Time With Supportive Loved Ones  For most of us, family denotes the people we were raised with or live with. But, particularly at the holidays, sometimes those people behave in ways that are anything but loving. Take stock of your relationships, then protect yourself. Spend time with loved ones who support you and make you feel goodeven if they are not blood relatives. You'll cut back on your stress, and have a much better shot at getting the supportive, loving care you deserve. SAFETY PLAN    A suicide Safety Plan is a document that supports someone when they are having thoughts of suicide. Warning Signs that indicate a suicidal crisis may be developing: What (situations, thoughts, feelings, body sensations, behaviors, etc.) do you experience that lets you know you are beginning to think about suicide? 1. Stop eating/drinking  2. Laying in bed throughout the day  3. Crying uncontrollably throughout the day    Internal Coping Strategies:  What things can I do (relaxation techniques, hobbies, physical activities, etc.) to take my mind off my problems without contacting another person? 1. You can't do it because of the kids   2. Deep breathing  3.  Being around the kids    People and social settings that provide distraction: Who can I call or where can I go to distract me? 1. Name: Angela Meyers  Phone: in phone  2. Name:   Phone:    3. Place: visit friend at work            4. Place: go to mother's home    People whom I can ask for help: Who can I call when I need help - for example, friends, family, clergy, someone else? 1. Name:                 Phone: in phone  2. Name: Mother-in-law  Phone: in phone  3. Name: mother  Phone: in phone    Professionals or [address] [place], [place] [address]vd I can contact during a crisis: Who can I call for help - for example, my doctor, my psychiatrist, my psychologist, a mental health provider, a suicide hotline? 1. If you feel overwhelmed, unable to cope, extremely anxious, or have thoughts of wanting to harm yourself or someone else, go to the nearest Emergency Room. [facility] Emergency Room:  [phone]  Crisis line:  [phone]  [address] [place] [address] (inpatient psych):  [phone] option 1  Call 911- they will send help and get you to the local ER    2. Clinician Name: [facility]   Phone: [phone] option 3         3. Suicide Prevention Lifeline: 1-907-512-LQNY (6908)    Making the environment safe: How can I make my environment (house/apartment/living space) safer? For example, can I remove guns, medications, and other items? 1. Guns/knives are locked up  2. Medications locked up      Therapy Referral List    [address]  Phone: [phone]  Insurances Accepted: Any [employer], [employer], and [employer] [place], [place]  [address] [facility] [address]  Phone: [phone]  Insurances Accepted: Any [address]  [address] [address]  Phone: [phone]  Insurances Accepted:  Any [employer], [employer], and [facility]    [facility] [address]  Phone: [phone]  Insurances Accepted: Henri Wasserman, [address] [address]  Phone: [phone]  Insurances Accepted: Any [employer], [employer], and [place] (Doesn't accept Siennarebecca Lauder)      Contact your insurance provider, managed care organization, or local Medical Society to obtain a current listing available in your area.     Contact your insurance provider for in network benefits

## 2022-02-08 DIAGNOSIS — R55 SYNCOPE, UNSPECIFIED SYNCOPE TYPE: ICD-10-CM

## 2022-02-08 DIAGNOSIS — Z95.818 STATUS POST PLACEMENT OF IMPLANTABLE LOOP RECORDER: Primary | ICD-10-CM

## 2022-02-08 DIAGNOSIS — R00.0 TACHYCARDIA: ICD-10-CM

## 2022-02-11 ASSESSMENT — ENCOUNTER SYMPTOMS
ABDOMINAL PAIN: 0
WHEEZING: 0
CONSTIPATION: 0
SHORTNESS OF BREATH: 0
VOMITING: 0
NAUSEA: 0
COUGH: 0
DIARRHEA: 0
CHEST TIGHTNESS: 0

## 2022-02-15 ENCOUNTER — VIRTUAL VISIT (OUTPATIENT)
Dept: PRIMARY CARE CLINIC | Age: 37
End: 2022-02-15
Payer: MEDICARE

## 2022-02-15 DIAGNOSIS — T50.Z95A ADVERSE EFFECT OF VACCINE, INITIAL ENCOUNTER: ICD-10-CM

## 2022-02-15 DIAGNOSIS — F41.9 ANXIETY: ICD-10-CM

## 2022-02-15 DIAGNOSIS — F25.0 SCHIZOAFFECTIVE DISORDER, BIPOLAR TYPE (HCC): Primary | ICD-10-CM

## 2022-02-15 PROCEDURE — 99214 OFFICE O/P EST MOD 30 MIN: CPT | Performed by: FAMILY MEDICINE

## 2022-02-15 RX ORDER — ARIPIPRAZOLE 5 MG/1
7.5 TABLET ORAL NIGHTLY
Qty: 45 TABLET | Refills: 3 | Status: SHIPPED | OUTPATIENT
Start: 2022-02-15 | End: 2022-03-15 | Stop reason: SDUPTHER

## 2022-02-15 ASSESSMENT — ENCOUNTER SYMPTOMS
DIARRHEA: 0
SHORTNESS OF BREATH: 0
COUGH: 0
ABDOMINAL PAIN: 0
CONSTIPATION: 0
CHEST TIGHTNESS: 0
VOMITING: 0
NAUSEA: 0
WHEEZING: 0

## 2022-02-15 NOTE — PROGRESS NOTES
2/15/2022    TELEHEALTH EVALUATION -- Audio/Visual (During CCQON-12 public health emergency)    HPI:    Clau Reis (:  1985) has requested an audio/video evaluation for the following concern(s):    Patient presents today via video visit for depression and anxiety. Still has some anxiety that hasn't improved much. No longer have any and SUICIDAL IDEATIONS. No HOMICIDAL IDEATIONS reported ever    Review of Systems   Constitutional: Positive for fatigue and fever (3 days fever and fatigue from covid booster). Negative for chills. Respiratory: Negative for cough, chest tightness, shortness of breath and wheezing. Cardiovascular: Negative for chest pain, palpitations and leg swelling. Gastrointestinal: Negative for abdominal pain, constipation, diarrhea, nausea and vomiting. Genitourinary: Negative for difficulty urinating, dysuria and frequency. Psychiatric/Behavioral: Positive for dysphoric mood. Negative for agitation, self-injury, sleep disturbance and suicidal ideas. The patient is nervous/anxious. Prior to Visit Medications    Medication Sig Taking?  Authorizing Provider   ARIPiprazole (ABILIFY) 5 MG tablet Take 1.5 tablets by mouth nightly Yes Hannah Baugh MD   lamoTRIgine (LAMICTAL) 100 MG tablet Take 1 tablet by mouth daily  Hannah Baugh MD   bisoprolol (ZEBETA) 5 MG tablet Take 1 tablet by mouth daily  SETH Bejarano   butalbital-APAP-caffeine -40 MG CAPS per capsule Take 1 capsule by mouth every 4 hours as needed for Headaches   Historical Provider, MD       Social History     Tobacco Use    Smoking status: Former Smoker     Packs/day: 0.50     Years: 3.00     Pack years: 1.50     Types: Cigarettes     Quit date:      Years since quittin.1    Smokeless tobacco: Former User     Quit date: 2014   Vaping Use    Vaping Use: Some days    Start date: 2019    Substances: Flavoring    Devices: Refillable tank   Substance Use Topics    Alcohol use: Not Currently     Comment: 2020    Drug use: No        No Known Allergies,   Past Medical History:   Diagnosis Date    Abnormal glucose measurement     Abnormal Pap smear of cervix     Anxiety     Blood circulation, collateral     CAD (coronary artery disease)     Cerebral artery occlusion with cerebral infarction ([facility]     right side    Complication of anesthesia     difficulty waking    Depression     Heart disease 2020    LOOP recorder     IBS (irritable bowel syndrome)     MDD (major depressive disorder)     MI (myocardial infarction) ([address] 2014    Miscarriage 2012    Neurologic disorder     Postpartum depression     Prolonged emergence from general anesthesia     Schizophrenia ([address]     Seizures ([facility]     anxiety related (last 2020)    Syncope     Tachycardia     UTI (urinary tract infection) 2019   ,   Past Surgical History:   Procedure Laterality Date    CHOLECYSTECTOMY, LAPAROSCOPIC      COLONOSCOPY N/A 2019    Dr Naina Granados, 10 yr recall    EGD      [facility]      lower right leg, has metal plate and screws    FRACTURE SURGERY      left wrist    HYSTERECTOMY N/A 6/3/2021    TOTAL LAPAROSCOPIC HYSTERECTOMY WITH DAVINCI CYSTOSCOPY performed by Gabby Shook MD at [address] [place] [address]      loop recorder    INTRAUTERINE DEVICE INSERTION  2016    Essure placement    UPPER GASTROINTESTINAL ENDOSCOPY N/A 2019    Dr Jessie Servin   ,   Social History     Tobacco Use    Smoking status: Former Smoker     Packs/day: 0.50     Years: 3.00     Pack years: 1.50     Types: Cigarettes     Quit date:      Years since quittin.1    Smokeless tobacco: Former User     Quit date: 2014   Vaping Use    Vaping Use: Some days    Start date: 2019    Substances: Flavoring    Devices: Refillable tank   Substance Use Topics    Alcohol use: Not Currently     Comment: 2020    Drug use:  No ,   Family History   Problem Relation Age of Onset    High Blood Pressure Mother     Diabetes Father     Heart Disease Father     Stomach Cancer Father     Breast Cancer Other         maternal great aunt    Colon Cancer Paternal Grandmother     Colon Polyps Neg Hx     Esophageal Cancer Neg Hx     Liver Cancer Neg Hx     Liver Disease Neg Hx     Rectal Cancer Neg Hx    ,   Immunization History   Administered Date(s) Administered    COVID-19, Pfizer Purple top, DILUTE for use, 12+ yrs, 30mcg/0.3mL dose 05/06/2021, 06/10/2021    Influenza, MDCK Quadv, IM, PF (Flucelvax 2 yrs and older) 11/03/2021    Influenza, Quadv, IM, PF (6 mo and older Fluzone, Flulaval, Fluarix, and 3 yrs and older Afluria) 11/02/2018, 11/06/2019, 10/13/2020    Pneumococcal Polysaccharide ([id number]) 03/22/2019   ,   Health Maintenance   Topic Date Due    Hepatitis C screen  Never done    Varicella vaccine (1 of 2 - 2-dose childhood series) Never done    HIV screen  Never done    DTaP/Tdap/Td vaccine (1 - Tdap) Never done    COVID-19 Vaccine (3 - Booster for Deyr Peter series) 11/10/2021    Depression Monitoring  02/01/2023    Colon cancer screen colonoscopy  11/07/2029    Flu vaccine  Completed    Hepatitis A vaccine  Aged Out    Hepatitis B vaccine  Aged Out    Hib vaccine  Aged Out    Meningococcal (ACWY) vaccine  Aged Out    Pneumococcal 0-64 years Vaccine  Aged Out       PHYSICAL EXAMINATION:  [ INSTRUCTIONS:  \"[x]\" Indicates a positive item  \"[]\" Indicates a negative item  -- DELETE ALL ITEMS NOT EXAMINED]  Vital Signs: (As obtained by patient/caregiver or practitioner observation)    Blood pressure-  Heart rate-    Respiratory rate-    Temperature-  Pulse oximetry-     Constitutional: [x] Appears well-developed and well-nourished [] No apparent distress      [] Abnormal-   Mental status  [x] Alert and awake  [x] Oriented to person/place/time [x]Able to follow commands      Eyes:  EOM    [x]  Normal  [] Abnormal-  Sclera  [x]  Normal  [] Abnormal -         Discharge []  None visible  [] Abnormal -    HENT:   [x] Normocephalic, atraumatic. [] Abnormal   [x] Mouth/Throat: Mucous membranes are moist.     External Ears [x] Normal  [] Abnormal-     Neck: [x] No visualized mass     Pulmonary/Chest: [x] Respiratory effort normal.  [x] No visualized signs of difficulty breathing or respiratory distress        [] Abnormal-      Musculoskeletal:   [x] Normal gait with no signs of ataxia         [x] Normal range of motion of neck        [] Abnormal-       Neurological:        [x] No Facial Asymmetry (Cranial nerve 7 motor function) (limited exam to video visit)          [x] No gaze palsy        [] Abnormal-         Skin:        [x] No significant exanthematous lesions or discoloration noted on facial skin         [] Abnormal-            Psychiatric:       [x] Normal Affect [x] No Hallucinations        [] Abnormal-     Other pertinent observable physical exam findings-     ASSESSMENT/PLAN:    ICD-10-CM    1. Schizoaffective disorder, bipolar type (HCC)  F25.0    2. Adverse effect of vaccine, initial encounter  T50. Z95A    3. Anxiety  F41.9        Anxiety controlled, depression improving but still not controlled. Vaccine reaction reported, non concerning and reassurance given. Orders Placed This Encounter   Medications    ARIPiprazole (ABILIFY) 5 MG tablet     Sig: Take 1.5 tablets by mouth nightly     Dispense:  45 tablet     Refill:  3       No orders of the defined types were placed in this encounter. Return in about 4 weeks (around 3/15/2022) for vv f/u inc abilify for anxiety>depression. Francy Hwang is a 43 y.o. female being evaluated by a Virtual Visit (video visit) encounter to address concerns as mentioned above. A caregiver was present when appropriate.  Due to this being a TeleHealth encounter (During GNONG-14 public health emergency), evaluation of the following organ systems was limited: Vitals/Constitutional/EENT/Resp/CV/GI//MS/Neuro/Skin/Heme-Lymph-Imm. Pursuant to the emergency declaration under the [address] [place] [address], [address] [place] [address] and the [employer] Act, this Virtual Visit was conducted with patient's (and/or legal guardian's) consent, to reduce the patient's risk of exposure to COVID-19 and provide necessary medical care. The patient (and/or legal guardian) has also been advised to contact this office for worsening conditions or problems, and seek emergency medical treatment and/or call 911 if deemed necessary. Services were provided through a video synchronous discussion virtually to substitute for in-person clinic visit. Patient and provider were located at their individual homes or provider at secure site for business. It is possible that material may be posted from a notepad that is used for a Dragon dictation device for dictating notes outside the EMR and I am the original author of all of this content. --Ben Sharma MD on 2/15/2022 at 11:56 AM    An electronic signature was used to authenticate this note.

## 2022-02-21 DIAGNOSIS — R55 SYNCOPE, UNSPECIFIED SYNCOPE TYPE: ICD-10-CM

## 2022-02-21 DIAGNOSIS — R00.0 TACHYCARDIA: ICD-10-CM

## 2022-02-21 DIAGNOSIS — Z95.818 STATUS POST PLACEMENT OF IMPLANTABLE LOOP RECORDER: Primary | ICD-10-CM

## 2022-02-21 PROCEDURE — 93298 REM INTERROG DEV EVAL SCRMS: CPT | Performed by: CLINICAL NURSE SPECIALIST

## 2022-02-21 PROCEDURE — G2066 INTER DEVC REMOTE 30D: HCPCS | Performed by: CLINICAL NURSE SPECIALIST

## 2022-02-22 ENCOUNTER — TELEMEDICINE (OUTPATIENT)
Dept: PSYCHOLOGY | Age: 37
End: 2022-02-22
Payer: MEDICARE

## 2022-02-22 DIAGNOSIS — F25.0 SCHIZOAFFECTIVE DISORDER, BIPOLAR TYPE (HCC): Primary | ICD-10-CM

## 2022-02-22 PROCEDURE — 90834 PSYTX W PT 45 MINUTES: CPT | Performed by: SOCIAL WORKER

## 2022-02-22 SDOH — ECONOMIC STABILITY: HOUSING INSECURITY: IN THE LAST 12 MONTHS, HOW MANY PLACES HAVE YOU LIVED?: 1

## 2022-02-22 SDOH — ECONOMIC STABILITY: TRANSPORTATION INSECURITY
IN THE PAST 12 MONTHS, HAS LACK OF TRANSPORTATION KEPT YOU FROM MEETINGS, WORK, OR FROM GETTING THINGS NEEDED FOR DAILY LIVING?: NO

## 2022-02-22 SDOH — ECONOMIC STABILITY: HOUSING INSECURITY
IN THE LAST 12 MONTHS, WAS THERE A TIME WHEN YOU DID NOT HAVE A STEADY PLACE TO SLEEP OR SLEPT IN A SHELTER (INCLUDING NOW)?: NO

## 2022-02-22 SDOH — ECONOMIC STABILITY: TRANSPORTATION INSECURITY
IN THE PAST 12 MONTHS, HAS THE LACK OF TRANSPORTATION KEPT YOU FROM MEDICAL APPOINTMENTS OR FROM GETTING MEDICATIONS?: NO

## 2022-02-22 SDOH — ECONOMIC STABILITY: INCOME INSECURITY: IN THE LAST 12 MONTHS, WAS THERE A TIME WHEN YOU WERE NOT ABLE TO PAY THE MORTGAGE OR RENT ON TIME?: NO

## 2022-02-22 ASSESSMENT — ANXIETY QUESTIONNAIRES
2. NOT BEING ABLE TO STOP OR CONTROL WORRYING: 3-NEARLY EVERY DAY
7. FEELING AFRAID AS IF SOMETHING AWFUL MIGHT HAPPEN: 3-NEARLY EVERY DAY
6. BECOMING EASILY ANNOYED OR IRRITABLE: 3-NEARLY EVERY DAY
3. WORRYING TOO MUCH ABOUT DIFFERENT THINGS: 3-NEARLY EVERY DAY
GAD7 TOTAL SCORE: 21
4. TROUBLE RELAXING: 3-NEARLY EVERY DAY
1. FEELING NERVOUS, ANXIOUS, OR ON EDGE: 3-NEARLY EVERY DAY
5. BEING SO RESTLESS THAT IT IS HARD TO SIT STILL: 3-NEARLY EVERY DAY

## 2022-02-22 ASSESSMENT — PATIENT HEALTH QUESTIONNAIRE - PHQ9
7. TROUBLE CONCENTRATING ON THINGS, SUCH AS READING THE NEWSPAPER OR WATCHING TELEVISION: 3
8. MOVING OR SPEAKING SO SLOWLY THAT OTHER PEOPLE COULD HAVE NOTICED. OR THE OPPOSITE, BEING SO FIGETY OR RESTLESS THAT YOU HAVE BEEN MOVING AROUND A LOT MORE THAN USUAL: 3
6. FEELING BAD ABOUT YOURSELF - OR THAT YOU ARE A FAILURE OR HAVE LET YOURSELF OR YOUR FAMILY DOWN: 2
4. FEELING TIRED OR HAVING LITTLE ENERGY: 3
2. FEELING DOWN, DEPRESSED OR HOPELESS: 2
5. POOR APPETITE OR OVEREATING: 3
10. IF YOU CHECKED OFF ANY PROBLEMS, HOW DIFFICULT HAVE THESE PROBLEMS MADE IT FOR YOU TO DO YOUR WORK, TAKE CARE OF THINGS AT HOME, OR GET ALONG WITH OTHER PEOPLE: 2
SUM OF ALL RESPONSES TO PHQ9 QUESTIONS 1 & 2: 5
1. LITTLE INTEREST OR PLEASURE IN DOING THINGS: 3
SUM OF ALL RESPONSES TO PHQ QUESTIONS 1-9: 23
9. THOUGHTS THAT YOU WOULD BE BETTER OFF DEAD, OR OF HURTING YOURSELF: 2
SUM OF ALL RESPONSES TO PHQ QUESTIONS 1-9: 23
SUM OF ALL RESPONSES TO PHQ QUESTIONS 1-9: 21
3. TROUBLE FALLING OR STAYING ASLEEP: 2
SUM OF ALL RESPONSES TO PHQ QUESTIONS 1-9: 23

## 2022-02-22 ASSESSMENT — COLUMBIA-SUICIDE SEVERITY RATING SCALE - C-SSRS
6. HAVE YOU EVER DONE ANYTHING, STARTED TO DO ANYTHING, OR PREPARED TO DO ANYTHING TO END YOUR LIFE?: NO
2. HAVE YOU ACTUALLY HAD ANY THOUGHTS OF KILLING YOURSELF?: NO
1. WITHIN THE PAST MONTH, HAVE YOU WISHED YOU WERE DEAD OR WISHED YOU COULD GO TO SLEEP AND NOT WAKE UP?: YES

## 2022-02-22 NOTE — PATIENT INSTRUCTIONS
If you receive a survey after visiting one of our offices, please take time to share your experience about your office visit. These surveys are confidential and no health information about you is shared. We highly welcome your feedback to continuously improve our services for you. Nhi Keller, [phone] and choose the option for behavioral health  You can also send her a message on My Chart. Be aware that all my messages are linked to her. Therapy Referral List    [address]  [address] [place] [address]  Phone: [phone]  Insurances Accepted: Any [employer], [employer], and [employer], [facility]  [address] [facility] [address]  Phone: [phone]  Insurances Accepted: Any [address]  [address] [facility] [address] [address]  Phone: [phone]  Insurances Accepted: Any [employer], [employer], and [facility] [place] [address]  Phone: [phone]  Insurances Accepted: [facility], [address] [place] [address] [facility] [address]  Phone: [phone]  Insurances Accepted: Any [employer], [employer], and [facility] (Doesn't accept Bhanu Jeronimo)    [address] [facility]  [address] [facility] [address]  Phone: [phone]  Insurances Accepted: Any [employer], [employer], and [facility] [employer] [facility]  [address] [address]  Phone: [phone]  Insurances Accepted: Any [employer], [facility], and [employer]. Contact your insurance provider, managed care organization, or local Medical Society to obtain a current listing available in your area. Contact your insurance provider for in network benefits      15 Suggestions for Constructive Conflict Management    1. Be specific when you introduce a gripe.      2. Dont just complain; no matter how specifically; ask for a reasonable change that will relieve the gripe. 3. Ask for and give feedback of the major points, to make sure you are heard, to assure your partner that you understand what he (or she) wants. 4. Confine yourself to one issue at a time. Otherwise, without professional guidance, you may skip back and forth, evading the hard ones. 5. Do not be glib or intolerant. Be open to your own feelings, and equally open to your partners. 6. Always consider compromise. Remember, your partners view of reality may be just as real as yours, even though you may differ. There are not many totally objective realities. 7. Do not allow counterdemands to enter the picture until the original demands are clearly understood, and there has been a clear-cut response to them. 8. Never assume that you know what your partner is thinking until you have checked out the assumption in plain language; or assume or predict how he (or she) will react, what he (or she) will accept or reject. Crystall-ball gazing is not for pairing. 9. \"Dont mind rape. \"  Nhung Homes correct a partners statement of his (or her) feeling. Do not tell a partner what he (or she) should know or feel. 10. Never put labels on a partner. Do not call him (or her) a coward, nor neurotic, nor a child. If you really believed that he(or she) was incompetent or suffered from some basic flaw, you probably would not be with him (or her). Do not make sweeping labeling judgments about his (or her) feelings, especially about whether or not they are real or important. 6. Sarcasm is dirty fighting. 12. Forget the past and stay with the here and now. What either of you did last year or month or this morning is not as important as what you are doing and feeling right now. The changes you ask cannot possibly be retroactive.  Hurts, grievances, and irritations should be brought up at the very earliest moment, or the partner has the right to suspect they may have been saved carefully as weapons. 13. Do not overload your partner with grievances. To do so makes him (or her) feel hopeless and suggests that you have either been hoarding complaints or have not thought through what really troubles you. 14. Meditate. Take time to consult your real thoughts and feelings before speaking. Your surface reaction may mask something deeper and more important. Don't be afraid to close your eyes and think. 13. Remember that there is never a single winner in an honest fight. Both either win or lose more intimacy. Time-Out Guidelines for Couples    1. Either partner can call a time-out during a discussion utilizing a pre-agreed upon signal.    2. Use an I statement to let your partner know that you want to stop the discussion. 3. The other partner agrees to respect the cue that the discussion is escalating. 4. A time-out needs to last long enough for each partner to calm sufficiently. Many couples have discovered that an hour works well. 5. During the time-out, both partners agree to utilize calming techniques. 6. The partner who initiated the time-out should reinitiate the discussion upon returning with both agreeing to use safer more effective ways to communicate. 7. If either partner is not ready to return to the discussion after the time-out, he/she can decline politely. 8. If declining, the partner must agree to a time within 24 hours that he/she will return to the discussion.        Guidelines for Objective Effectiveness:    Getting What You Want      A way to remember these skills is to remember:  Sanjay Kauffman   Describe the situation when necessary - sometimes it isn't stick to the facts and no judgmental statements   \"I've been working here for 2 years now and haven't received a raise, even though my performance reviews have always been positive\"   \"This is the third time this week that you've asked me for a ride home. \"       EXPRESS  Express feelings/opinions about the situation clearly. describe how you feel or what you believe about the situation. don't expect the other person to read your mind or know how you feel give a brief reason for making your request.   \"I believe that I deserve a raise. \"   \"I'm getting home so late that it is really hard for me and my family. But I also really enjoy giving you rides home, and it is hard for me to say no. \"       Shelli Gloria your wishes. Ask for what you want. Say no clearly. Don't expect the other person to know what you want them to do if you don't tell them (don't expect them to mind read). Don't tell others what they \"should\" do. Don't beat around the zaragoza. Zac Brocks Zac Brocks Just bite the bullet and ask, or say no   \"I would like a raise. Can you give it to me? \"But I have to say no tonight. I can't give you a ride home so often. \"       REINFORCE   Reward people who respond positively to you when you ask for something, say no or express an opinion. Sometimes it helps to reinforce people before they respond to your question by telling them the positive effects of getting what you want or need. The basic idea here is that if people do not gain form complying with a request, at least some of the time, they may stop responding in a positive way   \"I will be a lot happier and probably more productive if I get a salary that reflects my value to the company. \"   \"Thanks for being so understanding. I really appreciate it. \"       STAY MINDFUL   Keep your focus on your objectives in the situation   Maintain your position   Don't be distracted on to another topic   Two helpful techniques for staying mindful:  1. Broken Record    Keep asking, saying no or expressing your opinion. ..over and over and over   You just don't have to think up something new person is refusing a request, suggest that you put off the conversation to another time   Give the person another chance to think about it   When the other person is pestering you, ask them to stop   4. Reinforce   when saying no to someone who keeps asking. ...suggest that you end the conversation because you aren't going to change your mind anyway  Relationships Effectiveness Skills. ..using skills to maintain or improve a relationship, while you are trying to get what you want.

## 2022-02-22 NOTE — PROGRESS NOTES
Marco Burton, was evaluated through a synchronous (real-time) audio-video encounter. The patient (or guardian if applicable) is aware that this is a billable service. Verbal consent to proceed has been obtained within the past 12 months. The visit was conducted pursuant to the emergency declaration under the [facility], [address] [place] [address] authority and the [employer] Act. Patient identification was verified, and a caregiver was present when appropriate. The patient was located in a state where the provider was credentialed to provide care. Total time spent for this encounter: 45 minutes    --Alex Pike LCSW on 2/22/2022 at 2:22 PM    An electronic signature was used to authenticate this note. Behavioral Health Consultation  Alex GARCIA LCSW  2/22/2022  2:00 PM      Time spent with Patient: 45 minutes  This is patient's fourth  [facility] appointment. Reason for Consult:    Chief Complaint   Patient presents with    Follow-up    Depression     Referring Provider: No referring provider defined for this encounter. Feedback given to PCP. S:  Patient reports problems with feeling anxious and depressed. Pt reported she has a cold, trouble with her migraines, medical issues, unable to get into her specialist dr appointment until April due to how booked they are, and family stressors. Discussed 15 suggestions for constructive conflict management, time-out guidelines for couples, and DEAR MAN. Pt was also provided with a list to other therapist  And encouraged to follow up with someone, however pt reported she does not want a new therapist. Pt was also notified of this provider going on leave for 16 weeks, pt would like to wait for provider to return, however was notified of ability to reach out to other providers for support during this providers absents.        Addressed current and underlying issues, explored and released associated emotions, explored new ways to deal and cope with these problems. O:  MSE:    Mood    Anxious  Depressed  Affect    depressed affect  Appetite normal  Sleep disturbance Yes  Fatigue Yes  Loss of pleasure Yes  Attention/Concentration    intact  Morbid ideation No  Suicide Assessment    no suicidal ideation        A:  Patient presents for consult due to problems with depression and anxiety. PHQ Scores 2/22/2022 2/1/2022 12/16/2021 11/4/2021 9/4/2018   PHQ2 Score 5 5 5 6 2   PHQ9 Score 23 26 24 23 2     Interpretation of Total Score Depression Severity: 1-4 = Minimal depression, 5-9 = Mild depression, 10-14 = Moderate depression, 15-19 = Moderately severe depression, 20-27 = Severe depression    HEMANTH-7  Feeling nervous, anxious, or on edge: 3-Nearly every day  Not able to stop or control worrying: 3-Nearly every day  Worrying too much about different things: 3-Nearly every day  Trouble relaxing: 3-Nearly every day  Being so restless that it's hard to sit still: 3-Nearly every day  Becoming easily annoyed or irritable: 3-Nearly every day  Feeling afraid as if something awful might happen: 3-Nearly every day  HEMANTH-7 Total Score: 21    HEMANTH-7 score interpretation:  0-4 Subclinical, 5-9 Mild, 10-14 Moderate, 15-21 Severe    Continued consultation is clinically/medically necessary to support in learning new skills and build confidence to deal better with these issues. Patient response to consults, finds new strategies helpful.      Diagnosis:    1. Schizoaffective disorder, bipolar type ([facility] [address]          Diagnosis Date    Abnormal glucose measurement     Abnormal Pap smear of cervix     Anxiety     Blood circulation, collateral     CAD (coronary artery disease)     Cerebral artery occlusion with cerebral infarction ([facility] [address] 2014    right side    Complication of anesthesia     difficulty waking    Depression     Heart disease 06/2020    LOOP recorder     IBS (irritable bowel syndrome)     MDD (major depressive disorder)     MI (myocardial infarction) ([facility] 2014    Miscarriage 06/2012    Neurologic disorder     Postpartum depression     Prolonged emergence from general anesthesia     Schizophrenia ([facility]     [facility]     anxiety related (last 6/2020)    Syncope     Tachycardia     UTI (urinary tract infection) 12/09/2019         Plan:  Pt interventions:  Trained in strategies for increasing balanced thinking, Provided education, Discussed self-care (sleep, nutrition, rewarding activities, social support, exercise), Established rapport, Buckner-setting to identify pt's primary goals for [facility] visit / overall health, Supportive techniques, Emphasized self-care as important for managing overall health and Identified maladaptive thoughts      Pt Behavioral Change Plan:  See Pt Instructions

## 2022-02-23 ENCOUNTER — TELEPHONE (OUTPATIENT)
Dept: NEUROSURGERY | Age: 37
End: 2022-02-23

## 2022-02-23 DIAGNOSIS — R51.9 ACUTE NONINTRACTABLE HEADACHE, UNSPECIFIED HEADACHE TYPE: Primary | ICD-10-CM

## 2022-02-23 NOTE — TELEPHONE ENCOUNTER
Pt called stating she needs a refill of butalbital-APAP-caffeine -40 MG CAPS per capsule until her next appt with Dr. Misael Soriano on 04/19/2022.     Please send to  Pharmacy: [facility], [address]     Thank you

## 2022-02-24 RX ORDER — LAMOTRIGINE 25 MG/1
TABLET ORAL
Qty: 77 TABLET | Refills: 0 | Status: SHIPPED | OUTPATIENT
Start: 2022-02-24 | End: 2022-03-15

## 2022-02-24 RX ORDER — BUTALBITAL, ACETAMINOPHEN AND CAFFEINE 300; 40; 50 MG/1; MG/1; MG/1
1 CAPSULE ORAL EVERY 6 HOURS PRN
Qty: 60 CAPSULE | Refills: 3 | Status: CANCELLED | OUTPATIENT
Start: 2022-02-24 | End: 2022-03-26

## 2022-02-24 NOTE — TELEPHONE ENCOUNTER
Requested Prescriptions     Pending Prescriptions Disp Refills    butalbital-APAP-caffeine -40 MG CAPS per capsule 60 capsule 3     Sig: Take 1 capsule by mouth every 6 hours as needed for Headaches SCRIPT MUST LAST 30 DAYS       Last Office Visit: 5-2021  Next Office Visit:  4/19/2022  Last Medication Refill:    Kayla Robbins up to date:  2-    *RX updated to reflect 2- fill date*         DR ORTIZ  PATIENT   WILL SCAN KAREN WHEN WE GET INTO OFFICE

## 2022-03-08 ENCOUNTER — TELEPHONE (OUTPATIENT)
Dept: PRIMARY CARE CLINIC | Age: 37
End: 2022-03-08

## 2022-03-08 NOTE — TELEPHONE ENCOUNTER
----- Message from Quincy Jackson sent at 3/7/2022 11:29 AM CST -----  Subject: Message to Provider    QUESTIONS  Information for Provider? pt called to see if she could bring in a paper   to be signed for the [facility] stating she is disabled for her handicap placard   also she would like a copy of her medical records for the disability   office. Please call pt back to advise on this   ---------------------------------------------------------------------------  --------------  CALL BACK INFO  What is the best way for the office to contact you?  OK to leave message on   voicemail  Preferred Call Back Phone Number? [phone]  ---------------------------------------------------------------------------  --------------  SCRIPT ANSWERS  undefined

## 2022-03-09 DIAGNOSIS — R55 SYNCOPE, UNSPECIFIED SYNCOPE TYPE: ICD-10-CM

## 2022-03-09 DIAGNOSIS — R00.0 TACHYCARDIA: ICD-10-CM

## 2022-03-09 DIAGNOSIS — Z95.818 STATUS POST PLACEMENT OF IMPLANTABLE LOOP RECORDER: Primary | ICD-10-CM

## 2022-03-15 ENCOUNTER — OFFICE VISIT (OUTPATIENT)
Dept: PRIMARY CARE CLINIC | Age: 37
End: 2022-03-15
Payer: MEDICARE

## 2022-03-15 VITALS
OXYGEN SATURATION: 98 % | SYSTOLIC BLOOD PRESSURE: 98 MMHG | TEMPERATURE: 97.6 F | RESPIRATION RATE: 18 BRPM | HEIGHT: 61 IN | DIASTOLIC BLOOD PRESSURE: 74 MMHG | HEART RATE: 79 BPM | BODY MASS INDEX: 35.12 KG/M2 | WEIGHT: 186 LBS

## 2022-03-15 DIAGNOSIS — M54.50 CHRONIC LOW BACK PAIN, UNSPECIFIED BACK PAIN LATERALITY, UNSPECIFIED WHETHER SCIATICA PRESENT: ICD-10-CM

## 2022-03-15 DIAGNOSIS — F31.75 BIPOLAR DISORDER, IN PARTIAL REMISSION, MOST RECENT EPISODE DEPRESSED (HCC): Primary | ICD-10-CM

## 2022-03-15 DIAGNOSIS — G89.29 CHRONIC LOW BACK PAIN, UNSPECIFIED BACK PAIN LATERALITY, UNSPECIFIED WHETHER SCIATICA PRESENT: ICD-10-CM

## 2022-03-15 PROCEDURE — 99213 OFFICE O/P EST LOW 20 MIN: CPT | Performed by: FAMILY MEDICINE

## 2022-03-15 RX ORDER — ARIPIPRAZOLE 5 MG/1
7.5 TABLET ORAL NIGHTLY
Qty: 45 TABLET | Refills: 5 | Status: SHIPPED | OUTPATIENT
Start: 2022-03-15 | End: 2022-05-24 | Stop reason: SDUPTHER

## 2022-03-15 ASSESSMENT — PATIENT HEALTH QUESTIONNAIRE - PHQ9
SUM OF ALL RESPONSES TO PHQ QUESTIONS 1-9: 0
SUM OF ALL RESPONSES TO PHQ QUESTIONS 1-9: 0
SUM OF ALL RESPONSES TO PHQ9 QUESTIONS 1 & 2: 0
2. FEELING DOWN, DEPRESSED OR HOPELESS: 0
SUM OF ALL RESPONSES TO PHQ QUESTIONS 1-9: 0
SUM OF ALL RESPONSES TO PHQ QUESTIONS 1-9: 0
1. LITTLE INTEREST OR PLEASURE IN DOING THINGS: 0

## 2022-03-15 NOTE — PROGRESS NOTES
Leonila Marrero is a 43 y.o. female who presents today for   Chief Complaint   Patient presents with    1 Month Follow-Up     Pt states Abilify has been working well for her         HPI  Patient is here for follow-up for depression. She notes that it is improving on the Abilify. She notes that she is doing better other than some ongoing back pain. She wants to hold off on anything aggressive. No change in PMH, family, social, or surgical history unless mentioned above. I have reviewed the above chief complaint and HPI details charted by staff and claim ownership of the documentation. Review of Systems    Past Medical History:   Diagnosis Date    Abnormal glucose measurement     Abnormal Pap smear of cervix     Anxiety     Blood circulation, collateral     CAD (coronary artery disease)     Cerebral artery occlusion with cerebral infarction ([facility] 2014    right side    Complication of anesthesia     difficulty waking    Depression     Heart disease 06/2020    LOOP recorder     IBS (irritable bowel syndrome)     MDD (major depressive disorder)     MI (myocardial infarction) ([facility] 2014    Miscarriage 06/2012    Neurologic disorder     Postpartum depression     Prolonged emergence from general anesthesia     Schizophrenia ([facility]     Seizures ([facility]     anxiety related (last 6/2020)    Syncope     Tachycardia     UTI (urinary tract infection) 12/09/2019       Current Outpatient Medications   Medication Sig Dispense Refill    ARIPiprazole (ABILIFY) 5 MG tablet Take 1.5 tablets by mouth nightly 45 tablet 5    bisoprolol (ZEBETA) 5 MG tablet Take 1 tablet by mouth daily 90 tablet 3    butalbital-APAP-caffeine -40 MG CAPS per capsule Take 1 capsule by mouth every 4 hours as needed for Headaches        No current facility-administered medications for this visit.        No Known Allergies    Past Surgical History:   Procedure Laterality Date    CHOLECYSTECTOMY, LAPAROSCOPIC      COLONOSCOPY N/A 2019    Dr Jami Avendaño, 10 yr recall    EGD  [address]      lower right leg, has metal plate and screws    FRACTURE SURGERY      left wrist    HYSTERECTOMY N/A 6/3/2021    TOTAL LAPAROSCOPIC HYSTERECTOMY WITH DAVINCI CYSTOSCOPY performed by Jad Bradley MD at [address] [place] [address]      loop recorder    INTRAUTERINE DEVICE INSERTION  2016    Essure placement    UPPER GASTROINTESTINAL ENDOSCOPY N/A 2019    Dr Sheba Jackson-Gastritis       Social History     Tobacco Use    Smoking status: Former Smoker     Packs/day: 0.50     Years: 3.00     Pack years: 1.50     Types: Cigarettes     Quit date:      Years since quittin.2    Smokeless tobacco: Former User     Quit date: 2014   Vaping Use    Vaping Use: Some days    Start date: 2019    Substances: Flavoring    Devices: Refillable tank   Substance Use Topics    Alcohol use: Not Currently     Comment: 2020    Drug use: No       Family History   Problem Relation Age of Onset    High Blood Pressure Mother     Diabetes Father     Heart Disease Father     Stomach Cancer Father     Breast Cancer Other         maternal great aunt    Colon Cancer Paternal Grandmother     Colon Polyps Neg Hx     Esophageal Cancer Neg Hx     Liver Cancer Neg Hx     Liver Disease Neg Hx     Rectal Cancer Neg Hx        BP 98/74 (Site: Right Upper Arm, Position: Sitting)   Pulse 79   Temp 97.6 °F (36.4 °C) (Temporal)   Resp 18   Ht 5' 1\" (1.549 m)   Wt 186 lb (84.4 kg)   LMP 2021 (Approximate)   SpO2 98%   BMI 35.14 kg/m²   Review of Systems  Constitutional: Negative for chills and fever  Respiratory: Negative for cough, chest tightness, shortness of breath, and wheezing.   Cardiovascular: Negative for chest pain, palpitations, and leg swelling  Gastrointestinal: Negative for abdominal pain, constipation, diarrhea, nausea, and vomiting  Genitourinary: Negative for difficulty urinating, dysuria, and frequency    Physical Exam  Constitutional:  Well developed, well nourished, no acute distress, non-toxic appearance   Eyes:  PERRL, conjunctiva normal   HENT:  Atraumatic, external ears normal, nose normal, oropharynx moist, no pharyngeal exudates. Neck- normal range of motion, no tenderness, supple   Respiratory:  No respiratory distress, normal breath sounds, no rales, no wheezing   Cardiovascular:  Normal rate, normal rhythm, no murmurs, no gallops, no rubs   GI:  Soft, nondistended, normal bowel sounds, nontender, no organomegaly, no mass, no rebound, no guarding   :  No costovertebral angle tenderness   Musculoskeletal:  No edema, no tenderness, no deformities. Back- no tenderness  Integument:  Well hydrated, no rash   Lymphatic:  No lymphadenopathy noted   Neurologic:  Alert & oriented x 3, CN 2-12 normal, normal motor function, normal sensory function, no focal deficits noted   Psychiatric:  Speech and behavior appropriate assessment:    ICD-10-CM    1. Bipolar disorder, in partial remission, most recent episode depressed ([facility])  F31.75 ARIPiprazole (ABILIFY) 5 MG tablet   2. Chronic low back pain, unspecified back pain laterality, unspecified whether sciatica present  M54.50     G89.29        Plan:   Stretches provided for the back, Abilify doing well and stable otherwise. Continue current treatment plan. No orders of the defined types were placed in this encounter. Orders Placed This Encounter   Medications    ARIPiprazole (ABILIFY) 5 MG tablet     Sig: Take 1.5 tablets by mouth nightly     Dispense:  45 tablet     Refill:  5     Medications Discontinued During This Encounter   Medication Reason    lamoTRIgine (LAMICTAL) 25 MG tablet LIST CLEANUP    lamoTRIgine (LAMICTAL) 100 MG tablet LIST CLEANUP    ARIPiprazole (ABILIFY) 5 MG tablet REORDER     There are no Patient Instructions on file for this visit.    Patient given educational handouts and has had all questions answered. Patient voices understanding and agrees to plans along with risks and benefits of plan. Patient isinstructed to continue prior meds, diet, and exercise plans unless instructed otherwise. Patient agrees to follow up as instructed and sooner if needed. Patient agrees to go to ER if condition becomes emergent. Notesmay be completed with dictation device and spelling errors may occur. Materials may be copied and pasted from a notepad outside of EMR, all of which, I, Dr. Jay Rivera MD, take sole intellectual ownership of and have approved adding to my note. Return in about 3 months (around 6/15/2022) for ov or vv - f/u abilfy, bipolar depression.

## 2022-03-21 DIAGNOSIS — R55 SYNCOPE, UNSPECIFIED SYNCOPE TYPE: ICD-10-CM

## 2022-03-21 DIAGNOSIS — Z95.818 STATUS POST PLACEMENT OF IMPLANTABLE LOOP RECORDER: Primary | ICD-10-CM

## 2022-03-21 DIAGNOSIS — R00.0 TACHYCARDIA: ICD-10-CM

## 2022-03-27 RX ORDER — LAMOTRIGINE 25 MG/1
TABLET ORAL
Qty: 77 TABLET | Refills: 0 | OUTPATIENT
Start: 2022-03-27

## 2022-03-30 DIAGNOSIS — Z95.818 STATUS POST PLACEMENT OF IMPLANTABLE LOOP RECORDER: Primary | ICD-10-CM

## 2022-03-30 DIAGNOSIS — R00.0 TACHYCARDIA: ICD-10-CM

## 2022-03-30 DIAGNOSIS — R55 SYNCOPE, UNSPECIFIED SYNCOPE TYPE: ICD-10-CM

## 2022-03-30 PROCEDURE — 93298 REM INTERROG DEV EVAL SCRMS: CPT | Performed by: CLINICAL NURSE SPECIALIST

## 2022-03-30 PROCEDURE — G2066 INTER DEVC REMOTE 30D: HCPCS | Performed by: CLINICAL NURSE SPECIALIST

## 2022-04-02 ENCOUNTER — HOSPITAL ENCOUNTER (EMERGENCY)
Facility: HOSPITAL | Age: 37
Discharge: HOME | End: 2022-04-02
Attending: EMERGENCY MEDICINE | Admitting: EMERGENCY MEDICINE

## 2022-04-02 VITALS
BODY MASS INDEX: 32.98 KG/M2 | DIASTOLIC BLOOD PRESSURE: 71 MMHG | WEIGHT: 168 LBS | OXYGEN SATURATION: 100 % | HEART RATE: 84 BPM | RESPIRATION RATE: 16 BRPM | TEMPERATURE: 97.4 F | HEIGHT: 60 IN | SYSTOLIC BLOOD PRESSURE: 114 MMHG

## 2022-04-02 DIAGNOSIS — R22.0 FACIAL SWELLING: Primary | ICD-10-CM

## 2022-04-02 DIAGNOSIS — T78.40XA ALLERGIC REACTION, INITIAL ENCOUNTER: ICD-10-CM

## 2022-04-02 PROCEDURE — 99282 EMERGENCY DEPT VISIT SF MDM: CPT

## 2022-04-02 RX ORDER — FAMOTIDINE 20 MG/1
20 TABLET, FILM COATED ORAL 2 TIMES DAILY
Qty: 10 TABLET | Refills: 0 | Status: SHIPPED | OUTPATIENT
Start: 2022-04-02 | End: 2022-04-07

## 2022-04-02 RX ORDER — PREDNISONE 50 MG/1
50 TABLET ORAL DAILY
Qty: 5 TABLET | Refills: 0 | Status: SHIPPED | OUTPATIENT
Start: 2022-04-02 | End: 2022-04-07

## 2022-04-02 NOTE — ED PROVIDER NOTES
Emergency Medicine Provider Note    Subjective:    HISTORY OF PRESENT ILLNESS     This is a very pleasant 36 y.o. female with a past medical history of acid reflux, anxiety, depression, asthma who presents to the emergency department today with a chief complaint of right-sided facial swelling.  Patient states this first started after taking of a migraine.  Occurs in the mornings and resolves by late morning.  Intermittent.  Moderate.  Dull.  No exacerbating relieving factors.  She denies any numbness weakness or paresthesias.  No vision changes.  As for her headache she states she has chronic headaches for which she has follow-up.  No thunderclap in onset, no neck stiffness, no fevers chills nausea or vomiting.  No syncope or presyncope.          History is obtained from the patient.       Review of Systems: All other systems are reviewed and are negative other than noted in the HPI.    Past Medical History:  Past Medical History:   Diagnosis Date   • Acid reflux    • Anxiety and depression    • Asthma    • Bipolar 1 disorder, mixed, severe (HCC)    • Heart attack ([facility]) 2014    Patient reported on 8/31/17 that she had stress induced heart attack three years ago.   • Menorrhagia    • Pelvic pain    • Pelvic pain     menorrha   • Stroke ([facility]) 3 years ago    Patient reported on 8/31/2017 that she had stress induced stroke 3 years ago, but states nothing was done to treat because it was stress induced.       Allergies:  No Known Allergies    Past Surgical History:  Past Surgical History:   Procedure Laterality Date   • CHOLECYSTECTOMY     • ELBOW FUSION     • ELBOW PERCUTANEOUS PINNING     • ENDOSCOPY N/A 11/22/2016    Procedure: ESOPHAGOGASTRODUODENOSCOPY WITH ANESTHESIA;  Surgeon: Az Miller DO;  Location: [facility] ENDOSCOPY;  Service:    • ESSURE TUBAL LIGATION     • HYSTEROSCOPY TUBAL STERILIZATION  06/07/2016    Essure Sterilization   • ILEAL LOOP CONDUIT     • PERCUTANEOUS PINNING ELBOW FRACTURE     •  REPLACEMENT TOTAL KNEE         Family History:  Family History   Problem Relation Age of Onset   • Cancer Other    • Diabetes Other    • Heart attack Father    • No Known Problems Mother    • No Known Problems Brother    • No Known Problems Sister    • No Known Problems Daughter    • Cancer Paternal Grandmother    • Lead poisoning Maternal Grandmother    • Asthma Daughter        Social History:  Social History     Socioeconomic History   • Marital status:    Tobacco Use   • Smoking status: Former Smoker     Packs/day: 0.25     Years: 2.00     Pack years: 0.50     Types: Cigarettes     Quit date:      Years since quittin.2   • Smokeless tobacco: Never Used   Substance and Sexual Activity   • Alcohol use: Yes     Comment: OCC   • Drug use: No   • Sexual activity: Yes     Partners: Male     Birth control/protection: Other     Comment: Essure       Home Medications:  Prior to Admission medications    Medication Sig Start Date End Date Taking? Authorizing Provider   albuterol sulfate HFA (PROVENTIL HFA) 108 (90 Base) MCG/ACT inhaler Inhale 2 puffs. 19   Bri Hunter MD   aspirin 81 MG EC tablet Take  by mouth Daily. 19   Bri Hunter MD   Cariprazine HCl (VRAYLAR) 1.5 MG capsule capsule Take 1.5 mg by mouth Daily. 20   Emergency, Nurse Alcon RN   cephalexin (KEFLEX) 500 MG capsule Take 1 capsule by mouth 3 (Three) Times a Day. 20   Jose Enrique Kan MD   metoprolol succinate XL (TOPROL-XL) 25 MG 24 hr tablet Take 25 mg by mouth Daily. 11/15/19   Martine, Nurse Epic, RN   omeprazole (priLOSEC) 20 MG capsule TAKE 1 CAPSULE BY MOUTH TWICE DAILY BEFORE MEALS 19   Bri Hunter MD   ondansetron (ZOFRAN) 4 MG tablet Take 4 mg by mouth.    Bri Hunter MD   promethazine (PHENERGAN) 25 MG tablet TK ONE T PO QID PRF NAUSEA 20   Emergency, Nurse Alcon RN   venlafaxine XR (EFFEXOR-XR) 150 MG 24 hr capsule Take 150 mg by mouth Daily. 20    Emergency, Nurse Epic, RN         Objective:    PHYSICAL EXAM     Vitals:   Vitals:    04/02/22 1147   BP: 114/71   Pulse: 84   Resp: 16   Temp: 97.4 °F (36.3 °C)   SpO2: 100%     GENERAL: Well appearing, in no acute distress.   HEENT: Moist mucous membranes, oropharynx clear without lesions, exudates, thrush.  Very mild right-sided facial swelling noted.  No erythema.  Extraocular movements are intact without pain or limitation.  Pupils are equal round and reactive to light bilaterally.  EYES: No scleral icterus, conjunctivae clear.   NECK: No cervical lymphadenopathy, no stiffness.  CARDIAC: Normal rate, regular rhythm, no murmurs, 2+ peripheral pulses in all four extremities, normal capillary refill.   PULMONARY: Normal work of breathing on room air, lungs are clear to auscultation bilaterally without wheezes, crackles, rhonchi.  ABDOMINAL: Normal bowel sounds, abdomen is soft, non-tender, non-distended, no hepatomegaly or splenomegaly.   MUSCULOSKELETAL: Normal range of motion, no lower extremity edema.  NEUROLOGIC: Alert and oriented x 3, 5 out of 5 strength in all 4 extremities, normal sensation to light touch in all 4 extremities, full range of motion of neck without any pain or limitation, cranial nerves II through XII are intact.  SKIN: Warm and dry without rashes.   PSYCHIATRIC: Mood and affect are normal.     PROCEDURES     Procedures    LAB AND RADIOLOGY RESULTS     Lab Results (last 24 hours)     ** No results found for the last 24 hours. **          No results found.    ED course:    Medications - No data to display       Amount and/or complexity of data reviewed:          Risk of significant complications, morbidity, and/or mortality.    •  Presenting problem: moderate  •  Diagnostic procedures: moderate  •  Management options: moderate        MEDICAL DECISION MAKING     Patient presents with facial swelling and concern for allergic reaction. Upon arrival to the Emergency Department patient is in no  acute distress vital signs are reassuring.  No signs or symptoms of anaphylaxis.  No signs of facial cellulitis.  No fevers chills nausea or vomiting.  Extraocular movements are intact without any pain or limitation so low concern for cavernous sinus thrombus or orbital cellulitis as for her headache she states she has a follow-up does not want this worked up right now.  I believe this is reasonable.  Low concern for subarachnoid hemorrhage with no thunderclap in onset, no syncope, presyncope, no neck stiffness.  Low concern for meningitis with no fevers chills or meningismus.  Low concern for intracranial mass with a history that does not suggest this and a reassuring neurologic exam.  No trauma to suggest traumatic hemorrhage.  No history of blood clots to suggest dural venous sinus thrombus.  No vision changes to suggest IH.  Again, she has no signs of facial infection.  No signs of facial cellulitis.  No signs of dental infection.  This could be an allergic reaction.  Will treat with steroids.  We will also give Pepcid.  She verbalizes understanding.  She understands to return immediately for any new or worsening symptoms.  She is discharged in good condition with reassuring vital signs and she is given commonsense return precautions which she verbalizes understanding of.        Diagnosis:    Final diagnoses:   Facial swelling   Allergic reaction, initial encounter         ED Disposition:     ED Disposition     ED Disposition   Discharge    Condition   Stable    Comment   --             Tommy Scanlon MD  [address] [place] [address] DR DAVIS [address] [place] [id number]  [phone]    Schedule an appointment as soon as possible for a visit in 2 days           Medication List      New Prescriptions    famotidine 20 MG tablet  Commonly known as: PEPCID  Take 1 tablet by mouth 2 (Two) Times a Day for 5 days.     predniSONE 50 MG tablet  Commonly known as: DELTASONE  Take 1 tablet by mouth Daily for 5 days.           Where to Get  Your Medications      You can get these medications from any pharmacy    Bring a paper prescription for each of these medications  · famotidine 20 MG tablet  · predniSONE 50 MG tablet            Bubba Delong MD  04/03/22 5380

## 2022-04-19 ENCOUNTER — OFFICE VISIT (OUTPATIENT)
Dept: NEUROSURGERY | Age: 37
End: 2022-04-19
Payer: MEDICARE

## 2022-04-19 VITALS
OXYGEN SATURATION: 99 % | DIASTOLIC BLOOD PRESSURE: 80 MMHG | BODY MASS INDEX: 36.52 KG/M2 | SYSTOLIC BLOOD PRESSURE: 127 MMHG | HEIGHT: 60 IN | HEART RATE: 82 BPM | WEIGHT: 186 LBS | TEMPERATURE: 97 F

## 2022-04-19 DIAGNOSIS — R55 SYNCOPE, UNSPECIFIED SYNCOPE TYPE: ICD-10-CM

## 2022-04-19 DIAGNOSIS — Z95.818 STATUS POST PLACEMENT OF IMPLANTABLE LOOP RECORDER: Primary | ICD-10-CM

## 2022-04-19 DIAGNOSIS — R56.9 CONVULSIONS, UNSPECIFIED CONVULSION TYPE (HCC): ICD-10-CM

## 2022-04-19 DIAGNOSIS — R51.9 ACUTE NONINTRACTABLE HEADACHE, UNSPECIFIED HEADACHE TYPE: Primary | ICD-10-CM

## 2022-04-19 DIAGNOSIS — R00.0 TACHYCARDIA: ICD-10-CM

## 2022-04-19 PROCEDURE — 99214 OFFICE O/P EST MOD 30 MIN: CPT | Performed by: PSYCHIATRY & NEUROLOGY

## 2022-04-19 RX ORDER — BUTALBITAL, ACETAMINOPHEN AND CAFFEINE 300; 40; 50 MG/1; MG/1; MG/1
1 CAPSULE ORAL EVERY 6 HOURS PRN
Qty: 40 CAPSULE | Refills: 3 | Status: SHIPPED | OUTPATIENT
Start: 2022-04-19 | End: 2022-08-24 | Stop reason: SDUPTHER

## 2022-04-19 RX ORDER — FAMOTIDINE 20 MG/1
TABLET, FILM COATED ORAL
COMMUNITY
Start: 2022-04-02 | End: 2022-10-28 | Stop reason: ALTCHOICE

## 2022-04-19 NOTE — PROGRESS NOTES
[facility] NEUROLOGY:    Patient: Jamal Flor  :  1985  Age:  43 y.o. MRN:  [id number]  Today:  22      Chief Complaint:  Chief Complaint   Patient presents with    Follow-up    Migraine     c/o \"4 month long migraine\"    Seizures     last known seizure     Discuss Medications     patient is out of fioricet- was last ordered by dr Lanny Walker:   Jamal Flor is a 43y.o. year old female here for follow up of headaches. Still noting headaches. Botox had helped previously, but stopped due to pregnancy. Off Emgality, has not helped. Still noting intermittent headaches, severe at times, some nausea and vomiting noted. 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 No overt seizures noted, but is noting intermittent episodes of shaking without a consistent YANG same as before. She is still off nortriptyline as well. No change in characteristics of headaches. Headache pain is global with little radiation of pain. Pain is described as sharp and stabbing, She does report some scotoma like changes prior to headaches, sees spots in both eyes. She notes nausea, vomiting, vomiting, sonophobia, photophobia, dizziness and diplopia with headaches as above. She has previously tried Imitrex, Tylenol, Ibuprofen, Aleve with little relief. She is using Fioricet daily now. Prior history of kidney stones as a child. Long history of headaches since age 13. Some possible myoclonus noted at times as well. No incontinence or tongue biting. No clear confusion noted afterwards, takes 15-20 minutes for her to be able to stand up again. Seen by cardiology as well, loop placed, states episodes seem to occur when she is in stressful situations. She does report a history of CVA and MI. No further stroke like symptoms since last seen. She states that these conditions were \"stressed induced\". She notes residual left-sided weakness and intermittent slurred speech from the stroke.   She had a prior episode of left sided numbness with headache, shortness of breath. Seen in the ED at [facility], CT head negative. CTA chest negative. Workup largely negative. discharged home.      PAST MEDICAL HISTORY:    Medical History:      Diagnosis Date    Abnormal glucose measurement     Abnormal Pap smear of cervix     Anxiety     Blood circulation, collateral     CAD (coronary artery disease)     Cerebral artery occlusion with cerebral infarction ([address] 2014    right side    Complication of anesthesia     difficulty waking    Depression     Heart disease 06/2020    LOOP recorder     IBS (irritable bowel syndrome)     MDD (major depressive disorder)     MI (myocardial infarction) ([address] 2014    Miscarriage 06/2012    Neurologic disorder     Postpartum depression     Prolonged emergence from general anesthesia     Schizophrenia ([address]     Seizures ([address]     anxiety related (last 6/2020)    Syncope     Tachycardia     UTI (urinary tract infection) 12/09/2019       Surgical History:      Procedure Laterality Date    CHOLECYSTECTOMY, LAPAROSCOPIC      COLONOSCOPY N/A 11/7/2019    Dr Reta Gonzalez, 10 yr recall    EGD  2016    [facility]      lower right leg, has metal plate and screws    FRACTURE SURGERY      left wrist    HYSTERECTOMY N/A 6/3/2021    TOTAL LAPAROSCOPIC HYSTERECTOMY WITH DAVINCI CYSTOSCOPY performed by Maury Guzmán MD at [address] [place], [place] [address]e [facility]  2014    loop recorder    INTRAUTERINE DEVICE INSERTION  06/26/2016    Essure placement    UPPER GASTROINTESTINAL ENDOSCOPY N/A 11/7/2019    Dr Karan Jackson-Gastritis       Current Medications:  Current Outpatient Medications   Medication Sig Dispense Refill    famotidine (PEPCID) 20 MG tablet TAKE 1 TABLET BY MOUTH TWICE A DAY      ARIPiprazole (ABILIFY) 5 MG tablet Take 1.5 tablets by mouth nightly 45 tablet 5    butalbital-APAP-caffeine -40 MG CAPS per capsule Take 1 capsule by mouth every 4 hours as needed for Headaches       bisoprolol (ZEBETA) 5 MG tablet Take 1 tablet by mouth daily (Patient not taking: Reported on 4/19/2022) 90 tablet 3     No current facility-administered medications for this visit.        Allergies:  Advil [ibuprofen]    SOCIAL HISTORY:   No tobacco  Occ alcohol    FAMILY HISTORY:       Problem Relation Age of Onset    High Blood Pressure Mother     Diabetes Father     Heart Disease Father     Stomach Cancer Father     Breast Cancer Other         maternal great aunt    Colon Cancer Paternal Grandmother     Colon Polyps Neg Hx     Esophageal Cancer Neg Hx     Liver Cancer Neg Hx     Liver Disease Neg Hx     Rectal Cancer Neg Hx      REVIEW OF SYSTEMS    Constitutional: []Fever []Sweats []Chills [] Recent Injury [x] Denies all unless marked  HEENT:[]Headache  [] Head Injury [] Hearing Loss  [] Sore Throat  [] Ear Ache [x] Denies all unless marked  Spine:  [] Neck pain  [] Back pain  [] Sciaticia  [x] Denies all unless marked  Cardiovascular:[]Heart Disease []Palpitations [] Chest Pain   [x] Denies all unless marked  Pulmonary: []Shortness of Breath []Cough   [x] Denies all unless marked  Psychiatric/Behavioral:[] Depression [] Anxiety [x] Denies all unless marked  Gastrointestinal: []Nausea  []Vomiting  []Abdominal Pain  []Constipation  []Diarrhea  [x] Denies all unless marked  Genitourinary:   [] Frequency  [] Urgency  [] Dysuria [] Incontinence  [x] Denies all unless marked  Extremities: []Pain  []Swelling  [x] Denies all unless marked  Musculoskeletal: [] Myalgias  [] Joint Pain  [] Arthritis [] Muscle Cramps [] Muscle Twitches  [x] Denies all unless marked  Sleep: []Insomnia[]Snoring []Restless Legs  []Sleep Apnea  []Daytime Sleepiness  [x] Denies all unless marked  Skin:[] Rash [] Color Change [x] Denies all unless marked   Neurological:[]Visual Disturbance [] Memory Loss []Loss of Balance []Slurred Speech []Weakness []Seizures  [] Dizziness [x] Denies all unless marked    ROS reviewed, agree with above. PHYSICAL EXAMINATION:  Constitutional -   /80   Pulse 82   Temp 97 °F (36.1 °C)   Ht 5' (1.524 m)   Wt 186 lb (84.4 kg)   LMP 05/23/2021 (Approximate)   SpO2 99%   BMI 36.33 kg/m²   General appearance: No acute distress   EYES -   Conjunctiva normal  Pupillary exam as below, see CN exam in the neurologic exam  ENT-    No scars, masses, or lesions over external nose or ears  Hearing normal bilaterally to finger rub  Cardiovascular -   No clubbing, cyanosis, or edema   Respiratory-   Good expansion, normal effort without use of accessory muscles  Musculoskeletal -   No significant wasting of muscles noted  Gait as below, see gait exam in the neurologic exam  Muscle strength, tone, stability as below. No bony deformities  Skin -   Warm, dry, and intact to inspection and palpation. No rash, erythema, or pallor  Psychiatric -   Mood, affect, and behavior appear normal    Memory as below see mental status examination in the neurologic exam    NEUROLOGIC EXAMINATION:    Mental status   [x]Awake, alert, oriented   [x]Affect attention and concentration appear appropriate  [x]Recent and remote memory appears unremarkable  [x]Speech normal without dysarthria or aphasia, comprehension and repetition intact.    COMMENTS:     Cranial Nerves [x]No VF deficit to confrontation,  no papilledema on fundoscopic exam.  [x]PERRLA, EOMI, no nystagmus, conjugate eye movements, no ptosis  [x]Face symmetric  [x]Facial sensation intact  [x]Tongue midline no atrophy or fasciculations present  [x]Palate midline, hearing to finger rub normal bilaterally  [x]Shoulder shrug and SCM testing normal bilaterally  COMMENTS:   Motor   []5/5 strength x 4 extremities  [x]Normal bulk and tone  [x]No tremor present  [x]No rigidity or bradykinesia noted  COMMENTS: 3/5 strength LUE (chronic r/t elbow injury)   Sensory  [x]Sensation intact to light touch, pin prick, vibration, and proprioception BLE  [x]Sensation intact to light touch, pin prick, vibration, and proprioception BUE  COMMENTS:   Coordination [x]FTN normal bilaterally   [x]HTS normal bilaterally  [x]LACEY normal bilaterally. COMMENTS:    Reflexes  [x]Symmetric and non-pathological  [x]Toes down going bilaterally  [x]No clonus present  COMMENTS:    Gait                  [x]Normal steady gait    []Ataxic    []Spastic     []Magnetic     []Shuffling  COMMENTS:        ADDITIONAL REVIEW:  Lab Results   Component Value Date    OSPTHDLQ10 457 12/11/2019     Lab Results   Component Value Date    WBC 6.2 05/11/2021    HGB 13.6 05/11/2021    HCT 43.8 05/11/2021    MCV 97.1 05/11/2021     05/11/2021     Lab Results   Component Value Date     05/11/2021    K 4.1 05/11/2021     05/11/2021    CO2 27 05/11/2021    BUN 13 05/11/2021    CREATININE 0.7 05/11/2021    GLUCOSE 82 05/11/2021    CALCIUM 9.3 05/11/2021    PROT 5.9 (L) 12/11/2019    LABALBU 3.1 (L) 12/11/2019    BILITOT 0.3 12/11/2019    ALKPHOS 79 12/11/2019    AST 13 12/11/2019    ALT 12 12/11/2019    LABGLOM >60 05/11/2021    GFRAA >59 05/11/2021    GLOB 3.3 07/13/2016     Carotid artery u/s (9/2018)- there is no plaque visualized in bilateral internal carotid arteries. No stenosis in right or left internal carotid arteries. Normal antegrade flow in the bilateral vertebral arteries    Echocardiogram (9/2018)- EF 55-60%; mildly thickened mitral valve and aortic valve; mild tricuspid and pulmonic regurgitation     MRI brain (11/2018)-no acute intracranial abnormality. No focal FLAIR signal abnormality    MRA head (1/2019)- unremarkable     EEG (12/2018)-  Largely normal EEG for age in the awake, drowsy, and sleep states. There were  intermittent sharply contoured discharges that at times appeared somewhat generalized and semi-rhythmic.   Though, these discharges were not clearly consistent with epileptiform abnormalities and may be robust changes associated with drowsiness and sleep. Clinical correlation is needed. Video EEG was normal from an interictal standpoint. Events captured were nonepileptic. IMPRESSION:  Armin Stringer is a 43 y.o. female here for follow up of headaches and possible seizures. She reports history of stroke as well. Prior MRI brain was normal, no clear evidence of stroke. MRA normal. Echo/carotid artery u/s normal. Given overall normal stroke work up will hold off on hypercoagulable labs. Noting headaches still, was better on botox but stopped due to pregnancy, now s/p delivery. Off  Emgality, unable to tolerate Nortriptyline given side effects. Topamax contraindicated d/t kidney stone history, Propranolol contraindicated due to syncopal episodes, unknown cause. Would avoid Depakote r/t child bearing age. Video EEG was normal with nonepileptic events captured. Followed by cardiology as well, loop recorder placed. Prior visit [facility] ED at [facility] with left side numbness, shortness of breath, workup negative. PLAN:  1. Off  Emgality, restart botox. 2. Hold off on seizure mediations given Video EEG monitoring results, events are non-epileptic. 3. Continue Fioricet prn headaches. Discussed limiting use of this to avoid rebound headaches. 4. Epilepsy precautions and seizure first aid discussed. No driving, heights, swimming, tub baths, open flames, or heavy machinery. 5. Continue stroke risk factor maximization, will repeat MRI brain given recent event to ensure no new stroke. 6.  Follow up with cardiology   7. Continue to maximize treatment of anxiety through primary / [place], no SI/HI today.      Ernst Huang DO   Board Certified Neurology

## 2022-05-03 DIAGNOSIS — Z95.818 STATUS POST PLACEMENT OF IMPLANTABLE LOOP RECORDER: Primary | ICD-10-CM

## 2022-05-03 DIAGNOSIS — R00.0 TACHYCARDIA: ICD-10-CM

## 2022-05-03 DIAGNOSIS — R55 SYNCOPE, UNSPECIFIED SYNCOPE TYPE: ICD-10-CM

## 2022-05-03 PROCEDURE — G2066 INTER DEVC REMOTE 30D: HCPCS | Performed by: CLINICAL NURSE SPECIALIST

## 2022-05-03 PROCEDURE — 93298 REM INTERROG DEV EVAL SCRMS: CPT | Performed by: CLINICAL NURSE SPECIALIST

## 2022-05-16 ENCOUNTER — HOSPITAL ENCOUNTER (OUTPATIENT)
Dept: PAIN MANAGEMENT | Age: 37
Discharge: HOME | End: 2022-05-16
Payer: MEDICARE

## 2022-05-16 VITALS
TEMPERATURE: 97.4 F | SYSTOLIC BLOOD PRESSURE: 112 MMHG | RESPIRATION RATE: 18 BRPM | OXYGEN SATURATION: 98 % | DIASTOLIC BLOOD PRESSURE: 64 MMHG | HEART RATE: 63 BPM

## 2022-05-16 DIAGNOSIS — G43.719 CHRONIC MIGRAINE WITHOUT AURA, INTRACTABLE, WITHOUT STATUS MIGRAINOSUS: ICD-10-CM

## 2022-05-16 PROCEDURE — 64615 CHEMODENERV MUSC MIGRAINE: CPT | Performed by: PSYCHIATRY & NEUROLOGY

## 2022-05-16 PROCEDURE — 64615 CHEMODENERV MUSC MIGRAINE: CPT

## 2022-05-16 PROCEDURE — 6360000002 HC RX W HCPCS

## 2022-05-16 PROCEDURE — A4216 STERILE WATER/SALINE, 10 ML: HCPCS

## 2022-05-16 PROCEDURE — 2580000003 HC RX 258

## 2022-05-16 RX ORDER — SODIUM CHLORIDE 0.9 % (FLUSH) 0.9 %
5 SYRINGE (ML) INJECTION ONCE
Status: DISCONTINUED | OUTPATIENT
Start: 2022-05-16 | End: 2022-05-18 | Stop reason: HOSPADM

## 2022-05-16 NOTE — PROGRESS NOTES
[facility] Botox Procedure Note     Patient:   Earline Warren  MR#:    [id number]  Account Number:                   [id number]      YOB: 1985  Date of Evaluation:  5/16/2022  Time of Note:                          2:13 PM  Primary Physician:    Cole Steel MD   Consulting Physician:  Ponce Lyons DO    Consent was signed and on the chart. Risk, benefits, and side effects discussed. Pt has a clear history of having more than 15 days/month of migraine, lasting more than 4 hours with multiple treatment failures. Patient states that he/she has 30 headaches out of 30 days each month. Patient states that he/she has 30 migraines out of 30 days each month. Vial Exp Date: 5/24  [employer] Hand Lot Number:  [id number] x 2    Botox was diluted with 0.9% NS to yield a final concentration of 50 units / 1 ml. The following muscles were injected in 0.1 ml (5 unit) increments:    -   5 units left, 5 units right  Procerus-      5 units  Frontalis-      20 units divided into 4 sites left and right  Temporalis-  40 units divided into 4 sites left and 4 sites right  Occipitalis-    30 units divided into 3 sites left and 3 sites right  Cervical Paraspinal-  20 units divided into 2 sites left and 2 sites right  Trapezius-     30 units divided into 3 sites left and 3 sites right    Total units injected: 155  Total units unavoidably discarded: 45    Pt tolerated the procedure well. There were no complications. Pt will follow up in 6 weeks to assess effectiveness and will repeat injections in 12 weeks if continues to benefit.         Ponce Lyons DO  Board Certified Neurologist

## 2022-05-16 NOTE — PROGRESS NOTES
Procedure:  Level of Consciousness: [x]Alert [x]Oriented []Disoriented []Lethargic  Anxiety Level: [x]Calm []Anxious []Depressed []Other  Skin: []Warm [x]Dry []Cool []Moist []Intact []Other  Cardiovascular: [x]Palpitations: [x]Never []Occasionally []Frequently  Chest Pain: [x]No []Yes  Respiratory:  [x]Unlabored []Labored []Cough ([] Productive []Unproductive)  HCG Required: [x]No []Yes   Results: []Negative []Positive  Knowledge Level:        [x]Patient/Other verbalized understanding of pre-procedure instructions. [x]Assessment of post-op care needs (transportation, responsible caregiver)        [x]Able to discuss health care problems and how to deal with it. Factors that Affect Teaching:        Language Barrier: [x]No []Yes - why:        Hearing Loss:        [x]No []Yes            Corrective Device:  []Yes []No        Vision Loss:           [x]No []Yes            Corrective Device:  []Yes []No        Memory Loss:       [x]No []Yes            []Short Term []Long Term  Motivational Level:  [x]Asks Questions                  []Extremely Anxious       [x]Seems Interested               []Seems Uninterested                  [x]Denies need for Education  Risk for Injury:  [x]Patient oriented to person, place and time  []History of frequent falls/loss of balance  Nutritional:  []Change in appetite   []Weight Gain   []Weight Loss  Functional:  []Requires assistance with ADL's      Botox Assessment  [] Patient  has had a reduction of at least 100 hours (5 Days) in Migraines since receiving Botox  []  []  []  Factors that Affect Teaching:  Assessment of post-op care needs (transportation, responsible caregiver)        []Able to discuss health care problems and how to deal with it. Factors that Affect Teaching:Patient states that he/she has ____30__ headaches out of 30 days each month.   Patient states that he/she has __30____ migraines out of 30 days each month.monthly

## 2022-05-18 ENCOUNTER — OFFICE VISIT (OUTPATIENT)
Dept: CARDIOLOGY CLINIC | Age: 37
End: 2022-05-18
Payer: MEDICARE

## 2022-05-18 VITALS
HEIGHT: 60 IN | SYSTOLIC BLOOD PRESSURE: 118 MMHG | DIASTOLIC BLOOD PRESSURE: 64 MMHG | BODY MASS INDEX: 36.52 KG/M2 | HEART RATE: 74 BPM | WEIGHT: 186 LBS

## 2022-05-18 DIAGNOSIS — Z95.818 STATUS POST PLACEMENT OF IMPLANTABLE LOOP RECORDER: Primary | ICD-10-CM

## 2022-05-18 DIAGNOSIS — R55 SYNCOPE, UNSPECIFIED SYNCOPE TYPE: ICD-10-CM

## 2022-05-18 DIAGNOSIS — R00.0 TACHYCARDIA: ICD-10-CM

## 2022-05-18 PROCEDURE — 93291 INTERROG DEV EVAL SCRMS IP: CPT | Performed by: CLINICAL NURSE SPECIALIST

## 2022-05-18 PROCEDURE — 99214 OFFICE O/P EST MOD 30 MIN: CPT | Performed by: CLINICAL NURSE SPECIALIST

## 2022-05-18 RX ORDER — BISOPROLOL FUMARATE 5 MG/1
5 TABLET ORAL DAILY
Qty: 90 TABLET | Refills: 3 | Status: SHIPPED | OUTPATIENT
Start: 2022-05-18 | End: 2022-06-01 | Stop reason: DRUGHIGH

## 2022-05-18 NOTE — PROGRESS NOTES
[facility]  [facility] [place]  [address]  Phone: [phone]  Fax: [phone]    OFFICE VISIT:  2022    Praveen Galindo - : 1985    Reason For Visit:  Chris Kuhn is a 43 y.o. female who is here for 6 Month Follow-Up (pt has palpittions) and Tachycardia (LOOP)  Patient has had history of syncopal spells with cardiac and neurological work-up. Normal carotids, negative stress test and normal 2D echo  Underwent placement of loop recorder due to recurrent syncope  Thus far the recorder has failed to show any correlation of her syncopal spells with arrhythmia or bradycardia  Patient's tachycardia has been treated with beta-blocker  Patient had pregnancy and delivered last [place]  Longstanding psychiatric history and follows with psychiatry. She returns today for follow-up and loop recorder interrogation. We have been receiving transmissions from her loop recorder showing tachycardia    States that she thinks she is taking the bisoprolol but told the medical assistant she was not taking it. She states she does feel her heart get fast at times. She thinks it is associated with her arguing with her older kids who are 25 and 23. They are not working or going to school. She also has her baby who is about 17 months old  Have 1 episode where she felt weak and thought she was going to pass out. Subjective  Opal denies exertional chest pain, shortness of breath, orthopnea, paroxysmal nocturnal dyspnea, syncope, edema and fatigue. The patient denies numbness or weakness to suggest cerebrovascular accident or transient ischemic attack. Enzo Rogel MD is PCP.   Praveen Galindo has the following history as recorded in [facility]:    Patient Active Problem List    Diagnosis Date Noted    Chronic migraine without aura, intractable, without status migrainosus 2022    Schizoaffective disorder, bipolar type ([facility] 2021    Severe episode of recurrent major depressive disorder, without psychotic features ([address] 11/04/2021    HEMANTH (generalized anxiety disorder) 11/04/2021    Abnormal uterine bleeding (AUB)     History of abnormal cervical Pap smear 08/11/2020    Gastric reflux syndrome     Rectal bleed     Convulsions ([address] 06/24/2019    Coronary artery disease involving native coronary artery of native heart without angina pectoris 09/04/2018    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) 09/04/2018    Moderate episode of recurrent major depressive disorder ([address] 09/04/2018    Decreased strength of lower extremity 09/04/2018    Syncope and collapse 09/04/2018    Chronic superficial gastritis 09/04/2018    Family hx-breast malignancy 07/18/2012    Diffuse cystic mastopathy 04/16/2012    Lump or mass in breast left  04/16/2012     Past Medical History:   Diagnosis Date    Abnormal glucose measurement     Abnormal Pap smear of cervix     Anxiety     Blood circulation, collateral     CAD (coronary artery disease)     Cerebral artery occlusion with cerebral infarction ([address] 2014    right side    Complication of anesthesia     difficulty waking    Depression     Heart disease 06/2020    LOOP recorder     IBS (irritable bowel syndrome)     MDD (major depressive disorder)     MI (myocardial infarction) ([address] 2014    Miscarriage 06/2012    Neurologic disorder     Postpartum depression     Prolonged emergence from general anesthesia     Schizophrenia ([address]     Seizures ([address]     anxiety related (last 6/2020)    Syncope     Tachycardia     UTI (urinary tract infection) 12/09/2019     Past Surgical History:   Procedure Laterality Date    CHOLECYSTECTOMY, LAPAROSCOPIC      COLONOSCOPY N/A 11/7/2019    Dr Js Bartlett, 10 yr recall    EGD  2016    [address]      lower right leg, has metal plate and screws    FRACTURE SURGERY      left wrist    HYSTERECTOMY N/A 6/3/2021    TOTAL LAPAROSCOPIC HYSTERECTOMY WITH Diana Dutton performed by Demetrius Tran MD at [address]  2014    loop recorder    INTRAUTERINE DEVICE INSERTION  2016    Essure placement    UPPER GASTROINTESTINAL ENDOSCOPY N/A 2019    Dr Peyton Jackson-Gastritis     Family History   Problem Relation Age of Onset    High Blood Pressure Mother     Diabetes Father     Heart Disease Father     Stomach Cancer Father     Breast Cancer Other         maternal great aunt    Colon Cancer Paternal Grandmother     Colon Polyps Neg Hx     Esophageal Cancer Neg Hx     Liver Cancer Neg Hx     Liver Disease Neg Hx     Rectal Cancer Neg Hx      Social History     Tobacco Use    Smoking status: Former Smoker     Packs/day: 0.50     Years: 3.00     Pack years: 1.50     Types: Cigarettes     Quit date:      Years since quittin.3    Smokeless tobacco: Former User     Quit date: 2014   Substance Use Topics    Alcohol use: Yes     Comment: occ      Current Outpatient Medications   Medication Sig Dispense Refill    famotidine (PEPCID) 20 MG tablet TAKE 1 TABLET BY MOUTH TWICE A DAY      butalbital-APAP-caffeine -40 MG CAPS per capsule Take 1 capsule by mouth every 6 hours as needed for Headaches 40 capsule 3    ARIPiprazole (ABILIFY) 5 MG tablet Take 1.5 tablets by mouth nightly 45 tablet 5    bisoprolol (ZEBETA) 5 MG tablet Take 1 tablet by mouth daily (Patient not taking: Reported on 2022) 90 tablet 3     No current facility-administered medications for this visit. Allergies: Advil [ibuprofen]    Review of Systems  Constitutional  no significant activity change, appetite change, or unexpected weight change. No fever, chills or diaphoresis. No fatigue. HEENT  no significant rhinorrhea or epistaxis. No tinnitus or significant hearing loss. Eyes  no sudden vision change or amaurosis. Respiratory  no significant wheezing, stridor, apnea or cough.   No dyspnea on exertion or shortness of breath. Cardiovascular  no exertional chest pain, orthopnea or PND. + sensation of arrhythmia no slow heart rate. No claudication or leg edema. Gastrointestinal  no abdominal swelling or pain. No blood in stool. No severe constipation, diarrhea, nausea, or vomiting. Genitourinary  no difficulty urinating, dysuria, frequency, or urgency. No flank pain or hematuria. Musculoskeletal  no back pain, gait disturbance, or myalgia. Skin  no color change or rash. No pallor. No new surgical incision. Neurologic  no speech difficulty, facial asymmetry or lateralizing weakness. No seizures,  syncope, or significant dizziness. + preSyncope  Hematologic  no easy bruising or excessive bleeding. Psychiatric  no severe anxiety or insomnia. No confusion. All other review of systems are negative. Objective  Vital Signs - /64   Pulse 74   Ht 5' (1.524 m)   Wt 186 lb (84.4 kg)   LMP 05/23/2021 (Approximate)   BMI 36.33 kg/m²   General - Opal is alert, cooperative, and pleasant. Well groomed. No acute distress. Body habitus is obese. HEENT  The head is normocephalic. No circumoral cyanosis. Dentition is normal.   EYES -  No Xanthelasma, no arcus senilis, no conjunctival hemorrhages or discharge. Neck - Supple, without increased jugular venous pressures. No carotid bruits. No mass. Respiratory - Lungs are clear bilaterally. No wheezes or rales. Normal effort without use of accessory muscles. Cardiovascular  Heart has regular rhythm and rate. No murmurs, rubs or gallops. + pedal pulses and no varicosities. Abdominal -  Soft, nontender, nondistended. Bowel sounds are intact. Extremities - No clubbing, cyanosis, or  edema. Musculoskeletal -  No clubbing . No Osler's nodes. Gait normal .  No kyphosis or scoliosis. Skin -  no statis ulcers or dermatitis. Neurological - No focal signs are identified. Oriented to person, place and time.     Psychiatric - Appropriate affect and mood. Assessment:     Diagnosis Orders   1. Status post placement of implantable loop recorder     2. Tachycardia     3. Syncope, unspecified syncope type       Data:  BP Readings from Last 3 Encounters:   05/18/22 118/64   05/16/22 112/64   04/19/22 127/80    Pulse Readings from Last 3 Encounters:   05/18/22 74   05/16/22 63   04/19/22 82        Wt Readings from Last 3 Encounters:   05/18/22 186 lb (84.4 kg)   04/19/22 186 lb (84.4 kg)   03/15/22 186 lb (84.4 kg)   Loop recorder interrogation shows 15 symptom episodes 168 tachycardic episodes. Reviewing some of her tachycardic episodes last several seconds but most are all under a minute. She has had most of these episodes with getting onto her older children    Questionable where she is actually taking her bisoprolol in a regular basis. Encouraged her to take it regularly. We will continue to monitor her loop recorder and if needed uptitrate her bisoprolol to twice a day. We have to be careful with blood pressure not getting too low but looks like she is tolerating this at this time. Encouraged to work with her psychiatrist and to set boundaries with arguing with her older children   Reviewed PCP recent notes   Reviewed recent labs    She reports she is not had any other medication changes    30 minutes were spent preparing, reviewing and seeing patient. All questions answered    Plan  Make sure you take your bisoprolol nightly  If heart rates continue to be fast at times will increase your bisoprolol to twice a day  Loop recorder will sen in week   Follow up in 6 mos  Call with any questions or concerns  Follow up with Dannielle Conde MD for non cardiac problems  Report any new problems  Cardiovascular Fitness-Exercise as tolerated. Strive for 30 minutes of exercise most days of the week.     Cardiac / Healthy Diet  Continue current medications as directed  Continue plan of treatment  It is always recommended that you bring your medications bottles with you to each visit - this is for your safety! SETH Yousif dragon/transcription disclaimer: Much of this encounter note is electronic transcription/translation of spoken language to printed tach. Electronic translation of spoken language may be erroneous, or at times, nonsensical words or phrases may be inadvertently transcribed.  Although, I have reviewed the note for such errors, some may still exist.

## 2022-05-25 DIAGNOSIS — R00.0 TACHYCARDIA: ICD-10-CM

## 2022-05-25 DIAGNOSIS — F31.75 BIPOLAR DISORDER, IN PARTIAL REMISSION, MOST RECENT EPISODE DEPRESSED (HCC): ICD-10-CM

## 2022-05-25 DIAGNOSIS — R55 SYNCOPE, UNSPECIFIED SYNCOPE TYPE: ICD-10-CM

## 2022-05-25 DIAGNOSIS — Z95.818 STATUS POST PLACEMENT OF IMPLANTABLE LOOP RECORDER: Primary | ICD-10-CM

## 2022-05-25 RX ORDER — ARIPIPRAZOLE 5 MG/1
7.5 TABLET ORAL NIGHTLY
Qty: 45 TABLET | Refills: 5 | Status: SHIPPED | OUTPATIENT
Start: 2022-05-25

## 2022-06-01 RX ORDER — BISOPROLOL FUMARATE 5 MG/1
5 TABLET ORAL 2 TIMES DAILY
COMMUNITY
Start: 2022-06-01 | End: 2022-06-01 | Stop reason: SDUPTHER

## 2022-06-01 RX ORDER — BISOPROLOL FUMARATE 5 MG/1
5 TABLET ORAL 2 TIMES DAILY
Qty: 60 TABLET | Refills: 5 | Status: SHIPPED | OUTPATIENT
Start: 2022-06-01

## 2022-06-01 NOTE — TELEPHONE ENCOUNTER
Patient returned call from last week. She stated she has been taking the Zebeta 5mg daily as ordered. Reviewed recommendation to try taking Zebeta 5mg twice a day per SETH Gregory. Patient stated she will increase her dose.

## 2022-06-06 DIAGNOSIS — R55 SYNCOPE, UNSPECIFIED SYNCOPE TYPE: ICD-10-CM

## 2022-06-06 DIAGNOSIS — Z95.818 STATUS POST PLACEMENT OF IMPLANTABLE LOOP RECORDER: Primary | ICD-10-CM

## 2022-06-06 DIAGNOSIS — R00.0 TACHYCARDIA: ICD-10-CM

## 2022-06-09 DIAGNOSIS — R55 SYNCOPE, UNSPECIFIED SYNCOPE TYPE: ICD-10-CM

## 2022-06-09 DIAGNOSIS — Z95.818 STATUS POST PLACEMENT OF IMPLANTABLE LOOP RECORDER: Primary | ICD-10-CM

## 2022-06-09 DIAGNOSIS — R00.0 TACHYCARDIA: ICD-10-CM

## 2022-06-09 RX ORDER — LAMOTRIGINE 25 MG/1
TABLET ORAL
Qty: 77 TABLET | Refills: 0 | OUTPATIENT
Start: 2022-06-09

## 2022-06-15 DIAGNOSIS — R55 SYNCOPE, UNSPECIFIED SYNCOPE TYPE: ICD-10-CM

## 2022-06-15 DIAGNOSIS — Z95.818 STATUS POST PLACEMENT OF IMPLANTABLE LOOP RECORDER: Primary | ICD-10-CM

## 2022-06-15 DIAGNOSIS — R00.0 TACHYCARDIA: ICD-10-CM

## 2022-06-20 ENCOUNTER — TELEPHONE (OUTPATIENT)
Dept: PRIMARY CARE CLINIC | Age: 37
End: 2022-06-20

## 2022-06-20 DIAGNOSIS — R55 SYNCOPE, UNSPECIFIED SYNCOPE TYPE: ICD-10-CM

## 2022-06-20 DIAGNOSIS — Z95.818 STATUS POST PLACEMENT OF IMPLANTABLE LOOP RECORDER: Primary | ICD-10-CM

## 2022-06-20 DIAGNOSIS — R00.0 TACHYCARDIA: ICD-10-CM

## 2022-06-20 NOTE — TELEPHONE ENCOUNTER
----- Message from Marysanti Razia sent at 6/17/2022 11:47 AM CDT -----  Subject: Message to Provider    QUESTIONS  Information for Provider? Patient missed VV with Cristino Ott on 06/17 for   med refill / check and needs to reschedule. No availability. Please call   to schedule.   ---------------------------------------------------------------------------  --------------  CALL BACK INFO  What is the best way for the office to contact you? OK to leave message on   voicemail  Preferred Call Back Phone Number? [phone]  ---------------------------------------------------------------------------  --------------  SCRIPT ANSWERS  Relationship to Patient?  Self

## 2022-06-21 DIAGNOSIS — R00.0 TACHYCARDIA: ICD-10-CM

## 2022-06-21 DIAGNOSIS — Z95.818 STATUS POST PLACEMENT OF IMPLANTABLE LOOP RECORDER: Primary | ICD-10-CM

## 2022-06-21 DIAGNOSIS — R55 SYNCOPE, UNSPECIFIED SYNCOPE TYPE: ICD-10-CM

## 2022-06-21 PROCEDURE — 93298 REM INTERROG DEV EVAL SCRMS: CPT | Performed by: CLINICAL NURSE SPECIALIST

## 2022-06-21 PROCEDURE — G2066 INTER DEVC REMOTE 30D: HCPCS | Performed by: CLINICAL NURSE SPECIALIST

## 2022-06-24 ENCOUNTER — TELEPHONE (OUTPATIENT)
Dept: PSYCHOLOGY | Age: 37
End: 2022-06-24

## 2022-06-24 NOTE — TELEPHONE ENCOUNTER
Left voicemail for return call to confirm appointment with Rex Savage LCSW. Advised to call [phone].

## 2022-06-27 ENCOUNTER — TELEMEDICINE (OUTPATIENT)
Dept: PSYCHOLOGY | Age: 37
End: 2022-06-27
Payer: MEDICARE

## 2022-06-27 ENCOUNTER — TELEPHONE (OUTPATIENT)
Dept: PRIMARY CARE CLINIC | Age: 37
End: 2022-06-27

## 2022-06-27 DIAGNOSIS — F25.0 SCHIZOAFFECTIVE DISORDER, BIPOLAR TYPE (HCC): Primary | ICD-10-CM

## 2022-06-27 PROCEDURE — 90834 PSYTX W PT 45 MINUTES: CPT | Performed by: SOCIAL WORKER

## 2022-06-27 ASSESSMENT — ANXIETY QUESTIONNAIRES
2. NOT BEING ABLE TO STOP OR CONTROL WORRYING: 3-NEARLY EVERY DAY
7. FEELING AFRAID AS IF SOMETHING AWFUL MIGHT HAPPEN: 3-NEARLY EVERY DAY
GAD7 TOTAL SCORE: 21
1. FEELING NERVOUS, ANXIOUS, OR ON EDGE: 3
6. BECOMING EASILY ANNOYED OR IRRITABLE: 3-NEARLY EVERY DAY
4. TROUBLE RELAXING: 3-NEARLY EVERY DAY
3. WORRYING TOO MUCH ABOUT DIFFERENT THINGS: 3-NEARLY EVERY DAY
5. BEING SO RESTLESS THAT IT IS HARD TO SIT STILL: 3-NEARLY EVERY DAY

## 2022-06-27 ASSESSMENT — PATIENT HEALTH QUESTIONNAIRE - PHQ9
4. FEELING TIRED OR HAVING LITTLE ENERGY: 3
7. TROUBLE CONCENTRATING ON THINGS, SUCH AS READING THE NEWSPAPER OR WATCHING TELEVISION: 3
SUM OF ALL RESPONSES TO PHQ QUESTIONS 1-9: 26
SUM OF ALL RESPONSES TO PHQ9 QUESTIONS 1 & 2: 6
9. THOUGHTS THAT YOU WOULD BE BETTER OFF DEAD, OR OF HURTING YOURSELF: 2
1. LITTLE INTEREST OR PLEASURE IN DOING THINGS: 3
5. POOR APPETITE OR OVEREATING: 3
SUM OF ALL RESPONSES TO PHQ QUESTIONS 1-9: 24
8. MOVING OR SPEAKING SO SLOWLY THAT OTHER PEOPLE COULD HAVE NOTICED. OR THE OPPOSITE, BEING SO FIGETY OR RESTLESS THAT YOU HAVE BEEN MOVING AROUND A LOT MORE THAN USUAL: 3
3. TROUBLE FALLING OR STAYING ASLEEP: 3
10. IF YOU CHECKED OFF ANY PROBLEMS, HOW DIFFICULT HAVE THESE PROBLEMS MADE IT FOR YOU TO DO YOUR WORK, TAKE CARE OF THINGS AT HOME, OR GET ALONG WITH OTHER PEOPLE: 3
6. FEELING BAD ABOUT YOURSELF - OR THAT YOU ARE A FAILURE OR HAVE LET YOURSELF OR YOUR FAMILY DOWN: 3
SUM OF ALL RESPONSES TO PHQ QUESTIONS 1-9: 26
2. FEELING DOWN, DEPRESSED OR HOPELESS: 3
SUM OF ALL RESPONSES TO PHQ QUESTIONS 1-9: 26

## 2022-06-27 ASSESSMENT — COLUMBIA-SUICIDE SEVERITY RATING SCALE - C-SSRS
4. HAVE YOU HAD THESE THOUGHTS AND HAD SOME INTENTION OF ACTING ON THEM?: NO
6. HAVE YOU EVER DONE ANYTHING, STARTED TO DO ANYTHING, OR PREPARED TO DO ANYTHING TO END YOUR LIFE?: NO
1. WITHIN THE PAST MONTH, HAVE YOU WISHED YOU WERE DEAD OR WISHED YOU COULD GO TO SLEEP AND NOT WAKE UP?: YES
5. HAVE YOU STARTED TO WORK OUT OR WORKED OUT THE DETAILS OF HOW TO KILL YOURSELF? DO YOU INTEND TO CARRY OUT THIS PLAN?: NO
2. HAVE YOU ACTUALLY HAD ANY THOUGHTS OF KILLING YOURSELF?: YES
3. HAVE YOU BEEN THINKING ABOUT HOW YOU MIGHT KILL YOURSELF?: NO

## 2022-06-27 NOTE — PROGRESS NOTES
Peri Barcenas, was evaluated through a synchronous (real-time) audio-video encounter. The patient (or guardian if applicable) is aware that this is a billable service. Verbal consent to proceed has been obtained within the past 12 months. The visit was conducted pursuant to the emergency declaration under the [facility], [address] [place] [address] authority and the [employer] and [employer] Act. Patient identification was verified, and a caregiver was present when appropriate. The patient was located in a state where the provider was credentialed to provide care. Total time spent for this encounter: 45 minutes    --Shaina Mitchell LCSW on 6/27/2022 at 12:28 PM    An electronic signature was used to authenticate this note. Behavioral Health Consultation  Shaina GARCIA LCSW  6/27/2022  12:00 PM      Time spent with Patient: 45 minutes  This is patient's fifth  [facility] appointment. Reason for Consult:    Chief Complaint   Patient presents with    Follow-up    Depression    Anxiety    Stress     Referring Provider: No referring provider defined for this encounter. Feedback given to PCP. S:  Patient reports problems with feeling depression and anxiety. Pt reported life stressors continue, her daughter is having legal issues due to poor choices, getting an ear infection,  had kidney stone surgery. Pt reported her cardio dr said her issues were Neuro, Neuro is doing botox to help with arm pain, pt has only had one injection, and no relief. Pt reported she has not established care with new provider, pt was strongly encouraged to schedule the appointment. Pt shared her stress levels are high, however no boundaries or coping skills are helpful. When discussing options, pt reports she has \"already tried that\" and then explains how it did not work. Suicide Safety Plan was reviewed, however pt denied suicidal thoughts/ideations.  Pt was encouraged to review the list of 99 coping skills and practice self care. Addressed current and underlying issues, explored and released associated emotions, explored new ways to deal and cope with these problems. O:  MSE:    Mood    Anxious  Depressed  Affect    depressed affect  Appetite normal  Sleep disturbance Yes  Fatigue Yes  Loss of pleasure Yes  Attention/Concentration    intact  Morbid ideation No  Suicide Assessment    no suicidal ideation        A:  Patient presents for consult due to problems with depression and anxiety. PHQ Scores 6/27/2022 3/15/2022 2/22/2022 2/1/2022 12/16/2021 11/4/2021 9/4/2018   PHQ2 Score 6 0 5 5 5 6 2   PHQ9 Score 26 0 23 26 24 23 2     Interpretation of Total Score Depression Severity: 1-4 = Minimal depression, 5-9 = Mild depression, 10-14 = Moderate depression, 15-19 = Moderately severe depression, 20-27 = Severe depression    HEMANTH-7  Feeling nervous, anxious, or on edge: Nearly every day  Not able to stop or control worrying: 3-Nearly every day  Worrying too much about different things: 3-Nearly every day  Trouble relaxing: 3-Nearly every day  Being so restless that it's hard to sit still: 3-Nearly every day  Becoming easily annoyed or irritable: 3-Nearly every day  Feeling afraid as if something awful might happen: 3-Nearly every day  HEMANTH-7 Total Score: 21    HEMANTH-7 score interpretation:  0-4 Subclinical, 5-9 Mild, 10-14 Moderate, 15-21 Severe    Continued consultation is clinically/medically necessary to support in learning new skills and build confidence to deal better with these issues. Patient response to consults, finds new strategies helpful.      Diagnosis:    1. Schizoaffective disorder, bipolar type ([facility]          Diagnosis Date    Abnormal glucose measurement     Abnormal Pap smear of cervix     Anxiety     Blood circulation, collateral     CAD (coronary artery disease)     Cerebral artery occlusion with cerebral infarction ([facility] 2014    right side    Complication of anesthesia     difficulty waking    Depression     Heart disease 06/2020    LOOP recorder     IBS (irritable bowel syndrome)     MDD (major depressive disorder)     MI (myocardial infarction) ([facility] 2014    Miscarriage 06/2012    Neurologic disorder     Postpartum depression     Prolonged emergence from general anesthesia     Schizophrenia ([facility]     Seizures ([facility]     anxiety related (last 6/2020)    Syncope     Tachycardia     UTI (urinary tract infection) 12/09/2019         Plan:  Pt interventions:  Trained in strategies for increasing balanced thinking, Provided education, Discussed self-care (sleep, nutrition, rewarding activities, social support, exercise), Established rapport, [place]-setting to identify pt's primary goals for [facility] visit / overall health, Supportive techniques, Emphasized self-care as important for managing overall health and Identified maladaptive thoughts      Pt Behavioral Change Plan:  See Pt Instructions

## 2022-06-27 NOTE — PATIENT INSTRUCTIONS
Patient notified and will keep track of the flare ups and let  know.   SAFETY PLAN    A suicide Safety Plan is a document that supports someone when they are having thoughts of suicide. Warning Signs that indicate a suicidal crisis may be developing: What (situations, thoughts, feelings, body sensations, behaviors, etc.) do you experience that lets you know you are beginning to think about suicide? 1. Stop eating/drinking  2. Laying in bed throughout the day  3. Crying uncontrollably throughout the day    Internal Coping Strategies:  What things can I do (relaxation techniques, hobbies, physical activities, etc.) to take my mind off my problems without contacting another person? 1. You can't do it because of the kids   2. Deep breathing  3. Being around the kids    People and social settings that provide distraction: Who can I call or where can I go to distract me? 1. Name: Justin Tolentino  Phone: in phone  2. Name:    Phone:     3. Place: visit friend at work            4. Place: go to mother's home    People whom I can ask for help: Who can I call when I need help - for example, friends, family, clergy, someone else? 1. Name:                 Phone: in phone  2. Name: Mother-in-law  Phone: in phone  3. Name: mother  Phone: in phone    Professionals or [address] [place], [place] [address]vd I can contact during a crisis: Who can I call for help - for example, my doctor, my psychiatrist, my psychologist, a mental health provider, a suicide hotline? 1. If you feel overwhelmed, unable to cope, extremely anxious, or have thoughts of wanting to harm yourself or someone else, go to the nearest Emergency Room. [facility] Emergency Room:  [phone]  Crisis line:  [phone]  [facility] [place] (inpatient psych):  [phone] option 1  Call 911- they will send help and get you to the local ER    2. Clinician Name: [facility]   Phone: [phone] option 3         3.  Suicide Prevention Lifeline: 0-555-796-TALK (0073)    Making the environment safe: How can I make my environment (house/apartment/living space) safer? For example, can I remove guns, medications, and other items? 1. Guns/knives are locked up  2. Medications locked up        99 Coping Skills     1. Exercise (running, walking, etc.). 2. Put on fake tattoos. 3. Write (poetry, stories, journal). 4. Scribble/doodle on paper. 5. Be with other people. 6. Watch a favorite TV show. 7. Post on [employer], and answer others' posts. 8. Go see a movie. 9. Do a wordsearch or crossword . 10. Do schoolwork. 11. Play a musical instrument. 12. Paint your nails, do your make-up or hair. 15. Sing. 14. Study the palmira. 15. Punch a punching bag. 16. Cover yourself with Band-Aids where you want to cut. 17. Let yourself cry. 18. Take a nap (only if you are tired). 19. Take a hot shower or relaxing bath. 21. Play with a pet. 21. Go shopping. 22. Clean something. 23. Knit or sew. 24. Read a good book. 25. Listen to music. 26. Try some aromatherapy (candle, lotion, room spray). 27. Meditate. 28. Go somewhere very public. 29. Bake cookies. 30. Alphabetize your CDs/DVDs/books. 31. Paint or draw. 32. Rip paper into itty-bitty pieces   33. Shoot hoops, kick a ball. 29. Write a letter or send an email. 35. Plan your dream room (colors/furniture). [address] a pillow or stuffed animal.   37. Hyperfocus on something like a rock, hand, etc.   38. Dance. 39. Make hot chocolate, milkshake or smoothie. 36. Play with modeling gin or Play-Dough. 41. Build a pillow fort. 42. Go for a nice, long walk. 43. Complete something you've been putting off. 44. Draw on yourself with a marker. 45. Take up a new hobby. 46. Look up recipes, cook a meal.   47. Look at pretty things, like flowers or art. 50. Create or build something. 49. Darwin. 50. Make a list of blessings in your life. 51. Read the Bible. 46. Go to a friend's house. 53. Jump on a trampoline. 47. Watch an old, happy movie. 54. Contact a hotline/ therapists office. 64. Talk to someone close to you. 57. Ride a bicycle. 58. Feed the ducks, birds, or squirrels. 59. Color with Crayons. 61. Memorize a poem, play, or song. 64. Stretch.   62. Search for ridiculous things on the internet. 63. Shop on-line (without buying any-thing). 64. Color-coordinate your wardrobe. 65. Watch fish. 77. Make a CD/playlist of your favorite songs. 67. Play the 15 minute game.  (Avoid something for 15 minutes, when time is up start again.)   68. Plan your birthday/graduation/wedding/prom/other event. 69. Plant some seeds. 79. Hunt for your perfect home or car on-line. 71. Try to make as many words out of your full name as possible . 72. Sort through your photographs. 73. Play with a balloon. 74. Give yourself a facial.   75. Find yourself some toys and play. 68. Start collecting something. 77. Play video/computer games. 78. Clean up trash at your local park. 79. Perform a random act of kindness for someone. 80. Text or call an old friend. 80. Write yourself an \"I love you be-cause\" letter. 82. Look up new words and use them. 83. Rearrange furniture. 80. Write a letter to someone that you may never send. 85. Smile at least at five people. 80. Play with little kids. 87. Go for a walk (with or without a friend). 88. Put a puzzle together. 80. Clean your room /closet. 90. Try to do handstands, cartwheels, or backbends. 91. Yoga. 80. Teach your pet a new trick. 80. Learn a new language. 94. Move EVERYTHING in your room to a new spot. 95. Get together with friends and play Frisbee, soccer or basketball. 80. Hug a friend or family member. 97. Search on-line for new songs/artists. 98. Make a list of goals for the week/month/year/5 years. 99. Face paint. This is a list of coping skills as suggested by other, also normal people.   Feel free to expand on it and share.

## 2022-06-27 NOTE — TELEPHONE ENCOUNTER
----- Message from Makeda Cole sent at 6/27/2022  1:04 PM CDT -----  Subject: Message to Provider    QUESTIONS  Information for Provider? Pt is requesting an antibiotic to [facility] on [address] [place] began yesterday, sinus pain, ear pain. Please advise.  ---------------------------------------------------------------------------  --------------  CALL BACK INFO  What is the best way for the office to contact you? OK to leave message on   voicemail  Preferred Call Back Phone Number? [phone]  ---------------------------------------------------------------------------  --------------  SCRIPT ANSWERS  Relationship to Patient?  Self

## 2022-07-12 ENCOUNTER — OFFICE VISIT (OUTPATIENT)
Dept: PRIMARY CARE CLINIC | Age: 37
End: 2022-07-12
Payer: MEDICARE

## 2022-07-12 VITALS
HEART RATE: 60 BPM | TEMPERATURE: 97 F | SYSTOLIC BLOOD PRESSURE: 126 MMHG | DIASTOLIC BLOOD PRESSURE: 74 MMHG | WEIGHT: 182 LBS | OXYGEN SATURATION: 98 % | HEIGHT: 60 IN | BODY MASS INDEX: 35.73 KG/M2

## 2022-07-12 DIAGNOSIS — H69.83 EUSTACHIAN TUBE DYSFUNCTION, BILATERAL: ICD-10-CM

## 2022-07-12 DIAGNOSIS — L28.2 PRURITIC RASH: Primary | ICD-10-CM

## 2022-07-12 PROCEDURE — 99213 OFFICE O/P EST LOW 20 MIN: CPT | Performed by: FAMILY MEDICINE

## 2022-07-12 RX ORDER — METHYLPREDNISOLONE 4 MG/1
TABLET ORAL
Qty: 1 KIT | Refills: 0 | Status: SHIPPED | OUTPATIENT
Start: 2022-07-12 | End: 2022-07-29 | Stop reason: ALTCHOICE

## 2022-07-12 NOTE — PROGRESS NOTES
[facility] PR[facility]  [address] [address]  [address] [address]  Dept: [phone]  Dept Fax: [phone]  Loc: [phone]      Subjective:     Chief Complaint   Patient presents with    Follow-up     former AW patient    Rash     rash on right arm     Otalgia     bilateral ear pain       HPI:  Regis Barthel is a 42 y.o. female presents today with 2 weeks history of pruritic rash. She states OTC medication has not helped. She also c/o pain in both ears. ROS:   Review of Systems   Constitutional:  Negative for chills and fever. HENT:  Positive for ear pain. Respiratory:  Negative for cough, chest tightness, shortness of breath and wheezing. Cardiovascular:  Negative for chest pain, palpitations and leg swelling. Gastrointestinal:  Negative for abdominal pain, constipation, diarrhea, nausea and vomiting. Genitourinary:  Negative for difficulty urinating, dysuria and frequency. Psychiatric/Behavioral:  Positive for dysphoric mood. Negative for agitation, self-injury, sleep disturbance and suicidal ideas. The patient is nervous/anxious.       PMHx:  Past Medical History:   Diagnosis Date    Abnormal glucose measurement     Abnormal Pap smear of cervix     Anxiety     Blood circulation, collateral     CAD (coronary artery disease)     Cerebral artery occlusion with cerebral infarction [facility]) 2014    right side    Complication of anesthesia     difficulty waking    Depression     Heart disease 06/2020    LOOP recorder     IBS (irritable bowel syndrome)     MDD (major depressive disorder)     MI (myocardial infarction) ([facility] [address] 2014    Miscarriage 06/2012    Neurologic disorder     Postpartum depression     Prolonged emergence from general anesthesia     Schizophrenia ([facility] [address]     Seizures ([facility] [address]     anxiety related (last 6/2020)    Syncope     Tachycardia     UTI (urinary tract infection) 12/09/2019     Patient Active Problem List   Diagnosis    Diffuse cystic mastopathy    Lump or mass in breast left     Family hx-breast malignancy    Coronary artery disease involving native coronary artery of native heart without angina pectoris    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)    Moderate episode of recurrent major depressive disorder (HCC)    Decreased strength of lower extremity    Syncope and collapse    Chronic superficial gastritis    Convulsions (HCC)    Gastric reflux syndrome    Rectal bleed    History of abnormal cervical Pap smear    Abnormal uterine bleeding (AUB)    Schizoaffective disorder, bipolar type (HCC)    Severe episode of recurrent major depressive disorder, without psychotic features ([facility] [address]    HEMANTH (generalized anxiety disorder)    Chronic migraine without aura, intractable, without status migrainosus       PSHx:  Past Surgical History:   Procedure Laterality Date    CHOLECYSTECTOMY, LAPAROSCOPIC      COLONOSCOPY N/A 11/7/2019    Dr Tha Dickerson, 10 yr recall    EGD  2016    [address]      lower right leg, has metal plate and screws    FRACTURE SURGERY      left wrist    HYSTERECTOMY (CERVIX STATUS UNKNOWN) N/A 6/3/2021    TOTAL LAPAROSCOPIC HYSTERECTOMY WITH DAVINCI CYSTOSCOPY performed by Stephanie Rincon MD at [facility]  2014    loop recorder    INTRAUTERINE DEVICE INSERTION  06/26/2016    Essure placement    UPPER GASTROINTESTINAL ENDOSCOPY N/A 11/7/2019    Dr Chun Arriaza       PFHx:  Family History   Problem Relation Age of Onset    High Blood Pressure Mother     Diabetes Father     Heart Disease Father     Stomach Cancer Father     Breast Cancer Other         maternal great aunt    Colon Cancer Paternal Grandmother     Colon Polyps Neg Hx     Esophageal Cancer Neg Hx     Liver Cancer Neg Hx     Liver Disease Neg Hx     Rectal Cancer Neg Hx        SocialHx:  Social History     Tobacco Use    Smoking status: Former     Packs/day: 0.50     Years: 3.00     Pack years: 1.50     Types: Cigarettes     Quit date:      Years since quittin.5    Smokeless tobacco: Former     Quit date: 2014   Substance Use Topics    Alcohol use: Yes     Comment: occ       Allergies: Allergies   Allergen Reactions    Advil [Ibuprofen]      Facial swelling       Medications:  Current Outpatient Medications   Medication Sig Dispense Refill    triamcinolone (KENALOG) 0.1 % ointment Apply topically 2 times daily for 7 days 30 g 0    methylPREDNISolone (MEDROL DOSEPACK) 4 MG tablet Take by mouth. 1 kit 0    bisoprolol (ZEBETA) 5 MG tablet Take 1 tablet by mouth 2 times daily 60 tablet 5    ARIPiprazole (ABILIFY) 5 MG tablet Take 1.5 tablets by mouth nightly 45 tablet 5    famotidine (PEPCID) 20 MG tablet TAKE 1 TABLET BY MOUTH TWICE A DAY      butalbital-APAP-caffeine -40 MG CAPS per capsule Take 1 capsule by mouth every 6 hours as needed for Headaches 40 capsule 3     No current facility-administered medications for this visit. Objective:   PE:  /74   Pulse 60   Temp 97 °F (36.1 °C)   Ht 5' (1.524 m)   Wt 182 lb (82.6 kg)   LMP 2021 (Approximate)   SpO2 98%   BMI 35.54 kg/m²   Physical Exam  Vitals and nursing note reviewed. Constitutional:       General: She is not in acute distress. Appearance: She is well-developed. She is not ill-appearing. HENT:      Head: Normocephalic and atraumatic. Right Ear: Tympanic membrane, ear canal and external ear normal.      Left Ear: Tympanic membrane, ear canal and external ear normal.      Nose: Nose normal.      Mouth/Throat:      Mouth: Mucous membranes are moist.   Eyes:      Conjunctiva/sclera: Conjunctivae normal.      Pupils: Pupils are equal, round, and reactive to light. Cardiovascular:      Rate and Rhythm: Normal rate and regular rhythm. Heart sounds: Normal heart sounds. No murmur heard. Pulmonary:      Effort: Pulmonary effort is normal. No respiratory distress.       Breath sounds: Normal breath sounds. No wheezing or rales. Abdominal:      General: Bowel sounds are normal.      Palpations: Abdomen is soft. Tenderness: There is no abdominal tenderness. Musculoskeletal:         General: No swelling. Cervical back: Neck supple. Lymphadenopathy:      Cervical: No cervical adenopathy. Skin:     General: Skin is warm and dry. Findings: Rash present. Neurological:      Mental Status: She is alert and oriented to person, place, and time. Mental status is at baseline. Psychiatric:         Mood and Affect: Mood is depressed. Mood is not anxious. Speech: Speech normal.         Behavior: Behavior normal.         Thought Content: Thought content does not include homicidal or suicidal ideation. Cognition and Memory: Cognition is not impaired. Judgment: Judgment normal.          Assessment & Plan   Diana was seen today for follow-up, rash and otalgia. Diagnoses and all orders for this visit:    Pruritic rash  -     triamcinolone (KENALOG) 0.1 % ointment; Apply topically 2 times daily for 7 days  -     methylPREDNISolone (MEDROL DOSEPACK) 4 MG tablet; Take by mouth. Eustachian tube dysfunction, bilateral       Return in about 4 weeks (around 8/9/2022) for follow-up recent visit. All questions were answered. Medications, including possible adverse effects, and instructions were reviewed and  understanding was confirmed. Follow-up recommendations, including when to contact or return to office (ie; if symptoms worsen or fail to improve), were discussed and acknowledged.     Electronically signed by Genoveva Mays MD on 7/12/22 at 2:23 PM CDT

## 2022-07-15 ASSESSMENT — ENCOUNTER SYMPTOMS
SHORTNESS OF BREATH: 0
ABDOMINAL PAIN: 0
CHEST TIGHTNESS: 0
COUGH: 0
VOMITING: 0
DIARRHEA: 0
CONSTIPATION: 0
NAUSEA: 0
WHEEZING: 0

## 2022-07-25 ENCOUNTER — TELEMEDICINE (OUTPATIENT)
Dept: PSYCHOLOGY | Age: 37
End: 2022-07-25
Payer: MEDICARE

## 2022-07-25 DIAGNOSIS — F25.0 SCHIZOAFFECTIVE DISORDER, BIPOLAR TYPE (HCC): Primary | ICD-10-CM

## 2022-07-25 PROCEDURE — 90834 PSYTX W PT 45 MINUTES: CPT | Performed by: SOCIAL WORKER

## 2022-07-25 ASSESSMENT — PATIENT HEALTH QUESTIONNAIRE - PHQ9
2. FEELING DOWN, DEPRESSED OR HOPELESS: 3
7. TROUBLE CONCENTRATING ON THINGS, SUCH AS READING THE NEWSPAPER OR WATCHING TELEVISION: 3
6. FEELING BAD ABOUT YOURSELF - OR THAT YOU ARE A FAILURE OR HAVE LET YOURSELF OR YOUR FAMILY DOWN: 3
3. TROUBLE FALLING OR STAYING ASLEEP: 3
SUM OF ALL RESPONSES TO PHQ QUESTIONS 1-9: 27
10. IF YOU CHECKED OFF ANY PROBLEMS, HOW DIFFICULT HAVE THESE PROBLEMS MADE IT FOR YOU TO DO YOUR WORK, TAKE CARE OF THINGS AT HOME, OR GET ALONG WITH OTHER PEOPLE: 3
5. POOR APPETITE OR OVEREATING: 3
8. MOVING OR SPEAKING SO SLOWLY THAT OTHER PEOPLE COULD HAVE NOTICED. OR THE OPPOSITE, BEING SO FIGETY OR RESTLESS THAT YOU HAVE BEEN MOVING AROUND A LOT MORE THAN USUAL: 3
SUM OF ALL RESPONSES TO PHQ QUESTIONS 1-9: 24
SUM OF ALL RESPONSES TO PHQ9 QUESTIONS 1 & 2: 6
SUM OF ALL RESPONSES TO PHQ QUESTIONS 1-9: 27
9. THOUGHTS THAT YOU WOULD BE BETTER OFF DEAD, OR OF HURTING YOURSELF: 3
SUM OF ALL RESPONSES TO PHQ QUESTIONS 1-9: 27
4. FEELING TIRED OR HAVING LITTLE ENERGY: 3
1. LITTLE INTEREST OR PLEASURE IN DOING THINGS: 3

## 2022-07-25 ASSESSMENT — ANXIETY QUESTIONNAIRES
7. FEELING AFRAID AS IF SOMETHING AWFUL MIGHT HAPPEN: 3-NEARLY EVERY DAY
1. FEELING NERVOUS, ANXIOUS, OR ON EDGE: 3
3. WORRYING TOO MUCH ABOUT DIFFERENT THINGS: 3-NEARLY EVERY DAY
2. NOT BEING ABLE TO STOP OR CONTROL WORRYING: 3-NEARLY EVERY DAY
GAD7 TOTAL SCORE: 21
6. BECOMING EASILY ANNOYED OR IRRITABLE: 3-NEARLY EVERY DAY
4. TROUBLE RELAXING: 3-NEARLY EVERY DAY
5. BEING SO RESTLESS THAT IT IS HARD TO SIT STILL: 3-NEARLY EVERY DAY

## 2022-07-25 ASSESSMENT — COLUMBIA-SUICIDE SEVERITY RATING SCALE - C-SSRS
2. HAVE YOU ACTUALLY HAD ANY THOUGHTS OF KILLING YOURSELF?: YES
1. WITHIN THE PAST MONTH, HAVE YOU WISHED YOU WERE DEAD OR WISHED YOU COULD GO TO SLEEP AND NOT WAKE UP?: YES
6. HAVE YOU EVER DONE ANYTHING, STARTED TO DO ANYTHING, OR PREPARED TO DO ANYTHING TO END YOUR LIFE?: NO
5. HAVE YOU STARTED TO WORK OUT OR WORKED OUT THE DETAILS OF HOW TO KILL YOURSELF? DO YOU INTEND TO CARRY OUT THIS PLAN?: NO
3. HAVE YOU BEEN THINKING ABOUT HOW YOU MIGHT KILL YOURSELF?: NO
4. HAVE YOU HAD THESE THOUGHTS AND HAD SOME INTENTION OF ACTING ON THEM?: NO
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
## 2022-07-25 NOTE — PATIENT INSTRUCTIONS
Scheduled follow up appointment. Call for a sooner appointment if needed or if you need to change or cancel you appointment. Nhi May, [phone] and ask for Nhi, She might answer it as NEW [facility]\"  You can also send her a message on My Chart. Be aware that all my messages are linked to her. If you receive a survey after visiting one of our offices, please take time to share your experience about your office visit. These surveys are confidential and no health information about you is shared. We highly welcome your feedback to continuously improve our services for you. Recommendations to patient:      1. Practice new coping, stress management, relaxation skills at least                   two times a day for at least 10-30 minutes. 2. Find at least one positive outlet per day that makes you feel better. 3. Talk things over with a good friend. Practice letting things go. 4. Stop, breathe, reset. \"I am ok. \"    Coping card:    Run for 15 minutes  Write thoughts and feelings for 10-15 minutes  Artistic expression (coloring and painting) for 20 minutes  Talk about my frustrations to myself  Talk it out with friends and get second opinions  Go to the ER or call 911    99 Coping Skills     1. Exercise (running, walking, etc.). 2. Put on fake tattoos. 3. Write (poetry, stories, journal). 4. Scribble/doodle on paper. 5. Be with other people. 6. Watch a favorite TV show. 7. Post on [employer], and answer others' posts. 8. Go see a movie. 9. Do a wordsearch or crossword . 10. Do schoolwork. 11. Play a musical instrument. 12. Paint your nails, do your make-up or hair. 15. Sing. 14. Study the palmira. 15. Punch a punching bag. 16. Cover yourself with Band-Aids where you want to cut. 17. Let yourself cry. 18. Take a nap (only if you are tired). 19. Take a hot shower or relaxing bath. 21. Play with a pet. 21. Go shopping. 22. Clean something.    23. Knit something. 77. Play video/computer games. 78. Clean up trash at your local park. 79. Perform a random act of kindness for someone. 80. Text or call an old friend. 80. Write yourself an \"I love you be-cause\" letter. 82. Look up new words and use them. 83. Rearrange furniture. 80. Write a letter to someone that you may never send. 85. Smile at least at five people. 80. Play with little kids. 87. Go for a walk (with or without a friend). 88. Put a puzzle together. 80. Clean your room /closet. 90. Try to do handstands, cartwheels, or backbends. 91. Yoga. 80. Teach your pet a new trick. 80. Learn a new language. 94. Move EVERYTHING in your room to a new spot. 95. Get together with friends and play Frisbee, soccer or basketball. 80. Hug a friend or family member. 97. Search on-line for new songs/artists. 98. Make a list of goals for the week/month/year/5 years. 99. Face paint. This is a list of coping skills as suggested by other, also normal people. Feel free to expand on it and share. List Of Pleasurable Activities  Depression tried to fool us into thinking that we can rest our way out of depression, but this couldn't be farther from the truth. This is one of the evil tricks of depression. To combat this the pleasurable activities list can provide options of things to do to get people doing something/anything. Your assignment is to pick an activity that you are going to try each day either from this list or perhaps reading this list has reminded you of something else you can do. It can be a different activity each day or a different one each day. Once you have selected something then rate your mood before and after you do the activity using a scale (0-10, where 0 is the worst you have ever felt and 10 is the best you ever felt, or smiley face to frowning face, or whatever makes sense to you).  These activities are not meant to be a cure, but will help you combat the trick of depression that tries to convince you that you can rest your way out of depression. 1. Set aside a day with nothing to do  2. Acting  3. Apply fresh deodorant/antiperspirant 4. Arranging flowers  5. Ask a friend to play an instrument for you 6. Ask a friend to sing to you or with you  7. Attend a Latter day service    8. Attend hearings in public courts  9. Bake fresh bread or brownies  10. Bake goodies for someone  6. Being alone  12. Build a fire in your fireplace and notice the smell 13. Build a model  14. Burn incense or scented candles  15. Buy a noise machine with nature sounds  16. Buy a decorative centerpiece  17. Buy a gift certificate for someone else  25. Buy and look at a beautiful painting, print, or poster  19. Buy someone a subscription to their favorite publication  21. Buying books    24. Buying clothes  22. Buying gifts  21. Buying household gadgets  24. Buying music25. Buying things for myself (perfume, golf balls, etc. )  26. Call a friend  32. Call a toll-free line to hear a human voice  28. Call a weather, time, and temperature line  29. Call joke lines  30. Chew your favorite gum, or try a new one  31. Chop wood  32. Clean your home  33. Cleaning  34. Collecting free travel brochures and looking at them  35. Collecting old things  39. Collecting shells    37. Collecting things (coins, shells, etc. )  38. Completing a task  39. Cooking    40. Dancing  41. Daydreaming 42. Debate an issue with someone  37. Discussing books with others  40. Do volunteer work ([facility] [facility], hospital, etc. )  45. Do yoga   46. Do your homework 47. Doing arts and crafts  50. Doing crossword puzzles or suduko  49. Doing needlepoint or hook-rugging  50. Doing something new  51. Doing something spontaneously  52. Doing woodworking 53. Donate money to a cause you believe in  47. Doodling  55. Dressing up and looking nice 56. Drink flavored milk  57. Drink herbal tea  58. Drink warm milk 59.  Drive across a prairie or up a mountain  60. Drive or walk around your town and look at architecture    61. Driving  62. Early morning coffee and newspaper  63. Eat hot toast  64. Eat peppermint or cinnamon candy, slowly 65. Eating  66. Entertaining  67. Exercising  68. Fantasizing about the future 69. Flying in a plane  70. Flying kites  71. Gambling  72. Gardening 73. Get a massage  74. Getting out of (paying on) debt  75. Give or get a back rub  76. Go for a swim  77. Go look at the ocean  78. Go on a ferry ride  79. Go on a train  80. Go to a bakery or café and stand around, taking in the smells  81. Go to a museum  82. Go to the zoo and look at the animals  83. Go to wooded area and notice the smells  84. Go window-shopping  85. Go bike riding  86. Go bowling  87. Go camping  88. Go fishing  89. Go for a drive  90. Go hiking  91. Go home from work  92. Go horseback riding  93. Go on a picnic  94. Go on vacation  95. Go out to dinner  96. Go sail-boating    97. Go skating  98. Go skiing  99. Go swimming  100. Go to a movie in the middle of the week  101. Go to a party  102. Go to a spectator sport (baseball, basketball, tennis, soccer, auto racing, horse racing) 103. Go to museums  104. Go to plays and concerts  007. Go to the beach 106. Go to the beauty parlor  107. Go to the mountains  108. Hang pictures on your walls  109. Have a bowl of your favorite soup  110. Have a friend read to you  111. Have an ice cream cone or make an ice cream sundae 112. Have some heated water with lemon squeezed into it  113. Have a political discussion  114. Have an aquarium  115. Have a class reunions  116. Have discussions with friends  [phone]. Have family get-togethers  80. Have lunch with a friend  119. Have quiet evenings  120. Hobbies (stamp collecting, model building, etc. )  121. Hold hands  122. Hug someone  123. Hum a tune  124. Jogging, walking  125. Knitting  126. Landscape  127. Laughing  128. Lighting candles  129.  Listen to Play cards  182. Play golf 183. Play guitar or other musical instrument    184. Play musical instruments  185. Play soccer  186. Play softball  187. Play tennis  188. Play the imaginary drums or darbuka  189. Playing volleyball  190. Playing with animals  191. Practice a foreign language  192. Practicing karate, judo, yoga  193. Practicing Oriental orthodox (going to Restorationist, group praying, etc. )  194. [place] prayers for the well-being of others  195. Put change in an  parking meter  196. Put clean sheets on your bed and climb in  197. Put on cologne or perfume  198. Put up seasonal decorations  199. Read a book  200. Read a suspenseful novel or mystery  201. Read biographies  56. Read emotional books or stories that trigger different emotions  203. Read funny greeting cards  204. Read how-to books  205. Read inspirational literature  206. Read joke books  207. Read out loud  208. Read Zoroastrianism and spiritual literature 209. Reading fiction  210. Reading magazines or newspapers  211. Reading nonfiction  212. Recalling past parties  [id number] Recycling old items 214. Refinishing furniture  215. Reflecting on how Ive improved    216. Relaxing  217. Remembering beautiful scenery  26. Remembering the words and deeds of loving people  26. Repairing things around the house  220. Riding a motorbike  221. Rub your temples and forehead  222. Running track  223. Sample foods at your local deli  224. Sample perfumes and colognes at local department store  225. Saving money  226. Saying I love you  227. Search the Internet for information about emotions  228. Seeing and/or showing photos or slides  229. Send a thank-you note to someone 230. Send e-mail  231. Send flowers anonymously  [phone]. Send out cards to loved ones  233. Sewing  234. Shooting pool  235. Sightseeing  236. Sing to yourself  237. Singing around the house  238. Singing with groups 239. Sitting in a sidewalk cafe  240. Sleeping  241.  Slowly and mindfully drink a warm drink, feeling its warmth entering you  242. Slowly eat your favorite food, savoring every bite  243. Smell flowers  244. Smell fresh laundry  245. Soak your feet in warm water, a pool, or a stream 246. Soaking in the bathtub    247. Solving riddles mentally  248. Spending an evening with good friend    249. Splurging  250. Spray air freshener around your home  251. Squish your toes in mud  252. Start a petition for a cause or political issue you think is worthy    253. Staying on a diet  254. Take a bus ride  255. Take a friend to a spa    256. Take a long and luxurious bath  257. Take a long hot shower  258. Take inventory of your wardrobe  259. Take a sauna or a steam bath  260. Take ballet, tap dancing  261. Take care of my plants  262. Take children places  263. Talk on the phone  264. Thinking I did that pretty well after doing something  265. Think about becoming active in the community 266. Think about buying things  267. Think about getting   268. Think about having a family  56. Think about my good qualities 270. Think about past trips  271. Think about pleasant events  272. Think about skilled nursing  273. Think how it will be when I finish school    274. Think I have a lot more going for me than most people  275. Think I have done a full days work  276. Think Im a person who can cope  277. Think Im an OK person  278. Think Confucianist thoughts  279. Thoughts about happy moments in my childhood  280. Throw a surprise birthday party  200. Tie a dollar bill to a helium balloon and release it into the palmira  282. Traveling abroad or in the [facility]  283. Travel to national trinidad  284. Try to recall the features of faces you havent seen in a while  285. Try to remember every detail of a beautiful day you had    286. Try to understand obscure poetry 287. Turn on a fan, air purifier, or anything else that makes white noise 288. Use air freshener plug-ins  289.  Use the Internet to build a resources file  290. Visit shut-ins  291. Visit someone who is sick    0. Volunteer work 293. Walk barefoot through sand, mud, or grass  294. Walks in the woods (or at [employer])  295. Wash your hair with fruit-scented shampoo (strawberry, banana, etc. )  296. Watch a thunderstorm  297. Watch inspirational and emotional movies  298. Watch nature shows  299. Watch the sunrise or sunset    300. Watch children play  301. Watch sports  302. Watch TV  303. Whistle    304. White-water canoeing  305. Work crossword or jigsaw puzzles  306. Work logic problems  307. Work out with [employer]  308. Working  309. Working on my car or bicycle  310. Write a letter of reference  311. Write a letter to the   [id number]. Write a mission statement for your life  26. Write a note of appreciation or encouragement to someone you know  314. Write about the way you would like your life to be    315. Write letters to friends, family, politicians  [phone]. Write out your solution to a political or social problem  317. Writing books, blogs, poems, letters to the , op-ed pieces, or articles  318. Writing diary entries or letters     SAFETY PLAN    A suicide Safety Plan is a document that supports someone when they are having thoughts of suicide. Warning Signs that indicate a suicidal crisis may be developing: What (situations, thoughts, feelings, body sensations, behaviors, etc.) do you experience that lets you know you are beginning to think about suicide? 1. Stop eating/drinking  2. Laying in bed throughout the day  3. Crying uncontrollably throughout the day    Internal Coping Strategies:  What things can I do (relaxation techniques, hobbies, physical activities, etc.) to take my mind off my problems without contacting another person? 1. You can't do it because of the kids   2. Deep breathing  3. Being around the kids    People and social settings that provide distraction: Who can I call or where can I go to distract me? 1.  Name: Manda Phi  Phone: in phone  2. Name: ______  Phone: _______   3. Place: visit friend at work            4. Place: go to mother's home    People whom I can ask for help: Who can I call when I need help - for example, friends, family, clergy, someone else? 1. Name:                 Phone: in phone  2. Name: Mother-in-law  Phone: in phone  3. Name: mother  Phone: in phone    Professionals or [address] [place] [address] I can contact during a crisis: Who can I call for help - for example, my doctor, my psychiatrist, my psychologist, a mental health provider, a suicide hotline? 1. If you feel overwhelmed, unable to cope, extremely anxious, or have thoughts of wanting to harm yourself or someone else, go to the nearest Emergency Room. [place] [facility]:  [phone]  Crisis line:  [phone]  [address] [place] (inpatient psych):  [phone] option 1  Call 911- they will send help and get you to the local ER    2. Clinician Name: [facility]   Phone: [phone] option 3         3. Suicide Prevention Lifeline: 1-041-898-TALK (2969)    Making the environment safe: How can I make my environment (house/apartment/living space) safer? For example, can I remove guns, medications, and other items? 1. Guns/knives are locked up  2.  Medications locked up

## 2022-07-28 ENCOUNTER — TELEPHONE (OUTPATIENT)
Dept: PRIMARY CARE CLINIC | Age: 37
End: 2022-07-28

## 2022-07-28 NOTE — TELEPHONE ENCOUNTER
----- Message from Brittney Irvin MA sent at 7/28/2022 10:04 AM CDT -----  Subject: Message to Provider    QUESTIONS  Information for Provider? Pt states rash and itching are no better maybe   even worse cream not helping request something else. Layla Talbot Dr.   ---------------------------------------------------------------------------  --------------  [address] [employer]  [phone]; OK to leave message on [employer], Sandra Hernandez to respond with   electronic message via [employer] portal (only for patients who have   registered [employer] account)  ---------------------------------------------------------------------------  --------------  SCRIPT ANSWERS  Relationship to Patient?  Self

## 2022-07-29 ENCOUNTER — OFFICE VISIT (OUTPATIENT)
Dept: PRIMARY CARE CLINIC | Age: 37
End: 2022-07-29
Payer: MEDICARE

## 2022-07-29 VITALS
DIASTOLIC BLOOD PRESSURE: 76 MMHG | HEART RATE: 61 BPM | HEIGHT: 60 IN | TEMPERATURE: 97.5 F | SYSTOLIC BLOOD PRESSURE: 122 MMHG | WEIGHT: 181.8 LBS | BODY MASS INDEX: 35.69 KG/M2 | OXYGEN SATURATION: 99 %

## 2022-07-29 DIAGNOSIS — L24.9 IRRITANT CONTACT DERMATITIS, UNSPECIFIED TRIGGER: Primary | ICD-10-CM

## 2022-07-29 PROCEDURE — 99213 OFFICE O/P EST LOW 20 MIN: CPT | Performed by: FAMILY MEDICINE

## 2022-07-29 RX ORDER — HYDROXYZINE HYDROCHLORIDE 25 MG/1
25 TABLET, FILM COATED ORAL EVERY 8 HOURS PRN
Qty: 30 TABLET | Refills: 0 | Status: SHIPPED | OUTPATIENT
Start: 2022-07-29 | End: 2022-08-08

## 2022-07-29 RX ORDER — PREDNISONE 1 MG/1
5 TABLET ORAL 2 TIMES DAILY
Qty: 20 TABLET | Refills: 0 | Status: SHIPPED | OUTPATIENT
Start: 2022-07-29 | End: 2022-08-08

## 2022-07-29 NOTE — PROGRESS NOTES
[address] [facility]  [address] [address]  [address] [address]  Dept: [phone]  Dept Fax: [phone]  Loc: [phone]      Subjective:     Chief Complaint   Patient presents with    Rash     Rash has worsened since last visit, could not take steroid due to high heart rate. Cream is not helping and it seems to make the itching worse    Pruritis       HPI:  Mariela Cole is a 42 y.o. female presents today with an itchy rash x 3-4 days ago. She states she cannot take any strong steroids - it affects her heart ? ?   Pt c/o pruritis    ROS:   Review of Systems    PMHx:  Past Medical History:   Diagnosis Date    Abnormal glucose measurement     Abnormal Pap smear of cervix     Anxiety     Blood circulation, collateral     CAD (coronary artery disease)     Cerebral artery occlusion with cerebral infarction [facility]) 2014    right side    Complication of anesthesia     difficulty waking    Depression     Heart disease 06/2020    LOOP recorder     IBS (irritable bowel syndrome)     MDD (major depressive disorder)     MI (myocardial infarction) ([facility] [address] 2014    Miscarriage 06/2012    Neurologic disorder     Postpartum depression     Prolonged emergence from general anesthesia     Schizophrenia ([facility] [address]     Seizures ([facility] [address]     anxiety related (last 6/2020)    Syncope     Tachycardia     UTI (urinary tract infection) 12/09/2019     Patient Active Problem List   Diagnosis    Diffuse cystic mastopathy    Lump or mass in breast left     Family hx-breast malignancy    Coronary artery disease involving native coronary artery of native heart without angina pectoris    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)    Moderate episode of recurrent major depressive disorder (HCC)    Decreased strength of lower extremity    Syncope and collapse    Chronic superficial gastritis    Convulsions (HCC)    Gastric reflux syndrome    Rectal bleed    History of abnormal cervical Pap smear    Abnormal uterine bleeding (AUB)    Schizoaffective disorder, bipolar type (HCC)    Severe episode of recurrent major depressive disorder, without psychotic features ([facility] [address]    HEMANTH (generalized anxiety disorder)    Chronic migraine without aura, intractable, without status migrainosus       PSHx:  Past Surgical History:   Procedure Laterality Date    CHOLECYSTECTOMY, LAPAROSCOPIC      COLONOSCOPY N/A 2019    Dr Reshma Trujillo, 10 yr recall    EGD  2016    [address]      lower right leg, has metal plate and screws    FRACTURE SURGERY      left wrist    HYSTERECTOMY (CERVIX STATUS UNKNOWN) N/A 6/3/2021    TOTAL LAPAROSCOPIC HYSTERECTOMY WITH DAVINCI CYSTOSCOPY performed by Porsche Davis MD at [facility]  2014    loop recorder    INTRAUTERINE DEVICE INSERTION  2016    Essure placement    UPPER GASTROINTESTINAL ENDOSCOPY N/A 2019    Dr Jannet Merlos       PFHx:  Family History   Problem Relation Age of Onset    High Blood Pressure Mother     Diabetes Father     Heart Disease Father     Stomach Cancer Father     Breast Cancer Other         maternal great aunt    Colon Cancer Paternal Grandmother     Colon Polyps Neg Hx     Esophageal Cancer Neg Hx     Liver Cancer Neg Hx     Liver Disease Neg Hx     Rectal Cancer Neg Hx        SocialHx:  Social History     Tobacco Use    Smoking status: Former     Packs/day: 0.50     Years: 3.00     Pack years: 1.50     Types: Cigarettes     Quit date:      Years since quittin.5    Smokeless tobacco: Former     Quit date: 2014   Substance Use Topics    Alcohol use: Yes     Comment: occ       Allergies: Allergies   Allergen Reactions    Advil [Ibuprofen]      Facial swelling       Medications:  Current Outpatient Medications   Medication Sig Dispense Refill    predniSONE (DELTASONE) 5 MG tablet Take 1 tablet by mouth in the morning and 1 tablet before bedtime. Do all this for 10 days.  20 tablet 0    hydrOXYzine HCl (ATARAX) 25 MG tablet Take 1 tablet by mouth every 8 hours as needed for Itching 30 tablet 0    bisoprolol (ZEBETA) 5 MG tablet Take 1 tablet by mouth 2 times daily 60 tablet 5    ARIPiprazole (ABILIFY) 5 MG tablet Take 1.5 tablets by mouth nightly 45 tablet 5    famotidine (PEPCID) 20 MG tablet TAKE 1 TABLET BY MOUTH TWICE A DAY      butalbital-APAP-caffeine -40 MG CAPS per capsule Take 1 capsule by mouth every 6 hours as needed for Headaches 40 capsule 3     No current facility-administered medications for this visit. Objective:   PE:  /76   Pulse 61   Temp 97.5 °F (36.4 °C)   Ht 5' (1.524 m)   Wt 181 lb 12.8 oz (82.5 kg)   LMP 05/23/2021 (Approximate)   SpO2 99%   BMI 35.51 kg/m²   Physical Exam  Constitutional:       General: She is in acute distress (anxious). Appearance: She is obese. Cardiovascular:      Heart sounds: Normal heart sounds. Pulmonary:      Breath sounds: Normal breath sounds. No wheezing. Skin:     General: Skin is warm. Findings: Rash (linear, slightly raised rash in exposed areas of the arms, few excoriations in her abdomen) present. The rest of exam unremarkable    Assessment & Plan   Diana was seen today for rash and pruritis. Diagnoses and all orders for this visit:    Irritant contact dermatitis, unspecified trigger  -     predniSONE (DELTASONE) 5 MG tablet; Take 1 tablet by mouth in the morning and 1 tablet before bedtime. Do all this for 10 days. -     hydrOXYzine HCl (ATARAX) 25 MG tablet; Take 1 tablet by mouth every 8 hours as needed for Itching      Return for follow up as needed. All questions were answered. Medications, including possible adverse effects, and instructions were reviewed and  understanding was confirmed. Follow-up recommendations, including when to contact or return to office (ie; if symptoms worsen or fail to improve), were discussed and acknowledged.     Electronically signed by Silvia Cano

## 2022-08-08 ENCOUNTER — HOSPITAL ENCOUNTER (OUTPATIENT)
Dept: PAIN MANAGEMENT | Age: 37
Discharge: HOME | End: 2022-08-08
Payer: MEDICARE

## 2022-08-08 VITALS
DIASTOLIC BLOOD PRESSURE: 49 MMHG | HEART RATE: 54 BPM | TEMPERATURE: 96 F | SYSTOLIC BLOOD PRESSURE: 105 MMHG | OXYGEN SATURATION: 100 % | RESPIRATION RATE: 18 BRPM

## 2022-08-08 DIAGNOSIS — G43.719 CHRONIC MIGRAINE WITHOUT AURA, INTRACTABLE, WITHOUT STATUS MIGRAINOSUS: Primary | ICD-10-CM

## 2022-08-08 PROCEDURE — A4216 STERILE WATER/SALINE, 10 ML: HCPCS

## 2022-08-08 PROCEDURE — 64615 CHEMODENERV MUSC MIGRAINE: CPT | Performed by: PSYCHIATRY & NEUROLOGY

## 2022-08-08 PROCEDURE — 2580000003 HC RX 258

## 2022-08-08 PROCEDURE — 6360000002 HC RX W HCPCS

## 2022-08-08 PROCEDURE — 64615 CHEMODENERV MUSC MIGRAINE: CPT

## 2022-08-08 PROCEDURE — 6360000002 HC RX W HCPCS: Performed by: PSYCHIATRY & NEUROLOGY

## 2022-08-08 RX ORDER — SODIUM CHLORIDE 0.9 % (FLUSH) 0.9 %
5 SYRINGE (ML) INJECTION ONCE
Status: DISCONTINUED | OUTPATIENT
Start: 2022-08-08 | End: 2022-08-10 | Stop reason: HOSPADM

## 2022-08-08 NOTE — PROGRESS NOTES
[facility] Botox Procedure Note     Patient:   Belgica Edmondson  MR#:    [id number]  Account Number:                   [id number]      YOB: 1985  Date of Evaluation:  8/8/2022  Time of Note:                          4:39 PM  Primary Physician:    Mega Randhawa MD   Consulting Physician:  Janell Lima DO    Consent was signed and on the chart. Risk, benefits, and side effects discussed. Pt has a clear history of having more than 15 days/month of migraine, lasting more than 4 hours with multiple treatment failures. Patient states that he/she has 30 headaches out of 30 days each month. Patient states that he/she has 30 migraines out of 30 days each month. Vial Exp Date: 9/23  Nakul Parker Lot Number:  [id number] x 2    Botox was diluted with 0.9% NS to yield a final concentration of 50 units / 1 ml. The following muscles were injected in 0.1 ml (5 unit) increments:    -   5 units left, 5 units right  Procerus-      5 units  Frontalis-      20 units divided into 4 sites left and right  Temporalis-  40 units divided into 4 sites left and 4 sites right  Occipitalis-    30 units divided into 3 sites left and 3 sites right  Cervical Paraspinal-  20 units divided into 2 sites left and 2 sites right  Trapezius-     30 units divided into 3 sites left and 3 sites right    Total units injected: 155  Total units unavoidably discarded: 45    Pt tolerated the procedure well. There were no complications. Pt will follow up in 6 weeks to assess effectiveness and will repeat injections in 12 weeks if continues to benefit.         Janell Lima DO  Board Certified Neurologist

## 2022-08-08 NOTE — PROGRESS NOTES
Procedure:  Level of Consciousness: [x]Alert [x]Oriented []Disoriented []Lethargic  Anxiety Level: [x]Calm []Anxious []Depressed []Other  Skin: []Warm [x]Dry []Cool []Moist []Intact []Other  Cardiovascular: [x]Palpitations: []Never [x]Occasionally []Frequently  Chest Pain: [x]No []Yes  Respiratory:  [x]Unlabored []Labored []Cough ([] Productive []Unproductive)  HCG Required: [x]No []Yes   Results: []Negative []Positive  Knowledge Level:        [x]Patient/Other verbalized understanding of pre-procedure instructions. [x]Assessment of post-op care needs (transportation, responsible caregiver)        []Able to discuss health care problems and how to deal with it. Factors that Affect Teaching:        Language Barrier: [x]No []Yes - why:        Hearing Loss:        [x]No []Yes            Corrective Device:  []Yes []No        Vision Loss:           [x]No []Yes            Corrective Device:  []Yes []No        Memory Loss:       []No []Yes            []Short Term []Long Term  Motivational Level:  [x]Asks Questions                  []Extremely Anxious       [x]Seems Interested               []Seems Uninterested                  [x]Denies need for Education  Risk for Injury:  [x]Patient oriented to person, place and time  []History of frequent falls/loss of balance  Nutritional:  []Change in appetite   []Weight Gain   []Weight Loss  Functional:  []Requires assistance with ADL's      Botox Assessment  [] Patient  has had a reduction of at least 100 hours (5 Days) in Migraines since receiving Botox  []  []  []  Factors that Affect Teaching:  Assessment of post-op care needs (transportation, responsible caregiver)        []Able to discuss health care problems and how to deal with it. Factors that Affect Teaching: Patient states that he/she has __30____ headaches out of 30 days each month.   Patient states that he/she has __30____ migraines out of 30 days each month.monthly

## 2022-08-08 NOTE — DISCHARGE INSTRUCTIONS
[facility]  Post Procedure Discharge Instructions        YOU HAVE HAD THE FOLLOWING PROCEDURE:                                  [] Occipital Nerve Blocks  [] CTS wrist injection(s)  [] Knee Injection(s)         [] Shoulder Injection(s)   [] Elbow Injection(s)     [x] Botox Injection  [] Cervical Trigger Point Injections    [] Thoracic Trigger Point Injections    [] Lumbar Trigger Point Injections  [] Piriformis Trigger Point Injections  [] SI Joint Injection(s)     [] Trochanteric Bursa Injection(s)       [] Ankle Injection(s)   [] Plantar Fasciitis   []  ______________  Injection(s) [x] Botox [x]  Migraines [] Spasticity    YOU HAVE RECEIVED THE FOLLOWING MEDICATIONS IN YOUR INJECTION(s)  [] Lidocaine [] Bupivacaine   [] DepoMedrol (steroid) [] Decadron (steroid)  []  Kenalog (steroid)   [] Toradol  [] Supartz [] Arbutus Boozer    [x] Botox        PATIENT INFORMATION:   You may experience the following symptoms after your procedure. These symptoms are normal and should not cause concern: You may have an increase in your pain. This may last 24 - 48 hours after your procedure. You may have no change in the pain that you had prior to your injection(s). You may have weakness or numbness in your affected extremity. If this occurs, this may last until numbing the medication wears off. REPORT THE FOLLOWING SYMPTOMS TO YOUR DOCTOR:  Redness, swelling or drainage at the injection site(s)  Unusual pain that interferes with your normal activities of daily living. OTHER INSTRUCTIONS:    [] I will apply ice to the injection site(s) for at least 24 hours after the procedure. I will rotate the ice on for 20 minutes and off for 20 minutes for at least 24 hours. [] I will not apply heat for at least 48 hours and I will not take a hot bath or shower for at least 24 hours.      [] I understand that if Lidocaine or Bupivacaine was used in my injection(s) that the injection site(s) will be numb for a few hours after the procedure    [] I understand if a steroid was used it will take effect in 3 - 7 days. I understand that as the numbing medication wears off, the pain may return until the steroids start working. [] I understand that today I will rest for the next 24 hours and drink plenty of water. [x] For Botox for Migraines please do not wear anything constricting around the forehead for 7-10 days post injection. Examples headband, hats, or bandana    [] For Botox for Spasticity start therapy for the affected limb in two weeks. [] Additional instructions: ______________________________________________ ___________________________________________________________________    Sedation:  Was oral sedation given? [] Yes  [x] No    I understand that if I took an oral nerve calming medication I will not drive for  [] 24 hours after taking the medication.     [x] I have received a copy of my discharge instructions and understand the above instructions to the best of my knowledge    Patient Discharged:  [x] Home  [] Hospital  [] Other  ____________________________________________    Via:  [x] Ambulatory  [] Wheelchair   [] Stretcher [] EMS       Accompanied By:   [] Significant other  [] Family Member  [] Friend   [] Hospital Staff  []  Ambulance Staff  [x] Other_______________________________________________    Plan:  [] Will return to the office in   [] 1 month   [] 3 months for:  [] Procedure Follow-up  [x] Office Visit   [x] Planned Procedure      Patient Signature: _____________________________________________________    Staff Signature: _______________________________________________________        If you have questions, problems or concerns you may call [phone] and follow the prompts

## 2022-08-24 ENCOUNTER — OFFICE VISIT (OUTPATIENT)
Dept: OBGYN CLINIC | Age: 37
End: 2022-08-24
Payer: MEDICARE

## 2022-08-24 ENCOUNTER — OFFICE VISIT (OUTPATIENT)
Dept: NEUROSURGERY | Age: 37
End: 2022-08-24
Payer: MEDICARE

## 2022-08-24 VITALS
HEIGHT: 60 IN | SYSTOLIC BLOOD PRESSURE: 118 MMHG | WEIGHT: 181 LBS | BODY MASS INDEX: 35.53 KG/M2 | DIASTOLIC BLOOD PRESSURE: 60 MMHG | OXYGEN SATURATION: 96 % | HEART RATE: 88 BPM

## 2022-08-24 VITALS
HEIGHT: 60 IN | HEART RATE: 89 BPM | BODY MASS INDEX: 35.53 KG/M2 | SYSTOLIC BLOOD PRESSURE: 98 MMHG | WEIGHT: 181 LBS | DIASTOLIC BLOOD PRESSURE: 66 MMHG

## 2022-08-24 DIAGNOSIS — R51.9 ACUTE NONINTRACTABLE HEADACHE, UNSPECIFIED HEADACHE TYPE: Primary | ICD-10-CM

## 2022-08-24 DIAGNOSIS — Z01.419 WELL FEMALE EXAM WITH ROUTINE GYNECOLOGICAL EXAM: Primary | ICD-10-CM

## 2022-08-24 DIAGNOSIS — R56.9 CONVULSIONS, UNSPECIFIED CONVULSION TYPE (HCC): ICD-10-CM

## 2022-08-24 DIAGNOSIS — Z12.31 ENCOUNTER FOR SCREENING MAMMOGRAM FOR BREAST CANCER: ICD-10-CM

## 2022-08-24 PROCEDURE — 99214 OFFICE O/P EST MOD 30 MIN: CPT | Performed by: PSYCHIATRY & NEUROLOGY

## 2022-08-24 PROCEDURE — 99395 PREV VISIT EST AGE 18-39: CPT | Performed by: OBSTETRICS & GYNECOLOGY

## 2022-08-24 RX ORDER — BUTALBITAL, ACETAMINOPHEN AND CAFFEINE 300; 40; 50 MG/1; MG/1; MG/1
1 CAPSULE ORAL EVERY 6 HOURS PRN
Qty: 40 CAPSULE | Refills: 5 | Status: SHIPPED | OUTPATIENT
Start: 2022-08-24

## 2022-08-24 RX ORDER — GALCANEZUMAB 120 MG/ML
120 INJECTION, SOLUTION SUBCUTANEOUS
Qty: 1 PEN | Refills: 11 | Status: SHIPPED | OUTPATIENT
Start: 2022-08-24 | End: 2023-08-19

## 2022-08-24 ASSESSMENT — ENCOUNTER SYMPTOMS
EYES NEGATIVE: 1
GASTROINTESTINAL NEGATIVE: 1
RESPIRATORY NEGATIVE: 1

## 2022-08-24 NOTE — PROGRESS NOTES
[facility] NEUROLOGY:    Patient: Krista Matthew  :  1985  Age:  42 y.o. MRN:  [id number]  Today:  22      Chief Complaint:  Chief Complaint   Patient presents with    Migraine     4 month follow up     Seizures     Last seizure was 8 -    Numbness     Having full body numbness       HISTORY OF PRESENT ILLNESS:   Krista Matthew is a 42y.o. year old female here for follow up of headaches. Still noting headaches. Botox had helped previously, but stopped due to pregnancy. Off Emgality, has not helped. Still noting intermittent headaches, severe at times, some nausea and vomiting noted. 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 No overt seizures noted, but is noting intermittent episodes of shaking without a consistent YANG same as before, unchanged. She is still off nortriptyline as well. No change in characteristics of headaches. Headache pain is global with little radiation of pain. Pain is described as sharp and stabbing, She does report some scotoma like changes prior to headaches, sees spots in both eyes. She notes nausea, vomiting, vomiting, sonophobia, photophobia, dizziness and diplopia with headaches as above. She has previously tried Imitrex, Tylenol, Ibuprofen, Aleve with little relief. She is using Fioricet daily now. Prior history of kidney stones as a child. Long history of headaches since age 13. No incontinence or tongue biting. No clear confusion noted afterwards, takes 15-20 minutes for her to be able to stand up again. Seen by cardiology as well, loop placed, states episodes seem to occur when she is in stressful situations. She does report a history of CVA and MI. No further stroke like symptoms since last seen. She states that these conditions were \"stressed induced\". She notes residual left-sided weakness and intermittent slurred speech from the stroke. She had a prior episode of left sided numbness with headache, shortness of breath.   Seen in the ED at [facility] -40 MG CAPS per capsule Take 1 capsule by mouth every 6 hours as needed for Headaches 40 capsule 3     No current facility-administered medications for this visit.        Allergies:  Advil [ibuprofen]    SOCIAL HISTORY:   No tobacco  Occ alcohol    FAMILY HISTORY:       Problem Relation Age of Onset    High Blood Pressure Mother     Diabetes Father     Heart Disease Father     Stomach Cancer Father     Breast Cancer Other         maternal great aunt    Colon Cancer Paternal Grandmother     Colon Polyps Neg Hx     Esophageal Cancer Neg Hx     Liver Cancer Neg Hx     Liver Disease Neg Hx     Rectal Cancer Neg Hx      REVIEW OF SYSTEMS    Constitutional: []Fever []Sweat []Chills [] Recent Injury [x] Denies all unless marked  HEENT:[x]Headache  [] Head Injury/Hearing Loss  [] Sore Throat  [] Ear Ache/Dizziness  [x] Denies all unless marked  Spine:  [x] Neck pain  [] Back pain  [] Sciaticia  [x] Denies all unless marked  Cardiovascular:[]Heart Disease []Chest Pain [] Palpitations  [x] Denies all unless marked  Pulmonary: []Shortness of Breath []Cough   [x] Denies all unless marke  Gastrointestinal: []Nausea  []Vomiting  []Abdominal Pain  []Constipation  []Diarrhea  []Dark Bloody Stools  [x] Denies all unless marked  Psychiatric/Behavioral:[x] Depression [x] Anxiety [x] Denies all unless marked  Genitourinary:   [] Frequency  [] Urgency  [] Incontinence [] Pain with Urination  [x] Denies all unless marked  Extremities: []Pain  []Swelling  [x] Denies all unless marked  Musculoskeletal: [x] Muscle Pain  [] Joint Pain  [] Arthritis [x] Muscle Cramps [x] Muscle Twitches  [x] Denies all unless marked  Sleep: [x] Insomnia [] Snoring [] Restless Legs [] Sleep Apnea  [] Daytime Sleepiness  [x] Denies all unless marked  Skin:[] Rash [] Skin Discoloration [x] Denies all unless marked   Neurological: []Visual Disturbance/Memory Loss [] Loss of Balance [] Slurred Speech/Weakness [x] Seizures  [] Vertigo/Dizziness [x] Denies all unless marked     ROS reviewed, agree with above. PHYSICAL EXAMINATION:  Constitutional -   /60   Pulse 88   Ht 5' (1.524 m)   Wt 181 lb (82.1 kg)   LMP 05/23/2021 (Approximate)   SpO2 96%   BMI 35.35 kg/m²   General appearance: No acute distress   EYES -   Conjunctiva normal  Pupillary exam as below, see CN exam in the neurologic exam  ENT-    No scars, masses, or lesions over external nose or ears  Hearing normal bilaterally to finger rub  Cardiovascular -   No clubbing, cyanosis, or edema   Respiratory-   Good expansion, normal effort without use of accessory muscles  Musculoskeletal -   No significant wasting of muscles noted  Gait as below, see gait exam in the neurologic exam  Muscle strength, tone, stability as below. No bony deformities  Skin -   Warm, dry, and intact to inspection and palpation. No rash, erythema, or pallor  Psychiatric -   Mood, affect, and behavior appear normal    Memory as below see mental status examination in the neurologic exam    NEUROLOGIC EXAMINATION:    Mental status   [x]Awake, alert, oriented   [x]Affect attention and concentration appear appropriate  [x]Recent and remote memory appears unremarkable  [x]Speech normal without dysarthria or aphasia, comprehension and repetition intact.    COMMENTS:     Cranial Nerves [x]No VF deficit to confrontation,  no papilledema on fundoscopic exam.  [x]PERRLA, EOMI, no nystagmus, conjugate eye movements, no ptosis  [x]Face symmetric  [x]Facial sensation intact  [x]Tongue midline no atrophy or fasciculations present  [x]Palate midline, hearing to finger rub normal bilaterally  [x]Shoulder shrug and SCM testing normal bilaterally  COMMENTS:   Motor   []5/5 strength x 4 extremities  [x]Normal bulk and tone  [x]No tremor present  [x]No rigidity or bradykinesia noted  COMMENTS: 3/5 strength LUE (chronic r/t elbow injury)   Sensory  [x]Sensation intact to light touch, pin prick, vibration, and proprioception BLE  [x]Sensation intact to light touch, pin prick, vibration, and proprioception BUE  COMMENTS:   Coordination [x]FTN normal bilaterally   [x]HTS normal bilaterally  [x]LACEY normal bilaterally. COMMENTS:    Reflexes  [x]Symmetric and non-pathological  [x]Toes down going bilaterally  [x]No clonus present  COMMENTS:    Gait                  [x]Normal steady gait    []Ataxic    []Spastic     []Magnetic     []Shuffling  COMMENTS:        ADDITIONAL REVIEW:  Lab Results   Component Value Date    [id number] 457 12/11/2019     Lab Results   Component Value Date    WBC 6.2 05/11/2021    HGB 13.6 05/11/2021    HCT 43.8 05/11/2021    MCV 97.1 05/11/2021     05/11/2021     Lab Results   Component Value Date     05/11/2021    K 4.1 05/11/2021     05/11/2021    CO2 27 05/11/2021    BUN 13 05/11/2021    CREATININE 0.7 05/11/2021    GLUCOSE 82 05/11/2021    CALCIUM 9.3 05/11/2021    PROT 5.9 (L) 12/11/2019    LABALBU 3.1 (L) 12/11/2019    BILITOT 0.3 12/11/2019    ALKPHOS 79 12/11/2019    AST 13 12/11/2019    ALT 12 12/11/2019    LABGLOM >60 05/11/2021    GFRAA >59 05/11/2021    GLOB 3.3 07/13/2016     Carotid artery u/s (9/2018)- there is no plaque visualized in bilateral internal carotid arteries. No stenosis in right or left internal carotid arteries. Normal antegrade flow in the bilateral vertebral arteries    Echocardiogram (9/2018)- EF 55-60%; mildly thickened mitral valve and aortic valve; mild tricuspid and pulmonic regurgitation     MRI brain (11/2018)-no acute intracranial abnormality. No focal FLAIR signal abnormality    MRA head (1/2019)- unremarkable     EEG (12/2018)-  Largely normal EEG for age in the awake, drowsy, and sleep states. There were  intermittent sharply contoured discharges that at times appeared somewhat generalized and semi-rhythmic.   Though, these discharges were not clearly consistent with epileptiform abnormalities and may be robust changes associated with drowsiness and sleep. Clinical correlation is needed. Video EEG was normal from an interictal standpoint. Events captured were nonepileptic. IMPRESSION:  Lilliam Mejia is a 42 y.o. female here for follow up of headaches and possible seizures. She reports history of stroke as well. Prior MRI brain was normal, no clear evidence of stroke. MRA normal. Echo/carotid artery u/s normal. Given overall normal stroke work up will hold off on hypercoagulable labs. Noting headaches still, was better on botox but stopped due to pregnancy, now s/p delivery. Off  Emgality, unable to tolerate Nortriptyline given side effects. Topamax contraindicated d/t kidney stone history, Propranolol contraindicated due to syncopal episodes, unknown cause. Would avoid Depakote r/t child bearing age. Video EEG was normal with nonepileptic events captured. Followed by cardiology as well, loop recorder placed. Prior visit to the ED at [facility] with left side numbness, shortness of breath, workup negative. Still noting headaches, worsening, and noting numbness in all four extremities. PLAN:  1. Will restart Emgality, stop botox as not helping. 2. Hold off on seizure mediations given Video EEG monitoring results, events are non-epileptic. 3. Continue Fioricet prn headaches. Discussed limiting use of this to avoid rebound headaches. 4. Epilepsy precautions and seizure first aid discussed. No driving, heights, swimming, tub baths, open flames, or heavy machinery. 5. Continue stroke risk factor maximization, will re-try repeat MRI brain given new and persistent symptoms. 6.  Follow up with cardiology   7. Continue to maximize treatment of anxiety through primary / [place], no SI/HI today.      Oliver Smart DO   Board Certified Neurology

## 2022-08-24 NOTE — PATIENT INSTRUCTIONS
Patient Education        Well Visit, Ages 25 to 48: Care Instructions  Overview     Well visits can help you stay healthy. Your doctor has checked your overall health and may have suggested ways to take good care of yourself. Your doctor also may have recommended tests. At home, you can help prevent illness withhealthy eating, regular exercise, and other steps. Follow-up care is a key part of your treatment and safety. Be sure to make and go to all appointments, and call your doctor if you are having problems. It's also a good idea to know your test results and keep alist of the medicines you take. How can you care for yourself at home? Get screening tests that you and your doctor decide on. Screening helps find diseases before any symptoms appear. Eat healthy foods. Choose fruits, vegetables, whole grains, protein, and low-fat dairy foods. Limit fat, especially saturated fat. Reduce salt in your diet. Limit alcohol. If you are a man, have no more than 2 drinks a day or 14 drinks a week. If you are a woman, have no more than 1 drink a day or 7 drinks a week. Get at least 30 minutes of physical activity on most days of the week. Walking is a good choice. You also may want to do other activities, such as running, swimming, cycling, or playing tennis or team sports. Discuss any changes in your exercise program with your doctor. Reach and stay at a healthy weight. This will lower your risk for many problems, such as obesity, diabetes, heart disease, and high blood pressure. Do not smoke or allow others to smoke around you. If you need help quitting, talk to your doctor about stop-smoking programs and medicines. These can increase your chances of quitting for good. Care for your mental health. It is easy to get weighed down by worry and stress. Learn strategies to manage stress, like deep breathing and mindfulness, and stay connected with your family and community.  If you find you often feel sad or hopeless, talk with your doctor. Treatment can help. Talk to your doctor about whether you have any risk factors for sexually transmitted infections (STIs). You can help prevent STIs if you wait to have sex with a new partner (or partners) until you've each been tested for STIs. It also helps if you use condoms (male or female condoms) and if you limit your sex partners to one person who only has sex with you. Vaccines are available for some STIs, such as HPV. Use birth control if it's important to you to prevent pregnancy. Talk with your doctor about the choices available and what might be best for you. If you think you may have a problem with alcohol or drug use, talk to your doctor. This includes prescription medicines (such as amphetamines and opioids) and illegal drugs (such as cocaine and methamphetamine). Your doctor can help you figure out what type of treatment is best for you. Protect your skin from too much sun. When you're outdoors from 10 a.m. to 4 p.m., stay in the shade or cover up with clothing and a hat with a wide brim. Wear sunglasses that block UV rays. Even when it's cloudy, put broad-spectrum sunscreen (SPF 30 or higher) on any exposed skin. See a dentist one or two times a year for checkups and to have your teeth cleaned. Wear a seat belt in the car. When should you call for help? Watch closely for changes in your health, and be sure to contact your doctor if you have any problems or symptoms that concern you. Where can you learn more? Go to https://harlan.[employer]. org and sign in to your gis.to account. Enter P072 in the Newsreps box to learn more about \"Well Visit, Ages 25 to 48: Care Instructions. \"     If you do not have an account, please click on the \"Sign Up Now\" link. Current as of: October 6, 2021               Content Version: 13.3  © 1582-7964 Healthwise, Incorporated. Care instructions adapted under license by [facility] ([facility]).  If you have questions about a medical condition or this instruction, always ask your healthcare professional. [address] any warranty or liability for your use of this information. Patient Education        Breast Self-Exam: Care Instructions  Your Care Instructions     A breast self-exam is when you check your breasts for lumps or changes. This regular exam helps you learn how your breasts normally look and feel. Mostbreast problems or changes are not because of cancer. Breast self-exam is not a substitute for a mammogram. Having regular breast exams by your doctor and regular mammograms improve your chances of finding anyproblems with your breasts. Some women set a time each month to do a step-by-step breast self-exam. Other women like a less formal system. They might look at their breasts as they brushtheir teeth, or feel their breasts once in a while in the shower. If you notice a change in your breast, tell your doctor. Follow-up care is a key part of your treatment and safety. Be sure to make and go to all appointments, and call your doctor if you are having problems. It's also a good idea to know your test results and keep alist of the medicines you take. How do you do a breast self-exam?  The best time to examine your breasts is usually one week after your menstrual period begins. Your breasts should not be tender then. If you do not have periods, you might do your exam on a day of the month that is easy to remember. To examine your breasts:  Remove all your clothes above the waist and lie down. When you are lying down, your breast tissue spreads evenly over your chest wall, which makes it easier to feel all your breast tissue. Use the pads--not the fingertips--of the 3 middle fingers of your left hand to check your right breast. Move your fingers slowly in small coin-sized circles that overlap. Use three levels of pressure to feel of all your breast tissue.  Use light pressure to feel the tissue close to the skin surface. Use medium pressure to feel a little deeper. Use firm pressure to feel your tissue close to your breastbone and ribs. Use each pressure level to feel your breast tissue before moving on to the next spot. Check your entire breast, moving up and down as if following a strip from the collarbone to the bra line, and from the armpit to the ribs. Repeat until you have covered the entire breast.  Repeat this procedure for your left breast, using the pads of the 3 middle fingers of your right hand. To examine your breasts while in the shower:  Place one arm over your head and lightly soap your breast on that side. Using the pads of your fingers, gently move your hand over your breast (in the strip pattern described above), feeling carefully for any lumps or changes. Repeat for the other breast.  Have your doctor inspect anything you notice to see if you need further testing. Where can you learn more? Go to https://"[employer].Met[employer]. org and sign in to your [employer] account. Enter P148 in the OneRoof box to learn more about \"Breast Self-Exam: Care Instructions. \"     If you do not have an account, please click on the \"Sign Up Now\" link. Current as of: September 8, 2021               Content Version: 13.3  © 2006-2022 Healthwise, Incorporated. Care instructions adapted under license by [facility]. If you have questions about a medical condition or this instruction, always ask your healthcare professional. [place] [address] any warranty or liability for your use of this information.

## 2022-08-24 NOTE — PROGRESS NOTES
DO Diamante   bisoprolol (ZEBETA) 5 MG tablet Take 1 tablet by mouth 2 times daily Yes SETH Kimball   ARIPiprazole (ABILIFY) 5 MG tablet Take 1.5 tablets by mouth nightly Yes Brittany Burton MD   famotidine (PEPCID) 20 MG tablet TAKE 1 TABLET BY MOUTH TWICE A DAY Yes Historical Provider, MD        Allergies   Allergen Reactions    Advil [Ibuprofen]      Facial swelling       Past Medical History:   Diagnosis Date    Abnormal glucose measurement     Abnormal Pap smear of cervix     Anxiety     Blood circulation, collateral     CAD (coronary artery disease)     Cerebral artery occlusion with cerebral infarction [facility]) 2014    right side    Complication of anesthesia     difficulty waking    Depression     Heart disease 06/2020    LOOP recorder     IBS (irritable bowel syndrome)     MDD (major depressive disorder)     MI (myocardial infarction) ([facility] [address] 2014    Miscarriage 06/2012    Neurologic disorder     Postpartum depression     Prolonged emergence from general anesthesia     Schizophrenia ([facility] [address]     Seizures ([facility] [address]     anxiety related (last 6/2020)    Syncope     Tachycardia     UTI (urinary tract infection) 12/09/2019       Past Surgical History:   Procedure Laterality Date    CHOLECYSTECTOMY, LAPAROSCOPIC      COLONOSCOPY N/A 11/7/2019    Dr Lorri Perales, 10 yr recall    EGD  2016    [address]      lower right leg, has metal plate and screws    FRACTURE SURGERY      left wrist    HYSTERECTOMY (CERVIX STATUS UNKNOWN) N/A 6/3/2021    TOTAL LAPAROSCOPIC HYSTERECTOMY WITH DAVINCI CYSTOSCOPY performed by Connor Leonard MD at [address] [place] [address]  2014    loop recorder    INTRAUTERINE DEVICE INSERTION  06/26/2016    Essure placement    UPPER GASTROINTESTINAL ENDOSCOPY N/A 11/7/2019    Dr Savannah Ghotra       Social History     Socioeconomic History    Marital status:      Spouse name: Not on file    Number of children: Not on file    Years of education: Not on file    Highest education level: Not on file   Occupational History    Not on file   Tobacco Use    Smoking status: Former     Packs/day: 0.50     Years: 3.00     Pack years: 1.50     Types: Cigarettes     Quit date:      Years since quittin.6    Smokeless tobacco: Former     Quit date: 2014   Vaping Use    Vaping Use: Some days    Start date: 2019    Substances: Flavoring    Devices: Refillable tank   Substance and Sexual Activity    Alcohol use: Yes     Comment: occ    Drug use: No    Sexual activity: Yes     Partners: Male   Other Topics Concern    Not on file   Social History Narrative    SAFETY PLAN        A suicide Safety Plan is a document that supports someone when they are having thoughts of suicide. Warning Signs that indicate a suicidal crisis may be developing: What (situations, thoughts, feelings, body sensations, behaviors, etc.) do you experience that lets you know you are beginning to think about suicide? 1. Stop eating/drinking    2. Laying in bed throughout the day    3. Crying uncontrollably throughout the day        Internal Coping Strategies:  What things can I do (relaxation techniques, hobbies, physical activities, etc.) to take my mind off my problems without contacting another person? 1. You can't do it because of the kids     2. Deep breathing    3. Being around the kids        People and social settings that provide distraction: Who can I call or where can I go to distract me? 1. Name: Manda Yip  Phone: in phone    2. Name: ______  Phone: _______     3. Place: visit friend at work              4. Place: go to [facility]        People whom I can ask for help: Who can I call when I need help - for example, friends, family, clergy, someone else? 1. Name:                 Phone: in phone    2. Name: Mother-in-law  Phone: in phone    3.  Name: mother  Phone: in phone        Professionals or [address] [place] [address] I can contact during a crisis: Who can I call for help - for example, my doctor, my psychiatrist, my psychologist, a mental health provider, a suicide hotline? 1. If you feel overwhelmed, unable to cope, extremely anxious, or have thoughts of wanting to harm yourself or someone else, go to the nearest Emergency Room. [facility]:  [phone]    Crisis line:  [phone]    [address] (inpatient psych):  [phone] option 1    Call 911- they will send help and get you to the local ER        2. Clinician Name: [facility]   Phone: [phone] option 3               3. Suicide Prevention Lifeline: 1-624-179-TALK (4300)        Making the environment safe: How can I make my environment (house/apartment/living space) safer? For example, can I remove guns, medications, and other items? 1. Guns/knives are locked up    2. Medications locked up     Social Determinants of Health     Financial Resource Strain: Low Risk     Difficulty of Paying Living Expenses: Not hard at all   Food Insecurity: No Food Insecurity    Worried About [address] [facility] in the Last Year: Never true    [address] in the Last Year: Never true   Transportation Needs: No Transportation Needs    Lack of Transportation (Medical): No    Lack of Transportation (Non-Medical):  No   Physical Activity: Not on file   Stress: Not on file   Social Connections: Not on file   Intimate Partner Violence: Not on file   Housing Stability: Low Risk     Unable to Pay for Housing in the Last Year: No    Number of Places Lived in the Last Year: 1    Unstable Housing in the Last Year: No        Family History   Problem Relation Age of Onset    High Blood Pressure Mother     Diabetes Father     Heart Disease Father     Stomach Cancer Father     Breast Cancer Other         maternal great aunt    Colon Cancer Paternal Grandmother     Colon Polyps Neg Hx     Esophageal Cancer Neg Hx     Liver Cancer Neg Hx     Liver Disease Neg Hx Rectal Cancer Neg Hx        ADVANCE DIRECTIVE: N, <no information>    Vitals:    08/24/22 1105   BP: 98/66   Site: Left Upper Arm   Position: Sitting   Cuff Size: Large Adult   Pulse: 89   Weight: 181 lb (82.1 kg)   Height: 5' (1.524 m)     Estimated body mass index is 35.35 kg/m² as calculated from the following:    Height as of this encounter: 5' (1.524 m). Weight as of this encounter: 181 lb (82.1 kg). Physical Exam  Constitutional:       Appearance: She is well-developed. HENT:      Head: Normocephalic and atraumatic. Right Ear: Hearing normal.      Left Ear: Hearing normal.      Nose: Nose normal.   Eyes:      General: Lids are normal.      Conjunctiva/sclera: Conjunctivae normal.      Pupils: Pupils are equal, round, and reactive to light. Cardiovascular:      Rate and Rhythm: Normal rate and regular rhythm. Heart sounds: No murmur heard. No friction rub. No gallop. Pulmonary:      Effort: Pulmonary effort is normal.      Breath sounds: Normal breath sounds. Chest:   Breasts:     Breasts are symmetrical.      Right: No inverted nipple, mass, nipple discharge, skin change or tenderness. Left: No inverted nipple, mass, nipple discharge, skin change or tenderness. Abdominal:      General: Bowel sounds are normal. There is no distension. Palpations: Abdomen is soft. There is no mass. Tenderness: There is no abdominal tenderness. Genitourinary:     Labia:         Right: No rash, tenderness or lesion. Left: No rash, tenderness or lesion. Vagina: No vaginal discharge or bleeding. Rectum: No anal fissure or external hemorrhoid. Comments: Vaginal cuff intact. Musculoskeletal:         General: Normal range of motion. Cervical back: Normal range of motion and neck supple. Comments: Normal ROM in all four extremities; normal gait   Lymphadenopathy:      Cervical: No cervical adenopathy. Skin:     General: Skin is warm and dry. Findings: No rash. Neurological:      Mental Status: She is alert and oriented to person, place, and time. Psychiatric:         Speech: Speech normal.         Behavior: Behavior normal.       No flowsheet data found. Lab Results   Component Value Date/Time    CHOL 177 09/06/2018 09:00 AM    TRIG 79 09/06/2018 09:00 AM    HDL 49 09/06/2018 09:00 AM    LDLCALC 112 09/06/2018 09:00 AM    GLUCOSE 82 05/11/2021 10:50 AM       The ASCVD Risk score (Alan Dior et al., 2013) failed to calculate for the following reasons: The 2013 ASCVD risk score is only valid for ages 36 to 78    Immunization History   Administered Date(s) Administered    COVID-19, PFIZER PURPLE top, DILUTE for use, (age 15 y+), 30mcg/0.3mL 05/06/2021, 06/10/2021    Influenza, FLUARIX, FLULAVAL, (age 10 mo+) AND AFLURIA, FLUZONE (age 1 y+), PF 11/02/2018, 11/06/2019, 10/13/2020    Influenza, FLUCELVAX, (age 10 mo+), MDCK, PF 11/03/2021    Pneumococcal Polysaccharide (Utncafmck63) 03/22/2019       Health Maintenance   Topic Date Due    Varicella vaccine (1 of 2 - 2-dose childhood series) Never done    HIV screen  Never done    Hepatitis C screen  Never done    DTaP/Tdap/Td vaccine (1 - Tdap) Never done    COVID-19 Vaccine (3 - Booster for Pfizer series) 11/10/2021    Flu vaccine (1) 09/01/2022    Depression Monitoring  07/25/2023    Colorectal Cancer Screen  11/07/2029    Hepatitis A vaccine  Aged Out    Hepatitis B vaccine  Aged Out    Hib vaccine  Aged Out    Meningococcal (ACWY) vaccine  Aged Out    Pneumococcal 0-64 years Vaccine  Aged Out       Assessment & Plan   Well female exam with routine gynecological exam    MEDICATIONS:  No orders of the defined types were placed in this encounter.       ORDERS:  Orders Placed This Encounter   Procedures    RENEA DIGITAL SCREEN W OR WO CAD BILATERAL     Mammogram ordered  Continue to see GI as directed   F/u yearly or prn    Return in about 1 year (around 8/24/2023), or if symptoms worsen or fail to improve. Raysa Vazquez MD, personally performed services described in this document as scribed by Heather Kim RN in my presence, and it is both accurate and complete.

## 2022-08-25 ENCOUNTER — TELEMEDICINE (OUTPATIENT)
Dept: PSYCHOLOGY | Age: 37
End: 2022-08-25

## 2022-08-25 DIAGNOSIS — F25.0 SCHIZOAFFECTIVE DISORDER, BIPOLAR TYPE (HCC): Primary | ICD-10-CM

## 2022-08-25 ASSESSMENT — ANXIETY QUESTIONNAIRES
5. BEING SO RESTLESS THAT IT IS HARD TO SIT STILL: 3-NEARLY EVERY DAY
GAD7 TOTAL SCORE: 21
6. BECOMING EASILY ANNOYED OR IRRITABLE: 3-NEARLY EVERY DAY
4. TROUBLE RELAXING: 3-NEARLY EVERY DAY
1. FEELING NERVOUS, ANXIOUS, OR ON EDGE: 3
7. FEELING AFRAID AS IF SOMETHING AWFUL MIGHT HAPPEN: 3-NEARLY EVERY DAY
3. WORRYING TOO MUCH ABOUT DIFFERENT THINGS: 3-NEARLY EVERY DAY
2. NOT BEING ABLE TO STOP OR CONTROL WORRYING: 3-NEARLY EVERY DAY

## 2022-08-25 ASSESSMENT — PATIENT HEALTH QUESTIONNAIRE - PHQ9
2. FEELING DOWN, DEPRESSED OR HOPELESS: 3
SUM OF ALL RESPONSES TO PHQ QUESTIONS 1-9: 24
3. TROUBLE FALLING OR STAYING ASLEEP: 3
10. IF YOU CHECKED OFF ANY PROBLEMS, HOW DIFFICULT HAVE THESE PROBLEMS MADE IT FOR YOU TO DO YOUR WORK, TAKE CARE OF THINGS AT HOME, OR GET ALONG WITH OTHER PEOPLE: 3
SUM OF ALL RESPONSES TO PHQ QUESTIONS 1-9: 26
1. LITTLE INTEREST OR PLEASURE IN DOING THINGS: 3
8. MOVING OR SPEAKING SO SLOWLY THAT OTHER PEOPLE COULD HAVE NOTICED. OR THE OPPOSITE, BEING SO FIGETY OR RESTLESS THAT YOU HAVE BEEN MOVING AROUND A LOT MORE THAN USUAL: 3
7. TROUBLE CONCENTRATING ON THINGS, SUCH AS READING THE NEWSPAPER OR WATCHING TELEVISION: 3
4. FEELING TIRED OR HAVING LITTLE ENERGY: 3
SUM OF ALL RESPONSES TO PHQ9 QUESTIONS 1 & 2: 6
SUM OF ALL RESPONSES TO PHQ QUESTIONS 1-9: 26
6. FEELING BAD ABOUT YOURSELF - OR THAT YOU ARE A FAILURE OR HAVE LET YOURSELF OR YOUR FAMILY DOWN: 3
9. THOUGHTS THAT YOU WOULD BE BETTER OFF DEAD, OR OF HURTING YOURSELF: 2
SUM OF ALL RESPONSES TO PHQ QUESTIONS 1-9: 26
5. POOR APPETITE OR OVEREATING: 3

## 2022-08-25 ASSESSMENT — COLUMBIA-SUICIDE SEVERITY RATING SCALE - C-SSRS
6. HAVE YOU EVER DONE ANYTHING, STARTED TO DO ANYTHING, OR PREPARED TO DO ANYTHING TO END YOUR LIFE?: NO
1. WITHIN THE PAST MONTH, HAVE YOU WISHED YOU WERE DEAD OR WISHED YOU COULD GO TO SLEEP AND NOT WAKE UP?: YES
2. HAVE YOU ACTUALLY HAD ANY THOUGHTS OF KILLING YOURSELF?: NO

## 2022-08-25 NOTE — PATIENT INSTRUCTIONS
Scheduled follow up appointment. Call for a sooner appointment if needed or if you need to change or cancel you appointment. Nhi May, [phone] and ask for Nhi, She might answer it as [facility]\"  You can also send her a message on My Chart. Be aware that all my messages are linked to her. If you receive a survey after visiting one of our offices, please take time to share your experience about your office visit. These surveys are confidential and no health information about you is shared. We highly welcome your feedback to continuously improve our services for you. Recommendations to patient:      1. Practice new coping, stress management, relaxation skills at least                   two times a day for at least 10-30 minutes. 2. Find at least one positive outlet per day that makes you feel better. 3. Talk things over with a good friend. Practice letting things go. 4. Stop, breathe, reset. \"I am ok. \"    Therapy Referral List    [address] [address]  Phone: [phone]  Insurances Accepted: Any [employer], [employer], and [address] [address] [address] [address]  Phone: [phone]  Insurances Accepted: Any [address]  [address] [facility] [address] [address]  Phone: [phone]  Insurances Accepted: Any [employer], [employer], and [facility]    [facility] [address] [address]  Phone: [phone]  Insurances Accepted: Carolyn Wright, Medicare    Contact your insurance provider, managed care organization, or local Medical Society to obtain a current listing available in your area. Contact your insurance provider for in network benefits    Problem-Solving.   When we are uncertain about what action can be taken to handle a situation, using the problem-solving steps below can help point us in the right direction. Get information about the problem. We often know something is wrong but dont know exactly what. Identifying the problem in clear, specific terms is essential to resolving it. Generate options for resolving the problem. There are usually a number of different ways of dealing with a problem. At this step, its important to come up with as many potential solutions as possible, even if they dont sound quite right at first.  Suspend any judgment of the options until you have generated as many as possible. Evaluate the advantages and disadvantages of implementing each option. Not all options will be as good as others. Think about how successful you think each option will be at resolving the problem, and what might get in the way of that option actually working. Choose an option and implement it. Which option do you think will work the best overall? Make a plan for implementing the option, and put it into action. Analyze whether the option worked to resolve the problem. Keep track of what changes occur as you implement the option chosen. If the option worked, the problem is resolved and you are done. If not, you need to re-think the situation, by repeating the process. Repeat Steps 1 through 5 as needed.

## 2022-08-25 NOTE — PROGRESS NOTES
Saul Fallon, was evaluated through a synchronous (real-time) audio-video encounter. The patient (or guardian if applicable) is aware that this is a billable service. Verbal consent to proceed has been obtained within the past 12 months. The visit was conducted pursuant to the emergency declaration under the [address], [address] [place] [address] authority and the [employer] Act. Patient identification was verified, and a caregiver was present when appropriate. The patient was located in a state where the provider was credentialed to provide care. Total time spent for this encounter:  45 minutes    --Kaley Ingram LCSW on 8/25/2022 at 1:46 PM    An electronic signature was used to authenticate this note. Behavioral Health Consultation  Kaley GARCIA LCSW  8/25/2022  12:55 PM      Time spent with Patient: 45 minutes  This is patient's seventh  [facility] appointment. Reason for Consult:    Chief Complaint   Patient presents with    Follow-up    Depression    Anxiety     Referring Provider: No referring provider defined for this encounter. Feedback given to PCP. S:  Patient reports problems with feeling depression and anxiety. Pt shared about stressors with the [facility] health and doctor appointments. Pt shared about situational stressors that she is waiting for them to change, \"I can't do anything about it right now. \" Discussed positive problem solving steps. Addressed current and underlying issues, explored and released associated emotions, explored new ways to deal and cope with these problems. O:  MSE:    Mood    Anxious  Depressed  Affect    normal affect  Appetite normal  Sleep disturbance No  Fatigue Yes  Loss of pleasure Yes  Attention/Concentration    intact  Morbid ideation No  Suicide Assessment    no suicidal ideation        A:  Patient presents for consult due to problems with depression and anxiety.     PHQ Scores 8/25/2022 7/25/2022 6/27/2022 3/15/2022 2/22/2022 2/1/2022 12/16/2021   PHQ2 Score 6 6 6 0 5 5 5   PHQ9 Score 26 27 26 0 23 26 24     Interpretation of Total Score Depression Severity: 1-4 = Minimal depression, 5-9 = Mild depression, 10-14 = Moderate depression, 15-19 = Moderately severe depression, 20-27 = Severe depression    HEMANTH-7  Feeling nervous, anxious, or on edge: Nearly every day  Not able to stop or control worrying: 3-Nearly every day  Worrying too much about different things: 3-Nearly every day  Trouble relaxing: 3-Nearly every day  Being so restless that it's hard to sit still: 3-Nearly every day  Becoming easily annoyed or irritable: 3-Nearly every day  Feeling afraid as if something awful might happen: 3-Nearly every day  HEMANTH-7 Total Score: 21    HEMANTH-7 score interpretation:  0-4 Subclinical, 5-9 Mild, 10-14 Moderate, 15-21 Severe    Continued consultation is clinically/medically necessary to support in learning new skills and build confidence to deal better with these issues. Patient response to consults, finds new strategies helpful.      Diagnosis:    1. Schizoaffective disorder, bipolar type ([facility] [address]          Diagnosis Date    Abnormal glucose measurement     Abnormal Pap smear of cervix     Anxiety     Blood circulation, collateral     CAD (coronary artery disease)     Cerebral artery occlusion with cerebral infarction [facility]) 2014    right side    Complication of anesthesia     difficulty waking    Depression     Heart disease 06/2020    LOOP recorder     IBS (irritable bowel syndrome)     MDD (major depressive disorder)     MI (myocardial infarction) ([address] 2014    Miscarriage 06/2012    Neurologic disorder     Postpartum depression     Prolonged emergence from general anesthesia     Schizophrenia ([facility] [address]     Seizures ([facility] [address]     anxiety related (last 6/2020)    Syncope     Tachycardia     UTI (urinary tract infection) 12/09/2019         Plan:  Pt interventions:  Provided education, Discussed self-care (sleep, nutrition, rewarding activities, social support, exercise), [place]-setting to identify pt's primary goals for [facility] visit / overall health, Supportive techniques, Emphasized self-care as important for managing overall health, and Identified maladaptive thoughts      Pt Behavioral Change Plan:  See Pt Instructions   Recommendations to patient:      1. Practice new coping, stress management, relaxation skills at least                   two times a day for at least 10-30 minutes. 2. Find at least one positive outlet per day that makes you feel better. 3. Talk things over with a good friend. Practice letting things go. 4. Stop, breathe, reset. \"I am ok. \"     Scheduled follow up appointment. Call for a sooner appointment if needed or if you need to change or cancel you appointment.     See Pt Instructions

## 2022-09-06 ENCOUNTER — OFFICE VISIT (OUTPATIENT)
Dept: INTERNAL MEDICINE | Age: 37
End: 2022-09-06
Payer: MEDICARE

## 2022-09-06 VITALS
HEART RATE: 84 BPM | HEIGHT: 60 IN | SYSTOLIC BLOOD PRESSURE: 102 MMHG | BODY MASS INDEX: 34.36 KG/M2 | OXYGEN SATURATION: 98 % | DIASTOLIC BLOOD PRESSURE: 60 MMHG | WEIGHT: 175 LBS

## 2022-09-06 DIAGNOSIS — F20.9 SCHIZOPHRENIA, UNSPECIFIED TYPE (HCC): ICD-10-CM

## 2022-09-06 DIAGNOSIS — K29.30 CHRONIC SUPERFICIAL GASTRITIS, PRESENCE OF BLEEDING UNSPECIFIED: ICD-10-CM

## 2022-09-06 DIAGNOSIS — I69.398 VISUAL DISTURBANCE AS LATE EFFECT OF CEREBROVASCULAR ACCIDENT (CVA): Chronic | ICD-10-CM

## 2022-09-06 DIAGNOSIS — R05.3 CHRONIC COUGH: ICD-10-CM

## 2022-09-06 DIAGNOSIS — Z00.00 ENCOUNTER FOR PREVENTIVE HEALTH EXAMINATION: Primary | ICD-10-CM

## 2022-09-06 DIAGNOSIS — H53.9 VISUAL DISTURBANCE AS LATE EFFECT OF CEREBROVASCULAR ACCIDENT (CVA): Chronic | ICD-10-CM

## 2022-09-06 DIAGNOSIS — G43.719 CHRONIC MIGRAINE WITHOUT AURA, INTRACTABLE, WITHOUT STATUS MIGRAINOSUS: ICD-10-CM

## 2022-09-06 DIAGNOSIS — N63.0 LUMP OR MASS IN BREAST: ICD-10-CM

## 2022-09-06 DIAGNOSIS — Z00.00 ENCOUNTER FOR PREVENTIVE HEALTH EXAMINATION: ICD-10-CM

## 2022-09-06 DIAGNOSIS — R56.9 CONVULSIONS, UNSPECIFIED CONVULSION TYPE (HCC): ICD-10-CM

## 2022-09-06 DIAGNOSIS — I25.10 CORONARY ARTERY DISEASE INVOLVING NATIVE CORONARY ARTERY OF NATIVE HEART WITHOUT ANGINA PECTORIS: Chronic | ICD-10-CM

## 2022-09-06 PROBLEM — O09.529 AMA (ADVANCED MATERNAL AGE) MULTIGRAVIDA 35+, UNSPECIFIED TRIMESTER: Status: ACTIVE | Noted: 2020-10-05

## 2022-09-06 PROBLEM — I25.2 HISTORY OF MI (MYOCARDIAL INFARCTION): Status: ACTIVE | Noted: 2020-10-05

## 2022-09-06 LAB
ALBUMIN SERPL-MCNC: 4.2 G/DL (ref 3.5–5.2)
ALP BLD-CCNC: 110 U/L (ref 35–104)
ALT SERPL-CCNC: 17 U/L (ref 5–33)
ANION GAP SERPL CALCULATED.3IONS-SCNC: 11 MMOL/L (ref 7–19)
AST SERPL-CCNC: 23 U/L (ref 5–32)
BASOPHILS ABSOLUTE: 0.1 K/UL (ref 0–0.2)
BASOPHILS RELATIVE PERCENT: 0.8 % (ref 0–1)
BILIRUB SERPL-MCNC: 0.3 MG/DL (ref 0.2–1.2)
BILIRUBIN URINE: NEGATIVE
BLOOD, URINE: NEGATIVE
BUN BLDV-MCNC: 5 MG/DL (ref 6–20)
CALCIUM SERPL-MCNC: 9.5 MG/DL (ref 8.6–10)
CHLORIDE BLD-SCNC: 102 MMOL/L (ref 98–111)
CHOLESTEROL, TOTAL: 164 MG/DL (ref 160–199)
CLARITY: CLEAR
CO2: 26 MMOL/L (ref 22–29)
COLOR: YELLOW
CREAT SERPL-MCNC: 0.6 MG/DL (ref 0.5–0.9)
EOSINOPHILS ABSOLUTE: 0.4 K/UL (ref 0–0.6)
EOSINOPHILS RELATIVE PERCENT: 4.7 % (ref 0–5)
GFR AFRICAN AMERICAN: >59
GFR NON-AFRICAN AMERICAN: >60
GLUCOSE BLD-MCNC: 90 MG/DL (ref 74–109)
GLUCOSE URINE: NEGATIVE MG/DL
HCT VFR BLD CALC: 42.7 % (ref 37–47)
HDLC SERPL-MCNC: 48 MG/DL (ref 65–121)
HEMOGLOBIN: 13.8 G/DL (ref 12–16)
IMMATURE GRANULOCYTES #: 0 K/UL
KETONES, URINE: NEGATIVE MG/DL
LDL CHOLESTEROL CALCULATED: 89 MG/DL
LEUKOCYTE ESTERASE, URINE: NEGATIVE
LYMPHOCYTES ABSOLUTE: 2.3 K/UL (ref 1.1–4.5)
LYMPHOCYTES RELATIVE PERCENT: 26.5 % (ref 20–40)
MCH RBC QN AUTO: 31.6 PG (ref 27–31)
MCHC RBC AUTO-ENTMCNC: 32.3 G/DL (ref 33–37)
MCV RBC AUTO: 97.7 FL (ref 81–99)
MONOCYTES ABSOLUTE: 0.5 K/UL (ref 0–0.9)
MONOCYTES RELATIVE PERCENT: 6 % (ref 0–10)
NEUTROPHILS ABSOLUTE: 5.4 K/UL (ref 1.5–7.5)
NEUTROPHILS RELATIVE PERCENT: 61.8 % (ref 50–65)
NITRITE, URINE: NEGATIVE
PDW BLD-RTO: 13.2 % (ref 11.5–14.5)
PH UA: 8 (ref 5–8)
PLATELET # BLD: 333 K/UL (ref 130–400)
PMV BLD AUTO: 10.2 FL (ref 9.4–12.3)
POTASSIUM SERPL-SCNC: 4.1 MMOL/L (ref 3.5–5)
PROTEIN UA: NEGATIVE MG/DL
RBC # BLD: 4.37 M/UL (ref 4.2–5.4)
SODIUM BLD-SCNC: 139 MMOL/L (ref 136–145)
SPECIFIC GRAVITY UA: 1.01 (ref 1–1.03)
TOTAL PROTEIN: 6.9 G/DL (ref 6.6–8.7)
TRIGL SERPL-MCNC: 134 MG/DL (ref 0–149)
TSH SERPL DL<=0.05 MIU/L-ACNC: 3.64 UIU/ML (ref 0.27–4.2)
UROBILINOGEN, URINE: 1 E.U./DL
VITAMIN B-12: 461 PG/ML (ref 211–946)
VITAMIN D 25-HYDROXY: 27.3 NG/ML
WBC # BLD: 8.7 K/UL (ref 4.8–10.8)

## 2022-09-06 PROCEDURE — 99204 OFFICE O/P NEW MOD 45 MIN: CPT | Performed by: NURSE PRACTITIONER

## 2022-09-06 RX ORDER — LAMOTRIGINE 25 MG/1
25 TABLET ORAL DAILY
COMMUNITY

## 2022-09-06 RX ORDER — ASPIRIN 81 MG/1
81 TABLET ORAL DAILY
COMMUNITY

## 2022-09-06 ASSESSMENT — ENCOUNTER SYMPTOMS
TROUBLE SWALLOWING: 0
CONSTIPATION: 0
CHOKING: 0
EYE DISCHARGE: 0
SORE THROAT: 0
ABDOMINAL PAIN: 0
COUGH: 1
SHORTNESS OF BREATH: 0
NAUSEA: 0
BLOOD IN STOOL: 0
EYE ITCHING: 0
ABDOMINAL DISTENTION: 0
VOMITING: 0
DIARRHEA: 0
COLOR CHANGE: 0
STRIDOR: 0
WHEEZING: 0
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
## 2022-09-06 NOTE — PATIENT INSTRUCTIONS
Cough  stable for now   CAD; stable followed by Cardiology   Chronic gastritis  OTC meds  Migraines;  followed y Dr Marissa Ramírez CVA;  followed by Dr Kristine Burch  Seizures  followed by Dr Lira Schooling  on lamictal and abilify   Left breast lump  GYn has mammogram set up   Old leg injury   stable for now

## 2022-09-21 ENCOUNTER — HOSPITAL ENCOUNTER (OUTPATIENT)
Dept: MRI IMAGING | Age: 37
Discharge: HOME | End: 2022-09-21
Payer: MEDICARE

## 2022-09-21 DIAGNOSIS — R56.9 CONVULSIONS, UNSPECIFIED CONVULSION TYPE (HCC): ICD-10-CM

## 2022-09-21 DIAGNOSIS — R51.9 ACUTE NONINTRACTABLE HEADACHE, UNSPECIFIED HEADACHE TYPE: ICD-10-CM

## 2022-09-21 PROCEDURE — A9577 INJ MULTIHANCE: HCPCS | Performed by: PSYCHIATRY & NEUROLOGY

## 2022-09-21 PROCEDURE — 6360000004 HC RX CONTRAST MEDICATION: Performed by: PSYCHIATRY & NEUROLOGY

## 2022-09-21 PROCEDURE — 70553 MRI BRAIN STEM W/O & W/DYE: CPT

## 2022-09-21 RX ADMIN — GADOBENATE DIMEGLUMINE 16 ML: 529 INJECTION, SOLUTION INTRAVENOUS at 11:56

## 2022-09-26 ENCOUNTER — PATIENT MESSAGE (OUTPATIENT)
Dept: NEUROSURGERY | Age: 37
End: 2022-09-26

## 2022-09-26 DIAGNOSIS — J34.89 LESION OR MASS OF PARANASAL SINUSES: Primary | ICD-10-CM

## 2022-09-26 NOTE — TELEPHONE ENCOUNTER
Spoke with patient and informed her of \" MRI brain ok, but more significant sinus disease noted, radiology recommended ENT evaluation. Will put in referral to ENT. \"    Patient expecting a call for referral appointment.

## 2022-09-26 NOTE — TELEPHONE ENCOUNTER
From: Krista Matthew  To: Dr. Lindsey Sensin2022 1:36 PM CDT  Subject: Question regarding MRI Brain With and Without Contrast    Can I get a call to explain my results please [phone]

## 2022-09-29 ENCOUNTER — TELEPHONE (OUTPATIENT)
Dept: NEUROSURGERY | Age: 37
End: 2022-09-29

## 2022-10-17 ENCOUNTER — HOSPITAL ENCOUNTER (OUTPATIENT)
Dept: WOMENS IMAGING | Age: 37
Discharge: HOME | End: 2022-10-17
Payer: MEDICARE

## 2022-10-17 DIAGNOSIS — Z12.31 ENCOUNTER FOR SCREENING MAMMOGRAM FOR BREAST CANCER: ICD-10-CM

## 2022-10-17 PROCEDURE — 77063 BREAST TOMOSYNTHESIS BI: CPT

## 2022-10-18 ENCOUNTER — TELEMEDICINE (OUTPATIENT)
Dept: PSYCHOLOGY | Age: 37
End: 2022-10-18

## 2022-10-18 DIAGNOSIS — F25.0 SCHIZOAFFECTIVE DISORDER, BIPOLAR TYPE (HCC): Primary | ICD-10-CM

## 2022-10-18 PROCEDURE — 90834 PSYTX W PT 45 MINUTES: CPT | Performed by: SOCIAL WORKER

## 2022-10-18 ASSESSMENT — PATIENT HEALTH QUESTIONNAIRE - PHQ9
SUM OF ALL RESPONSES TO PHQ QUESTIONS 1-9: 27
3. TROUBLE FALLING OR STAYING ASLEEP: 3
8. MOVING OR SPEAKING SO SLOWLY THAT OTHER PEOPLE COULD HAVE NOTICED. OR THE OPPOSITE, BEING SO FIGETY OR RESTLESS THAT YOU HAVE BEEN MOVING AROUND A LOT MORE THAN USUAL: 3
SUM OF ALL RESPONSES TO PHQ QUESTIONS 1-9: 24
SUM OF ALL RESPONSES TO PHQ9 QUESTIONS 1 & 2: 6
7. TROUBLE CONCENTRATING ON THINGS, SUCH AS READING THE NEWSPAPER OR WATCHING TELEVISION: 3
2. FEELING DOWN, DEPRESSED OR HOPELESS: 3
1. LITTLE INTEREST OR PLEASURE IN DOING THINGS: 3
6. FEELING BAD ABOUT YOURSELF - OR THAT YOU ARE A FAILURE OR HAVE LET YOURSELF OR YOUR FAMILY DOWN: 3
5. POOR APPETITE OR OVEREATING: 3
9. THOUGHTS THAT YOU WOULD BE BETTER OFF DEAD, OR OF HURTING YOURSELF: 3
4. FEELING TIRED OR HAVING LITTLE ENERGY: 3

## 2022-10-18 ASSESSMENT — ANXIETY QUESTIONNAIRES
GAD7 TOTAL SCORE: 21
4. TROUBLE RELAXING: 3-NEARLY EVERY DAY
6. BECOMING EASILY ANNOYED OR IRRITABLE: 3-NEARLY EVERY DAY
1. FEELING NERVOUS, ANXIOUS, OR ON EDGE: 3
3. WORRYING TOO MUCH ABOUT DIFFERENT THINGS: 3-NEARLY EVERY DAY
7. FEELING AFRAID AS IF SOMETHING AWFUL MIGHT HAPPEN: 3-NEARLY EVERY DAY
2. NOT BEING ABLE TO STOP OR CONTROL WORRYING: 3-NEARLY EVERY DAY
5. BEING SO RESTLESS THAT IT IS HARD TO SIT STILL: 3-NEARLY EVERY DAY

## 2022-10-18 ASSESSMENT — COLUMBIA-SUICIDE SEVERITY RATING SCALE - C-SSRS
2. HAVE YOU ACTUALLY HAD ANY THOUGHTS OF KILLING YOURSELF?: NO
1. WITHIN THE PAST MONTH, HAVE YOU WISHED YOU WERE DEAD OR WISHED YOU COULD GO TO SLEEP AND NOT WAKE UP?: YES
7. DID THIS OCCUR IN THE LAST THREE MONTHS: NO
6. HAVE YOU EVER DONE ANYTHING, STARTED TO DO ANYTHING, OR PREPARED TO DO ANYTHING TO END YOUR LIFE?: YES

## 2022-10-18 NOTE — PROGRESS NOTES
Leilani Goel, was evaluated through a synchronous (real-time) audio-video encounter. The patient (or guardian if applicable) is aware that this is a billable service. Verbal consent to proceed has been obtained within the past 12 months. The visit was conducted pursuant to the emergency declaration under the [facility], [address] [place] [address] authority and the [employer] General Act. Patient identification was verified, and a caregiver was present when appropriate. The patient was located in a state where the provider was credentialed to provide care. Total time spent for this encounter:  45minutes    --Leonila Saeed LCSW on 10/18/2022 at 1:56 PM    An electronic signature was used to authenticate this note. Behavioral Health Consultation  Leonila GARCIA LCSW  10/18/2022  12:55 PM      Time spent with Patient: 45 minutes  This is patient's ninth  [facility] appointment. Reason for Consult:    Chief Complaint   Patient presents with    Follow-up    Anxiety    Depression     Referring Provider: No referring provider defined for this encounter. Feedback given to PCP. S:  Patient reports problems with feeling depressed and hopeless. Pt reported everything is the same as it has been. Pt reported she is getting stressed about needing to get all the testing done for [facility] for a pending surgery. Pt also is concerned about her health and children. Discussed long term therapy options for pt to seek. Addressed current and underlying issues, explored and released associated emotions, explored new ways to deal and cope with these problems.       O:  MSE:    Mood    Anxious  Depressed  Affect    depressed affect  Appetite normal  Sleep disturbance Yes  Fatigue Yes  Loss of pleasure Yes  Attention/Concentration    intact  Morbid ideation No  Suicide Assessment    no suicidal ideation        A:  Patient presents for consult due to problems with depression and anxiety. PHQ Scores 10/18/2022 9/19/2022 8/25/2022 7/25/2022 6/27/2022 3/15/2022 2/22/2022   PHQ2 Score 6 6 6 6 6 0 5   PHQ9 Score 27 26 26 27 26 0 23     Interpretation of Total Score Depression Severity: 1-4 = Minimal depression, 5-9 = Mild depression, 10-14 = Moderate depression, 15-19 = Moderately severe depression, 20-27 = Severe depression    HEMANTH-7  Feeling nervous, anxious, or on edge: Nearly every day  Not able to stop or control worrying: 3-Nearly every day  Worrying too much about different things: 3-Nearly every day  Trouble relaxing: 3-Nearly every day  Being so restless that it's hard to sit still: 3-Nearly every day  Becoming easily annoyed or irritable: 3-Nearly every day  Feeling afraid as if something awful might happen: 3-Nearly every day  HEMANTH-7 Total Score: 21    HEMANTH-7 score interpretation:  0-4 Subclinical, 5-9 Mild, 10-14 Moderate, 15-21 Severe    Continued consultation is clinically/medically necessary to support in learning new skills and build confidence to deal better with these issues. Patient response to consults, finds new strategies helpful.      Diagnosis:    1. Schizoaffective disorder, bipolar type ([facility] [address]          Diagnosis Date    Abnormal glucose measurement     Abnormal Pap smear of cervix     Anxiety     Blood circulation, collateral     CAD (coronary artery disease)     Cerebral artery occlusion with cerebral infarction [facility]) 2014    right side    Complication of anesthesia     difficulty waking    Depression     Heart disease 06/2020    LOOP recorder     IBS (irritable bowel syndrome)     MDD (major depressive disorder)     MI (myocardial infarction) ([address] 2014    Miscarriage 06/2012    Neurologic disorder     Postpartum depression     Prolonged emergence from general anesthesia     Schizophrenia ([address]     Seizures ([facility] [address]     anxiety related (last 6/2020)    Syncope     Tachycardia     UTI (urinary tract infection) 12/09/2019         Plan:  Pt interventions:  Practiced assertive communication, Trained in strategies for increasing balanced thinking, Trained in relaxation strategies, Trained in improving communication skills, Provided education, Discussed self-care (sleep, nutrition, rewarding activities, social support, exercise), Champion-setting to identify pt's primary goals for [facility] visit / overall health, Supportive techniques, Emphasized self-care as important for managing overall health, and Identified maladaptive thoughts      Pt Behavioral Change Plan:  See Pt Instructions   Recommendations to patient:      1. Practice new coping, stress management, relaxation skills at least                   two times a day for at least 10-30 minutes. 2. Find at least one positive outlet per day that makes you feel better. 3. Talk things over with a good friend. Practice letting things go. 4. Stop, breathe, reset. \"I am ok. \"     Scheduled follow up appointment. Call for a sooner appointment if needed or if you need to change or cancel you appointment.     See Pt Instructions

## 2022-10-18 NOTE — PATIENT INSTRUCTIONS
Scheduled follow up appointment. Call for a sooner appointment if needed or if you need to change or cancel you appointment. Nhi May, [phone] and ask for Nhi, She might answer it as [facility]\"  You can also send her a message on My Chart. Be aware that all my messages are linked to her. If you receive a survey after visiting one of our offices, please take time to share your experience about your office visit. These surveys are confidential and no health information about you is shared. We highly welcome your feedback to continuously improve our services for you. Recommendations to patient:      1. Practice new coping, stress management, relaxation skills at least                   two times a day for at least 10-30 minutes. 2. Find at least one positive outlet per day that makes you feel better. 3. Talk things over with a good friend. Practice letting things go. 4. Stop, breathe, reset. \"I am ok. \"    Therapy Referral List    [address] [address]  Phone: [phone]  Insurances Accepted: Any [employer], [employer], and [facility]  [address] [address]  Phone: [phone]  Insurances Accepted: [address]  [place] [address]  Phone: [phone]  Insurances Accepted: Any [employer], [employer], and [address] [address]  Phone: [phone]  Insurances Accepted: [address]  [address] [facility] [address]  Phone: [phone]  Insurances Accepted: Any [employer], [employer], and [facility] (Doesn't accept Bhanu Jeronimo)    [address] [facility]  [address] [address]  Phone: [phone]  Insurances Accepted:  Any [employer], [place] Medicare, and [facility]  [address] [address]  Phone: [phone]  Insurances Accepted: Any [employer], [employer], and [employer] Medicaid. Contact your insurance provider, managed care organization, or local Medical Society to obtain a current listing available in your area. Contact your insurance provider for in network benefits    [employer]  [employer]/  Get $100 off with code [facility], Can pay out of pocket    [email or web address]  The cost of therapy through [facility] ranges from $60 to $90 per week (billed every 4 weeks) and it is based on your location, preferences, and therapist availability. You can cancel your membership at any time, for any reason.

## 2022-10-28 ENCOUNTER — OFFICE VISIT (OUTPATIENT)
Dept: ENT CLINIC | Age: 37
End: 2022-10-28
Payer: MEDICARE

## 2022-10-28 VITALS
SYSTOLIC BLOOD PRESSURE: 118 MMHG | WEIGHT: 182 LBS | HEIGHT: 60 IN | BODY MASS INDEX: 35.73 KG/M2 | DIASTOLIC BLOOD PRESSURE: 74 MMHG

## 2022-10-28 DIAGNOSIS — J34.89 MAXILLARY SINUS MASS: Primary | ICD-10-CM

## 2022-10-28 PROCEDURE — 99203 OFFICE O/P NEW LOW 30 MIN: CPT | Performed by: PHYSICIAN ASSISTANT

## 2022-10-28 ASSESSMENT — ENCOUNTER SYMPTOMS
FACIAL SWELLING: 0
SINUS PAIN: 0
SORE THROAT: 0
EYE PAIN: 0
VOICE CHANGE: 0
EYE DISCHARGE: 0
TROUBLE SWALLOWING: 0
RHINORRHEA: 0
SINUS PRESSURE: 0

## 2022-10-28 NOTE — ASSESSMENT & PLAN NOTE
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 I will call her the results once this has been completed.

## 2022-10-28 NOTE — PROGRESS NOTES
[facility] OTOLARYNGOLOGY/ENT  Mrs. Mario Meigs is a pleasant 77-year-old  female that was referred by Dr. Carroll Moran due to a recent MRI that was obtained for migraines. This demonstrated a questionable mass-effect to the left maxillary sinus. Patient reports that she has had no sinus symptoms and does report some fullness only to the right side. After review of the CT, this was questionable for a retained mucous cyst versus a possible inverted papilloma. Patient reports that she wears a loop recorder due to cardiac arrhythmias requiring syncope. -No prednisone      Allergies: Advil [ibuprofen]      Current Outpatient Medications   Medication Sig Dispense Refill    aspirin 81 MG EC tablet Take 81 mg by mouth daily      lamoTRIgine (LAMICTAL) 25 MG tablet Take 25 mg by mouth daily      Galcanezumab-gnlm (EMGALITY) 120 MG/ML SOAJ Inject 120 mg into the skin every 30 days 1 pen 11    butalbital-APAP-caffeine -40 MG CAPS per capsule Take 1 capsule by mouth every 6 hours as needed for Headaches 40 capsule 5    bisoprolol (ZEBETA) 5 MG tablet Take 1 tablet by mouth 2 times daily 60 tablet 5    ARIPiprazole (ABILIFY) 5 MG tablet Take 1.5 tablets by mouth nightly 45 tablet 5     No current facility-administered medications for this visit.        Past Surgical History:   Procedure Laterality Date    CHOLECYSTECTOMY, LAPAROSCOPIC      COLONOSCOPY N/A 11/7/2019    Dr Ally Hernández, 10 yr recall    EGD  2016    [address]      lower right leg, has metal plate and screws    FRACTURE SURGERY      left wrist    HYSTERECTOMY (CERVIX STATUS UNKNOWN) N/A 6/3/2021    TOTAL LAPAROSCOPIC HYSTERECTOMY WITH DAVINCI CYSTOSCOPY performed by Akhil Lawson MD at [address] [place] [address]  2014    loop recorder    INTRAUTERINE DEVICE INSERTION  06/26/2016    Essure placement    UPPER GASTROINTESTINAL ENDOSCOPY N/A 11/7/2019    Dr Joe Berrios       Past Medical History: Diagnosis Date    Abnormal glucose measurement     Abnormal Pap smear of cervix     Anxiety     Blood circulation, collateral     CAD (coronary artery disease)     Cerebral artery occlusion with cerebral infarction ([facility]     right side    Complication of anesthesia     difficulty waking    Depression     Heart disease 2020    LOOP recorder     IBS (irritable bowel syndrome)     MDD (major depressive disorder)     MI (myocardial infarction) ([facility] 2014    Miscarriage 2012    Neurologic disorder     Postpartum depression     Prolonged emergence from general anesthesia     Schizophrenia ([facility]     Seizures ([facility]     anxiety related (last 2020)    Syncope     Tachycardia     UTI (urinary tract infection) 2019       Family History   Problem Relation Age of Onset    High Blood Pressure Mother     Diabetes Father     Heart Disease Father     Stomach Cancer Father     Colon Cancer Paternal Grandmother     Breast Cancer Other         maternal great aunt    Colon Polyps Neg Hx     Esophageal Cancer Neg Hx     Liver Cancer Neg Hx     Liver Disease Neg Hx     Rectal Cancer Neg Hx        Social History     Tobacco Use    Smoking status: Former     Packs/day: 0.50     Years: 3.00     Pack years: 1.50     Types: Cigarettes     Quit date:      Years since quittin.8    Smokeless tobacco: Former     Quit date: 2014   Substance Use Topics    Alcohol use: Yes     Comment: occ           REVIEW OF SYSTEMS:  all other systems reviewed and are negative  Review of Systems   Constitutional:  Negative for chills and fever. HENT:  Negative for congestion, dental problem, ear discharge, ear pain, facial swelling, hearing loss, nosebleeds, postnasal drip, rhinorrhea, sinus pressure, sinus pain, sneezing, sore throat, tinnitus, trouble swallowing and voice change. Eyes:  Negative for pain and discharge. Cardiovascular:  Positive for palpitations.    Neurological:  Positive for syncope and headaches. Negative for dizziness and seizures. Comments:     PHYSICAL EXAM:    /74   Ht 5' (1.524 m)   Wt 182 lb (82.6 kg)   LMP 05/23/2021 (Approximate)   BMI 35.54 kg/m²   Body mass index is 35.54 kg/m². General Appearance: well developed  and well nourished  Head/ Face: normocephalic and atraumatic  Vocal Quality: good/ normal  Ears: Right Ear: External: external ears normal Otoscopy Ear Canal: canal clear Otoscopy TM: TM's normal and TM's mobile Left Ear: External: external ears normal Otoscopy Ear Canal: canal clear Otoscopy TM: TM's normal and TM's mobile  Hearing: grossly intact  Nose: nares normal and septum midline  Neck: supple and adenopathy none palpable  Thyroid: normal  Exam demonstrated the tongue to be midline with no abnormalities to the posterior pharynx. Patient was noted with no sinus tenderness to percussion over the maxillary and frontal sinuses. Assessment & Plan:    Problem List Items Addressed This Visit       Maxillary sinus mass - Primary     Left maxillary sinus mass-questionable etiology  Plan: Due to the patient being asymptomatic with this finding noted on the MRI, will get CT of sinuses to ensure that the abnormality is truly there. I will call her the results once this has been completed. Relevant Orders    CT SINUS FOR IMAGE GUIDANCE       Orders Placed This Encounter   Procedures    CT SINUS FOR IMAGE GUIDANCE     Standing Status:   Future     Standing Expiration Date:   10/28/2023     Scheduling Instructions:      LMP     Order Specific Question:   Reason for exam:     Answer:   Maxillary sinus mass with chronic sinusitis based on previous MRI       No orders of the defined types were placed in this encounter. Electronically signed by Belen Altamirano PA-C on 10/28/22 at 1:39 PM CDT        Please note that this chart was generated using dragon dictation software.   Although every effort was made to ensure the accuracy of this automated transcription, some errors in transcription may have occurred.

## 2022-11-03 ENCOUNTER — TELEMEDICINE (OUTPATIENT)
Dept: INTERNAL MEDICINE | Age: 37
End: 2022-11-03
Payer: MEDICARE

## 2022-11-03 DIAGNOSIS — J11.1 INFLUENZA: ICD-10-CM

## 2022-11-03 PROCEDURE — 99213 OFFICE O/P EST LOW 20 MIN: CPT | Performed by: NURSE PRACTITIONER

## 2022-11-03 RX ORDER — OSELTAMIVIR PHOSPHATE 75 MG/1
75 CAPSULE ORAL 2 TIMES DAILY
Qty: 10 CAPSULE | Refills: 0 | Status: SHIPPED | OUTPATIENT
Start: 2022-11-03 | End: 2022-11-08

## 2022-11-03 ASSESSMENT — ENCOUNTER SYMPTOMS: COUGH: 1

## 2022-11-03 NOTE — PATIENT INSTRUCTIONS
1.  Likely influenza as her household has it. Use Tamiflu.   Tylenol ibuprofen for fever and can use Mucinex for congestion

## 2022-11-03 NOTE — PROGRESS NOTES
11/3/2022    TELEHEALTH EVALUATION -- Audio/Visual (During AEZUT- public health emergency)    HPI:    Denise Lee (:  1985) has requested an audio/video evaluation for the following concern(s):     Fever chills and body a chest;    Cough for a couple of days;    her children have the flu    Review of Systems   Constitutional:  Positive for fatigue. Respiratory:  Positive for cough. Musculoskeletal:  Positive for myalgias. Prior to Visit Medications    Medication Sig Taking? Authorizing Provider   oseltamivir (TAMIFLU) 75 MG capsule Take 1 capsule by mouth 2 times daily for 5 days Yes SETH Lucia   aspirin 81 MG EC tablet Take 81 mg by mouth daily  Historical Provider, MD   lamoTRIgine (LAMICTAL) 25 MG tablet Take 25 mg by mouth daily  Historical Provider, MD   Galcanezumab-gnlm (EMGALITY) 120 MG/ML SOAJ Inject 120 mg into the skin every 30 days  Dot Dust, DO   butalbital-APAP-caffeine -40 MG CAPS per capsule Take 1 capsule by mouth every 6 hours as needed for Headaches  Dot Dust, DO   bisoprolol (ZEBETA) 5 MG tablet Take 1 tablet by mouth 2 times daily  Charmayne Handy, APRN   ARIPiprazole (ABILIFY) 5 MG tablet Take 1.5 tablets by mouth nightly  Jacques Fields MD       Social History     Tobacco Use    Smoking status: Former     Packs/day: 0.50     Years: 3.00     Pack years: 1.50     Types: Cigarettes     Quit date:      Years since quittin.8    Smokeless tobacco: Former     Quit date: 2014   Vaping Use    Vaping Use: Some days    Start date: 2019    Substances: Flavoring    Devices: Refillable tank   Substance Use Topics    Alcohol use: Yes     Comment: occ    Drug use:  No            PHYSICAL EXAMINATION:  [ INSTRUCTIONS:  \"[x]\" Indicates a positive item  \"[]\" Indicates a negative item  -- DELETE ALL ITEMS NOT EXAMINED]  Vital Signs: (As obtained by patient/caregiver or practitioner observation)    Blood pressure-  Heart rate-    Respiratory rate- Temperature-  Pulse oximetry-     Constitutional: [x] Appears well-developed and well-nourished [] No apparent distress      [] Abnormal-   Mental status  [x] Alert and awake  [x] Oriented to person/place/time []Able to follow commands      Eyes:  EOM    []  Normal  [] Abnormal-  Sclera  []  Normal  [] Abnormal -         Discharge []  None visible  [] Abnormal -    HENT:   [] Normocephalic, atraumatic. [] Abnormal   [] Mouth/Throat: Mucous membranes are moist.     External Ears [] Normal  [] Abnormal-     Neck: [] No visualized mass     Pulmonary/Chest: [x] Respiratory effort normal.  [] No visualized signs of difficulty breathing or respiratory distress        [] Abnormal-      Musculoskeletal:   [] Normal gait with no signs of ataxia         [] Normal range of motion of neck        [] Abnormal-       Neurological:        [x] No Facial Asymmetry (Cranial nerve 7 motor function) (limited exam to video visit)          [] No gaze palsy        [] Abnormal-         Skin:        [x] No significant exanthematous lesions or discoloration noted on facial skin         [] Abnormal-            Psychiatric:       [] Normal Affect [] No Hallucinations        [] Abnormal-     Other pertinent observable physical exam findings-     ASSESSMENT/PLAN:  1. Influenza  Tamiflu as needed Mucinex Tylenol ibuprofen    No follow-ups on file. Brock Jame, was evaluated through a synchronous (real-time) audio-video encounter. The patient (or guardian if applicable) is aware that this is a billable service, which includes applicable co-pays. This Virtual Visit was conducted with patient's (and/or legal guardian's) consent. The visit was conducted pursuant to the emergency declaration under the [address] [place] [address] authority and the [employer] and [employer] General Act. Patient identification was verified, and a caregiver was present when appropriate.    The patient was located at Home: [phone]. Provider was located at [facility] ([place] [address]): [address] [place] [address]. Total time spent on this encounter:       --SETH Haas on 11/3/2022 at 12:11 PM    An electronic signature was used to authenticate this note.

## 2022-11-18 ENCOUNTER — OFFICE VISIT (OUTPATIENT)
Dept: CARDIOLOGY CLINIC | Age: 37
End: 2022-11-18
Payer: MEDICARE

## 2022-11-18 VITALS
DIASTOLIC BLOOD PRESSURE: 58 MMHG | WEIGHT: 179 LBS | BODY MASS INDEX: 35.14 KG/M2 | HEART RATE: 75 BPM | HEIGHT: 60 IN | SYSTOLIC BLOOD PRESSURE: 118 MMHG | OXYGEN SATURATION: 98 %

## 2022-11-18 DIAGNOSIS — Z95.818 STATUS POST PLACEMENT OF IMPLANTABLE LOOP RECORDER: Primary | ICD-10-CM

## 2022-11-18 DIAGNOSIS — R55 SYNCOPE, UNSPECIFIED SYNCOPE TYPE: ICD-10-CM

## 2022-11-18 DIAGNOSIS — R00.0 TACHYCARDIA: ICD-10-CM

## 2022-11-18 PROCEDURE — 99213 OFFICE O/P EST LOW 20 MIN: CPT | Performed by: CLINICAL NURSE SPECIALIST

## 2022-11-18 NOTE — PATIENT INSTRUCTIONS
Continue bisoprolol for fast heart rates  If you decide you want the loop recorder removed then let us kno  Follow up in 1 year  Call with any questions or concerns  Follow up with SETH Avila for non cardiac problems  Report any new problems  Cardiovascular Fitness-Exercise as tolerated. Strive for 30 minutes of exercise most days of the week. Cardiac / Healthy Diet- Avoid processed high fat foods, maintain low sodium/salt   Continue current medications as directed  Continue plan of treatment  It is always recommended that you bring your medications bottles with you to each visit - this is for your safety!

## 2022-11-18 NOTE — PROGRESS NOTES
[facility] Cardiology  [facility], [address]  [address]  Phone: [phone]  Fax: [phone]    OFFICE VISIT:  2022    Jamel  - : 1985    Reason For Visit:  Alexandra Mcdowell is a 42 y.o. female who is here for 6 Month Follow-Up (LOOP     soa with headache)  Patient has had history of syncopal spells with cardiac and neurological work-up. Normal carotids, negative stress test and normal 2D echo  Underwent placement of loop recorder due to recurrent syncope  Thus far the recorder has failed to show any correlation of her syncopal spells with arrhythmia or bradycardia  Patient's tachycardia has been treated with beta-blocker. Medication was interrupted with pregnancy last year. Longstanding psychiatric history and follows with psychiatry. She returns today for routine follow-up and loop recorder interrogation    Patient states she is had a couple syncopal episodes. They are similar to the past.  She will be seen in the ER walking and just fall. She along with all of her kids have had the flu recently. Slowly recovering. She continues to have headaches. Due to her 's job loss she has not been able to afford her migraine medicine. She is taking her bisoprolol regularly  She is being evaluated for ENT and neurology for ongoing migraines and sinus problems    Subjective  Opal denies exertional chest pain, shortness of breath, orthopnea, paroxysmal nocturnal dyspnea, syncope, presyncope, arrhythmia, edema and fatigue. The patient denies numbness or weakness to suggest cerebrovascular accident or transient ischemic attack. SETH Knox is PCP .   Jamel Toni has the following history as recorded in [facility]:    Patient Active Problem List    Diagnosis Date Noted    Influenza 2022    Maxillary sinus mass 10/28/2022    Chronic cough 2022    Chronic migraine without aura, intractable, without status migrainosus 2022    AMA (advanced maternal age) multigravida and screws    FRACTURE SURGERY      left wrist    HYSTERECTOMY (CERVIX STATUS UNKNOWN) N/A 6/3/2021    TOTAL LAPAROSCOPIC HYSTERECTOMY WITH DAVINCI CYSTOSCOPY performed by Rylan Segovia MD at [address] [facility]  2014    loop recorder    INTRAUTERINE DEVICE INSERTION  2016    Essure placement    UPPER GASTROINTESTINAL ENDOSCOPY N/A 2019    Dr Aren Alanis     Family History   Problem Relation Age of Onset    High Blood Pressure Mother     Diabetes Father     Heart Disease Father     Stomach Cancer Father     Colon Cancer Paternal Grandmother     Breast Cancer Other         maternal great aunt    Colon Polyps Neg Hx     Esophageal Cancer Neg Hx     Liver Cancer Neg Hx     Liver Disease Neg Hx     Rectal Cancer Neg Hx      Social History     Tobacco Use    Smoking status: Former     Packs/day: 0.50     Years: 3.00     Pack years: 1.50     Types: Cigarettes     Quit date:      Years since quittin.8    Smokeless tobacco: Former     Quit date: 2014   Substance Use Topics    Alcohol use: Yes     Comment: occ      Current Outpatient Medications   Medication Sig Dispense Refill    aspirin 81 MG EC tablet Take 81 mg by mouth daily      lamoTRIgine (LAMICTAL) 25 MG tablet Take 25 mg by mouth daily      butalbital-APAP-caffeine -40 MG CAPS per capsule Take 1 capsule by mouth every 6 hours as needed for Headaches 40 capsule 5    bisoprolol (ZEBETA) 5 MG tablet Take 1 tablet by mouth 2 times daily 60 tablet 5    ARIPiprazole (ABILIFY) 5 MG tablet Take 1.5 tablets by mouth nightly 45 tablet 5    Galcanezumab-gnlm (EMGALITY) 120 MG/ML SOAJ Inject 120 mg into the skin every 30 days (Patient not taking: Reported on 2022) 1 pen 11     No current facility-administered medications for this visit. Allergies: Advil [ibuprofen]    Review of Systems  Constitutional - no significant activity change, appetite change, or unexpected weight change. No fever, chills or diaphoresis. No fatigue. HEENT - no significant rhinorrhea or epistaxis. No tinnitus or significant hearing loss. Eyes - no sudden vision change or amaurosis. Respiratory - no significant wheezing, stridor, apnea or cough. No dyspnea on exertion or shortness of breath. Cardiovascular - no exertional chest pain, orthopnea or PND. No sensation of arrhythmia or slow heart rate. No claudication or leg edema. Gastrointestinal - no abdominal swelling or pain. No blood in stool. No severe constipation, diarrhea, nausea, or vomiting. Genitourinary - no difficulty urinating, dysuria, frequency, or urgency. No flank pain or hematuria. Musculoskeletal - no back pain, gait disturbance, or myalgia. Skin - no color change or rash. No pallor. No new surgical incision. Neurologic - no speech difficulty, facial asymmetry or lateralizing weakness. No seizures, presyncope,  or significant dizziness. + Headaches + syncope  Hematologic - no easy bruising or excessive bleeding. Psychiatric - no severe anxiety or insomnia. No confusion. All other review of systems are negative. Objective  Vital Signs - BP (!) 118/58   Pulse 75   Ht 5' (1.524 m)   Wt 179 lb (81.2 kg)   LMP 05/23/2021 (Approximate)   SpO2 98%   BMI 34.96 kg/m²   General - Opal is alert, cooperative, and pleasant. Well groomed. No acute distress. Body habitus is obese. HEENT - The head is normocephalic. No circumoral cyanosis. Dentition is normal.   EYES -  No Xanthelasma, no arcus senilis, no conjunctival hemorrhages or discharge. Neck - Supple, without increased jugular venous pressures. No carotid bruits. No mass. Respiratory - Lungs are clear bilaterally. No wheezes or rales. Normal effort without use of accessory muscles. Cardiovascular - Heart has regular rhythm and rate. No murmurs, rubs or gallops. + pedal pulses and no varicosities. Abdominal -  Soft, nontender, nondistended. Bowel sounds are intact.    Extremities - No clubbing, cyanosis, or  edema. Musculoskeletal -  No clubbing . No Osler's nodes. Gait normal .  No kyphosis or scoliosis. Skin -  no statis ulcers or dermatitis. Neurological - No focal signs are identified. Oriented to person, place and time. Psychiatric -  Appropriate affect and mood. Assessment:     Diagnosis Orders   1. Status post placement of implantable loop recorder        2. Tachycardia        3. Syncope, unspecified syncope type          Data:  BP Readings from Last 3 Encounters:   11/18/22 (!) 118/58   10/28/22 118/74   09/06/22 102/60    Pulse Readings from Last 3 Encounters:   11/18/22 75   09/06/22 84   08/24/22 89        Wt Readings from Last 3 Encounters:   11/18/22 179 lb (81.2 kg)   10/28/22 182 lb (82.6 kg)   09/06/22 175 lb (79.4 kg)     Loop recorder interrogation today shows battery is now completely dead. Reached RRT in June. We discussed removal of device versus leaving it in. Its not bothering her. She is elected at this time to leave device and. She will let us know in the future if she would like it removed    Looking back patient's heart rate has been fairly well controlled if she is taking her beta-blocker regularly. She is taking it currently and doing well. Unfortunately she is had recurrent episodes of syncope but have not been related to heart rate or rhythm. Thought to be vasovagal      Heart rates  well controlled on bisoprolol. Reviewed  recent notes-recent fluent and ENT working up questionable maxillary sinus mass versus cyst   Reviewed recent labs      Stable cardiovascular status. No evidence of overt heart failure, angina or dysrhythmia. 20 minutes were spent preparing, reviewing and seeing patient.   All questions answered    Plan    Continue bisoprolol for fast heart rates  If you decide you want the loop recorder removed then let us kno  Follow up in 1 year  Call with any questions or concerns  Follow up with Deshawn Watts, SETH for non cardiac problems  Report any new problems  Cardiovascular Fitness-Exercise as tolerated. Strive for 30 minutes of exercise most days of the week. Cardiac / Healthy Diet- Avoid processed high fat foods, maintain low sodium/salt   Continue current medications as directed  Continue plan of treatment  It is always recommended that you bring your medications bottles with you to each visit - this is for your safety! SETH Isidro dragon/transcription disclaimer: Much of this encounter note is electronic transcription/translation of spoken language to printed tach. Electronic translation of spoken language may be erroneous, or at times, nonsensical words or phrases may be inadvertently transcribed.  Although, I have reviewed the note for such errors, some may still exist.

## 2022-11-21 ENCOUNTER — HOSPITAL ENCOUNTER (OUTPATIENT)
Dept: CT IMAGING | Age: 37
Discharge: HOME | End: 2022-11-21
Payer: MEDICARE

## 2022-11-21 DIAGNOSIS — J34.89 MAXILLARY SINUS MASS: ICD-10-CM

## 2022-11-21 PROCEDURE — 70486 CT MAXILLOFACIAL W/O DYE: CPT

## 2022-11-23 RX ORDER — AMOXICILLIN AND CLAVULANATE POTASSIUM 875; 125 MG/1; MG/1
1 TABLET, FILM COATED ORAL 2 TIMES DAILY
Qty: 20 TABLET | Refills: 0 | Status: SHIPPED | OUTPATIENT
Start: 2022-11-23 | End: 2022-11-29

## 2022-11-29 ENCOUNTER — TELEPHONE (OUTPATIENT)
Dept: ENT CLINIC | Age: 37
End: 2022-11-29

## 2022-11-29 RX ORDER — AMOXICILLIN AND CLAVULANATE POTASSIUM 875; 125 MG/1; MG/1
1 TABLET, FILM COATED ORAL 2 TIMES DAILY
Qty: 42 TABLET | Refills: 0 | Status: SHIPPED | OUTPATIENT
Start: 2022-11-29 | End: 2022-12-20

## 2022-11-29 NOTE — TELEPHONE ENCOUNTER
The CT of the sinuses was reviewed and discussed with the patient. Patient was noted to have impressive chronic maxillary sinusitis with opacification bilaterally. Patient was already started on 10 days of Augmentin however due to financial issues she has been unable to get this filled at this point. I advised the patient that I will give her a 21-day course and to call if she is unable to get the full 21-day supply. I will need to see her back in the clinic in 1 month to reassess. She reports that she is unable to take steroids due to cardiac arrhythmias that was resulting in syncope. Patient was advised to call if she has any questions or problems.       Electronically signed by Ronald Palomares PA-C on 11/29/22 at 5:30 PM CST

## 2022-12-22 ENCOUNTER — TELEPHONE (OUTPATIENT)
Dept: NEUROLOGY | Age: 37
End: 2022-12-22

## 2022-12-22 NOTE — TELEPHONE ENCOUNTER
Called pt to r/s due to provider out of office , pt stated she had not been able to afford any of her meds from Dr Ryan Pete ,condition is worsening , please advise

## 2023-01-03 ENCOUNTER — OFFICE VISIT (OUTPATIENT)
Dept: NEUROLOGY | Age: 38
End: 2023-01-03
Payer: MEDICARE

## 2023-01-03 VITALS
OXYGEN SATURATION: 98 % | HEIGHT: 60 IN | SYSTOLIC BLOOD PRESSURE: 118 MMHG | BODY MASS INDEX: 35.14 KG/M2 | DIASTOLIC BLOOD PRESSURE: 68 MMHG | HEART RATE: 81 BPM | WEIGHT: 179 LBS

## 2023-01-03 DIAGNOSIS — R20.0 NUMBNESS: ICD-10-CM

## 2023-01-03 DIAGNOSIS — R51.9 HEADACHE, UNSPECIFIED HEADACHE TYPE: Primary | ICD-10-CM

## 2023-01-03 PROCEDURE — 99214 OFFICE O/P EST MOD 30 MIN: CPT | Performed by: PSYCHIATRY & NEUROLOGY

## 2023-01-18 ENCOUNTER — APPOINTMENT (OUTPATIENT)
Dept: GENERAL RADIOLOGY | Age: 38
End: 2023-01-18
Payer: MEDICARE

## 2023-01-18 ENCOUNTER — HOSPITAL ENCOUNTER (OUTPATIENT)
Age: 38
Setting detail: OBSERVATION
Discharge: HOME | End: 2023-01-19
Attending: INTERNAL MEDICINE | Admitting: INTERNAL MEDICINE
Payer: MEDICARE

## 2023-01-18 ENCOUNTER — TELEPHONE (OUTPATIENT)
Dept: CARDIOLOGY CLINIC | Age: 38
End: 2023-01-18

## 2023-01-18 DIAGNOSIS — Z86.79 HX OF CORONARY ARTERY DISEASE: ICD-10-CM

## 2023-01-18 DIAGNOSIS — R07.9 ACUTE CHEST PAIN: ICD-10-CM

## 2023-01-18 DIAGNOSIS — N30.01 ACUTE CYSTITIS WITH HEMATURIA: Primary | ICD-10-CM

## 2023-01-18 LAB
ALBUMIN SERPL-MCNC: 4.2 G/DL (ref 3.5–5.2)
ALP BLD-CCNC: 104 U/L (ref 35–104)
ALT SERPL-CCNC: 16 U/L (ref 5–33)
AMPHETAMINE SCREEN, URINE: NEGATIVE
ANION GAP SERPL CALCULATED.3IONS-SCNC: 7 MMOL/L (ref 7–19)
AST SERPL-CCNC: 20 U/L (ref 5–32)
BACTERIA: ABNORMAL /HPF
BARBITURATE SCREEN URINE: NEGATIVE
BASOPHILS ABSOLUTE: 0.1 K/UL (ref 0–0.2)
BASOPHILS RELATIVE PERCENT: 0.9 % (ref 0–1)
BENZODIAZEPINE SCREEN, URINE: NEGATIVE
BILIRUB SERPL-MCNC: <0.2 MG/DL (ref 0.2–1.2)
BILIRUBIN URINE: NEGATIVE
BLOOD, URINE: ABNORMAL
BUN BLDV-MCNC: 10 MG/DL (ref 6–20)
BUPRENORPHINE URINE: NEGATIVE
CALCIUM SERPL-MCNC: 9 MG/DL (ref 8.6–10)
CANNABINOID SCREEN URINE: NEGATIVE
CHLORIDE BLD-SCNC: 104 MMOL/L (ref 98–111)
CLARITY: CLEAR
CO2: 28 MMOL/L (ref 22–29)
COCAINE METABOLITE SCREEN URINE: NEGATIVE
COLOR: YELLOW
CREAT SERPL-MCNC: 0.6 MG/DL (ref 0.5–0.9)
CRYSTALS, UA: ABNORMAL /HPF
D DIMER: 0.36 UG/ML FEU (ref 0–0.48)
EOSINOPHILS ABSOLUTE: 0.7 K/UL (ref 0–0.6)
EOSINOPHILS RELATIVE PERCENT: 7.6 % (ref 0–5)
EPITHELIAL CELLS, UA: 4 /HPF (ref 0–5)
GFR SERPL CREATININE-BSD FRML MDRD: >60 ML/MIN/{1.73_M2}
GLUCOSE BLD-MCNC: 92 MG/DL (ref 74–109)
GLUCOSE URINE: NEGATIVE MG/DL
HCT VFR BLD CALC: 42.4 % (ref 37–47)
HEMOGLOBIN: 13.4 G/DL (ref 12–16)
HYALINE CASTS: 2 /HPF (ref 0–8)
IMMATURE GRANULOCYTES #: 0 K/UL
KETONES, URINE: NEGATIVE MG/DL
LEUKOCYTE ESTERASE, URINE: NEGATIVE
LYMPHOCYTES ABSOLUTE: 2.9 K/UL (ref 1.1–4.5)
LYMPHOCYTES RELATIVE PERCENT: 33.3 % (ref 20–40)
Lab: NORMAL
MCH RBC QN AUTO: 30.7 PG (ref 27–31)
MCHC RBC AUTO-ENTMCNC: 31.6 G/DL (ref 33–37)
MCV RBC AUTO: 97.2 FL (ref 81–99)
METHADONE SCREEN, URINE: NEGATIVE
METHAMPHETAMINE, URINE: NEGATIVE
MONOCYTES ABSOLUTE: 0.6 K/UL (ref 0–0.9)
MONOCYTES RELATIVE PERCENT: 6.5 % (ref 0–10)
NEUTROPHILS ABSOLUTE: 4.4 K/UL (ref 1.5–7.5)
NEUTROPHILS RELATIVE PERCENT: 51.5 % (ref 50–65)
NITRITE, URINE: NEGATIVE
OPIATE SCREEN URINE: NEGATIVE
OXYCODONE URINE: NEGATIVE
PDW BLD-RTO: 13.3 % (ref 11.5–14.5)
PH UA: 6 (ref 5–8)
PHENCYCLIDINE SCREEN URINE: NEGATIVE
PLATELET # BLD: 305 K/UL (ref 130–400)
PMV BLD AUTO: 8.6 FL (ref 9.4–12.3)
POTASSIUM REFLEX MAGNESIUM: 4.1 MMOL/L (ref 3.5–5)
PRO-BNP: 43 PG/ML (ref 0–450)
PROPOXYPHENE SCREEN: NEGATIVE
PROTEIN UA: NEGATIVE MG/DL
RBC # BLD: 4.36 M/UL (ref 4.2–5.4)
RBC UA: 5 /HPF (ref 0–4)
SARS-COV-2, NAAT: NOT DETECTED
SODIUM BLD-SCNC: 139 MMOL/L (ref 136–145)
SPECIFIC GRAVITY UA: 1.01 (ref 1–1.03)
TOTAL PROTEIN: 7.1 G/DL (ref 6.6–8.7)
TRICYCLIC, URINE: NEGATIVE
TROPONIN: <0.01 NG/ML (ref 0–0.03)
UROBILINOGEN, URINE: 0.2 E.U./DL
WBC # BLD: 8.6 K/UL (ref 4.8–10.8)
WBC UA: 3 /HPF (ref 0–5)

## 2023-01-18 PROCEDURE — 80053 COMPREHEN METABOLIC PANEL: CPT

## 2023-01-18 PROCEDURE — G0378 HOSPITAL OBSERVATION PER HR: HCPCS

## 2023-01-18 PROCEDURE — 2580000003 HC RX 258: Performed by: INTERNAL MEDICINE

## 2023-01-18 PROCEDURE — 93005 ELECTROCARDIOGRAM TRACING: CPT | Performed by: EMERGENCY MEDICINE

## 2023-01-18 PROCEDURE — 83880 ASSAY OF NATRIURETIC PEPTIDE: CPT

## 2023-01-18 PROCEDURE — 80306 DRUG TEST PRSMV INSTRMNT: CPT

## 2023-01-18 PROCEDURE — 85025 COMPLETE CBC W/AUTO DIFF WBC: CPT

## 2023-01-18 PROCEDURE — 36415 COLL VENOUS BLD VENIPUNCTURE: CPT

## 2023-01-18 PROCEDURE — 6360000002 HC RX W HCPCS: Performed by: PHYSICIAN ASSISTANT

## 2023-01-18 PROCEDURE — 87635 SARS-COV-2 COVID-19 AMP PRB: CPT

## 2023-01-18 PROCEDURE — 96374 THER/PROPH/DIAG INJ IV PUSH: CPT

## 2023-01-18 PROCEDURE — 99285 EMERGENCY DEPT VISIT HI MDM: CPT

## 2023-01-18 PROCEDURE — 6370000000 HC RX 637 (ALT 250 FOR IP): Performed by: PHYSICIAN ASSISTANT

## 2023-01-18 PROCEDURE — 71045 X-RAY EXAM CHEST 1 VIEW: CPT | Performed by: RADIOLOGY

## 2023-01-18 PROCEDURE — 71045 X-RAY EXAM CHEST 1 VIEW: CPT

## 2023-01-18 PROCEDURE — 2580000003 HC RX 258: Performed by: PHYSICIAN ASSISTANT

## 2023-01-18 PROCEDURE — 85379 FIBRIN DEGRADATION QUANT: CPT

## 2023-01-18 PROCEDURE — 81001 URINALYSIS AUTO W/SCOPE: CPT

## 2023-01-18 PROCEDURE — 84484 ASSAY OF TROPONIN QUANT: CPT

## 2023-01-18 RX ORDER — SODIUM CHLORIDE 0.9 % (FLUSH) 0.9 %
5-40 SYRINGE (ML) INJECTION PRN
Status: DISCONTINUED | OUTPATIENT
Start: 2023-01-18 | End: 2023-01-19 | Stop reason: HOSPADM

## 2023-01-18 RX ORDER — ACETAMINOPHEN 325 MG/1
650 TABLET ORAL EVERY 6 HOURS PRN
Status: DISCONTINUED | OUTPATIENT
Start: 2023-01-18 | End: 2023-01-19 | Stop reason: HOSPADM

## 2023-01-18 RX ORDER — ENOXAPARIN SODIUM 100 MG/ML
40 INJECTION SUBCUTANEOUS DAILY
Status: DISCONTINUED | OUTPATIENT
Start: 2023-01-19 | End: 2023-01-19 | Stop reason: HOSPADM

## 2023-01-18 RX ORDER — SODIUM CHLORIDE 9 MG/ML
INJECTION, SOLUTION INTRAVENOUS PRN
Status: DISCONTINUED | OUTPATIENT
Start: 2023-01-18 | End: 2023-01-19 | Stop reason: HOSPADM

## 2023-01-18 RX ORDER — ARIPIPRAZOLE 15 MG/1
7.5 TABLET ORAL NIGHTLY
Status: DISCONTINUED | OUTPATIENT
Start: 2023-01-18 | End: 2023-01-19 | Stop reason: HOSPADM

## 2023-01-18 RX ORDER — ACETAMINOPHEN 650 MG/1
650 SUPPOSITORY RECTAL EVERY 6 HOURS PRN
Status: DISCONTINUED | OUTPATIENT
Start: 2023-01-18 | End: 2023-01-19 | Stop reason: HOSPADM

## 2023-01-18 RX ORDER — METOPROLOL SUCCINATE 50 MG/1
50 TABLET, EXTENDED RELEASE ORAL DAILY
Status: DISCONTINUED | OUTPATIENT
Start: 2023-01-19 | End: 2023-01-19 | Stop reason: HOSPADM

## 2023-01-18 RX ORDER — ONDANSETRON 2 MG/ML
4 INJECTION INTRAMUSCULAR; INTRAVENOUS EVERY 6 HOURS PRN
Status: DISCONTINUED | OUTPATIENT
Start: 2023-01-18 | End: 2023-01-19 | Stop reason: HOSPADM

## 2023-01-18 RX ORDER — NITROGLYCERIN 0.4 MG/1
0.4 TABLET SUBLINGUAL EVERY 5 MIN PRN
Status: DISCONTINUED | OUTPATIENT
Start: 2023-01-18 | End: 2023-01-19 | Stop reason: HOSPADM

## 2023-01-18 RX ORDER — ASPIRIN 81 MG/1
81 TABLET ORAL DAILY
Status: DISCONTINUED | OUTPATIENT
Start: 2023-01-19 | End: 2023-01-19 | Stop reason: HOSPADM

## 2023-01-18 RX ORDER — ONDANSETRON 4 MG/1
4 TABLET, ORALLY DISINTEGRATING ORAL EVERY 8 HOURS PRN
Status: DISCONTINUED | OUTPATIENT
Start: 2023-01-18 | End: 2023-01-19 | Stop reason: HOSPADM

## 2023-01-18 RX ORDER — POLYETHYLENE GLYCOL 3350 17 G/17G
17 POWDER, FOR SOLUTION ORAL DAILY PRN
Status: DISCONTINUED | OUTPATIENT
Start: 2023-01-18 | End: 2023-01-19 | Stop reason: HOSPADM

## 2023-01-18 RX ORDER — SODIUM CHLORIDE 0.9 % (FLUSH) 0.9 %
5-40 SYRINGE (ML) INJECTION EVERY 12 HOURS SCHEDULED
Status: DISCONTINUED | OUTPATIENT
Start: 2023-01-18 | End: 2023-01-19 | Stop reason: HOSPADM

## 2023-01-18 RX ORDER — LAMOTRIGINE 25 MG/1
25 TABLET ORAL DAILY
Status: DISCONTINUED | OUTPATIENT
Start: 2023-01-19 | End: 2023-01-19 | Stop reason: HOSPADM

## 2023-01-18 RX ADMIN — NITROGLYCERIN 0.4 MG: 0.4 TABLET, ORALLY DISINTEGRATING SUBLINGUAL at 15:16

## 2023-01-18 RX ADMIN — CEFTRIAXONE 1000 MG: 1 INJECTION, POWDER, FOR SOLUTION INTRAMUSCULAR; INTRAVENOUS at 15:18

## 2023-01-18 RX ADMIN — SODIUM CHLORIDE, PRESERVATIVE FREE 10 ML: 5 INJECTION INTRAVENOUS at 21:34

## 2023-01-18 RX ADMIN — NITROGLYCERIN 0.4 MG: 0.4 TABLET, ORALLY DISINTEGRATING SUBLINGUAL at 15:23

## 2023-01-18 ASSESSMENT — ENCOUNTER SYMPTOMS
SHORTNESS OF BREATH: 0
COLOR CHANGE: 0
EYE PAIN: 0
COUGH: 0
EYE DISCHARGE: 0
NAUSEA: 0
PHOTOPHOBIA: 0
APNEA: 0
ABDOMINAL PAIN: 0
SORE THROAT: 0
RHINORRHEA: 0
ABDOMINAL DISTENTION: 0
BACK PAIN: 0

## 2023-01-18 ASSESSMENT — PAIN SCALES - GENERAL: PAINLEVEL_OUTOF10: 0

## 2023-01-18 NOTE — CARE COORDINATION
Case Management Assessment  Initial Evaluation    Date/Time of Evaluation: 1/18/2023 4:11 PM  Assessment Completed by: Nickie Miranda    If patient is discharged prior to next notation, then this note serves as note for discharge by case management. Patient Name: Genesis Mukherjee                   YOB: 1985  Diagnosis: No admission diagnoses are documented for this encounter. Date / Time: 1/18/2023 12:55 PM    Patient Admission Status: Emergency   Readmission Risk (Low < 19, Mod (19-27), High > 27): No data recorded  Current PCP: SETH Mahoney  PCP verified by CM? Yes    Chart Reviewed: Yes      History Provided by: Patient  Patient Orientation: Alert and Oriented    Patient Cognition: Alert    Hospitalization in the last 30 days (Readmission):  No    If yes, Readmission Assessment in CM Navigator will be completed. Advance Directives:      Code Status: Prior   Patient's Primary Decision Maker is: Legal Next of Kin      Discharge Planning:    Patient lives with: Spouse/Significant Other, Children, Parent Type of Home: House  Primary Care Giver: Self  Patient Support Systems include: Spouse/Significant Other, Children, Family Members   Current Financial resources: Medicare, Medicaid  Current community resources: None  Current services prior to admission: None            Current DME:              Type of Home Care services:  None    ADLS  Prior functional level: Independent in ADLs/IADLs  Current functional level: Independent in ADLs/IADLs    PT AM-PAC:   /24  OT AM-PAC:   /24    Family can provide assistance at DC: Yes  Would you like Case Management to discuss the discharge plan with any other family members/significant others, and if so, who?  Yes  Plans to Return to Present Housing: Yes  Potential Assistance needed at discharge: N/A            Potential DME:    Patient expects to discharge to: [address] [place] [address] for transportation at discharge: Family (Spouse)    Financial    Payor: Tammy White / Plan: [address] / Product Type: *No Product type* /     Does insurance require precert for SNF: No    Potential assistance Purchasing Medications: No  Meds-to-Beds request:        [facility] [address], [address] [facility] [address] Jonh Kauffman [address]  Phone: [phone] Fax: [phone]      Notes:    Factors facilitating achievement of predicted outcomes: Family support, Cooperative, Pleasant, and Sense of humor    Barriers to discharge: Pain    The Plan for Transition of Care is related to the following treatment goals of No admission diagnoses are documented for this encounter.     Evelin Zendejas  Case Management Department

## 2023-01-18 NOTE — TELEPHONE ENCOUNTER
Pt called stating she is having chest pain that is a \"pulling feeling\", SOA, and headache. Pt advised to report to ER. Pt verbally understood and states she will.

## 2023-01-18 NOTE — ED PROVIDER NOTES
[facility] EMERGENCY DEPT  eMERGENCYdEPARTMENT eNCOUnter      Pt Name: Fernando Solorio  MRN: [id number]  [place] 1985  Date of evaluation: 2023  JULIA Vilchis    CHIEF COMPLAINT       Chief Complaint   Patient presents with    Chest Pain     States that she has had a couple of episodes of chest pain over the past couple of days, around arms feel heavy/numbness (started at 10 pm last night)     Numbness     Last known well 10 pm last night          HISTORY OF PRESENT ILLNESS  (Location/Symptom, Timing/Onset, Context/Setting, Quality, Duration, Modifying Factors, Severity.)   Fernando Solorio is a 42 y.o. female who presents to the emergency department with complaints of chest pressure \"feels like a weight\" pain goes down both arms and through to back doesn't correlate with exertion. She denies fever chills. Tingling in arms when asked about the numbness this all started 2 days ago. She has prior CAD and loop recorded followed locally by cards her loop recorded battery  in  per patient MI in  she thinks it feels similar leaning forward makes it harder to breath. She denies pleuritic type chest pain. Room air baseline. Normal vitals on triage. Risk factors include family hx prior CAD dx takes BP meds. HPI    Nursing Notes were reviewed and I agree. REVIEW OF SYSTEMS    (2-9 systems for level 4, 10 or more for level 5)     Review of Systems   Constitutional:  Negative for activity change, appetite change, chills and fever. HENT:  Negative for congestion, postnasal drip, rhinorrhea and sore throat. Eyes:  Negative for photophobia, pain, discharge and visual disturbance. Respiratory:  Negative for apnea, cough and shortness of breath. Cardiovascular:  Positive for chest pain. Negative for leg swelling. Gastrointestinal:  Negative for abdominal distention, abdominal pain and nausea. Genitourinary:  Negative for vaginal bleeding.    Musculoskeletal:  Negative for arthralgias, back pain, joint swelling, neck pain and neck stiffness. Skin:  Negative for color change and rash. Neurological:  Negative for dizziness, syncope, facial asymmetry and headaches. Hematological:  Negative for adenopathy. Does not bruise/bleed easily. Psychiatric/Behavioral:  Negative for agitation, behavioral problems and confusion. Except as noted above the remainder of the review of systems was reviewed and negative.        PAST MEDICAL HISTORY     Past Medical History:   Diagnosis Date    Abnormal glucose measurement     Abnormal Pap smear of cervix     Anxiety     Blood circulation, collateral     CAD (coronary artery disease)     Cerebral artery occlusion with cerebral infarction [facility]) 2014    right side    Complication of anesthesia     difficulty waking    Depression     Heart disease 06/2020    LOOP recorder     IBS (irritable bowel syndrome)     MDD (major depressive disorder)     MI (myocardial infarction) ([facility] [address] 2014    Miscarriage 06/2012    Neurologic disorder     Postpartum depression     Prolonged emergence from general anesthesia     Schizophrenia ([facility] [address]     Seizures ([facility] [address]     anxiety related (last 6/2020)    Syncope     Tachycardia     UTI (urinary tract infection) 12/09/2019         SURGICAL HISTORY       Past Surgical History:   Procedure Laterality Date    CHOLECYSTECTOMY, LAPAROSCOPIC      COLONOSCOPY N/A 11/7/2019    Dr Fifi Lancaster, 10 yr recall    EGD  2016    [address]      lower right leg, has metal plate and screws    FRACTURE SURGERY      left wrist    HYSTERECTOMY (CERVIX STATUS UNKNOWN) N/A 6/3/2021    TOTAL LAPAROSCOPIC HYSTERECTOMY WITH DAVINCI CYSTOSCOPY performed by Sakina Castro MD at [address] [place] [address]  2014    loop recorder    INTRAUTERINE DEVICE INSERTION  06/26/2016    Essure placement    UPPER GASTROINTESTINAL ENDOSCOPY N/A 11/7/2019    Dr Tai Jackson-Gastritis         CURRENT MEDICATIONS       Previous Medications    ARIPIPRAZOLE (ABILIFY) 5 MG TABLET    Take 1.5 tablets by mouth nightly    ASPIRIN 81 MG EC TABLET    Take 81 mg by mouth daily    BISOPROLOL (ZEBETA) 5 MG TABLET    Take 1 tablet by mouth 2 times daily    BUTALBITAL-APAP-CAFFEINE -40 MG CAPS PER CAPSULE    Take 1 capsule by mouth every 6 hours as needed for Headaches    GALCANEZUMAB-GNLM (EMGALITY) 120 MG/ML SOAJ    Inject 120 mg into the skin every 30 days    LAMOTRIGINE (LAMICTAL) 25 MG TABLET    Take 25 mg by mouth daily       ALLERGIES     Advil [ibuprofen]    FAMILY HISTORY       Family History   Problem Relation Age of Onset    High Blood Pressure Mother     Diabetes Father     Heart Disease Father     Stomach Cancer Father     Colon Cancer Paternal Grandmother     Breast Cancer Other         maternal great aunt    Colon Polyps Neg Hx     Esophageal Cancer Neg Hx     Liver Cancer Neg Hx     Liver Disease Neg Hx     Rectal Cancer Neg Hx           SOCIAL HISTORY       Social History     Socioeconomic History    Marital status:      Spouse name: None    Number of children: None    Years of education: None    Highest education level: None   Tobacco Use    Smoking status: Former     Packs/day: 0.50     Years: 3.00     Pack years: 1.50     Types: Cigarettes     Quit date:      Years since quittin.0    Smokeless tobacco: Former     Quit date: 2014   Vaping Use    Vaping Use: Some days    Start date: 2019    Substances: Flavoring    Devices: Refillable tank   Substance and Sexual Activity    Alcohol use: Yes     Comment: occ    Drug use: No    Sexual activity: Yes     Partners: Male   Social History Narrative    SAFETY PLAN        A suicide Safety Plan is a document that supports someone when they are having thoughts of suicide. Warning Signs that indicate a suicidal crisis may be developing: What (situations, thoughts, feelings, body sensations, behaviors, etc.) do you experience that lets you know you are beginning to think about suicide? 1. Stop eating/drinking    2. Laying in bed throughout the day    3. Crying uncontrollably throughout the day        Internal Coping Strategies:  What things can I do (relaxation techniques, hobbies, physical activities, etc.) to take my mind off my problems without contacting another person? 1. You can't do it because of the kids     2. Deep breathing    3. Being around the kids        People and social settings that provide distraction: Who can I call or where can I go to distract me? 1. Name: Tiara Coyle  Phone: in phone    2. Name: ______  Phone: _______     3. Place: visit friend at work              4. Place: go to [facility]        People whom I can ask for help: Who can I call when I need help - for example, friends, family, clergy, someone else? 1. Name:                 Phone: in phone    2. Name: Mother-in-law  Phone: in phone    3. Name: mother  Phone: in phone        Professionals or [address] [place] [address] I can contact during a crisis: Who can I call for help - for example, my doctor, my psychiatrist, my psychologist, a mental health provider, a suicide hotline? 1. If you feel overwhelmed, unable to cope, extremely anxious, or have thoughts of wanting to harm yourself or someone else, go to the nearest Emergency Room. [facility] Emergency Room:  [phone]    Crisis line:  [phone]    [address] (inpatient psych):  [phone] option 1    Call 911- they will send help and get you to the local ER        2. Clinician Name: [facility]   Phone: [phone] option 3               3. Suicide Prevention Lifeline: 4-096-507-TALK (6922)        Making the environment safe: How can I make my environment (house/apartment/living space) safer? For example, can I remove guns, medications, and other items? 1. Guns/knives are locked up    2.  Medications locked up     Social Determinants of Health     Transportation Needs: No Transportation Needs    Lack of Transportation (Medical): No    Lack of Transportation (Non-Medical): No   Housing Stability: Low Risk     Unable to Pay for Housing in the Last Year: No    Number of [place] in the Last Year: 1    Unstable Housing in the Last Year: No       SCREENINGS   NIH Stroke Scale  Interval: Baseline  Level of Consciousness (1a): Alert  LOC Questions (1b): Answers both correctly  LOC Commands (1c): Performs both tasks correctly  Best Gaze (2): Normal  Visual (3): No visual loss  Facial Palsy (4): Normal symmetrical movement  Motor Arm, Left (5a): No drift  Motor Arm, Right (5b): No drift  Motor Leg, Left (6a): No drift  Motor Leg, Right (6b): No drift  Limb Ataxia (7): Absent  Sensory (8): Normal  Best Language (9): No aphasia  Dysarthria (10): Normal  Extinction and Inattention (11): No abnormality  Total: 0Glasgow Coma Scale  Eye Opening: Spontaneous  Best Verbal Response: Oriented  Best Motor Response: Obeys commands  [place] Coma Scale Score: 15      PHYSICAL EXAM    (up to 7 forlevel 4, 8 or more for level 5)     ED Triage Vitals [01/18/23 1221]   BP Temp Temp Source Heart Rate Resp SpO2 Height Weight   113/81 98.2 °F (36.8 °C) Oral 85 18 96 % -- 179 lb (81.2 kg)       Physical Exam  Vitals and nursing note reviewed. Constitutional:       General: She is not in acute distress. Appearance: Normal appearance. She is well-developed. She is not diaphoretic. HENT:      Head: Normocephalic and atraumatic. Right Ear: Tympanic membrane, ear canal and external ear normal.      Left Ear: Tympanic membrane, ear canal and external ear normal.      Nose: Nose normal.      Mouth/Throat:      Mouth: Mucous membranes are moist.      Pharynx: No oropharyngeal exudate. Eyes:      General:         Right eye: No discharge. Left eye: No discharge. Pupils: Pupils are equal, round, and reactive to light. Neck:      Thyroid: No thyromegaly.    Cardiovascular:      Rate and Rhythm: Normal rate and regular rhythm. Pulses: Normal pulses. Heart sounds: Normal heart sounds. No murmur heard. No friction rub. Pulmonary:      Effort: Pulmonary effort is normal. No respiratory distress. Breath sounds: Normal breath sounds. No stridor. No wheezing. Abdominal:      General: Abdomen is flat. Bowel sounds are normal. There is no distension. Palpations: Abdomen is soft. Tenderness: There is no abdominal tenderness. Musculoskeletal:         General: Normal range of motion. Cervical back: Normal range of motion and neck supple. Skin:     General: Skin is warm and dry. Capillary Refill: Capillary refill takes less than 2 seconds. Findings: No rash. Neurological:      General: No focal deficit present. Mental Status: She is alert and oriented to person, place, and time. Mental status is at baseline. Cranial Nerves: No cranial nerve deficit. Sensory: No sensory deficit. Coordination: Coordination normal.   Psychiatric:         Mood and Affect: Mood normal.         Behavior: Behavior normal.         Thought Content: Thought content normal.         Judgment: Judgment normal.         DIAGNOSTIC RESULTS     RADIOLOGY:   Non-plain film images such as CT, Ultrasound and MRI are read by the radiologist. Plain radiographic images are visualized and preliminarilyinterpreted by Darlin Dumont MD with the below findings:        Interpretation per the Radiologist below, if available at the time of this note:    XR CHEST PORTABLE   Final Result   No acute abnormality.           LABS:  Labs Reviewed   CBC WITH AUTO DIFFERENTIAL - Abnormal; Notable for the following components:       Result Value    MCHC 31.6 (*)     MPV 8.6 (*)     Eosinophils % 7.6 (*)     Eosinophils Absolute 0.70 (*)     All other components within normal limits   URINALYSIS WITH REFLEX TO CULTURE - Abnormal; Notable for the following components:    Blood, Urine SMALL (*)     All other components within normal limits   MICROSCOPIC URINALYSIS - Abnormal; Notable for the following components:    Bacteria, UA 1+ (*)     Crystals, UA NEG (*)     RBC, UA 5 (*)     All other components within normal limits   COVID-19, RAPID   COMPREHENSIVE METABOLIC PANEL W/ REFLEX TO MG FOR LOW K   TROPONIN   BRAIN NATRIURETIC PEPTIDE   DRUG SCRN, BUPRENORPHINE   D-DIMER, QUANTITATIVE   POCT GLUCOSE       All other labs were within normal range or notreturned as of this dictation. RE-ASSESSMENT          EMERGENCY DEPARTMENT COURSE and DIFFERENTIAL DIAGNOSIS/MDM:   Vitals:    Vitals:    01/18/23 1545 01/18/23 1600 01/18/23 1615 01/18/23 1630   BP: (!) 113/91 (!) 123/108 101/86 99/72   Pulse: 86 82 88 76   Resp: 20 22 21 21   Temp:       TempSrc:       SpO2: 96% 92% 97% 91%   Weight:         EKG - sinus rhythm 88 no st changes some LVH findings. Overall similar to prior. MDM  Number of Diagnoses or Management Options  Acute chest pain: new, needed workup  Acute cystitis with hematuria: new, needed workup  Hx of coronary artery disease: established, worsening     Amount and/or Complexity of Data Reviewed  Clinical lab tests: reviewed  Tests in the radiology section of CPT®: reviewed  Tests in the medicine section of CPT®: reviewed  Discuss the patient with other providers: yes  Independent visualization of images, tracings, or specimens: yes    Patient has prior MI pain made better here with nitro heart score is low to mid risk plan for admission to medicine agreeable to admit under their care plan for rule out. She is stable at this time. Rocephin given for bacteria in urine; clean catch. PROCEDURES:    Procedures      FINAL IMPRESSION      1. Acute cystitis with hematuria    2. Acute chest pain    3. Hx of coronary artery disease          DISPOSITION/PLAN   DISPOSITION Admitted 01/18/2023 05:10:52 PM      PATIENT REFERRED TO:  No follow-up provider specified.     DISCHARGE MEDICATIONS:  New Prescriptions    No medications on file       (Please note that portions of this note were completed with a voice recognition program.  Efforts were made to edit the dictations but occasionallywords are mis-transcribed.)    Felisha Smith, [address] [place] [address]  01/18/23 [facility] Jocelyn [place], [place]  01/18/23 0362

## 2023-01-19 VITALS
HEART RATE: 74 BPM | SYSTOLIC BLOOD PRESSURE: 104 MMHG | WEIGHT: 179 LBS | TEMPERATURE: 97 F | HEIGHT: 60 IN | BODY MASS INDEX: 35.14 KG/M2 | DIASTOLIC BLOOD PRESSURE: 67 MMHG | OXYGEN SATURATION: 97 % | RESPIRATION RATE: 17 BRPM

## 2023-01-19 LAB
ALBUMIN SERPL-MCNC: 3.6 G/DL (ref 3.5–5.2)
ANION GAP SERPL CALCULATED.3IONS-SCNC: 13 MMOL/L (ref 7–19)
BASOPHILS ABSOLUTE: 0.1 K/UL (ref 0–0.2)
BASOPHILS RELATIVE PERCENT: 1.2 % (ref 0–1)
BUN BLDV-MCNC: 12 MG/DL (ref 6–20)
CALCIUM SERPL-MCNC: 9 MG/DL (ref 8.6–10)
CHLORIDE BLD-SCNC: 102 MMOL/L (ref 98–111)
CO2: 23 MMOL/L (ref 22–29)
CREAT SERPL-MCNC: 0.7 MG/DL (ref 0.5–0.9)
EKG P AXIS: 39 DEGREES
EKG P-R INTERVAL: 118 MS
EKG Q-T INTERVAL: 358 MS
EKG QRS DURATION: 80 MS
EKG QTC CALCULATION (BAZETT): 408 MS
EKG T AXIS: 4 DEGREES
EOSINOPHILS ABSOLUTE: 0.7 K/UL (ref 0–0.6)
EOSINOPHILS RELATIVE PERCENT: 7.7 % (ref 0–5)
GFR SERPL CREATININE-BSD FRML MDRD: >60 ML/MIN/{1.73_M2}
GLUCOSE BLD-MCNC: 89 MG/DL (ref 74–109)
HCT VFR BLD CALC: 39.4 % (ref 37–47)
HEMOGLOBIN: 12.7 G/DL (ref 12–16)
IMMATURE GRANULOCYTES #: 0 K/UL
LYMPHOCYTES ABSOLUTE: 3.6 K/UL (ref 1.1–4.5)
LYMPHOCYTES RELATIVE PERCENT: 39 % (ref 20–40)
MCH RBC QN AUTO: 31.1 PG (ref 27–31)
MCHC RBC AUTO-ENTMCNC: 32.2 G/DL (ref 33–37)
MCV RBC AUTO: 96.6 FL (ref 81–99)
MONOCYTES ABSOLUTE: 0.6 K/UL (ref 0–0.9)
MONOCYTES RELATIVE PERCENT: 6.4 % (ref 0–10)
NEUTROPHILS ABSOLUTE: 4.2 K/UL (ref 1.5–7.5)
NEUTROPHILS RELATIVE PERCENT: 45.6 % (ref 50–65)
PDW BLD-RTO: 13.5 % (ref 11.5–14.5)
PHOSPHORUS: 4.5 MG/DL (ref 2.5–4.5)
PLATELET # BLD: 300 K/UL (ref 130–400)
PMV BLD AUTO: 9 FL (ref 9.4–12.3)
POTASSIUM SERPL-SCNC: 3.9 MMOL/L (ref 3.5–5)
RBC # BLD: 4.08 M/UL (ref 4.2–5.4)
SODIUM BLD-SCNC: 138 MMOL/L (ref 136–145)
TROPONIN: <0.01 NG/ML (ref 0–0.03)
WBC # BLD: 9.2 K/UL (ref 4.8–10.8)

## 2023-01-19 PROCEDURE — 6370000000 HC RX 637 (ALT 250 FOR IP): Performed by: INTERNAL MEDICINE

## 2023-01-19 PROCEDURE — 84484 ASSAY OF TROPONIN QUANT: CPT

## 2023-01-19 PROCEDURE — 96372 THER/PROPH/DIAG INJ SC/IM: CPT

## 2023-01-19 PROCEDURE — 6360000002 HC RX W HCPCS: Performed by: INTERNAL MEDICINE

## 2023-01-19 PROCEDURE — G0378 HOSPITAL OBSERVATION PER HR: HCPCS

## 2023-01-19 PROCEDURE — 2580000003 HC RX 258: Performed by: INTERNAL MEDICINE

## 2023-01-19 PROCEDURE — 80069 RENAL FUNCTION PANEL: CPT

## 2023-01-19 PROCEDURE — 36415 COLL VENOUS BLD VENIPUNCTURE: CPT

## 2023-01-19 PROCEDURE — 85025 COMPLETE CBC W/AUTO DIFF WBC: CPT

## 2023-01-19 PROCEDURE — 94760 N-INVAS EAR/PLS OXIMETRY 1: CPT

## 2023-01-19 PROCEDURE — 99222 1ST HOSP IP/OBS MODERATE 55: CPT | Performed by: INTERNAL MEDICINE

## 2023-01-19 RX ORDER — MELOXICAM 7.5 MG/1
7.5 TABLET ORAL DAILY
Status: DISCONTINUED | OUTPATIENT
Start: 2023-01-19 | End: 2023-01-19 | Stop reason: HOSPADM

## 2023-01-19 RX ORDER — MELOXICAM 7.5 MG/1
7.5 TABLET ORAL DAILY
Qty: 5 TABLET | Refills: 0 | Status: SHIPPED | OUTPATIENT
Start: 2023-01-19 | End: 2023-01-24

## 2023-01-19 RX ADMIN — ASPIRIN 81 MG: 81 TABLET, COATED ORAL at 08:32

## 2023-01-19 RX ADMIN — LAMOTRIGINE 25 MG: 25 TABLET ORAL at 08:32

## 2023-01-19 RX ADMIN — SODIUM CHLORIDE, PRESERVATIVE FREE 10 ML: 5 INJECTION INTRAVENOUS at 08:33

## 2023-01-19 RX ADMIN — ENOXAPARIN SODIUM 40 MG: 100 INJECTION SUBCUTANEOUS at 08:45

## 2023-01-19 RX ADMIN — METOPROLOL SUCCINATE 50 MG: 50 TABLET, EXTENDED RELEASE ORAL at 08:32

## 2023-01-19 RX ADMIN — NITROGLYCERIN 0.4 MG: 0.4 TABLET, ORALLY DISINTEGRATING SUBLINGUAL at 01:17

## 2023-01-19 RX ADMIN — NITROGLYCERIN 0.4 MG: 0.4 TABLET, ORALLY DISINTEGRATING SUBLINGUAL at 01:12

## 2023-01-19 RX ADMIN — MELOXICAM 7.5 MG: 7.5 TABLET ORAL at 08:44

## 2023-01-19 ASSESSMENT — ENCOUNTER SYMPTOMS
SHORTNESS OF BREATH: 0
WHEEZING: 0
DIARRHEA: 0
ABDOMINAL DISTENTION: 0
BLOOD IN STOOL: 0
BACK PAIN: 0
CONSTIPATION: 0
EYE DISCHARGE: 0
COUGH: 0
VOMITING: 0

## 2023-01-19 ASSESSMENT — PAIN SCALES - GENERAL: PAINLEVEL_OUTOF10: 0

## 2023-01-19 NOTE — DISCHARGE SUMMARY
Hospital Medicine Discharge Summary    Patient ID: Genesis Mukherjee      Patient's PCP: SETH Mahoney    Admit Date: 1/18/2023     Discharge Date: 1/19/2023      Admitting Provider: Laurent Chapman MD     Discharge Provider: Laurent Chapman MD     Discharge Diagnoses: Active Hospital Problems    Diagnosis     Chest pain [R07.9]      Priority: Medium       The patient was seen and examined on day of discharge and this discharge summary is in conjunction with any daily progress note from day of discharge. Hospital Course: The patient is a 42 y.o. female with self reported Hx of CAD and MI, in 2014, though on closer review of her record she presented to [facility] at that time and her work up was reassuring. She reports she was told she had an \"MI because of stress\" at that time. She comes in to ED with chest pain. Pt reports she was at home sitting down on Monday when she felt sharp anterior chest pain left parasternal area that \"knocked the breath out of her\". Pt reports this lasted for about 2 minutes and subsided spontaneously. When she got up she felt pulling sensation in the same area in the anterior chest.      Pt reports the pain much improved but her chest remained sore since the episode. She then was taking her child to school and experienced some arm tingling and chest discomfort continued so she decided to call cardiology office. She was advised to come to ED and have her symptoms evaluated further. She denies cough, no fever, no upper GI symptoms. Her pain is reproducible with palpation most notably over left sternocostal region. Chest pain. Non anginal.   Self reported Hx of CAD, though on chart review this does not appear to be accurate. Pain reproducible to palpation and most consistent with left costochondritis. Pt has implanted loop recorder -  reports battery has been depleted.    Plan:               - admit to obs with tele monitor and serial trop. - will ask cardiology to evaluate. - check ddimer - this is normal.   - will treat with Mobic PO x 5 day course. - pt apparently has NSAID allergy, though she tolerated Mobic started in the hospital this admission under direct observation without any issues. She also reports she can take ASA and ibuprofen without issues. Had a problem with aleve when her face became swollen in the past.             Mental Illness - stable. Mood and affect normal.   Will cont home medications without change. Obesity BMI 34.96 - . Physical Exam Performed:     /67   Pulse 74   Temp 97 °F (36.1 °C) (Temporal)   Resp 17   Ht 5' (1.524 m)   Wt 179 lb (81.2 kg)   LMP 05/23/2021 (Approximate)   SpO2 97%   BMI 34.96 kg/m²       General appearance:  No apparent distress, appears stated age and cooperative. HEENT:  Normal cephalic, atraumatic without obvious deformity. Pupils equal, round, and reactive to light. Extra ocular muscles intact. Conjunctivae/corneas clear. Neck: Supple, with full range of motion. No jugular venous distention. Trachea midline. Respiratory:  Normal respiratory effort. Clear to auscultation, bilaterally without Rales/Wheezes/Rhonchi. Cardiovascular:  Regular rate and rhythm with normal S1/S2 without murmurs, rubs or gallops. Abdomen: Soft, non-tender, non-distended with normal bowel sounds. Musculoskeletal:  No clubbing, cyanosis or edema bilaterally. Full range of motion without deformity. Skin: Skin color, texture, turgor normal.  No rashes or lesions. Neurologic:  Neurovascularly intact without any focal sensory/motor deficits. Cranial nerves: II-XII intact, grossly non-focal.  Psychiatric:  Alert and oriented, thought content appropriate, normal insight  Capillary Refill: Brisk,< 3 seconds   Peripheral Pulses: +2 palpable, equal bilaterally       Labs:  For convenience and continuity at follow-up the following most recent labs are provided:      CBC:    Lab Results   Component Value Date/Time    WBC 9.2 01/19/2023 01:43 AM    HGB 12.7 01/19/2023 01:43 AM    HCT 39.4 01/19/2023 01:43 AM     01/19/2023 01:43 AM       Renal:    Lab Results   Component Value Date/Time     01/19/2023 01:43 AM    K 3.9 01/19/2023 01:43 AM    K 4.1 01/18/2023 02:16 PM     01/19/2023 01:43 AM    CO2 23 01/19/2023 01:43 AM    BUN 12 01/19/2023 01:43 AM    CREATININE 0.7 01/19/2023 01:43 AM    CALCIUM 9.0 01/19/2023 01:43 AM    PHOS 4.5 01/19/2023 01:43 AM         Significant Diagnostic Studies    Radiology:   XR CHEST PORTABLE   Final Result   No acute abnormality. Consults:     IP CONSULT TO CARDIOLOGY  IP CONSULT TO CARDIOLOGY    Disposition:  home    Condition at Discharge: Stable    Discharge Instructions/Follow-up:  PCP 1 week     Code Status:  Full Code     Activity: activity as tolerated    Diet: regular diet      Discharge Medications:     Discharge Medication List as of 1/19/2023 10:09 AM             Details   meloxicam (MOBIC) 7.5 MG tablet Take 1 tablet by mouth daily for 5 days Please hold ASA while on this medication. Can resume ASA after you finish taking this medication for 5 days. , Disp-5 tablet, R-0Normal                Details   aspirin 81 MG EC tablet Take 81 mg by mouth dailyHistorical Med      lamoTRIgine (LAMICTAL) 25 MG tablet Take 25 mg by mouth dailyHistorical Med      Galcanezumab-gnlm (EMGALITY) 120 MG/ML SOAJ Inject 120 mg into the skin every 30 days, Disp-1 pen, R-11Normal      butalbital-APAP-caffeine -40 MG CAPS per capsule Take 1 capsule by mouth every 6 hours as needed for Headaches, Disp-40 capsule, R-5Normal      bisoprolol (ZEBETA) 5 MG tablet Take 1 tablet by mouth 2 times daily, Disp-60 tablet, R-5Normal      ARIPiprazole (ABILIFY) 5 MG tablet Take 1.5 tablets by mouth nightly, Disp-45 tablet, R-5Normal             Time Spent on discharge: 30min in the examination, evaluation, counseling and review of medications and discharge plan. Signed:    Jose Huff MD   1/19/2023      Thank you SETH Olmstead for the opportunity to be involved in this patient's care. If you have any questions or concerns, please feel free to contact me at [phone].

## 2023-01-19 NOTE — PLAN OF CARE
Problem: Discharge Planning  Goal: Discharge to home or other facility with appropriate resources  1/19/2023 0743 by Peng Harrison RN  Outcome: Progressing  1/19/2023 0327 by Kiersten Black RN  Outcome: Progressing  Flowsheets  Taken 1/18/2023 2130  Discharge to home or other facility with appropriate resources:   Identify barriers to discharge with patient and caregiver   Arrange for needed discharge resources and transportation as appropriate   Identify discharge learning needs (meds, wound care, etc)   Arrange for interpreters to assist at discharge as needed  Taken 1/18/2023 2129  Discharge to home or other facility with appropriate resources:   Identify barriers to discharge with patient and caregiver   Identify discharge learning needs (meds, wound care, etc)   Refer to discharge planning if patient needs post-hospital services based on physician order or complex needs related to functional status, cognitive ability or social support system     Problem: Safety - Adult  Goal: Free from fall injury  1/19/2023 0743 by Peng Harrison RN  Outcome: Progressing  1/19/2023 0327 by Kiersten Black RN  Outcome: Progressing     Problem: Pain  Goal: Verbalizes/displays adequate comfort level or baseline comfort level  1/19/2023 0743 by Peng Harrison RN  Outcome: Progressing  1/19/2023 0327 by Kiersten Black RN  Outcome: Progressing     Problem: Chronic Conditions and Co-morbidities  Goal: Patient's chronic conditions and co-morbidity symptoms are monitored and maintained or improved  1/19/2023 0743 by Pegn Harrison RN  Outcome: Progressing  1/19/2023 0327 by Kiersten Black RN  Outcome: Progressing     Problem: ABCDS Injury Assessment  Goal: Absence of physical injury  Outcome: Progressing     Problem: Neurosensory - Adult  Goal: Achieves stable or improved neurological status  Outcome: Progressing     Problem: Respiratory - Adult  Goal: Achieves optimal ventilation and oxygenation  Outcome: Progressing     Problem: Cardiovascular - Adult  Goal: Maintains optimal cardiac output and hemodynamic stability  Outcome: Progressing     Problem: Skin/Tissue Integrity - Adult  Goal: Skin integrity remains intact  Outcome: Progressing     Problem: Musculoskeletal - Adult  Goal: Return mobility to safest level of function  Outcome: Progressing     Problem: Gastrointestinal - Adult  Goal: Minimal or absence of nausea and vomiting  Outcome: Progressing     Problem: Genitourinary - Adult  Goal: Absence of urinary retention  Outcome: Progressing     Problem: Infection - Adult  Goal: Absence of infection at discharge  Outcome: Progressing     Problem: Metabolic/Fluid and Electrolytes - Adult  Goal: Electrolytes maintained within normal limits  Outcome: Progressing     Problem: Hematologic - Adult  Goal: Maintains hematologic stability  Outcome: Progressing

## 2023-01-19 NOTE — PROGRESS NOTES
Lupillo Arellaon arrived to room # [id number]. Presented with: acute chest pain  Mental Status: Patient is oriented, alert, coherent, logical, thought processes intact, and able to concentrate and follow conversation. Vitals:    01/18/23 2100   BP: 107/80   Pulse:    Resp:    Temp: 97.2 °F (36.2 °C)   SpO2: 98%     Patient safety contract and falls prevention contract reviewed with patient Yes. Oriented Patient to room. Call light within reach. Yes.   Needs, issues or concerns expressed at this time: no.      Electronically signed by Cora Morrell RN on 1/18/2023 at 9:15 PM

## 2023-01-19 NOTE — PROGRESS NOTES
Patient woke up with chest pain @ 0111. Stat chest pain protocol applied. EKG is taking. Showing sinus at this time. Rating her pain 7 out of 10  0112 received sublingual  nitro tab 1 . Rating her pain 5 out of 10  0117 received second sublingual nitro tab 3 now. Patient is stating she is comfortable at this time. Bp is 101/68 , map of 78. Hr is 93. Patient is resting well. Patient is rating her pain 0 out of 10 @ now 0130. Will continue to monitor the patient.

## 2023-01-19 NOTE — CONSULTS
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     Priority: Medium    Chronic cough 09/06/2022     Priority: Medium    Chronic migraine without aura, intractable, without status migrainosus 08/08/2022     Priority: Medium    AMA (advanced maternal age) multigravida 35+, unspecified trimester 10/05/2020     Priority: Medium    History of MI (myocardial infarction) 10/05/2020     Priority: Medium    Schizophrenia ([address] 10/05/2020     Priority: Medium    Schizoaffective disorder, bipolar type ([address] 11/04/2021     Priority: Low    Severe episode of recurrent major depressive disorder, without psychotic features ([address] 11/04/2021     Priority: Low    HEMANTH (generalized anxiety disorder) 11/04/2021     Priority: Low    History of abnormal cervical Pap smear 08/11/2020     Priority: Low    Convulsions ([address] 06/24/2019     Priority: Low    Coronary artery disease involving native coronary artery of native heart without angina pectoris 09/04/2018     Priority: Low    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) 09/04/2018     Priority: Low    Moderate episode of recurrent major depressive disorder ([address] 09/04/2018     Priority: Low    Decreased strength of lower extremity 09/04/2018     Priority: Low    Syncope and collapse 09/04/2018     Priority: Low     Overview Note:     9/11/2018  Echo  Normal LVFX, RVSP 39 mmHg  12/3/2019  Stress test indeterminate  4/10/19 loop recorder placed        Chronic superficial gastritis 09/04/2018     Priority: Low    Family hx-breast malignancy 07/18/2012     Priority: Low    Diffuse cystic mastopathy 04/16/2012     Priority: Low    Lump or mass in breast left  04/16/2012     Priority: Low     1. Atypical rest onset chest pain consistent with acute costochondritis. 2.  History of prior syncopal episodes with ILR placement 4/10/2019, negative work-up with normal LV function, negative carotid duplex, no arrhythmias noted. 3.  Psychiatric disorder with MDD/schizophrenia noted. 4.  Ex tobacco use quit 2014. PRESENTATION: Lizy Arora is a 42y.o. year old female who presents with chest pain which began about 3 to 4 days ago at rest, severe midsternal lasting a few minutes and resolving. The next day she had similar symptoms that spread to her arms with numbness in her chest.  Yesterday she had a similar episode at rest with severe pain and came to the emergency room. Last night she also had symptoms around 1 AM and received sublingual nitroglycerin with symptoms subsequently resolving. Chest pain increases with deep breathing and localizes to left parasternal location. Quit tobacco use 2014 reportedly. Father with heart disease but unclear of premature. REVIEW OF SYSTEMS:  Review of Systems   Constitutional:  Negative for activity change, fatigue and fever. HENT:  Negative for ear pain, hearing loss and tinnitus. Eyes:  Negative for discharge and visual disturbance. Respiratory:  Negative for cough, shortness of breath and wheezing. Cardiovascular:  Positive for chest pain. Negative for palpitations and leg swelling. Gastrointestinal:  Negative for abdominal distention, blood in stool, constipation, diarrhea and vomiting. Endocrine: Negative for cold intolerance, heat intolerance, polydipsia and polyuria. Genitourinary:  Negative for dysuria and hematuria. Musculoskeletal:  Negative for arthralgias, back pain and myalgias. Skin:  Negative for pallor and rash. Neurological:  Negative for seizures, syncope, weakness and headaches. Psychiatric/Behavioral:  Negative for behavioral problems and dysphoric mood. Past Medical History:      Diagnosis Date    Abnormal glucose measurement     Abnormal Pap smear of cervix     Anxiety     Blood circulation, collateral     CAD (coronary artery disease)     Cerebral artery occlusion with cerebral infarction ([facility]     right side    Complication of anesthesia     difficulty waking    Depression     Heart disease 2020    LOOP recorder     IBS (irritable bowel syndrome)     MDD (major depressive disorder)     MI (myocardial infarction) ([address] 2014    Miscarriage 2012    Neurologic disorder     Postpartum depression     Prolonged emergence from general anesthesia     Schizophrenia ([facility]     Seizures ([facility]     anxiety related (last 2020)    Syncope     Tachycardia     UTI (urinary tract infection) 2019       Past Surgical History:      Procedure Laterality Date    CHOLECYSTECTOMY, LAPAROSCOPIC      COLONOSCOPY N/A 2019    Dr Cindy Rodrigez, 10 yr recall    EGD  2016    [address]      lower right leg, has metal plate and screws    FRACTURE SURGERY      left wrist    HYSTERECTOMY (CERVIX STATUS UNKNOWN) N/A 6/3/2021    TOTAL LAPAROSCOPIC HYSTERECTOMY WITH DAVINCI CYSTOSCOPY performed by Joselo Juan MD at [facility]  2014    loop recorder    INTRAUTERINE DEVICE INSERTION  2016    Essure placement    UPPER GASTROINTESTINAL ENDOSCOPY N/A 2019    Dr Lindsey Jackson-Gastritis       Allergies:  Advil [ibuprofen]    Past Social History:  Social History     Socioeconomic History    Marital status:      Spouse name: Not on file    Number of children: Not on file    Years of education: Not on file    Highest education level: Not on file   Occupational History    Not on file   Tobacco Use    Smoking status: Former     Packs/day: 0.50     Years: 3.00     Pack years: 1.50     Types: Cigarettes     Quit date:      Years since quittin.0    Smokeless tobacco: Former     Quit date: 2014   Vaping Use Vaping Use: Some days    Start date: 1/1/2019    Substances: Flavoring    Devices: Refillable tank   Substance and Sexual Activity    Alcohol use: Yes     Comment: occ    Drug use: No    Sexual activity: Yes     Partners: Male   Other Topics Concern    Not on file   Social History Narrative    SAFETY PLAN        A suicide Safety Plan is a document that supports someone when they are having thoughts of suicide. Warning Signs that indicate a suicidal crisis may be developing: What (situations, thoughts, feelings, body sensations, behaviors, etc.) do you experience that lets you know you are beginning to think about suicide? 1. Stop eating/drinking    2. Laying in bed throughout the day    3. Crying uncontrollably throughout the day        Internal Coping Strategies:  What things can I do (relaxation techniques, hobbies, physical activities, etc.) to take my mind off my problems without contacting another person? 1. You can't do it because of the kids     2. Deep breathing    3. Being around the kids        People and social settings that provide distraction: Who can I call or where can I go to distract me? 1. Name: Andrea Zavala  Phone: in phone    2. Name: ______  Phone: _______     3. Place: visit friend at work              4. Place: go to [facility]        People whom I can ask for help: Who can I call when I need help - for example, friends, family, clergy, someone else? 1. Name:                 Phone: in phone    2. Name: Mother-in-law  Phone: in phone    3. Name: mother  Phone: in phone        Professionals or [address] [place] [address] I can contact during a crisis: Who can I call for help - for example, my doctor, my psychiatrist, my psychologist, a mental health provider, a suicide hotline? 1. If you feel overwhelmed, unable to cope, extremely anxious, or have thoughts of wanting to harm yourself or someone else, go to the nearest Emergency Room.     [facility] Emergency Room: [phone]    Crisis line:  [phone]    [address] (inpatient psych):  [phone] option 1    Call 911- they will send help and get you to the local ER        2. Clinician Name: [facility]   Phone: [phone] option 3               3. Suicide Prevention Lifeline: 6-411-967-TALK (7396)        Making the environment safe: How can I make my environment (house/apartment/living space) safer? For example, can I remove guns, medications, and other items? 1. Guns/knives are locked up    2. Medications locked up     Social Determinants of Health     Financial Resource Strain: Not on file   Food Insecurity: Not on file   Transportation Needs: No Transportation Needs    Lack of Transportation (Medical): No    Lack of Transportation (Non-Medical): No   Physical Activity: Not on file   Stress: Not on file   Social Connections: Not on file   Intimate Partner Violence: Not on file   Housing Stability: Low Risk     Unable to Pay for Housing in the Last Year: No    Number of Places Lived in the Last Year: 1    Unstable Housing in the Last Year: No       Family History:       Problem Relation Age of Onset    High Blood Pressure Mother     Diabetes Father     Heart Disease Father     Stomach Cancer Father     Colon Cancer Paternal Grandmother     Breast Cancer Other         maternal great aunt    Colon Polyps Neg Hx     Esophageal Cancer Neg Hx     Liver Cancer Neg Hx     Liver Disease Neg Hx     Rectal Cancer Neg Hx        Home Meds:  Prior to Admission medications    Medication Sig Start Date End Date Taking? Authorizing Provider   meloxicam (MOBIC) 7.5 MG tablet Take 1 tablet by mouth daily for 5 days Please hold ASA while on this medication. Can resume ASA after you finish taking this medication for 5 days.  1/19/23 1/24/23 Yes Kourtney Sommers MD   aspirin 81 MG EC tablet Take 81 mg by mouth daily    Historical Provider, MD   lamoTRIgine (LAMICTAL) 25 MG tablet Take 25 mg by mouth daily    Historical Provider, MD   Galcanezumab-gnlm (EMGALITY) 120 MG/ML SOAJ Inject 120 mg into the skin every 30 days  Patient not taking: No sig reported 8/24/22 8/19/23  Raul Swartz DO   butalbital-APAP-caffeine -40 MG CAPS per capsule Take 1 capsule by mouth every 6 hours as needed for Headaches  Patient not taking: No sig reported 8/24/22   Raul Swartz DO   bisoprolol (ZEBETA) 5 MG tablet Take 1 tablet by mouth 2 times daily 6/1/22   SETH Millan   ARIPiprazole (ABILIFY) 5 MG tablet Take 1.5 tablets by mouth nightly 5/25/22   Ritika Olvera MD       Current Meds:   meloxicam  7.5 mg Oral Daily    ARIPiprazole  7.5 mg Oral Nightly    aspirin  81 mg Oral Daily    metoprolol succinate  50 mg Oral Daily    lamoTRIgine  25 mg Oral Daily    sodium chloride flush  5-40 mL IntraVENous 2 times per day    enoxaparin  40 mg SubCUTAneous Daily       Current Infused Meds:   sodium chloride         Physical Exam:  Vitals:    01/19/23 0744   BP:    Pulse:    Resp:    Temp:    SpO2: 96%       Intake/Output Summary (Last 24 hours) at 1/19/2023 2135  Last data filed at 1/19/2023 0846  Gross per 24 hour   Intake --   Output 200 ml   Net -200 ml     Estimated body mass index is 34.96 kg/m² as calculated from the following:    Height as of this encounter: 5' (1.524 m). Weight as of this encounter: 179 lb (81.2 kg). Physical Exam  Constitutional:       General: She is not in acute distress. Appearance: She is not diaphoretic. HENT:      Mouth/Throat:      Pharynx: No oropharyngeal exudate. Eyes:      General: No scleral icterus. Right eye: No discharge. Left eye: No discharge. Neck:      Thyroid: No thyromegaly. Vascular: No JVD. Cardiovascular:      Rate and Rhythm: Normal rate and regular rhythm. No extrasystoles are present. Heart sounds: Normal heart sounds, S1 normal and S2 normal. No murmur heard. No systolic murmur is present.    No diastolic murmur is present. No friction rub. No gallop. No S3 or S4 sounds. Comments: No JVD  No edema  No significant systolic or diastolic murmurs noted  Reproducible severe chest wall tenderness localizable to left third costochondral joint  Pulmonary:      Effort: Pulmonary effort is normal. No respiratory distress. Breath sounds: Normal breath sounds. No wheezing or rales. Chest:      Chest wall: Tenderness present. Abdominal:      General: Bowel sounds are normal. There is no distension. Palpations: Abdomen is soft. There is no mass. Tenderness: There is no abdominal tenderness. There is no guarding or rebound. Hernia: No hernia is present. Comments: No palpable organomegaly   Musculoskeletal:         General: Normal range of motion. Skin:     General: Skin is warm. Coloration: Skin is not pale. Findings: No rash. Neurological:      Mental Status: She is alert and oriented to person, place, and time. Cranial Nerves: No cranial nerve deficit. Deep Tendon Reflexes: Reflexes normal.         Labs:  Recent Labs     01/18/23  1320 01/19/23  0143   WBC 8.6 9.2   HGB 13.4 12.7    300       Recent Labs     01/18/23  1416 01/19/23  0143    138   K 4.1 3.9    102   CO2 28 23   BUN 10 12   CREATININE 0.6 0.7   LABGLOM >60 >60   CALCIUM 9.0 9.0   PHOS  --  4.5       CK, CKMB, Troponin: @LABRCNT (CKTOTAL:3, CKMB:3, TROPONINI:3)@    Last 3 BNP:          IMAGING:  XR CHEST PORTABLE    Result Date: 1/18/2023  CHEST X-RAY, 1 view INDICATION: Chest pain COMPARISON: None. TECHNIQUE: Single frontal view of the chest. FINDINGS: The lungs are clear. There is no pleural effusion. No pneumothorax. Cardiomediastinal silhouette is normal in size. No significant osseous findings. No acute abnormality. Prior stress test 12/3/2018 reported as negative. Prior echo 9/4/2018 with normal LV function    Assessment and Plan:     This is a 42y.o. year old female with past medical history of psychiatric disorder with reported MDD/schizophrenia, prior tobacco use, history of reported syncope with negative work-up and prior ILR placement 4/10/2019 presenting with rest onset left-sided chest pain that is reproducible on examination consistent with costochondritis. 1.  Symptoms consistent with acute costochondritis. Reproducible on touch with immediate wincing. Can manage with nonsteroidals with GI protection. No further work-up from cardiac viewpoint at this time. Can follow-up as an outpatient with cardiology.       Electronically signed by Clara Martinez MD on 1/19/2023 at 9:37 AM

## 2023-01-19 NOTE — PROGRESS NOTES
Notified Dr. Pretty Cade of patients condition and she remains without any adverse or side effects. Okay to proceed with discharge.  Electronically signed by Frida Lancaster RN on 1/19/2023 at 12:33 PM

## 2023-01-19 NOTE — PROGRESS NOTES
Patient continues to be without any adverse effects from mobic. Resting comfortably in bed without distress.   Electronically signed by Keri Mac RN on 1/19/2023 at 11:01 AM

## 2023-01-19 NOTE — H&P
Hospital Medicine History & Physical      PCP: SETH Franco    Date of Admission: 1/18/2023    Date of Service: Pt seen/examined on 1/18/2023 and Placed in Observation. Chief Complaint: chest pain. History Of Present Illness: The patient is a 42 y.o. female with self reported Hx of CAD and MI, in 2014, though on closer review of her record she presented to [facility] ath that time and her work up was reassuring. She reports she was told she had an MI because of stress at that time. She comes in to ED today with chest pain. Pt reports she was at home sitting down on Monday when she felt sharp anterior chest pain left parasternal area that \"knocked the breath out of her\". Pt reports this lasted for about 2 minutes and subsided spontaneously. When she got up she felt pulling sensation in the same area in the anterior chest.     Pt reports the pain much improved but her chest remained sore wince the episode. She then was taking her child to school and experienced some arm tingling and chest discomfort continued so she decided to call cardiology office. She was advised to come to ED and have her symptoms evaluated further. She denies cough, no fever, no upper GI symptoms. Her pain is reproducible with palpation most notably over left costovertebral region.            Past Medical History:        Diagnosis Date    Abnormal glucose measurement     Abnormal Pap smear of cervix     Anxiety     Blood circulation, collateral     CAD (coronary artery disease)     Cerebral artery occlusion with cerebral infarction [facility]) 2014    right side    Complication of anesthesia     difficulty waking    Depression     Heart disease 06/2020    LOOP recorder     IBS (irritable bowel syndrome)     MDD (major depressive disorder)     MI (myocardial infarction) ([facility] 2014    Miscarriage 06/2012    Neurologic disorder     Postpartum depression     Prolonged emergence from general anesthesia     Schizophrenia ([facility]     Seizures ([facility]     anxiety related (last 6/2020)    Syncope     Tachycardia     UTI (urinary tract infection) 12/09/2019       Past Surgical History:        Procedure Laterality Date    CHOLECYSTECTOMY, LAPAROSCOPIC      COLONOSCOPY N/A 11/7/2019    Dr Bettina Goldstein, 10 yr recall    EGD  2016    [address]      lower right leg, has metal plate and screws    FRACTURE SURGERY      left wrist    HYSTERECTOMY (CERVIX STATUS UNKNOWN) N/A 6/3/2021    TOTAL LAPAROSCOPIC HYSTERECTOMY WITH DAVINCI CYSTOSCOPY performed by Shar Perez MD at [facility]  2014    loop recorder    INTRAUTERINE DEVICE INSERTION  06/26/2016    Essure placement    UPPER GASTROINTESTINAL ENDOSCOPY N/A 11/7/2019    Dr Estela Jackson-Gastritis       Medications Prior to Admission:    Prior to Admission medications    Medication Sig Start Date End Date Taking? Authorizing Provider   aspirin 81 MG EC tablet Take 81 mg by mouth daily    Historical Provider, MD   lamoTRIgine (LAMICTAL) 25 MG tablet Take 25 mg by mouth daily    Historical Provider, MD   Galcanezumab-gnlm (EMGALITY) 120 MG/ML SOAJ Inject 120 mg into the skin every 30 days  Patient not taking: No sig reported 8/24/22 8/19/23  Baljit Ortiz, DO   butalbital-APAP-caffeine -40 MG CAPS per capsule Take 1 capsule by mouth every 6 hours as needed for Headaches  Patient not taking: Reported on 1/3/2023 8/24/22   Baljit Ortiz, DO   bisoprolol (ZEBETA) 5 MG tablet Take 1 tablet by mouth 2 times daily 6/1/22   SETH Schmitz   ARIPiprazole (ABILIFY) 5 MG tablet Take 1.5 tablets by mouth nightly 5/25/22   Renaldo Tony MD       Allergies:  Advil [ibuprofen]    Social History:  The patient currently lives at home. TOBACCO:   reports that she quit smoking about 9 years ago.  Her smoking use included cigarettes. She has a 1.50 pack-year smoking history. She quit smokeless tobacco use about 8 years ago.  ETOH:   reports current alcohol use.      Family History:  Reviewed in detail and negative for DM, Early CAD, Cancer, CVA. Positive as follows:        Problem Relation Age of Onset    High Blood Pressure Mother     Diabetes Father     Heart Disease Father     Stomach Cancer Father     Colon Cancer Paternal Grandmother     Breast Cancer Other         maternal great aunt    Colon Polyps Neg Hx     Esophageal Cancer Neg Hx     Liver Cancer Neg Hx     Liver Disease Neg Hx     Rectal Cancer Neg Hx        REVIEW OF SYSTEMS:   As noted in the HPI. All other systems reviewed and negative.    PHYSICAL EXAM:    BP 99/72   Pulse 76   Temp 98.2 °F (36.8 °C) (Oral)   Resp 21   Wt 179 lb (81.2 kg)   LMP 05/23/2021 (Approximate)   SpO2 91%   BMI 34.96 kg/m²     General appearance: No apparent distress appears stated age and cooperative.  HEENT Normal cephalic, atraumatic without obvious deformity.  Pupils equal, round, and reactive to light.  Extra ocular muscles intact.  Conjunctivae/corneas clear.  Neck: Supple, No jugular venous distention/bruits.  Trachea midline without thyromegaly or adenopathy with full range of motion.  Lungs: Clear to auscultation, bilaterally without Rales/Wheezes/Rhonchi with good respiratory effort.  Heart: Regular rate and rhythm with Normal S1/S2 without murmurs, rubs or gallops, point of maximum impulse non-displaced  Abdomen: Soft, non-tender or non-distended without rigidity or guarding and positive bowel sounds all four quadrants.  Extremities: No clubbing, cyanosis, or edema bilaterally.  Full range of motion without deformity and normal gait intact.  Skin: Skin color, texture, turgor normal.  No rashes or lesions.  Neurologic: Alert and oriented X 3, neurovascularly intact with sensory/motor intact upper extremities/lower extremities, bilaterally.  Cranial nerves: II-XII  intact, grossly non-focal.  Mental status: Alert, oriented, thought content appropriate. Capillary Refill: Acceptable  < 3 seconds  Peripheral Pulses: +3 Easily felt, not easily obliterated with pressure      CBC   Recent Labs     01/18/23  1320   WBC 8.6   HGB 13.4   HCT 42.4         RENAL  Recent Labs     01/18/23  1416      K 4.1      CO2 28   BUN 10   CREATININE 0.6     LFT'S  Recent Labs     01/18/23  1416   AST 20   ALT 16   BILITOT <0.2   ALKPHOS 104     COAG  No results for input(s): INR in the last 72 hours. CARDIAC ENZYMES  Recent Labs     01/18/23  1416   TROPONINI <0.01       U/A:    Lab Results   Component Value Date/Time    NITRITE - 01/30/2020 02:42 PM    COLORU YELLOW 01/18/2023 02:21 PM    [address] 01/18/2023 02:21 PM    RBCUA 5 01/18/2023 02:21 PM    BACTERIA 1+ 01/18/2023 02:21 PM    CLARITYU Clear 01/18/2023 02:21 PM    SPECGRAV 1.010 01/18/2023 02:21 PM    LEUKOCYTESUR Negative 01/18/2023 02:21 PM    BLOODU SMALL 01/18/2023 02:21 PM    GLUCOSEU Negative 01/18/2023 02:21 PM       ABG  No results found for: NXE1NJO, BEART, M0YFKATW, PHART, THGBART, YYG8PPC, PO2ART, PDI6PWX        Active Hospital Problems    Diagnosis Date Noted    Chest pain [R07.9] 01/18/2023     Priority: Medium         PHYSICIANS CERTIFICATION:    I certify that Avani Juarez is expected to be hospitalized for more than 2 midnights based on the following assessment and plan:      ASSESSMENT/PLAN:      Chest pain. Non anginal.   Self reported Hx of CAD, though on chart review this does not appear to be accurate. Pain reproducible to palpation and most consistent with left costochondritis. Pt has implanted loop recorder -  reports battery has been depleted. Plan:    - admit to obs with tele monitor and serial trop. - will ask cardiology to evaluate. - check ddimer. Mental Illness - stable. Mood and affect normal.   Will cont home medications without change.          Obesity BMI 34.96 - . DVT Prophylaxis: lovenox  Diet: gen diet, NPO after midnight. Code Status: Prior      Dispo - obs PCU w tele monitor. Mayuri Wagner MD    Thank you SETH Perez for the opportunity to be involved in this patient's care. If you have any questions or concerns please feel free to contact me at [phone].

## 2023-01-19 NOTE — PROGRESS NOTES
Discussed new medication and follow up appointments with patient. Informed patient to not take aspirin for the duration of the Mobic and to resume once medication is completed per physician's order. Patient's  has gathered all belongings in a bag to take to vehicle. None remain int he room. Patient dressed and wheelchair called to take patient to private vehicle to discharge with .  Electronically signed by Jailyn Marcano RN on 1/19/2023 at 12:37 PM

## 2023-01-19 NOTE — PROGRESS NOTES
4 Eyes Skin Assessment    Stephen Diana is being assessed upon: Admission    I agree that I, Syed Gregory RN, along with Michelle Aguirre (either 2 RN's or 1 LPN and 1 RN) have performed a thorough Head to Toe Skin Assessment on the patient. ALL assessment sites listed below have been assessed. Areas assessed by both nurses:     [x]   Head, Face, and Ears   [x]   Shoulders, Back, and Chest  [x]   Arms, Elbows, and Hands   [x]   Coccyx, Sacrum, and Ischium  [x]   Legs, Feet, and Heels    Does the Patient have Skin Breakdown?  No    Brian Prevention initiated: No  Wound Care Orders initiated: No    [facility] nurse consulted for Pressure Injury (Stage 3,4, Unstageable, DTI, NWPT, and Complex wounds) and New or Established Ostomies: No        Primary Nurse eSignature: Syed Gregory RN on 1/18/2023 at 9:13 PM      Co-Signer eSignature: Electronically signed by Liz Cooper RN on 1/18/23 at 9:14 PM CST

## 2023-01-19 NOTE — PLAN OF CARE
Problem: Discharge Planning  Goal: Discharge to home or other facility with appropriate resources  Outcome: Progressing  Flowsheets  Taken 1/18/2023 2130  Discharge to home or other facility with appropriate resources:   Identify barriers to discharge with patient and caregiver   Arrange for needed discharge resources and transportation as appropriate   Identify discharge learning needs (meds, wound care, etc)   Arrange for interpreters to assist at discharge as needed  Taken 1/18/2023 2129  Discharge to home or other facility with appropriate resources:   Identify barriers to discharge with patient and caregiver   Identify discharge learning needs (meds, wound care, etc)   Refer to discharge planning if patient needs post-hospital services based on physician order or complex needs related to functional status, cognitive ability or social support system     Problem: Safety - Adult  Goal: Free from fall injury  Outcome: Progressing     Problem: Pain  Goal: Verbalizes/displays adequate comfort level or baseline comfort level  Outcome: Progressing     Problem: Chronic Conditions and Co-morbidities  Goal: Patient's chronic conditions and co-morbidity symptoms are monitored and maintained or improved  Outcome: Progressing

## 2023-01-20 ENCOUNTER — TELEPHONE (OUTPATIENT)
Dept: INTERNAL MEDICINE | Age: 38
End: 2023-01-20

## 2023-01-20 NOTE — TELEPHONE ENCOUNTER
[facility] Transitions Initial Follow Up Call    Outreach made within 2 business days of discharge: Yes    Patient: Miguel Reich   Patient : 1985 MRN: [id number]    Reason for Admission: Chest pain    Discharge Date: 23          Discharge Diagnosis:      Chest pain [R07.9]         Priority: Medium       Spoke with: Attempted to make contact with patient/caregiver for an initial transitions of care follow up call post discharge without success. I will reach out again at a later time. Any previously scheduled hospital follow up appointments noted below.         Discharge department/facility: [place] [address]    Scheduled appointment with PCP within 7-14 days    Follow Up  Future Appointments   Date Time Provider Sandra Jon   2023 10:30 AM Elise Snellen, APRN [facility]   2023  8:00 AM SETH Flores [facility]   3/6/2023 10:30 AM Elise Snellen, APRN [facility]   7/10/2023 10:00 AM DO BONNIE [address] [facility] -   2023  1:00 PM SETH Flores Cardio [facility]       [place], [place]

## 2023-01-23 ENCOUNTER — TELEPHONE (OUTPATIENT)
Dept: INTERNAL MEDICINE | Age: 38
End: 2023-01-23

## 2023-01-23 NOTE — TELEPHONE ENCOUNTER
[facility] Transitions Initial Follow Up Call    Outreach made within 2 business days of discharge: Yes    Patient: Meño Moon   Patient : 1985 MRN: [id number]    Reason for Admission: Chest pain    Discharge Date: 23        Discharge Diagnosis:      Chest pain [R07.9]         Priority: Medium      Spoke with: Patient    Discharge department/facility: [address] Interactive Patient Contact:  Was patient able to fill all prescriptions: Yes  Was patient instructed to bring all medications to the follow-up visit: Yes  Is patient taking all medications as directed in the discharge summary? Yes  Does patient understand their discharge instructions: Yes  Does patient have questions or concerns that need addressed prior to 7-14 day follow up office visit: no    Spoke to the patient she said that she is still sore. She was given Mobic to help with with inflammation. She is to follow up with cardiology next month. She states she is going to try to call them to get in sooner because she believes that the pain and inflammation is from her loop recorder. Pt is eating her normal diet and did not think she needed anything at this time.  She is schedueld for 23    Scheduled appointment with PCP within 7-14 days    Follow Up  Future Appointments   Date Time Provider Sandra Jon   2023 10:30 AM SETH MercadoY [facility]   2023  8:00 AM SETH Hernandez [facility] [place]   3/6/2023 10:30 AM SETH MercadoY [place]   7/10/2023 10:00 AM DO BONNIE S[address] [facility] -   2023  1:00 PM SETH Hernandez [place]io [place]       Sofy Rojas, [address]

## 2023-01-25 PROBLEM — R07.89 COSTOCHONDRAL CHEST PAIN: Status: ACTIVE | Noted: 2023-01-25

## 2023-01-25 NOTE — PROGRESS NOTES
Post-Discharge Transitional Care  Follow Up      Li Noriega   YOB: 1985    Date of Office Visit:  1/26/2023  Date of Hospital Admission: 1/18/23  Date of Hospital Discharge: 1/19/23  Risk of hospital readmission (high >=14%. Medium >=10%) :No data recorded    Care management risk score Rising risk (score 2-5) and Complex Care (Scores >=6): No Risk Score On File     Non face to face  following discharge, date last encounter closed (first attempt may have been earlier): 01/23/2023    Call initiated 2 business days of discharge: Yes    ASSESSMENT/PLAN:   Hospital discharge follow-up  -     MI DISCHARGE MEDS RECONCILED W/ CURRENT OUTPATIENT MED LIST  Costochondral chest pain  Assessment & Plan:  nsaid and GI protection s     Medical Decision Making: moderate complexity  Return for have labs done prior to appt, keep fu appt. Subjective:   HPI:  Follow up of Hospital problems/diagnosis(es):    Sharp Chest pain with history of MI   (review of records by hospitalist report that was not the case  cardiac workup was ok  for her last admission )   This admission she had cardiology consult with Dr Lupillo Jones;  he confirmed costochondritis   Inpatient course: Discharge summary reviewed- see chart. 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)    Moderate episode of recurrent major depressive disorder (HCC)    Decreased strength of lower extremity    Syncope and collapse    Chronic superficial gastritis    Convulsions (HCC)    History of abnormal cervical Pap smear    Schizoaffective disorder, bipolar type (HCC)    Severe episode of recurrent major depressive disorder, without psychotic features ([facility] [address]    HEMANTH (generalized anxiety disorder)    Chronic migraine without aura, intractable, without status migrainosus    AMA (advanced maternal age) multigravida 35+, unspecified trimester    History of MI (myocardial infarction)    Schizophrenia ([facility] [address]    Chronic cough    Maxillary sinus mass    Influenza    Chest pain    Costochondral chest pain       Medications listed as ordered at the time of discharge from hospital     Medication List            Accurate as of January 26, 2023 10:41 AM. If you have any questions, ask your nurse or doctor. CONTINUE taking these medications      ARIPiprazole 5 MG tablet  Commonly known as: Abilify  Take 1.5 tablets by mouth nightly     aspirin 81 MG EC tablet     bisoprolol 5 MG tablet  Commonly known as: ZEBETA  Take 1 tablet by mouth 2 times daily     butalbital-APAP-caffeine -40 MG Caps per capsule  Take 1 capsule by mouth every 6 hours as needed for Headaches     Emgality 120 MG/ML Soaj  Generic drug: Galcanezumab-gnlm  Inject 120 mg into the skin every 30 days     lamoTRIgine 25 MG tablet  Commonly known as: LAMICTAL     meloxicam 7.5 MG tablet  Commonly known as: MOBIC  Take 1 tablet by mouth daily for 5 days Please hold ASA while on this medication. Can resume ASA after you finish taking this medication for 5 days.                 Medications marked \"taking\" at this time  Outpatient Medications Marked as Taking for the 1/26/23 encounter (Office Visit) with SETH Diallo   Medication Sig Dispense Refill    aspirin 81 MG EC tablet Take 81 mg by mouth daily      lamoTRIgine (LAMICTAL) 25 MG tablet Take 25 mg by mouth daily      bisoprolol (ZEBETA) 5 MG tablet Take 1 tablet by mouth 2 times daily 60 tablet 5    ARIPiprazole (ABILIFY) 5 MG tablet Take 1.5 tablets by mouth nightly 45 tablet 5        Medications patient taking as of now reconciled against medications ordered at time of hospital discharge: Yes    A comprehensive review of systems was negative except for what was noted in the HPI. Objective:    /80   Pulse 86   Temp 97.4 °F (36.3 °C)   Wt 180 lb (81.6 kg)   LMP 05/23/2021 (Approximate)   SpO2 99%   BMI 35.15 kg/m²   General Appearance: alert and oriented to person, place and time, well developed and well- nourished, in no acute distress  Skin: warm and dry, no rash or erythema  Pulmonary/Chest: clear to auscultation bilaterally- no wheezes, rales or rhonchi, normal air movement, no respiratory distress  Cardiovascular: normal rate, regular rhythm, normal S1 and S2, no murmurs, rubs, clicks, or gallops, distal pulses intact, no carotid bruits  Abdomen: soft, non-tender, non-distended, normal bowel sounds, no masses or organomegaly  Extremities: no cyanosis, clubbing or edema  Musculoskeletal: normal range of motion, no joint swelling, deformity or tenderness  Neurologic: reflexes normal and symmetric, no cranial nerve deficit, gait, coordination and speech normal      An electronic signature was used to authenticate this note.   --Beverly Howell, APRN

## 2023-01-26 ENCOUNTER — TELEPHONE (OUTPATIENT)
Dept: CARDIOLOGY CLINIC | Age: 38
End: 2023-01-26

## 2023-01-26 ENCOUNTER — OFFICE VISIT (OUTPATIENT)
Dept: INTERNAL MEDICINE | Age: 38
End: 2023-01-26

## 2023-01-26 VITALS
HEART RATE: 86 BPM | DIASTOLIC BLOOD PRESSURE: 80 MMHG | TEMPERATURE: 97.4 F | SYSTOLIC BLOOD PRESSURE: 128 MMHG | OXYGEN SATURATION: 99 % | WEIGHT: 180 LBS | BODY MASS INDEX: 35.15 KG/M2

## 2023-01-26 DIAGNOSIS — R07.89 COSTOCHONDRAL CHEST PAIN: ICD-10-CM

## 2023-01-26 DIAGNOSIS — Z09 HOSPITAL DISCHARGE FOLLOW-UP: Primary | ICD-10-CM

## 2023-01-26 SDOH — ECONOMIC STABILITY: FOOD INSECURITY: WITHIN THE PAST 12 MONTHS, THE FOOD YOU BOUGHT JUST DIDN'T LAST AND YOU DIDN'T HAVE MONEY TO GET MORE.: NEVER TRUE

## 2023-01-26 SDOH — ECONOMIC STABILITY: FOOD INSECURITY: WITHIN THE PAST 12 MONTHS, YOU WORRIED THAT YOUR FOOD WOULD RUN OUT BEFORE YOU GOT MONEY TO BUY MORE.: NEVER TRUE

## 2023-01-26 ASSESSMENT — SOCIAL DETERMINANTS OF HEALTH (SDOH): HOW HARD IS IT FOR YOU TO PAY FOR THE VERY BASICS LIKE FOOD, HOUSING, MEDICAL CARE, AND HEATING?: NOT HARD AT ALL

## 2023-01-26 NOTE — TELEPHONE ENCOUNTER
Patient has a loop recorder that was put in by Dr. Qasim Jacques. Battery has been dead since November. Patient now would like loop recorder removed please    I am the only one that is ever seen her in the office. Can you get Dr. Parisa Louis to take it out?   I just saw her in November and she is stable  Thanks, Stephanie Maradiaga

## 2023-01-26 NOTE — TELEPHONE ENCOUNTER
[place] at the [facility] and [address] ([facility] located on the first floor of [place] [address] through hospital main entrance and turn immediately to your left. Procedure Date: 02/02/23  Procedure Arrival Time: 10:00AM  Procedure Start Time:12:00PM    ( Times are subject to change)       Procedure Instructions:   1) You will need to not have anything to eat or drink the morning before the procedure. 2) You may stay overnight for observation. 3) You can still take all of your morning medication with a sip of water   4) You will need a  for the procedure. I discussed the above with the Pt, Pt voiced her understanding, and I [facility].

## 2023-01-30 DIAGNOSIS — F31.75 BIPOLAR DISORDER, IN PARTIAL REMISSION, MOST RECENT EPISODE DEPRESSED (HCC): ICD-10-CM

## 2023-01-31 RX ORDER — BISOPROLOL FUMARATE 5 MG/1
5 TABLET, FILM COATED ORAL 2 TIMES DAILY
Qty: 60 TABLET | Refills: 5 | Status: SHIPPED | OUTPATIENT
Start: 2023-01-31

## 2023-01-31 RX ORDER — ARIPIPRAZOLE 5 MG/1
7.5 TABLET ORAL NIGHTLY
Qty: 45 TABLET | Refills: 5 | Status: SHIPPED | OUTPATIENT
Start: 2023-01-31

## 2023-02-02 ENCOUNTER — HOSPITAL ENCOUNTER (OUTPATIENT)
Dept: CARDIAC CATH/INVASIVE PROCEDURES | Age: 38
Discharge: HOME | End: 2023-02-02
Attending: INTERNAL MEDICINE | Admitting: INTERNAL MEDICINE
Payer: MEDICARE

## 2023-02-02 VITALS
WEIGHT: 180 LBS | SYSTOLIC BLOOD PRESSURE: 104 MMHG | OXYGEN SATURATION: 98 % | HEART RATE: 90 BPM | BODY MASS INDEX: 35.34 KG/M2 | HEIGHT: 60 IN | RESPIRATION RATE: 17 BRPM | DIASTOLIC BLOOD PRESSURE: 71 MMHG | TEMPERATURE: 97.8 F

## 2023-02-02 PROBLEM — Z45.09 ENCOUNTER FOR ADJUSTMENT AND MANAGEMENT OF OTHER CARDIAC DEVICE: Status: ACTIVE | Noted: 2023-02-02

## 2023-02-02 PROCEDURE — 6370000000 HC RX 637 (ALT 250 FOR IP): Performed by: INTERNAL MEDICINE

## 2023-02-02 PROCEDURE — 2500000003 HC RX 250 WO HCPCS

## 2023-02-02 PROCEDURE — 99218 PR INITIAL OBSERVATION CARE/DAY 30 MINUTES: CPT | Performed by: INTERNAL MEDICINE

## 2023-02-02 PROCEDURE — 33286 RMVL SUBQ CAR RHYTHM MNTR: CPT

## 2023-02-02 RX ORDER — SODIUM CHLORIDE 9 MG/ML
INJECTION, SOLUTION INTRAVENOUS PRN
Status: DISCONTINUED | OUTPATIENT
Start: 2023-02-02 | End: 2023-02-03 | Stop reason: HOSPADM

## 2023-02-02 RX ORDER — SODIUM CHLORIDE 0.9 % (FLUSH) 0.9 %
5-40 SYRINGE (ML) INJECTION PRN
Status: DISCONTINUED | OUTPATIENT
Start: 2023-02-02 | End: 2023-02-03 | Stop reason: HOSPADM

## 2023-02-02 RX ORDER — SODIUM CHLORIDE 0.9 % (FLUSH) 0.9 %
5-40 SYRINGE (ML) INJECTION EVERY 12 HOURS SCHEDULED
Status: DISCONTINUED | OUTPATIENT
Start: 2023-02-02 | End: 2023-02-03 | Stop reason: HOSPADM

## 2023-02-02 RX ADMIN — CHLORHEXIDINE GLUCONATE: 4 LIQUID TOPICAL at 12:44

## 2023-02-02 NOTE — DISCHARGE INSTRUCTIONS
Recovery is within a short period of time. All activity can be resumed , once most of the discomfort is gone from the incision site. Usually discomfort at the incision site lats for 1-2 weeks. Tylenol is a safe medication to take for this discomfort,  Unless you have an allergy to this drug. INCISION SITE  1. Remove the dressing from the incision site the day following the procedure. 2.  Use only antibacterial soap and water to clean your incision. Do not use any            ointments on your incision, unless directed by your cardiologist.  3.  If topical skin adhesive (or Dermabond) is used to close your incision, you may shower or bathe as usual. Gently blot your skin dry with a soft towel. The skin adhesive will gradually slough  (fall) off from  Your skin within 10-14 days. 4.  When steri-strips (small band-aides) are used, keep the incision clean and dry. Do not soak the wound until the steri-strips have fallen off, which should be withn 10-14 days. 5.  Check the incision site. If you notice signs of infection, such as increased soreness, redness or discharge, contact your cardiologist immediately.      If you have any questions please call your cardiologist.

## 2023-02-02 NOTE — PROCEDURES
Indications for Reveal LINQ Implantation -        Conscious Sedation Protocol Used During this Procedure -          Anesthesia: none   Sedation:  mg Midazolam (Versed)   mcg Fentanyl   Start time:    Stop time:    ASA Class:    EBL 0      A trained medical personnel administered medications at my direction. After obtaining informed written consent, the patient was brought to the catheterization laboratory where the left chest area was prepared in the usual sterile fashion. The patient was monitored continuously with ECG, pulse oximetry, blood pressure monitoring and direct observation. After obtaining informed consent, the patient was brought to the catheterization laboratory where the left chest was prepared in the usual sterile fashion. The 's hands were scrubbed in a betadine solution for 5 minutes. Utilizing local anesthesia, 2% lidocaine was used to anesthetize the area over the prior device and a 1 cm incision was made. The device was accessed via blunt dissection and sharp dissection with an 11 blade and it was removed without incident. The pocket was flushed thoroughly with saline solution and then closed with 4-0 Vicryl. Steri-Strip sterile 4 x 4 and Tegaderm cover the wound. The patient tolerated the procedure well and suffered no complications.       Electronically signed by Robinson Najjar, MD on 2/2/23

## 2023-02-02 NOTE — H&P
[facility]  [facility], [address]  [address]  Phone: [phone]  Fax: [phone]    OFFICE VISIT:  2023    Keiko Wilson - : 1985    Reason For Visit:  Paolo Velasquez is a 42 y.o. female who is here for No chief complaint on file. Patient has had history of syncopal spells with cardiac and neurological work-up. Normal carotids, negative stress test and normal 2D echo  Underwent placement of loop recorder due to recurrent syncope  Thus far the recorder has failed to show any correlation of her syncopal spells with arrhythmia or bradycardia  Patient's tachycardia has been treated with beta-blocker. Medication was interrupted with pregnancy last year. Longstanding psychiatric history and follows with psychiatry. She returns today for routine follow-up and loop recorder interrogation    Patient states she is had a couple syncopal episodes. They are similar to the past.  She will be seen in the ER walking and just fall. She along with all of her kids have had the flu recently. Slowly recovering. She continues to have headaches. Due to her 's job loss she has not been able to afford her migraine medicine. She is taking her bisoprolol regularly  She is being evaluated for ENT and neurology for ongoing migraines and sinus problems    Subjective  Opal denies exertional chest pain, shortness of breath, orthopnea, paroxysmal nocturnal dyspnea, syncope, presyncope, arrhythmia, edema and fatigue. The patient denies numbness or weakness to suggest cerebrovascular accident or transient ischemic attack. SETH Rosa is PCP .   Keiko Wilson has the following history as recorded in [facility]:    Patient Active Problem List    Diagnosis Date Noted    Costochondral chest pain 2023    Chest pain 2023    Influenza 2022    Maxillary sinus mass 10/28/2022    Chronic cough 2022    Chronic migraine without aura, intractable, without status migrainosus 2022 AMA (advanced maternal age) multigravida 33+, unspecified trimester 10/05/2020    History of MI (myocardial infarction) 10/05/2020    Schizophrenia ([address] 10/05/2020    Schizoaffective disorder, bipolar type ([address] 11/04/2021    Severe episode of recurrent major depressive disorder, without psychotic features ([address] 11/04/2021    HEMANTH (generalized anxiety disorder) 11/04/2021    History of abnormal cervical Pap smear 08/11/2020    Convulsions ([address] 06/24/2019    Coronary artery disease involving native coronary artery of native heart without angina pectoris 09/04/2018    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) 09/04/2018    Moderate episode of recurrent major depressive disorder ([address] 09/04/2018    Decreased strength of lower extremity 09/04/2018    Syncope and collapse 09/04/2018    Chronic superficial gastritis 09/04/2018    Family hx-breast malignancy 07/18/2012    Diffuse cystic mastopathy 04/16/2012    Lump or mass in breast left  04/16/2012     Past Medical History:   Diagnosis Date    Abnormal glucose measurement     Abnormal Pap smear of cervix     Anxiety     Blood circulation, collateral     CAD (coronary artery disease)     Cerebral artery occlusion with cerebral infarction ([address] 2014    right side    Complication of anesthesia     difficulty waking    Depression     Heart disease 06/2020    LOOP recorder     IBS (irritable bowel syndrome)     MDD (major depressive disorder)     MI (myocardial infarction) ([address] 2014    Miscarriage 06/2012    Neurologic disorder     Postpartum depression     Prolonged emergence from general anesthesia     Schizophrenia ([address]     Seizures ([address]     anxiety related (last 6/2020)    Syncope     Tachycardia     UTI (urinary tract infection) 12/09/2019     Past Surgical History:   Procedure Laterality Date    CHOLECYSTECTOMY, LAPAROSCOPIC      COLONOSCOPY N/A 11/7/2019    Dr Tenny Nissen, 10 yr recall    EGD  2016    [facility]      lower right leg, has metal plate and screws    FRACTURE SURGERY      left wrist    HYSTERECTOMY (CERVIX STATUS UNKNOWN) N/A 6/3/2021    TOTAL LAPAROSCOPIC HYSTERECTOMY WITH DAVINCI CYSTOSCOPY performed by Chapincito Deng MD at [facility]  2014    loop recorder    INTRAUTERINE DEVICE INSERTION  2016    Essure placement    UPPER GASTROINTESTINAL ENDOSCOPY N/A 2019    Dr Nargis New     Family History   Problem Relation Age of Onset    High Blood Pressure Mother     Diabetes Father     Heart Disease Father     Stomach Cancer Father     Colon Cancer Paternal Grandmother     Breast Cancer Other         maternal great aunt    Colon Polyps Neg Hx     Esophageal Cancer Neg Hx     Liver Cancer Neg Hx     Liver Disease Neg Hx     Rectal Cancer Neg Hx      Social History     Tobacco Use    Smoking status: Former     Packs/day: 0.50     Years: 3.00     Pack years: 1.50     Types: Cigarettes     Quit date:      Years since quittin.0    Smokeless tobacco: Former     Quit date: 2014   Substance Use Topics    Alcohol use: Yes     Comment: occ      Current Facility-Administered Medications   Medication Dose Route Frequency Provider Last Rate Last Admin    sodium chloride flush 0.9 % injection 5-40 mL  5-40 mL IntraVENous 2 times per day Nurys Acevedo MD        sodium chloride flush 0.9 % injection 5-40 mL  5-40 mL IntraVENous PRN Nurys Acevedo MD        0.9 % sodium chloride infusion   IntraVENous PRN Nurys Acevedo MD         Allergies: Advil [ibuprofen]    Review of Systems  Constitutional - no significant activity change, appetite change, or unexpected weight change. No fever, chills or diaphoresis. No fatigue. HEENT - no significant rhinorrhea or epistaxis. No tinnitus or significant hearing loss. Eyes - no sudden vision change or amaurosis. Respiratory - no significant wheezing, stridor, apnea or cough.   No dyspnea on exertion or shortness of breath. Cardiovascular - no exertional chest pain, orthopnea or PND. No sensation of arrhythmia or slow heart rate. No claudication or leg edema. Gastrointestinal - no abdominal swelling or pain. No blood in stool. No severe constipation, diarrhea, nausea, or vomiting. Genitourinary - no difficulty urinating, dysuria, frequency, or urgency. No flank pain or hematuria. Musculoskeletal - no back pain, gait disturbance, or myalgia. Skin - no color change or rash. No pallor. No new surgical incision. Neurologic - no speech difficulty, facial asymmetry or lateralizing weakness. No seizures, presyncope,  or significant dizziness. + Headaches + syncope  Hematologic - no easy bruising or excessive bleeding. Psychiatric - no severe anxiety or insomnia. No confusion. All other review of systems are negative. Objective  Vital Signs - /86   Pulse 85   Temp 97.8 °F (36.6 °C) (Temporal)   Resp 19   Ht 5' (1.524 m)   Wt 180 lb (81.6 kg)   LMP 05/23/2021 (Approximate)   SpO2 97%   BMI 35.15 kg/m²   General - Opal is alert, cooperative, and pleasant. Well groomed. No acute distress. Body habitus is obese. HEENT - The head is normocephalic. No circumoral cyanosis. Dentition is normal.   EYES -  No Xanthelasma, no arcus senilis, no conjunctival hemorrhages or discharge. Neck - Supple, without increased jugular venous pressures. No carotid bruits. No mass. Respiratory - Lungs are clear bilaterally. No wheezes or rales. Normal effort without use of accessory muscles. Cardiovascular - Heart has regular rhythm and rate. No murmurs, rubs or gallops. + pedal pulses and no varicosities. Abdominal -  Soft, nontender, nondistended. Bowel sounds are intact. Extremities - No clubbing, cyanosis, or  edema. Musculoskeletal -  No clubbing . No Osler's nodes. Gait normal .  No kyphosis or scoliosis. Skin -  no statis ulcers or dermatitis.   Neurological - No focal signs are identified.  Oriented to person, place and time.    Psychiatric -  Appropriate affect and mood.       Assessment:     Diagnosis Orders   1. Status post placement of implantable loop recorder        2. Tachycardia        3. Syncope, unspecified syncope type          Data:  BP Readings from Last 3 Encounters:   02/02/23 103/86   01/26/23 128/80   01/19/23 104/67    Pulse Readings from Last 3 Encounters:   02/02/23 85   01/26/23 86   01/19/23 74        Wt Readings from Last 3 Encounters:   02/02/23 180 lb (81.6 kg)   01/26/23 180 lb (81.6 kg)   01/19/23 179 lb (81.2 kg)     Loop recorder interrogation today shows battery is now completely dead.  Reached RRT in June.  We discussed removal of device versus leaving it in.  Its not bothering her.  She is elected at this time to leave device and.  She will let us know in the future if she would like it removed    Looking back patient's heart rate has been fairly well controlled if she is taking her beta-blocker regularly.  She is taking it currently and doing well.  Unfortunately she is had recurrent episodes of syncope but have not been related to heart rate or rhythm.  Thought to be vasovagal      Heart rates  well controlled on bisoprolol.     Reviewed  recent notes-recent fluent and ENT working up questionable maxillary sinus mass versus cyst   Reviewed recent labs      Stable cardiovascular status. No evidence of overt heart failure, angina or dysrhythmia.   20 minutes were spent preparing, reviewing and seeing patient.  All questions answered    Plan    Continue bisoprolol for fast heart rates  If you decide you want the loop recorder removed then let us kno  Follow up in 1 year  Call with any questions or concerns  Follow up with SETH Hernandez for non cardiac problems  Report any new problems  Cardiovascular Fitness-Exercise as tolerated.  Strive for 30 minutes of exercise most days of the week.    Cardiac / Healthy Diet- Avoid processed high fat foods,  maintain low sodium/salt   Continue current medications as directed  Continue plan of treatment  It is always recommended that you bring your medications bottles with you to each visit - this is for your safety! Addendum:  Patient seen and evaluated on the day of the procedure. She understands the risks and benefits including that of infection and wishes to proceed. Her device was implanted for what sounds like POTS. It is now nonfunctional and ready for removal.  MD Keyur Moore APRN EMR dragon/transcription disclaimer: Much of this encounter note is electronic transcription/translation of spoken language to printed tach. Electronic translation of spoken language may be erroneous, or at times, nonsensical words or phrases may be inadvertently transcribed.  Although, I have reviewed the note for such errors, some may still exist.

## 2023-02-02 NOTE — PROGRESS NOTES
Discharge instructions reviewed with patient and patient states understanding.  Patient discharged from cath holding with all belongings and AVS.  Electronically signed by Jazzmine Bolden RN on 2/2/2023 at 2:18 PM

## 2023-02-07 RX ORDER — ARIPIPRAZOLE 5 MG/1
5 TABLET ORAL DAILY
Qty: 30 TABLET | Refills: 3 | OUTPATIENT
Start: 2023-02-07

## 2023-02-07 NOTE — TELEPHONE ENCOUNTER
[facility]ton dAamson called requesting a refill of the below medication which has been pended for you:     Requested Prescriptions     Pending Prescriptions Disp Refills    ARIPiprazole (ABILIFY) 5 MG tablet 30 tablet 3     Sig: Take 1 tablet by mouth daily       Last Appointment Date: 1/26/2023  Next Appointment Date: 3/6/2023    Allergies   Allergen Reactions    Advil [Ibuprofen] Hives     Facial swelling. Tolerated Mobic during hospital admission for chest pain without any issues 1/19/2023.

## 2023-02-20 DIAGNOSIS — F31.75 BIPOLAR DISORDER, IN PARTIAL REMISSION, MOST RECENT EPISODE DEPRESSED (HCC): ICD-10-CM

## 2023-02-20 RX ORDER — ARIPIPRAZOLE 5 MG/1
5 TABLET ORAL NIGHTLY
Qty: 30 TABLET | Refills: 5 | Status: SHIPPED | OUTPATIENT
Start: 2023-02-20

## 2023-02-20 NOTE — TELEPHONE ENCOUNTER
[facility] called requesting a refill of the below medication which has been pended for you:     Requested Prescriptions     Pending Prescriptions Disp Refills    ARIPiprazole (ABILIFY) 5 MG tablet 30 tablet 5     Sig: Take 1 tablet by mouth nightly       Last Appointment Date: 1/26/2023  Next Appointment Date: 3/6/2023    Allergies   Allergen Reactions    Advil [Ibuprofen] Hives     Facial swelling. Tolerated Mobic during hospital admission for chest pain without any issues 1/19/2023.

## 2023-02-23 ENCOUNTER — OFFICE VISIT (OUTPATIENT)
Dept: CARDIOLOGY CLINIC | Age: 38
End: 2023-02-23
Payer: MEDICARE

## 2023-02-23 VITALS
DIASTOLIC BLOOD PRESSURE: 70 MMHG | SYSTOLIC BLOOD PRESSURE: 124 MMHG | HEIGHT: 60 IN | BODY MASS INDEX: 35.53 KG/M2 | HEART RATE: 60 BPM | WEIGHT: 181 LBS

## 2023-02-23 DIAGNOSIS — Z98.890 HISTORY OF LOOP RECORDER: ICD-10-CM

## 2023-02-23 DIAGNOSIS — R07.9 CHEST PAIN IN ADULT: ICD-10-CM

## 2023-02-23 DIAGNOSIS — R00.0 TACHYCARDIA: Primary | ICD-10-CM

## 2023-02-23 PROCEDURE — 93000 ELECTROCARDIOGRAM COMPLETE: CPT | Performed by: CLINICAL NURSE SPECIALIST

## 2023-02-23 PROCEDURE — 99213 OFFICE O/P EST LOW 20 MIN: CPT | Performed by: CLINICAL NURSE SPECIALIST

## 2023-02-23 NOTE — PROGRESS NOTES
[facility]  [facility], [address]  [address]  Phone: [phone]  Fax: [phone]    OFFICE VISIT:  2023    [facility] 1985    Reason For Visit:  Lalito Chu is a 42 y.o. female who is here for Follow-up (Pt has sob with pressure)  History of tachycardia and syncope with loop recorder. Loop recorder reached LOU and was removed 23  Returns today for follow-up. She states her incision healed up well. She has some chest pressure but is there constantly. It is worse if she takes a big deep breath or eats. Been going on for couple weeks. She has had problems with irritable bowel. Subjective  Opal denies exertional chest pain, shortness of breath, orthopnea, paroxysmal nocturnal dyspnea, syncope, presyncope, arrhythmia, edema and fatigue. The patient denies numbness or weakness to suggest cerebrovascular accident or transient ischemic attack.     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    Chronic cough 2022    Chronic migraine without aura, intractable, without status migrainosus 2022    AMA (advanced maternal age) multigravida 35+, unspecified trimester 10/05/2020    History of MI (myocardial infarction) 10/05/2020    [address]ia [address] 10/05/2020    Schizoaffective disorder, bipolar type ([address] 2021    Severe episode of recurrent major depressive disorder, without psychotic features ([address] 2021    HEMANTH (generalized anxiety disorder) 2021    History of abnormal cervical Pap smear 2020    Convulsions ([address] 2019    Coronary artery disease involving native coronary artery of native heart without angina pectoris 2018 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) 09/04/2018    Moderate episode of recurrent major depressive disorder ([address] 09/04/2018    Decreased strength of lower extremity 09/04/2018    Syncope and collapse 09/04/2018    Chronic superficial gastritis 09/04/2018    Family hx-breast malignancy 07/18/2012    Diffuse cystic mastopathy 04/16/2012    Lump or mass in breast left  04/16/2012     Past Medical History:   Diagnosis Date    Abnormal glucose measurement     Abnormal Pap smear of cervix     Anxiety     Blood circulation, collateral     CAD (coronary artery disease)     Cerebral artery occlusion with cerebral infarction ([address] 2014    right side    Complication of anesthesia     difficulty waking    Depression     Heart disease 06/2020    LOOP recorder     IBS (irritable bowel syndrome)     MDD (major depressive disorder)     MI (myocardial infarction) ([address] 2014    Miscarriage 06/2012    Neurologic disorder     Postpartum depression     Prolonged emergence from general anesthesia     Schizophrenia ([address]     Seizures ([address]     anxiety related (last 6/2020)    Syncope     Tachycardia     UTI (urinary tract infection) 12/09/2019     Past Surgical History:   Procedure Laterality Date    CHOLECYSTECTOMY, LAPAROSCOPIC      COLONOSCOPY N/A 11/7/2019    Dr Katie Hawley, 10 yr recall    EGD  2016    [address]      lower right leg, has metal plate and screws    FRACTURE SURGERY      left wrist    HYSTERECTOMY (CERVIX STATUS UNKNOWN) N/A 6/3/2021    TOTAL LAPAROSCOPIC HYSTERECTOMY WITH DAVINCI CYSTOSCOPY performed by Edward Montemayor MD at [address] [place] [address]  2014    loop recorder    INTRAUTERINE DEVICE INSERTION  06/26/2016    Essure placement    UPPER GASTROINTESTINAL ENDOSCOPY N/A 11/7/2019    Dr Perez [facility]     Family History   Problem Relation Age of Onset    High Blood Pressure Mother     Diabetes Father     Heart Disease Father     Stomach Cancer Father     Colon Cancer Paternal Grandmother     Breast Cancer Other         maternal great aunt    Colon Polyps Neg Hx     Esophageal Cancer Neg Hx     Liver Cancer Neg Hx     Liver Disease Neg Hx     Rectal Cancer Neg Hx      Social History     Tobacco Use    Smoking status: Former     Packs/day: 0.50     Years: 3.00     Pack years: 1.50     Types: Cigarettes     Quit date:      Years since quittin.1    Smokeless tobacco: Former     Quit date: 2014   Substance Use Topics    Alcohol use: Yes     Comment: occ      Current Outpatient Medications   Medication Sig Dispense Refill    ARIPiprazole (ABILIFY) 5 MG tablet Take 1 tablet by mouth nightly 30 tablet 5    bisoprolol (ZEBETA) 5 MG tablet Take 1 tablet by mouth 2 times daily 60 tablet 5    aspirin 81 MG EC tablet Take 81 mg by mouth daily      lamoTRIgine (LAMICTAL) 25 MG tablet Take 25 mg by mouth daily      Galcanezumab-gnlm (EMGALITY) 120 MG/ML SOAJ Inject 120 mg into the skin every 30 days 1 pen 11    butalbital-APAP-caffeine -40 MG CAPS per capsule Take 1 capsule by mouth every 6 hours as needed for Headaches 40 capsule 5    meloxicam (MOBIC) 7.5 MG tablet Take 1 tablet by mouth daily for 5 days Please hold ASA while on this medication. Can resume ASA after you finish taking this medication for 5 days. (Patient not taking: Reported on 2023) 5 tablet 0     No current facility-administered medications for this visit. Allergies: Advil [ibuprofen]    Review of Systems  Constitutional - no significant activity change, appetite change, or unexpected weight change. No fever, chills or diaphoresis. No fatigue. HEENT - no significant rhinorrhea or epistaxis. No tinnitus or significant hearing loss. Eyes - no sudden vision change or amaurosis. Respiratory - no significant wheezing, stridor, apnea or cough. No dyspnea on exertion or shortness of breath. Cardiovascular - no exertional chest pain, orthopnea or PND.   No sensation of arrhythmia or slow heart rate. No claudication or leg edema. Gastrointestinal - no abdominal swelling or pain. No blood in stool.+ Nausea. Pain with eating. Intermittent diarrhea  Genitourinary - no difficulty urinating, dysuria, frequency, or urgency. No flank pain or hematuria. Musculoskeletal - no back pain, gait disturbance, or myalgia. Skin - no color change or rash. No pallor. No new surgical incision. Neurologic - no speech difficulty, facial asymmetry or lateralizing weakness. No seizures, presyncope, syncope, or significant dizziness. Hematologic - no easy bruising or excessive bleeding. Psychiatric - no severe anxiety or insomnia. No confusion. All other review of systems are negative. Objective  Vital Signs - /70   Pulse 60   Ht 5' (1.524 m)   Wt 181 lb (82.1 kg)   LMP 05/23/2021 (Approximate)   BMI 35.35 kg/m²   General - Opal is alert, cooperative, and pleasant. Well groomed. No acute distress. Body habitus is obese. HEENT - The head is normocephalic. No circumoral cyanosis. Dentition is normal.   EYES -  No Xanthelasma, no arcus senilis, no conjunctival hemorrhages or discharge. Neck - Supple, without increased jugular venous pressures. No carotid bruits. No mass. Respiratory - Lungs are clear bilaterally. No wheezes or rales. Normal effort without use of accessory muscles. Cardiovascular - Heart has regular rhythm and rate. No murmurs, rubs or gallops. + pedal pulses and no varicosities. Abdominal -  Soft, nontender, nondistended. Bowel sounds are intact. Extremities - No clubbing, cyanosis, or  edema. Musculoskeletal -  No clubbing . No Osler's nodes. Gait normal .  No kyphosis or scoliosis. Skin -  no statis ulcers or dermatitis. Loop recorder site-well-healed  Neurological - No focal signs are identified. Oriented to person, place and time. Psychiatric -  Appropriate affect and mood.        Assessment:     Diagnosis Orders 1. Tachycardia        2. History of loop recorder        3. Chest pain in adult  EKG 12 lead        Data:  BP Readings from Last 3 Encounters:   02/23/23 124/70   02/02/23 104/71   01/26/23 128/80    Pulse Readings from Last 3 Encounters:   02/23/23 60   02/02/23 90   01/26/23 86        Wt Readings from Last 3 Encounters:   02/23/23 181 lb (82.1 kg)   02/02/23 180 lb (81.6 kg)   01/26/23 180 lb (81.6 kg)   EKG today shows sinus bradycardia with a rate of 56. Stable EKG. Loop recorder removed. Has had bouts of tachycardia. On bisoprolol with control of most of her fast rates    Blood pressure well controlled    Incisions well healed from loop recorder removal.  Chest pain not thought to be cardiac. Affected with position and eating. Has follow-up with PCP next week. Also has seen GI        ECHO 2018   Normal left ventricular size and function EF 55-60%. Mildly dilated left   atrium. Mildly thickened tricuspid aortic valve with normal leaflet   mobility and normal function. Mildly thickened but normally mobile mitral   leaflets with only trace mitral regurgitation. Mildly dilated left atrium. Normal right-sided chamber with preserved RV systolic function. Mild   tricuspid regurgitation with normal RV systolic estimated at 39. No   pericardial effusion and aortic root dimensions are within normal limits.  -------------------------------------------------   Electronically signed by Theresa Villagran MD(Interpreting physician)   on 09/11/2018 04:51 PM        Stable cardiovascular status. No evidence of overt heart failure, angina or dysrhythmia. 20 minutes were spent preparing, reviewing and seeing patient.   All questions answered    Plan      Maintain good blood pressure control-goal<130/80 at rest  Maintain good cholesterol control LDL goal<70 with arterial disease  If you are diabetic work to keep/obtain hemoglobin A1c< 7    Follow up in 1 year  Call with any questions or concerns  Follow up with Sarahy Fleming APRN for non cardiac problems  Report any new problems  Cardiovascular Fitness-Exercise as tolerated. Strive for 30 minutes of exercise most days of the week. Cardiac / Healthy Diet- Avoid processed high fat foods, maintain low sodium/salt   Continue current medications as directed  Continue plan of treatment  It is always recommended that you bring your medications bottles with you to each visit - this is for your safety! SETH Grubbs    EMR dragon/transcription disclaimer: Much of this encounter note is electronic transcription/translation of spoken language to printed tach. Electronic translation of spoken language may be erroneous, or at times, nonsensical words or phrases may be inadvertently transcribed.  Although, I have reviewed the note for such errors, some may still exist.

## 2023-02-23 NOTE — PATIENT INSTRUCTIONS
Maintain good blood pressure control-goal<130/80 at rest  Maintain good cholesterol control LDL goal<70 with arterial disease  If you are diabetic work to keep/obtain hemoglobin A1c< 7    Follow up in 1 year  Call with any questions or concerns  Follow up with SETH Garcia for non cardiac problems  Report any new problems  Cardiovascular Fitness-Exercise as tolerated. Strive for 30 minutes of exercise most days of the week. Cardiac / Healthy Diet- Avoid processed high fat foods, maintain low sodium/salt   Continue current medications as directed  Continue plan of treatment  It is always recommended that you bring your medications bottles with you to each visit - this is for your safety!

## 2023-03-03 DIAGNOSIS — Z00.00 ENCOUNTER FOR PREVENTIVE HEALTH EXAMINATION: ICD-10-CM

## 2023-03-03 LAB
ALBUMIN SERPL-MCNC: 3.6 G/DL (ref 3.5–5.2)
ALP BLD-CCNC: 90 U/L (ref 35–104)
ALT SERPL-CCNC: 14 U/L (ref 5–33)
ANION GAP SERPL CALCULATED.3IONS-SCNC: 11 MMOL/L (ref 7–19)
AST SERPL-CCNC: 17 U/L (ref 5–32)
BACTERIA: NEGATIVE /HPF
BASOPHILS ABSOLUTE: 0.1 K/UL (ref 0–0.2)
BASOPHILS RELATIVE PERCENT: 1.1 % (ref 0–1)
BILIRUB SERPL-MCNC: 0.4 MG/DL (ref 0.2–1.2)
BILIRUBIN URINE: NEGATIVE
BLOOD, URINE: ABNORMAL
BUN BLDV-MCNC: 10 MG/DL (ref 6–20)
CALCIUM SERPL-MCNC: 8.7 MG/DL (ref 8.6–10)
CHLORIDE BLD-SCNC: 105 MMOL/L (ref 98–111)
CHOLESTEROL, TOTAL: 155 MG/DL (ref 160–199)
CLARITY: CLEAR
CO2: 22 MMOL/L (ref 22–29)
COLOR: YELLOW
CREAT SERPL-MCNC: 0.7 MG/DL (ref 0.5–0.9)
CRYSTALS, UA: ABNORMAL /HPF
EOSINOPHILS ABSOLUTE: 0.7 K/UL (ref 0–0.6)
EOSINOPHILS RELATIVE PERCENT: 7.6 % (ref 0–5)
EPITHELIAL CELLS, UA: 2 /HPF (ref 0–5)
GFR SERPL CREATININE-BSD FRML MDRD: >60 ML/MIN/{1.73_M2}
GLUCOSE BLD-MCNC: 92 MG/DL (ref 74–109)
GLUCOSE URINE: NEGATIVE MG/DL
HCT VFR BLD CALC: 41.7 % (ref 37–47)
HDLC SERPL-MCNC: 54 MG/DL (ref 65–121)
HEMOGLOBIN: 13.4 G/DL (ref 12–16)
HYALINE CASTS: 0 /HPF (ref 0–8)
IMMATURE GRANULOCYTES #: 0 K/UL
KETONES, URINE: NEGATIVE MG/DL
LDL CHOLESTEROL CALCULATED: 89 MG/DL
LEUKOCYTE ESTERASE, URINE: NEGATIVE
LYMPHOCYTES ABSOLUTE: 2 K/UL (ref 1.1–4.5)
LYMPHOCYTES RELATIVE PERCENT: 21.5 % (ref 20–40)
MCH RBC QN AUTO: 30.6 PG (ref 27–31)
MCHC RBC AUTO-ENTMCNC: 32.1 G/DL (ref 33–37)
MCV RBC AUTO: 95.2 FL (ref 81–99)
MONOCYTES ABSOLUTE: 0.4 K/UL (ref 0–0.9)
MONOCYTES RELATIVE PERCENT: 4.4 % (ref 0–10)
NEUTROPHILS ABSOLUTE: 6.2 K/UL (ref 1.5–7.5)
NEUTROPHILS RELATIVE PERCENT: 65.1 % (ref 50–65)
NITRITE, URINE: NEGATIVE
PDW BLD-RTO: 13 % (ref 11.5–14.5)
PH UA: 6.5 (ref 5–8)
PLATELET # BLD: 296 K/UL (ref 130–400)
PMV BLD AUTO: 9.3 FL (ref 9.4–12.3)
POTASSIUM SERPL-SCNC: 4 MMOL/L (ref 3.5–5)
PROTEIN UA: NEGATIVE MG/DL
RBC # BLD: 4.38 M/UL (ref 4.2–5.4)
RBC UA: 9 /HPF (ref 0–4)
SODIUM BLD-SCNC: 138 MMOL/L (ref 136–145)
SPECIFIC GRAVITY UA: 1.01 (ref 1–1.03)
TOTAL PROTEIN: 6.9 G/DL (ref 6.6–8.7)
TRIGL SERPL-MCNC: 60 MG/DL (ref 0–149)
TSH SERPL DL<=0.05 MIU/L-ACNC: 3.21 UIU/ML (ref 0.27–4.2)
UROBILINOGEN, URINE: 0.2 E.U./DL
VITAMIN D 25-HYDROXY: 17.7 NG/ML
WBC # BLD: 9.5 K/UL (ref 4.8–10.8)
WBC UA: 3 /HPF (ref 0–5)

## 2023-03-06 ENCOUNTER — TELEPHONE (OUTPATIENT)
Dept: NEUROLOGY | Age: 38
End: 2023-03-06

## 2023-03-06 ENCOUNTER — OFFICE VISIT (OUTPATIENT)
Dept: INTERNAL MEDICINE | Age: 38
End: 2023-03-06
Payer: MEDICARE

## 2023-03-06 VITALS
WEIGHT: 180 LBS | HEIGHT: 60 IN | SYSTOLIC BLOOD PRESSURE: 128 MMHG | HEART RATE: 76 BPM | DIASTOLIC BLOOD PRESSURE: 70 MMHG | BODY MASS INDEX: 35.34 KG/M2 | OXYGEN SATURATION: 97 %

## 2023-03-06 DIAGNOSIS — F25.0 SCHIZOAFFECTIVE DISORDER, BIPOLAR TYPE (HCC): ICD-10-CM

## 2023-03-06 DIAGNOSIS — R56.9 SEIZURES (HCC): ICD-10-CM

## 2023-03-06 DIAGNOSIS — R07.9 CHEST PAIN, UNSPECIFIED TYPE: ICD-10-CM

## 2023-03-06 PROBLEM — Z45.09 ENCOUNTER FOR ADJUSTMENT AND MANAGEMENT OF OTHER CARDIAC DEVICE: Status: RESOLVED | Noted: 2023-02-02 | Resolved: 2023-03-06

## 2023-03-06 PROBLEM — J11.1 INFLUENZA: Status: RESOLVED | Noted: 2022-11-03 | Resolved: 2023-03-06

## 2023-03-06 PROBLEM — F20.9 SCHIZOPHRENIA (HCC): Status: RESOLVED | Noted: 2020-10-05 | Resolved: 2023-03-06

## 2023-03-06 PROBLEM — F33.2 SEVERE EPISODE OF RECURRENT MAJOR DEPRESSIVE DISORDER, WITHOUT PSYCHOTIC FEATURES (HCC): Status: RESOLVED | Noted: 2021-11-04 | Resolved: 2023-03-06

## 2023-03-06 PROBLEM — R55 SYNCOPE AND COLLAPSE: Status: RESOLVED | Noted: 2018-09-04 | Resolved: 2023-03-06

## 2023-03-06 PROCEDURE — 99214 OFFICE O/P EST MOD 30 MIN: CPT | Performed by: NURSE PRACTITIONER

## 2023-03-06 ASSESSMENT — PATIENT HEALTH QUESTIONNAIRE - PHQ9
8. MOVING OR SPEAKING SO SLOWLY THAT OTHER PEOPLE COULD HAVE NOTICED. OR THE OPPOSITE, BEING SO FIGETY OR RESTLESS THAT YOU HAVE BEEN MOVING AROUND A LOT MORE THAN USUAL: 2
9. THOUGHTS THAT YOU WOULD BE BETTER OFF DEAD, OR OF HURTING YOURSELF: 2
6. FEELING BAD ABOUT YOURSELF - OR THAT YOU ARE A FAILURE OR HAVE LET YOURSELF OR YOUR FAMILY DOWN: 2
10. IF YOU CHECKED OFF ANY PROBLEMS, HOW DIFFICULT HAVE THESE PROBLEMS MADE IT FOR YOU TO DO YOUR WORK, TAKE CARE OF THINGS AT HOME, OR GET ALONG WITH OTHER PEOPLE: 2
2. FEELING DOWN, DEPRESSED OR HOPELESS: 2
7. TROUBLE CONCENTRATING ON THINGS, SUCH AS READING THE NEWSPAPER OR WATCHING TELEVISION: 2
1. LITTLE INTEREST OR PLEASURE IN DOING THINGS: 0
5. POOR APPETITE OR OVEREATING: 2
SUM OF ALL RESPONSES TO PHQ QUESTIONS 1-9: 14
SUM OF ALL RESPONSES TO PHQ QUESTIONS 1-9: 16
SUM OF ALL RESPONSES TO PHQ9 QUESTIONS 1 & 2: 2
SUM OF ALL RESPONSES TO PHQ QUESTIONS 1-9: 16
SUM OF ALL RESPONSES TO PHQ QUESTIONS 1-9: 16
4. FEELING TIRED OR HAVING LITTLE ENERGY: 2
3. TROUBLE FALLING OR STAYING ASLEEP: 2

## 2023-03-06 ASSESSMENT — ENCOUNTER SYMPTOMS
DIARRHEA: 0
EYE DISCHARGE: 0
ABDOMINAL PAIN: 0
COUGH: 0
STRIDOR: 0
ABDOMINAL DISTENTION: 0
WHEEZING: 0
VOMITING: 0
SORE THROAT: 0
NAUSEA: 0
CONSTIPATION: 0
COLOR CHANGE: 0
CHOKING: 0
EYE ITCHING: 0
BLOOD IN STOOL: 0
SHORTNESS OF BREATH: 0
TROUBLE SWALLOWING: 0

## 2023-03-06 NOTE — PROGRESS NOTES
[facility] PHYSICIAN SERVICES  [facility] INTERNAL MEDICINE  [address] [address] Vince [place]auffman [address]  Dept: [phone]  Dept Fax: [phone]  Loc: [phone]    Meño Moon (:  1985) is a 42 y.o. female,Established patient  with green, here for evaluation of the following chief complaint(s): 6 Month Follow-Up and Depression (Worse felt suicidal and wanted to scratch self again.)      Meño Moon is a 42 y.o. female who presents today for her medical conditions/complaints as noted below. Meño Moon is c/rosaura 6 Month Follow-Up and Depression (Worse felt suicidal and wanted to scratch self again.)        HPI:     Chief Complaint   Patient presents with    6 Month Follow-Up    Depression     Worse felt suicidal and wanted to scratch self again. HPI   Recent hospitalization with chest pain   Previous loop recorder removed  she had hospital follow-up with cardiology that reported no changes  3. Bipolar followed by [facility] psych today she is reporting suicidal ideations today;  she knows the language as she says she doesn't have a plan ro plans to proceed;  she says she is wanting to start cutting herself again;  this has worsened since getting out of the hospital recently ;   her sister is with her here today;   4.   Seizure a couple of weeks ago and she has not felt well since then;  she is to call neurology to advise them     Past Medical History:   Diagnosis Date    Abnormal glucose measurement     Abnormal Pap smear of cervix     Anxiety     Blood circulation, collateral     CAD (coronary artery disease)     Cerebral artery occlusion with cerebral infarction ([address]     right side    Complication of anesthesia     difficulty waking    Depression     Encounter for adjustment and management of other cardiac device 2023    Heart disease 2020    LOOP recorder     IBS (irritable bowel syndrome)     MDD (major depressive disorder)     MI (myocardial infarction) ([address] 2014    Miscarriage 06/2012    Neurologic disorder     Postpartum depression     Prolonged emergence from general anesthesia     Sch[facility]ia [facility]     Seizures ([facility]     anxiety related (last 6/2020)    Severe episode of recurrent major depressive disorder, without psychotic features ([facility] 11/4/2021    Syncope     Tachycardia     UTI (urinary tract infection) 12/09/2019      Past Surgical History:   Procedure Laterality Date    CHOLECYSTECTOMY, LAPAROSCOPIC      COLONOSCOPY N/A 11/7/2019    Dr Salomon Noriega, 10 yr recall    EGD  2016    [facility]      lower right leg, has metal plate and screws    FRACTURE SURGERY      left wrist    HYSTERECTOMY (CERVIX STATUS UNKNOWN) N/A 6/3/2021    TOTAL LAPAROSCOPIC HYSTERECTOMY WITH DAVINCI CYSTOSCOPY performed by Luis Casey MD at [address] [place] [address]  2014    loop recorder    INTRAUTERINE DEVICE INSERTION  06/26/2016    Essure placement    UPPER GASTROINTESTINAL ENDOSCOPY N/A 11/7/2019    Dr NÉSTOR Jackson-Gastritis       Vitals 3/6/2023 2/23/2023 2/2/2023 2/2/2023 2/2/2023 9/3/8344   SYSTOLIC 340 861 357 95 663 580   DIASTOLIC 70 70 71 69 54 78   Pulse 76 60 90 91 88 96   Temp - - - - - -   Resp - - 17 18 18 18   SpO2 97 - 98 98 100 100   Weight 180 lb 181 lb - - - -   Height 5' 0\" 5' 0\" - - - -   Body mass index 35.15 kg/m2 35.35 kg/m2 - - - -   Pain Level - - - - - -   Some recent data might be hidden       Family History   Problem Relation Age of Onset    High Blood Pressure Mother     Diabetes Father     Heart Disease Father     Stomach Cancer Father     Colon Cancer Paternal Grandmother     Breast Cancer Other         maternal great aunt    Colon Polyps Neg Hx     Esophageal Cancer Neg Hx     Liver Cancer Neg Hx     Liver Disease Neg Hx     Rectal Cancer Neg Hx        Social History     Tobacco Use    Smoking status: Former     Packs/day: 0.50     Years: 3.00     Pack years: 1.50     Types: Cigarettes     Quit date:      Years since quittin.1    Smokeless tobacco: Former     Quit date: 2014   Substance Use Topics    Alcohol use: Yes     Comment: occ      Current Outpatient Medications   Medication Sig Dispense Refill    ARIPiprazole (ABILIFY) 5 MG tablet Take 1 tablet by mouth nightly 30 tablet 5    bisoprolol (ZEBETA) 5 MG tablet Take 1 tablet by mouth 2 times daily 60 tablet 5    aspirin 81 MG EC tablet Take 81 mg by mouth daily      lamoTRIgine (LAMICTAL) 25 MG tablet Take 25 mg by mouth daily      Galcanezumab-gnlm (EMGALITY) 120 MG/ML SOAJ Inject 120 mg into the skin every 30 days 1 pen 11    butalbital-APAP-caffeine -40 MG CAPS per capsule Take 1 capsule by mouth every 6 hours as needed for Headaches 40 capsule 5     No current facility-administered medications for this visit. Allergies   Allergen Reactions    Advil [Ibuprofen] Hives     Facial swelling. Tolerated Mobic during hospital admission for chest pain without any issues 2023.         Health Maintenance   Topic Date Due    Varicella vaccine (1 of 2 - 2-dose childhood series) 2024 (Originally 1984)    DTaP/Tdap/Td vaccine (1 - Tdap) 2024 (Originally 2002)    Flu vaccine (1) 2024 (Originally 2022)    Hepatitis C screen  2024 (Originally 2001)    HIV screen  2024 (Originally 1998)    Depression Monitoring  10/18/2023    Colorectal Cancer Screen  2029    COVID-19 Vaccine  Completed    Hepatitis A vaccine  Aged Out    Hib vaccine  Aged Out    Meningococcal (ACWY) vaccine  Aged Out    Pneumococcal 0-64 years Vaccine  Aged Out       No results found for: LABA1C  No results found for: PSA, PSADIA  TSH   Date Value Ref Range Status   2023 3.210 0.270 - 4.200 uIU/mL Final   ]  Lab Results   Component Value Date     2023    K 4.0 2023     2023    CO2 22 2023    BUN 10 2023    CREATININE 0.7 03/03/2023    GLUCOSE 92 03/03/2023    CALCIUM 8.7 03/03/2023    PROT 6.9 03/03/2023    LABALBU 3.6 03/03/2023    BILITOT 0.4 03/03/2023    ALKPHOS 90 03/03/2023    AST 17 03/03/2023    ALT 14 03/03/2023    LABGLOM >60 03/03/2023    GFRAA >59 09/06/2022    GLOB 3.3 07/13/2016     Lab Results   Component Value Date    CHOL 155 (L) 03/03/2023    CHOL 164 09/06/2022    CHOL 177 09/06/2018     Lab Results   Component Value Date    TRIG 60 03/03/2023    TRIG 134 09/06/2022    TRIG 79 09/06/2018     Lab Results   Component Value Date    HDL 54 (L) 03/03/2023    HDL 48 (L) 09/06/2022    HDL 49 (L) 09/06/2018     Lab Results   Component Value Date    LDLCALC 89 03/03/2023    LDLCALC 89 09/06/2022    LDLCALC 112 09/06/2018     Lab Results   Component Value Date/Time     03/03/2023 08:03 AM    K 4.0 03/03/2023 08:03 AM    K 4.1 01/18/2023 02:16 PM     03/03/2023 08:03 AM    CO2 22 03/03/2023 08:03 AM    BUN 10 03/03/2023 08:03 AM    CREATININE 0.7 03/03/2023 08:03 AM    GLUCOSE 92 03/03/2023 08:03 AM    CALCIUM 8.7 03/03/2023 08:03 AM      Lab Results   Component Value Date    WBC 9.5 03/03/2023    HGB 13.4 03/03/2023    HCT 41.7 03/03/2023    MCV 95.2 03/03/2023     03/03/2023    LYMPHOPCT 21.5 03/03/2023    RBC 4.38 03/03/2023    MCH 30.6 03/03/2023    MCHC 32.1 (L) 03/03/2023    RDW 13.0 03/03/2023     Lab Results   Component Value Date    VITD25 17.7 (L) 03/03/2023     Labs reviewed from 3/3/2023    Subjective:      Review of Systems   Constitutional:  Negative for fatigue, fever and unexpected weight change. HENT:  Negative for ear discharge, ear pain, mouth sores, sore throat and trouble swallowing. Eyes:  Negative for discharge, itching and visual disturbance. Respiratory:  Negative for cough, choking, shortness of breath, wheezing and stridor. Cardiovascular:  Negative for chest pain, palpitations and leg swelling.    Gastrointestinal:  Negative for abdominal distention, abdominal pain, blood in stool, constipation, diarrhea, nausea and vomiting. Endocrine: Negative for cold intolerance, polydipsia and polyuria. Genitourinary:  Negative for difficulty urinating, dysuria, frequency and urgency. Musculoskeletal:  Negative for arthralgias and gait problem. Skin:  Negative for color change and rash. Allergic/Immunologic: Negative for food allergies and immunocompromised state. Neurological:  Negative for dizziness, tremors, syncope, speech difficulty, weakness and headaches. Hematological:  Negative for adenopathy. Does not bruise/bleed easily. Psychiatric/Behavioral:  Negative for confusion and hallucinations. Bipolar     Objective:       /70   Pulse 76   Ht 5' (1.524 m)   Wt 180 lb (81.6 kg)   LMP 05/23/2021 (Approximate)   SpO2 97%   BMI 35.15 kg/m²           Assessment:      Problem List       Chest pain     For cardiacEvaluated and resolved issues at recent hospitalization          Schizoaffective disorder, bipolar type ([address]     With active thoughts of suicide is not seeing psych currently; Seizures ([facility]     She is to notify neurology of her recent seizure          Relevant Medications    Galcanezumab-gnlm (EMGALITY) 120 MG/ML SOAJ    lamoTRIgine (LAMICTAL) 25 MG tablet       Plan:        Patient given educational materials - see patient instructions. Discussed use, benefit, and side effects of prescribed medications. Allpatient questions answered. Pt voiced understanding. Reviewed health maintenance. Instructed to continue current medications, diet and exercise. Patient agreed with treatment plan. Follow up as directed. MEDICATIONS:  No orders of the defined types were placed in this encounter. ORDERS:  No orders of the defined types were placed in this encounter. Follow-up:  No follow-ups on file. PATIENT INSTRUCTIONS:  Patient Instructions    CP;  still with tenderness; Loop recorder removed stable  3.   Seizure she reports  Last week she is advised to get a hold of neurology and let them know  4.  bipolar disorder with acute suicidal ideations. Her sister is with her and I have advised them to go to the ER for treatment and further evaluation. Also advised her she is discharged hopefully ER will have made a referral to psych but if not let me know and I will certainly do that. Electronically signed by SETH Longoria on 3/6/2023 at 10:50 AM    @    Desmond/transcription disclaimer:  Much of this encounter note is electronic transcription/translation of spoken language to printed texts. The electronic translation of spoken language may be erroneous, or at times,nonsensical words or phrases may be inadvertently transcribed.   Although I have reviewed the note for such errors, some may still exist.

## 2023-03-06 NOTE — PATIENT INSTRUCTIONS
CP;  still with tenderness; Loop recorder removed stable  3. Seizure she reports  Last week she is advised to get a hold of neurology and let them know  4.  bipolar disorder with acute suicidal ideations. Her sister is with her and I have advised them to go to the ER for treatment and further evaluation. Also advised her she is discharged hopefully ER will have made a referral to psych but if not let me know and I will certainly do that.

## 2023-03-16 DIAGNOSIS — F31.75 BIPOLAR DISORDER, IN PARTIAL REMISSION, MOST RECENT EPISODE DEPRESSED (HCC): ICD-10-CM

## 2023-03-16 NOTE — TELEPHONE ENCOUNTER
Carrillo Acosta called requesting a refill of the below medication which has been pended for you:     Requested Prescriptions     Pending Prescriptions Disp Refills    ARIPiprazole (ABILIFY) 5 MG tablet 90 tablet 1     Sig: Take 1 tablet by mouth nightly       Last Appointment Date: 3/6/2023  Next Appointment Date: Visit date not found    Allergies   Allergen Reactions    Advil [Ibuprofen] Hives     Facial swelling. Tolerated Mobic during hospital admission for chest pain without any issues 1/19/2023.

## 2023-03-20 RX ORDER — ARIPIPRAZOLE 5 MG/1
5 TABLET ORAL NIGHTLY
Qty: 90 TABLET | Refills: 0 | Status: SHIPPED | OUTPATIENT
Start: 2023-03-20

## 2023-05-01 ENCOUNTER — TELEPHONE (OUTPATIENT)
Dept: CARDIOLOGY CLINIC | Age: 38
End: 2023-05-01

## 2023-05-01 NOTE — TELEPHONE ENCOUNTER
Pt called stating she is having chest pain with tingling in left arm. SOA with exertion. Pt advised to report to ER. PT verbally understood and states she will.

## 2023-07-10 ENCOUNTER — OFFICE VISIT (OUTPATIENT)
Dept: NEUROLOGY | Age: 38
End: 2023-07-10
Payer: MEDICARE

## 2023-07-10 ENCOUNTER — TELEPHONE (OUTPATIENT)
Dept: NEUROLOGY | Age: 38
End: 2023-07-10

## 2023-07-10 VITALS
SYSTOLIC BLOOD PRESSURE: 126 MMHG | HEART RATE: 79 BPM | DIASTOLIC BLOOD PRESSURE: 74 MMHG | BODY MASS INDEX: 35.34 KG/M2 | OXYGEN SATURATION: 97 % | WEIGHT: 180 LBS | HEIGHT: 60 IN

## 2023-07-10 DIAGNOSIS — R51.9 HEADACHE, UNSPECIFIED HEADACHE TYPE: Primary | ICD-10-CM

## 2023-07-10 DIAGNOSIS — R20.0 NUMBNESS: ICD-10-CM

## 2023-07-10 PROCEDURE — 99214 OFFICE O/P EST MOD 30 MIN: CPT | Performed by: PSYCHIATRY & NEUROLOGY

## 2023-07-10 RX ORDER — ONDANSETRON 8 MG/1
1 TABLET, ORALLY DISINTEGRATING ORAL DAILY PRN
COMMUNITY
Start: 2023-06-24

## 2023-07-10 NOTE — TELEPHONE ENCOUNTER
Called patient per Dr Marco Palmer to give her the date and time of her Botox appt that I schedule for her. Also advised her to answer odd numbers that may call her phone that it may be pain management giving her updates on her approval. Patient voiced understanding.

## 2023-07-10 NOTE — PROGRESS NOTES
[facility] NEUROLOGY:    Patient: Enrico Gonzalez  :  1985  Age:  34 y.o. MRN:  [id number]  Today:  7/10/23      Chief Complaint:  Chief Complaint   Patient presents with    6 Month Follow-Up    Headache       HISTORY OF PRESENT ILLNESS:   Enrico Gonzalez is a 34y.o. year old female here for follow up of headaches. Still noting headaches, no improvement. Botox had helped previously, but stopped due to pregnancy. Back on Emgality and is not helping. Still noting intermittent headaches, unchanged since last seen. Severe at times, some nausea and vomiting noted, some ED visits noted. Noting some numbness and paresthesias. 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 No overt seizures noted, but is noting intermittent episodes of shaking without a consistent YANG same as before, unchanged. She is still off nortriptyline as well. No change in characteristics of headaches. Headache pain is global with little radiation of pain. Pain is described as sharp and stabbing, She does report some scotoma like changes prior to headaches, sees spots in both eyes. She notes nausea, vomiting, vomiting, sonophobia, photophobia, dizziness and diplopia with headaches as above. She has previously tried Imitrex, Tylenol, Ibuprofen, Aleve with little relief. She is using Fioricet prn. Prior history of kidney stones as a child. Long history of headaches since age 13. No incontinence or tongue biting. No clear confusion noted afterwards, takes 15-20 minutes for her to be able to stand up again. Seen by cardiology as well, loop placed, states episodes seem to occur when she is in stressful situations. She does report a history of CVA and MI. No further stroke like symptoms since last seen. Some numbness, tingling noted, but not in a clear organic pattern, more \"whole body\" numbness. She states that these conditions were \"stressed induced\".   She notes residual left-sided weakness and intermittent slurred speech from the

## 2023-08-07 ENCOUNTER — HOSPITAL ENCOUNTER (OUTPATIENT)
Dept: PAIN MANAGEMENT | Age: 38
Discharge: HOME | End: 2023-08-07
Payer: MEDICARE

## 2023-08-07 VITALS
DIASTOLIC BLOOD PRESSURE: 80 MMHG | SYSTOLIC BLOOD PRESSURE: 130 MMHG | OXYGEN SATURATION: 98 % | TEMPERATURE: 97.5 F | RESPIRATION RATE: 18 BRPM | HEART RATE: 78 BPM

## 2023-08-07 DIAGNOSIS — M54.2 NECK PAIN: Primary | ICD-10-CM

## 2023-08-07 DIAGNOSIS — R20.0 NUMBNESS: ICD-10-CM

## 2023-08-07 DIAGNOSIS — G43.719 CHRONIC MIGRAINE WITHOUT AURA, INTRACTABLE, WITHOUT STATUS MIGRAINOSUS: ICD-10-CM

## 2023-08-07 PROCEDURE — A4216 STERILE WATER/SALINE, 10 ML: HCPCS

## 2023-08-07 PROCEDURE — 6360000002 HC RX W HCPCS

## 2023-08-07 PROCEDURE — 2580000003 HC RX 258

## 2023-08-07 PROCEDURE — 64615 CHEMODENERV MUSC MIGRAINE: CPT

## 2023-08-07 PROCEDURE — 64615 CHEMODENERV MUSC MIGRAINE: CPT | Performed by: PSYCHIATRY & NEUROLOGY

## 2023-08-07 RX ORDER — SODIUM CHLORIDE 0.9 % (FLUSH) 0.9 %
5 SYRINGE (ML) INJECTION ONCE
Status: DISCONTINUED | OUTPATIENT
Start: 2023-08-07 | End: 2023-08-09 | Stop reason: HOSPADM

## 2023-08-07 NOTE — PROGRESS NOTES
[facility] Neurology Botox Procedure Note     Patient:   Kunal Rivera  MR#:    [id number]  Account Number:                   [id number]      YOB: 1985  Date of Evaluation:  8/7/2023  Time of Note:                          4:01 PM  Primary Physician:    Evangelina Babinski, APRN   Consulting Physician:  Nora Lovell DO    Consent was signed and on the chart. Risk, benefits, and side effects discussed. Pt has a clear history of having more than 15 days/month of migraine, lasting more than 4 hours with multiple treatment failures. Patient states that he/she has 30 headaches out of 30 days each month. Patient states that he/she has 30 migraines out of 30 days each month. Vial Exp Date: 12/25  Lyndol Iron Lot Number:  [id number] x 2    Botox was diluted with 0.9% NS to yield a final concentration of 50 units / 1 ml. The following muscles were injected in 0.1 ml (5 unit) increments:    -   5 units left, 5 units right  Procerus-      5 units  Frontalis-      20 units divided into 4 sites left and right  Temporalis-  40 units divided into 4 sites left and 4 sites right  Occipitalis-    30 units divided into 3 sites left and 3 sites right  Cervical Paraspinal-  20 units divided into 2 sites left and 2 sites right  Trapezius-     30 units divided into 3 sites left and 3 sites right    Total units injected: 155  Total units unavoidably discarded: 45    Pt tolerated the procedure well. There were no complications. Pt will follow up in 6 weeks to assess effectiveness and will repeat injections in 12 weeks if continues to benefit. Patient noting bilateral upper extremity numbness, pain, will check MRI C-spine.      Nora Lovell DO  Board Certified Neurologist

## 2023-08-07 NOTE — PROGRESS NOTES
[address]   Fareed holliday  [phone]    Procedure:  Level of Consciousness: [x]Alert [x]Oriented []Disoriented []Lethargic  Anxiety Level: [x]Calm []Anxious []Depressed []Other  Skin: [x]Warm [x]Dry []Cool []Moist []Intact []Other  Cardiovascular: [x]Palpitations: [x]Never []Occasionally []Frequently  Chest Pain: [x]No []Yes  Respiratory:  [x]Unlabored []Labored []Cough ([] Productive []Unproductive)  HCG Required: [x]No []Yes   Results: []Negative []Positive  Knowledge Level:        [x]Patient/Other verbalized understanding of pre-procedure instructions. [x]Assessment of post-op care needs (transportation, responsible caregiver)        [x]Able to discuss health care problems and how to deal with it. Factors that Affect Teaching:        Language Barrier: [x]No []Yes - why:        Hearing Loss:        [x]No []Yes            Corrective Device:  []Yes [x]No        Vision Loss:           [x]No []Yes            Corrective Device:  []Yes [x]No        Memory Loss:       [x]No []Yes            []Short Term []Long Term  Motivational Level:  [x]Asks Questions                  []Extremely Anxious       [x]Seems Interested               []Seems Uninterested                  [x]Denies need for Education  Risk for Injury:  [x]Patient oriented to person, place and time  []History of frequent falls/loss of balance  Nutritional:  []Change in appetite   []Weight Gain   []Weight Loss  Functional:  []Requires assistance with ADL's      Migraine  Follow up Assessment:  Patient experiences 0 headaches per month  Patient states that he/she has 0 headaches out of 30 days each month. Patient states that he/she has 30 migraines out of 30 days each month.   Patient has experienced a  reduction in Migraine headaches less than 15 days per month []Yes [x]No  Patient has experienced a reduction in Migraine hours []Yes [x]No

## 2023-08-07 NOTE — DISCHARGE INSTRUCTIONS
[address] [facility] And Pain Medicine  Post Procedure Discharge Instructions        YOU HAVE HAD THE FOLLOWING PROCEDURE:                                  [] Occipital Nerve Blocks  [] CTS wrist injection(s)  [] Knee Injection(s)         [] Shoulder Injection(s)   [] Elbow Injection(s)     [x] Botox Injection  [] Cervical Trigger Point Injections    [] Thoracic Trigger Point Injections    [] Lumbar Trigger Point Injections  [] Piriformis Trigger Point Injections  [] SI Joint Injection(s)     [] Trochanteric Bursa Injection(s)       [] Ankle Injection(s)   [] Plantar Fasciitis   []  ______________  Injection(s) [x] Botox [x]  Migraines [] Spasticity    YOU HAVE RECEIVED THE FOLLOWING MEDICATIONS IN YOUR INJECTION(s)  [] Lidocaine [] Bupivacaine   [] DepoMedrol (steroid) [] Decadron (steroid)  []  Kenalog (steroid)   [] Toradol  [] Supartz [] Kirk Ghee    [x] Botox        PATIENT INFORMATION:   You may experience the following symptoms after your procedure. These symptoms are normal and should not cause concern: You may have an increase in your pain. This may last 24 - 48 hours after your procedure. You may have no change in the pain that you had prior to your injection(s). You may have weakness or numbness in your affected extremity. If this occurs, this may last until numbing the medication wears off. REPORT THE FOLLOWING SYMPTOMS TO YOUR DOCTOR:  Redness, swelling or drainage at the injection site(s)  Unusual pain that interferes with your normal activities of daily living. OTHER INSTRUCTIONS:    [] I will apply ice to the injection site(s) for at least 24 hours after the procedure. I will rotate the ice on for 20 minutes and off for 20 minutes for at least 24 hours. [] I will not apply heat for at least 48 hours and I will not take a hot bath or shower for at least 24 hours.      [] I understand that if Lidocaine or Bupivacaine was used in my injection(s) that the injection site(s)

## 2023-10-03 ENCOUNTER — HOSPITAL ENCOUNTER (OUTPATIENT)
Dept: MRI IMAGING | Age: 38
Discharge: HOME | End: 2023-10-03
Attending: PSYCHIATRY & NEUROLOGY
Payer: MEDICARE

## 2023-10-03 DIAGNOSIS — R20.0 NUMBNESS: ICD-10-CM

## 2023-10-03 DIAGNOSIS — M54.2 NECK PAIN: ICD-10-CM

## 2023-10-03 PROCEDURE — 72141 MRI NECK SPINE W/O DYE: CPT

## 2023-10-23 ENCOUNTER — OFFICE VISIT (OUTPATIENT)
Dept: PRIMARY CARE CLINIC | Age: 38
End: 2023-10-23
Payer: MEDICARE

## 2023-10-23 VITALS
OXYGEN SATURATION: 97 % | HEART RATE: 66 BPM | HEIGHT: 60 IN | BODY MASS INDEX: 36.32 KG/M2 | TEMPERATURE: 98.7 F | WEIGHT: 185 LBS | DIASTOLIC BLOOD PRESSURE: 78 MMHG | SYSTOLIC BLOOD PRESSURE: 128 MMHG | RESPIRATION RATE: 18 BRPM

## 2023-10-23 DIAGNOSIS — R68.89 SENSATION OF SWOLLEN THROAT: ICD-10-CM

## 2023-10-23 DIAGNOSIS — T17.308A CHOKING, INITIAL ENCOUNTER: ICD-10-CM

## 2023-10-23 DIAGNOSIS — L24.9 IRRITANT CONTACT DERMATITIS, UNSPECIFIED TRIGGER: ICD-10-CM

## 2023-10-23 DIAGNOSIS — F25.0 SCHIZOAFFECTIVE DISORDER, BIPOLAR TYPE (HCC): ICD-10-CM

## 2023-10-23 DIAGNOSIS — Z23 NEED FOR INFLUENZA VACCINATION: ICD-10-CM

## 2023-10-23 DIAGNOSIS — F41.1 GAD (GENERALIZED ANXIETY DISORDER): ICD-10-CM

## 2023-10-23 DIAGNOSIS — F31.75 BIPOLAR DISORDER, IN PARTIAL REMISSION, MOST RECENT EPISODE DEPRESSED (HCC): Primary | ICD-10-CM

## 2023-10-23 LAB
ALBUMIN SERPL-MCNC: 4.2 G/DL (ref 3.5–5.2)
ALP SERPL-CCNC: 99 U/L (ref 35–104)
ALT SERPL-CCNC: 17 U/L (ref 5–33)
ANION GAP SERPL CALCULATED.3IONS-SCNC: 16 MMOL/L (ref 7–19)
AST SERPL-CCNC: 26 U/L (ref 5–32)
BASOPHILS # BLD: 0 K/UL (ref 0–0.2)
BASOPHILS NFR BLD: 0.5 % (ref 0–1)
BILIRUB SERPL-MCNC: <0.2 MG/DL (ref 0.2–1.2)
BUN SERPL-MCNC: 11 MG/DL (ref 6–20)
CALCIUM SERPL-MCNC: 9.1 MG/DL (ref 8.6–10)
CHLORIDE SERPL-SCNC: 102 MMOL/L (ref 98–111)
CO2 SERPL-SCNC: 22 MMOL/L (ref 22–29)
CREAT SERPL-MCNC: 0.6 MG/DL (ref 0.5–0.9)
EOSINOPHIL # BLD: 0.3 K/UL (ref 0–0.6)
EOSINOPHIL NFR BLD: 4 % (ref 0–5)
ERYTHROCYTE [DISTWIDTH] IN BLOOD BY AUTOMATED COUNT: 13 % (ref 11.5–14.5)
GLUCOSE SERPL-MCNC: 79 MG/DL (ref 74–109)
HBA1C MFR BLD: 4.9 % (ref 4–6)
HCT VFR BLD AUTO: 44.7 % (ref 37–47)
HGB BLD-MCNC: 14.3 G/DL (ref 12–16)
IMM GRANULOCYTES # BLD: 0 K/UL
LYMPHOCYTES # BLD: 2.2 K/UL (ref 1.1–4.5)
LYMPHOCYTES NFR BLD: 35 % (ref 20–40)
MCH RBC QN AUTO: 30.4 PG (ref 27–31)
MCHC RBC AUTO-ENTMCNC: 32 G/DL (ref 33–37)
MCV RBC AUTO: 95.1 FL (ref 81–99)
MONOCYTES # BLD: 0.3 K/UL (ref 0–0.9)
MONOCYTES NFR BLD: 5 % (ref 0–10)
NEUTROPHILS # BLD: 3.4 K/UL (ref 1.5–7.5)
NEUTS SEG NFR BLD: 55.2 % (ref 50–65)
PLATELET # BLD AUTO: 269 K/UL (ref 130–400)
PMV BLD AUTO: 10.3 FL (ref 9.4–12.3)
POTASSIUM SERPL-SCNC: 3.6 MMOL/L (ref 3.5–5)
PROT SERPL-MCNC: 7.9 G/DL (ref 6.6–8.7)
RBC # BLD AUTO: 4.7 M/UL (ref 4.2–5.4)
SODIUM SERPL-SCNC: 140 MMOL/L (ref 136–145)
T4 FREE SERPL-MCNC: 0.97 NG/DL (ref 0.93–1.7)
TSH SERPL DL<=0.005 MIU/L-ACNC: 2.88 UIU/ML (ref 0.27–4.2)
WBC # BLD AUTO: 6.2 K/UL (ref 4.8–10.8)

## 2023-10-23 PROCEDURE — 99204 OFFICE O/P NEW MOD 45 MIN: CPT | Performed by: NURSE PRACTITIONER

## 2023-10-23 RX ORDER — CYCLOBENZAPRINE HCL 5 MG
5 TABLET ORAL 3 TIMES DAILY PRN
COMMUNITY
Start: 2023-08-11

## 2023-10-23 RX ORDER — ERGOCALCIFEROL 1.25 MG/1
50000 CAPSULE ORAL WEEKLY
Qty: 12 CAPSULE | Refills: 1 | Status: SHIPPED | OUTPATIENT
Start: 2023-10-23

## 2023-10-23 RX ORDER — SUCRALFATE 1 G/1
1 TABLET ORAL 4 TIMES DAILY
Qty: 120 TABLET | Refills: 3 | Status: SHIPPED | OUTPATIENT
Start: 2023-10-23

## 2023-10-23 RX ORDER — HYDROXYZINE HYDROCHLORIDE 25 MG/1
25 TABLET, FILM COATED ORAL EVERY 8 HOURS PRN
Qty: 30 TABLET | Refills: 0 | Status: SHIPPED | OUTPATIENT
Start: 2023-10-23 | End: 2023-11-02

## 2023-10-23 RX ORDER — LAMOTRIGINE 25 MG/1
TABLET ORAL
Qty: 60 TABLET | Refills: 0 | Status: SHIPPED | OUTPATIENT
Start: 2023-10-23

## 2023-10-23 SDOH — ECONOMIC STABILITY: INCOME INSECURITY: HOW HARD IS IT FOR YOU TO PAY FOR THE VERY BASICS LIKE FOOD, HOUSING, MEDICAL CARE, AND HEATING?: NOT HARD AT ALL

## 2023-10-23 SDOH — ECONOMIC STABILITY: FOOD INSECURITY: WITHIN THE PAST 12 MONTHS, YOU WORRIED THAT YOUR FOOD WOULD RUN OUT BEFORE YOU GOT MONEY TO BUY MORE.: NEVER TRUE

## 2023-10-23 SDOH — ECONOMIC STABILITY: FOOD INSECURITY: WITHIN THE PAST 12 MONTHS, THE FOOD YOU BOUGHT JUST DIDN'T LAST AND YOU DIDN'T HAVE MONEY TO GET MORE.: NEVER TRUE

## 2023-10-23 ASSESSMENT — ENCOUNTER SYMPTOMS
DIARRHEA: 0
WHEEZING: 0
COUGH: 0
TROUBLE SWALLOWING: 1
SHORTNESS OF BREATH: 1
ABDOMINAL PAIN: 0
EYES NEGATIVE: 1
VOMITING: 1
CONSTIPATION: 0
CHOKING: 1
NAUSEA: 1
ALLERGIC/IMMUNOLOGIC NEGATIVE: 1

## 2023-10-23 NOTE — PROGRESS NOTES
Health Maintenance   Topic Date Due    Hepatitis B vaccine (1 of 3 - 3-dose series) Never done    Flu vaccine (1) 08/01/2023    Varicella vaccine (1 of 2 - 2-dose childhood series) 01/04/2024 (Originally 6/1/1984)    DTaP/Tdap/Td vaccine (1 - Tdap) 03/06/2024 (Originally 6/1/2002)    Hepatitis C screen  03/06/2024 (Originally 6/1/2001)    HIV screen  03/06/2024 (Originally 6/1/1998)    Depression Monitoring  03/06/2024    Lipids  03/03/2028    Colorectal Cancer Screen  11/07/2029    COVID-19 Vaccine  Completed    Hepatitis A vaccine  Aged Out    Hib vaccine  Aged Out    HPV vaccine  Aged Out    Meningococcal (ACWY) vaccine  Aged Out    Pneumococcal 0-64 years Vaccine  Aged Out    Cervical cancer screen  Discontinued       Subjective:      Review of Systems   Constitutional:  Positive for fatigue. Negative for chills and fever. HENT:  Positive for trouble swallowing. Eyes: Negative. Respiratory:  Positive for choking and shortness of breath. Negative for cough and wheezing. Cardiovascular:  Positive for chest pain. Had loop recorder for 3 years - tachycardia - recorder negative    Gastrointestinal:  Positive for nausea and vomiting. Negative for abdominal pain, constipation and diarrhea. Lactose intolerance    Endocrine: Negative. Genitourinary: Negative. Negative for difficulty urinating, dysuria and menstrual problem. Musculoskeletal: Negative. Skin: Negative. Allergic/Immunologic: Negative. Neurological: Negative. Negative for headaches. Hematological: Negative. Psychiatric/Behavioral:  Positive for agitation and dysphoric mood. Negative for self-injury and suicidal ideas. The patient is nervous/anxious. Has had issues in the past about 8 months ago . Mynor Harrison wanted her to check herself in but she states that she couldn't because of her babies - no SI or HI at this time        Objective:     Physical Exam  Vitals and nursing note reviewed.

## 2023-10-24 NOTE — PATIENT INSTRUCTIONS
Start taking lamictal:   Take one tablet nightly for 2 weeks THEN increase to two tablets nightly for 2 weeks. Follow up in one month. One possible serious side effect of this medication is a rash. Monitor for signs of a rash  And notify your provider immediately if one is noted. [facility] APPOINTMENTS [phone]  OUTPATIENT SERVICES [phone]  CRISIS HOTLINE  [facility]  [address] [place] [address], [address] [place] [address] Street  Phone [phone] or [phone]  Fax [phone] [address] [place] [address] for Men  [address]  Phone [phone]  Fax [phone] [place] [address]  [address] [place] [address]  Phone [phone]  Fax [phone] [facility] [address]  [address] [place] [address], [address] [place] [address] Street  Phone [phone] or [phone]  Fax [phone] [address], [facility] [address]  Phone [phone]  Fax [phone] [place] [address]  [address] [place] [address], [address] [place] [address] Street  Phone [phone]  Fax [phone] [address] [place] [address], [address] [place] [address] Street  Phone [phone]  Fax [phone] [facility]  [address] [place] [address], [address] [place] [address] Street  Phone [phone]  Fax [address]  Phone [phone]  Fax [phone] Mark Soria.  [address]  [address]  Phone [phone]  Fax [phone] [facility]  [address]  Phone [phone]  Fax [phone] Illness Management & Recovery  [facility]) [facility] [address], [address]  [phone] Illness Management & Recovery  [facility]) [facility]   [address] [facility], [address] [place] [address],[address]  [phone]

## 2023-10-30 ENCOUNTER — HOSPITAL ENCOUNTER (OUTPATIENT)
Dept: PAIN MANAGEMENT | Age: 38
Discharge: HOME | End: 2023-10-30
Payer: MEDICARE

## 2023-10-30 ENCOUNTER — HOSPITAL ENCOUNTER (OUTPATIENT)
Dept: ULTRASOUND IMAGING | Age: 38
Discharge: HOME | End: 2023-10-30
Payer: MEDICARE

## 2023-10-30 VITALS
HEART RATE: 73 BPM | OXYGEN SATURATION: 100 % | SYSTOLIC BLOOD PRESSURE: 118 MMHG | RESPIRATION RATE: 18 BRPM | DIASTOLIC BLOOD PRESSURE: 85 MMHG | TEMPERATURE: 97.4 F

## 2023-10-30 DIAGNOSIS — T17.308A CHOKING, INITIAL ENCOUNTER: ICD-10-CM

## 2023-10-30 DIAGNOSIS — G43.E19 CHRONIC MIGRAINE WITH AURA, INTRACTABLE, WITHOUT STATUS MIGRAINOSUS: ICD-10-CM

## 2023-10-30 DIAGNOSIS — R68.89 SENSATION OF SWOLLEN THROAT: ICD-10-CM

## 2023-10-30 DIAGNOSIS — G43.719 CHRONIC MIGRAINE WITHOUT AURA, INTRACTABLE, WITHOUT STATUS MIGRAINOSUS: Primary | ICD-10-CM

## 2023-10-30 PROCEDURE — A4216 STERILE WATER/SALINE, 10 ML: HCPCS

## 2023-10-30 PROCEDURE — 64615 CHEMODENERV MUSC MIGRAINE: CPT

## 2023-10-30 PROCEDURE — 2580000003 HC RX 258

## 2023-10-30 PROCEDURE — 6360000002 HC RX W HCPCS

## 2023-10-30 PROCEDURE — 64615 CHEMODENERV MUSC MIGRAINE: CPT | Performed by: PSYCHIATRY & NEUROLOGY

## 2023-10-30 PROCEDURE — 76536 US EXAM OF HEAD AND NECK: CPT

## 2023-10-30 RX ORDER — SODIUM CHLORIDE 9 MG/ML
5 INJECTION INTRAVENOUS ONCE
Status: DISCONTINUED | OUTPATIENT
Start: 2023-10-30 | End: 2023-11-01 | Stop reason: HOSPADM

## 2023-10-30 NOTE — PROGRESS NOTES
[facility] Neurology Botox Procedure Note     Patient:   Lena Manriquez  MR#:    [id number]  Account Number:                   [id number]      YOB: 1985  Date of Evaluation:  10/30/2023  Time of Note:                          5:55 PM  Primary Physician:    SETH Salomon NP   Consulting Physician:  Kristina Luo DO    Consent was signed and on the chart. Risk, benefits, and side effects discussed. Pt has a clear history of having more than 15 days/month of migraine, lasting more than 4 hours with multiple treatment failures. Patient states that he/she has 30 headaches out of 30 days each month. Patient states that he/she has 30 migraines out of 0 days each month. Vial Exp Date: 3/26  Jaden Pae Lot Number:  [id number] x 2    Botox was diluted with 0.9% NS to yield a final concentration of 50 units / 1 ml. The following muscles were injected in 0.1 ml (5 unit) increments:    -   5 units left, 5 units right  Procerus-      5 units  Frontalis-      20 units divided into 4 sites left and right  Temporalis-  40 units divided into 4 sites left and 4 sites right  Occipitalis-    30 units divided into 3 sites left and 3 sites right  Cervical Paraspinal-  20 units divided into 2 sites left and 2 sites right  Trapezius-     30 units divided into 3 sites left and 3 sites right    Total units injected: 155  Total units unavoidably discarded: 45    Pt tolerated the procedure well. There were no complications. Pt will follow up in 6 weeks to assess effectiveness and will repeat injections in 12 weeks if continues to benefit. Patient noting bilateral upper extremity numbness, pain, will check MRI C-spine.      Kristina Luo DO  Board Certified Neurologist

## 2023-10-30 NOTE — DISCHARGE INSTRUCTIONS
will be numb for a few hours after the procedure    [] I understand if a steroid was used it will take effect in 3 - 7 days. I understand that as the numbing medication wears off, the pain may return until the steroids start working. [] I understand that today I will rest for the next 24 hours and drink plenty of water. [x] For Botox for Migraines please do not wear anything constricting around the forehead for 7-10 days post injection. Examples headband, hats, or bandana    [] For Botox for Spasticity start therapy for the affected limb in two weeks. [] Additional instructions: ______________________________________________ ___________________________________________________________________    Sedation:  Was oral sedation given? [] Yes  [x] No    I understand that if I took an oral nerve calming medication I will not drive for  [] 24 hours after taking the medication.     [x] I have received a copy of my discharge instructions and understand the above instructions to the best of my knowledge    Patient Discharged:  [x] Home  [] Hospital  [] Other  ____________________________________________    Via:  [x] Ambulatory  [] Wheelchair   [] Stretcher [] EMS       Accompanied By:   [] Significant other  [] Family Member  [] Friend   [] Hospital Staff  []  Ambulance Staff  [x] Other_______________________________________________    Plan:  [] Will return to the office in   [] 1 month   [] 3 months for:  [] Procedure Follow-up  [x] Office Visit   [x] Planned Procedure      Patient Signature: _____________________________________________________    Staff Signature: _______________________________________________________        If you have questions, problems or concerns you may call [phone] and follow the prompts

## 2023-11-03 ENCOUNTER — TELEPHONE (OUTPATIENT)
Dept: PSYCHIATRY | Age: 38
End: 2023-11-03

## 2023-11-03 NOTE — TELEPHONE ENCOUNTER
Called pt to schedule an appt from a referral.    Scheduled with Rahul Andre for 11/06/23 @ 1:00.     Electronically signed by Chun Santos MA on 11/3/2023 at 9:58 AM

## 2023-11-06 ENCOUNTER — TELEPHONE (OUTPATIENT)
Dept: PSYCHIATRY | Age: 38
End: 2023-11-06

## 2023-11-06 NOTE — TELEPHONE ENCOUNTER
Pt called to cancel/rescheduled appt for today 11/06/23 with Nicko Chinchilla because she is sick. Rescheduled for 11/07/23 @ 1:00.     Electronically signed by Ras Veliz MA on 11/6/2023 at 10:08 AM

## 2023-11-07 ENCOUNTER — OFFICE VISIT (OUTPATIENT)
Dept: PSYCHIATRY | Age: 38
End: 2023-11-07
Payer: MEDICARE

## 2023-11-07 VITALS
BODY MASS INDEX: 35.79 KG/M2 | TEMPERATURE: 98.2 F | WEIGHT: 182.3 LBS | HEART RATE: 72 BPM | SYSTOLIC BLOOD PRESSURE: 110 MMHG | DIASTOLIC BLOOD PRESSURE: 76 MMHG | OXYGEN SATURATION: 99 % | HEIGHT: 60 IN

## 2023-11-07 DIAGNOSIS — F41.1 GENERALIZED ANXIETY DISORDER WITH PANIC ATTACKS: ICD-10-CM

## 2023-11-07 DIAGNOSIS — F41.0 GENERALIZED ANXIETY DISORDER WITH PANIC ATTACKS: ICD-10-CM

## 2023-11-07 DIAGNOSIS — F39 UNSPECIFIED MOOD (AFFECTIVE) DISORDER (HCC): Primary | ICD-10-CM

## 2023-11-07 PROCEDURE — 90792 PSYCH DIAG EVAL W/MED SRVCS: CPT

## 2023-11-07 RX ORDER — LAMOTRIGINE 25 MG/1
TABLET ORAL
Qty: 49 TABLET | Refills: 0 | Status: SHIPPED | OUTPATIENT
Start: 2023-11-07 | End: 2023-11-21

## 2023-11-07 RX ORDER — OMEPRAZOLE 20 MG/1
20 CAPSULE, DELAYED RELEASE ORAL DAILY
COMMUNITY

## 2023-11-07 RX ORDER — HYDROXYZINE HYDROCHLORIDE 25 MG/1
25 TABLET, FILM COATED ORAL 3 TIMES DAILY PRN
Qty: 90 TABLET | Refills: 0 | Status: SHIPPED | OUTPATIENT
Start: 2023-11-07

## 2023-11-07 ASSESSMENT — PATIENT HEALTH QUESTIONNAIRE - PHQ9
3. TROUBLE FALLING OR STAYING ASLEEP: 0
SUM OF ALL RESPONSES TO PHQ QUESTIONS 1-9: 5
8. MOVING OR SPEAKING SO SLOWLY THAT OTHER PEOPLE COULD HAVE NOTICED. OR THE OPPOSITE, BEING SO FIGETY OR RESTLESS THAT YOU HAVE BEEN MOVING AROUND A LOT MORE THAN USUAL: 1
SUM OF ALL RESPONSES TO PHQ QUESTIONS 1-9: 5
1. LITTLE INTEREST OR PLEASURE IN DOING THINGS: 2
SUM OF ALL RESPONSES TO PHQ9 QUESTIONS 1 & 2: 3
5. POOR APPETITE OR OVEREATING: 0
10. IF YOU CHECKED OFF ANY PROBLEMS, HOW DIFFICULT HAVE THESE PROBLEMS MADE IT FOR YOU TO DO YOUR WORK, TAKE CARE OF THINGS AT HOME, OR GET ALONG WITH OTHER PEOPLE: 1
9. THOUGHTS THAT YOU WOULD BE BETTER OFF DEAD, OR OF HURTING YOURSELF: 0
SUM OF ALL RESPONSES TO PHQ QUESTIONS 1-9: 5
4. FEELING TIRED OR HAVING LITTLE ENERGY: 0
7. TROUBLE CONCENTRATING ON THINGS, SUCH AS READING THE NEWSPAPER OR WATCHING TELEVISION: 0
6. FEELING BAD ABOUT YOURSELF - OR THAT YOU ARE A FAILURE OR HAVE LET YOURSELF OR YOUR FAMILY DOWN: 1
SUM OF ALL RESPONSES TO PHQ QUESTIONS 1-9: 5
2. FEELING DOWN, DEPRESSED OR HOPELESS: 1

## 2023-11-07 NOTE — PATIENT INSTRUCTIONS
Medication Instructions  Increase to 50 mg daily for mood stabilization  Lamictal 50 mg for 7 days if tolerating increase to   Lamictal 75 mg for 7 days if tolerating increase to  Lamictal 100 mg daily until seen back in office    Call the office if you develop a rash or if depression or anxiety worse.

## 2023-11-07 NOTE — PROGRESS NOTES
(TV, radio, thought broadcasting, mind control, referential thinking)    (persecutory delusion - e.g., believing one is being followed and harassed by gangs)    (grandiose delusion - e.g., believing one is a billionaire  who owns casinos around the world)    (erotomanic delusion - e.g., believing a famous  is in love with them)    (somatic delusion - e.g., believing one's sinuses have been infested by worms)    (delusions of reference - e.g., believing dialogue on a TV program is directed specifically towards the patient)    (delusions of control - e.g., believing one's thoughts and movements are controlled by planetary overlords)     MENTAL STATUS EXAMINATION    Appearance: Appropriately groomed. Made good eye contact. Gait stable. No abnormal movements or tremor. Behavior: Calm, cooperative, and socially appropriate. No psychomotor retardation/agitation appreciated. Speech: Normal in tone, volume, and quality. No slurring, dysarthria or pressured speech noted. Mood: \"anxious\"   Affect: Mood congruent. Thought Process: Appears linear, logical and goal oriented. Causality appears intact. Thought Content: Denies active suicidal and homicidal ideations. No overt delusions or paranoia appreciated. Perceptions: Denies auditory or visual hallucinations at present time. Not responding to internal stimuli. Concentration: Intact. Orientation: to person, place, date, and situation. Language: Intact. Fund of information: Intact. Memory: Recent and remote appear intact. Impulsivity: Limited. Insight: Fair. Judgment: Fair. Cognition:    Can spell \"world\" backwards: Yes     Can do serial 7's:  Yes    Lab Results   Component Value Date     10/23/2023    K 3.6 10/23/2023     10/23/2023    CO2 22 10/23/2023    BUN 11 10/23/2023    CREATININE 0.6 10/23/2023    GLUCOSE 79 10/23/2023    CALCIUM 9.1 10/23/2023    PROT 7.9 10/23/2023    LABALBU 4.2 10/23/2023    BILITOT <0.2

## 2023-11-14 ENCOUNTER — TELEPHONE (OUTPATIENT)
Dept: PSYCHIATRY | Age: 38
End: 2023-11-14

## 2023-11-14 DIAGNOSIS — F39 UNSPECIFIED MOOD (AFFECTIVE) DISORDER (HCC): ICD-10-CM

## 2023-11-14 RX ORDER — LAMOTRIGINE 25 MG/1
TABLET ORAL
Qty: 49 TABLET | Refills: 0 | OUTPATIENT
Start: 2023-11-14 | End: 2023-11-28

## 2023-11-14 NOTE — TELEPHONE ENCOUNTER
Sent Referral to [facility] for therapy.     Electronically signed by Jorge Luis Diallo MA on 11/14/2023 at 4:55 PM

## 2023-11-17 ENCOUNTER — APPOINTMENT (OUTPATIENT)
Dept: CT IMAGING | Age: 38
End: 2023-11-17
Payer: MEDICARE

## 2023-11-17 ENCOUNTER — HOSPITAL ENCOUNTER (EMERGENCY)
Age: 38
Discharge: HOME | End: 2023-11-17
Attending: PEDIATRICS
Payer: MEDICARE

## 2023-11-17 ENCOUNTER — APPOINTMENT (OUTPATIENT)
Dept: GENERAL RADIOLOGY | Age: 38
End: 2023-11-17
Payer: MEDICARE

## 2023-11-17 VITALS
BODY MASS INDEX: 35.73 KG/M2 | HEART RATE: 75 BPM | WEIGHT: 182 LBS | SYSTOLIC BLOOD PRESSURE: 101 MMHG | DIASTOLIC BLOOD PRESSURE: 78 MMHG | OXYGEN SATURATION: 96 % | TEMPERATURE: 97.9 F | HEIGHT: 60 IN | RESPIRATION RATE: 18 BRPM

## 2023-11-17 DIAGNOSIS — R11.0 NAUSEA: ICD-10-CM

## 2023-11-17 DIAGNOSIS — R11.2 NAUSEA AND VOMITING, UNSPECIFIED VOMITING TYPE: Primary | ICD-10-CM

## 2023-11-17 DIAGNOSIS — R53.1 GENERAL WEAKNESS: ICD-10-CM

## 2023-11-17 LAB
B PARAP IS1001 DNA NPH QL NAA+NON-PROBE: NOT DETECTED
B PERT.PT PRMT NPH QL NAA+NON-PROBE: NOT DETECTED
BASOPHILS # BLD: 0 K/UL (ref 0–0.2)
BASOPHILS NFR BLD: 0.3 % (ref 0–1)
BILIRUB UR QL STRIP: NEGATIVE
C PNEUM DNA NPH QL NAA+NON-PROBE: NOT DETECTED
CLARITY UR: CLEAR
COLOR UR: YELLOW
EOSINOPHIL # BLD: 0.9 K/UL (ref 0–0.6)
EOSINOPHIL NFR BLD: 9.2 % (ref 0–5)
ERYTHROCYTE [DISTWIDTH] IN BLOOD BY AUTOMATED COUNT: 13.5 % (ref 11.5–14.5)
FLUAV RNA NPH QL NAA+NON-PROBE: NOT DETECTED
FLUBV RNA NPH QL NAA+NON-PROBE: NOT DETECTED
GLUCOSE BLD-MCNC: 99 MG/DL
GLUCOSE BLD-MCNC: 99 MG/DL (ref 70–99)
GLUCOSE UR STRIP.AUTO-MCNC: NEGATIVE MG/DL
HADV DNA NPH QL NAA+NON-PROBE: NOT DETECTED
HCOV 229E RNA NPH QL NAA+NON-PROBE: NOT DETECTED
HCOV HKU1 RNA NPH QL NAA+NON-PROBE: NOT DETECTED
HCOV NL63 RNA NPH QL NAA+NON-PROBE: NOT DETECTED
HCOV OC43 RNA NPH QL NAA+NON-PROBE: NOT DETECTED
HCT VFR BLD AUTO: 41.9 % (ref 37–47)
HGB BLD-MCNC: 13.3 G/DL (ref 12–16)
HGB UR STRIP.AUTO-MCNC: NEGATIVE MG/L
HMPV RNA NPH QL NAA+NON-PROBE: NOT DETECTED
HPIV1 RNA NPH QL NAA+NON-PROBE: NOT DETECTED
HPIV2 RNA NPH QL NAA+NON-PROBE: NOT DETECTED
HPIV3 RNA NPH QL NAA+NON-PROBE: NOT DETECTED
HPIV4 RNA NPH QL NAA+NON-PROBE: NOT DETECTED
IMM GRANULOCYTES # BLD: 0 K/UL
INR PPP: 0.94 (ref 0.88–1.18)
KETONES UR STRIP.AUTO-MCNC: NEGATIVE MG/DL
LEUKOCYTE ESTERASE UR QL STRIP.AUTO: NEGATIVE
LYMPHOCYTES # BLD: 1.7 K/UL (ref 1.1–4.5)
LYMPHOCYTES NFR BLD: 16.9 % (ref 20–40)
M PNEUMO DNA NPH QL NAA+NON-PROBE: NOT DETECTED
MCH RBC QN AUTO: 30.8 PG (ref 27–31)
MCHC RBC AUTO-ENTMCNC: 31.7 G/DL (ref 33–37)
MCV RBC AUTO: 97 FL (ref 81–99)
MONOCYTES # BLD: 0.4 K/UL (ref 0–0.9)
MONOCYTES NFR BLD: 4.5 % (ref 0–10)
NEUTROPHILS # BLD: 6.7 K/UL (ref 1.5–7.5)
NEUTS SEG NFR BLD: 68.8 % (ref 50–65)
NITRITE UR QL STRIP.AUTO: NEGATIVE
PERFORMED ON: NORMAL
PH UR STRIP.AUTO: 8 [PH] (ref 5–8)
PLATELET # BLD AUTO: 317 K/UL (ref 130–400)
PMV BLD AUTO: 8.9 FL (ref 9.4–12.3)
PROT UR STRIP.AUTO-MCNC: NEGATIVE MG/DL
PROTHROMBIN TIME: 12.3 SEC (ref 12–14.6)
RBC # BLD AUTO: 4.32 M/UL (ref 4.2–5.4)
REASON FOR REJECTION: NORMAL
REJECTED TEST: NORMAL
RSV RNA NPH QL NAA+NON-PROBE: NOT DETECTED
RV+EV RNA NPH QL NAA+NON-PROBE: NOT DETECTED
SARS-COV-2 RNA NPH QL NAA+NON-PROBE: NOT DETECTED
SP GR UR STRIP.AUTO: 1.03 (ref 1–1.03)
TROPONIN, HIGH SENSITIVITY: <6 NG/L (ref 0–14)
TROPONIN, HIGH SENSITIVITY: <6 NG/L (ref 0–14)
UROBILINOGEN UR STRIP.AUTO-MCNC: 0.2 E.U./DL
WBC # BLD AUTO: 9.8 K/UL (ref 4.8–10.8)

## 2023-11-17 PROCEDURE — 84484 ASSAY OF TROPONIN QUANT: CPT

## 2023-11-17 PROCEDURE — 99285 EMERGENCY DEPT VISIT HI MDM: CPT

## 2023-11-17 PROCEDURE — 6360000004 HC RX CONTRAST MEDICATION: Performed by: PEDIATRICS

## 2023-11-17 PROCEDURE — 82962 GLUCOSE BLOOD TEST: CPT

## 2023-11-17 PROCEDURE — 36415 COLL VENOUS BLD VENIPUNCTURE: CPT

## 2023-11-17 PROCEDURE — 81003 URINALYSIS AUTO W/O SCOPE: CPT

## 2023-11-17 PROCEDURE — 71045 X-RAY EXAM CHEST 1 VIEW: CPT

## 2023-11-17 PROCEDURE — 70498 CT ANGIOGRAPHY NECK: CPT

## 2023-11-17 PROCEDURE — 85610 PROTHROMBIN TIME: CPT

## 2023-11-17 PROCEDURE — 85025 COMPLETE CBC W/AUTO DIFF WBC: CPT

## 2023-11-17 PROCEDURE — 70450 CT HEAD/BRAIN W/O DYE: CPT

## 2023-11-17 PROCEDURE — 0202U NFCT DS 22 TRGT SARS-COV-2: CPT

## 2023-11-17 PROCEDURE — 93005 ELECTROCARDIOGRAM TRACING: CPT | Performed by: PEDIATRICS

## 2023-11-17 RX ORDER — ONDANSETRON 8 MG/1
TABLET, ORALLY DISINTEGRATING ORAL
Qty: 20 TABLET | Refills: 1 | Status: SHIPPED | OUTPATIENT
Start: 2023-11-17 | End: 2023-11-17

## 2023-11-17 RX ORDER — PROMETHAZINE HYDROCHLORIDE 25 MG/1
25 SUPPOSITORY RECTAL EVERY 6 HOURS PRN
Qty: 10 SUPPOSITORY | Refills: 0 | Status: SHIPPED | OUTPATIENT
Start: 2023-11-17 | End: 2023-11-24

## 2023-11-17 RX ORDER — ONDANSETRON 4 MG/1
4 TABLET, ORALLY DISINTEGRATING ORAL 3 TIMES DAILY PRN
Qty: 21 TABLET | Refills: 0 | Status: SHIPPED | OUTPATIENT
Start: 2023-11-17

## 2023-11-17 RX ADMIN — IOPAMIDOL 75 ML: 755 INJECTION, SOLUTION INTRAVENOUS at 12:35

## 2023-11-17 ASSESSMENT — ENCOUNTER SYMPTOMS
COUGH: 0
BLOOD IN STOOL: 0
BACK PAIN: 1
COLOR CHANGE: 0
RHINORRHEA: 0
VOMITING: 1
NAUSEA: 1
DIARRHEA: 0
ABDOMINAL PAIN: 0
SHORTNESS OF BREATH: 0

## 2023-11-17 ASSESSMENT — PAIN - FUNCTIONAL ASSESSMENT: PAIN_FUNCTIONAL_ASSESSMENT: NONE - DENIES PAIN

## 2023-11-17 NOTE — DISCHARGE INSTRUCTIONS
Return or seek medical attention with increasing weakness, difficulty walking or speaking, or other concerns. Follow-up with your primary care provider within 3 to 5 days.

## 2023-11-17 NOTE — ED PROVIDER NOTES
[facility] EMERGENCY DEPT  eMERGENCY dEPARTMENT eNCOUnter      Pt Name: Brittaney Taylor  MRN: [id number]  [address] 1985  Date of evaluation: 11/17/2023  Provider: Salomon Chapin MD    [address]       Chief Complaint   Patient presents with    Aphasia     Pt reports she feels as if she is having some slurred speech that started this morning along with feeling like her insides are shaking; pt reports history of stroke in 2014    Nausea     Pt reports feeling nauseated since symptoms started         HISTORY OF PRESENT ILLNESS   (Location/Symptom, Timing/Onset,Context/Setting, Quality, Duration, Modifying Factors, Severity)  Note limiting factors. Brittaney Taylor is a 34 y.o. female who presents to the emergency department with slurred speech. Patient states that symptoms began at 0 9:30 AM today. Patient complains of slurred speech, \"my mouth is numb all over, and nausea and vomiting x1. Patient has also been experiencing generalized weakness this morning. Yesterday patient had decreased energy. Patient denies facial weakness, numbness of face or extremities, or difficulty walking. Patient had a stroke in 2016 \"because of stress. \"  Patient denies chest pain, difficulty breathing, abdominal pain, difficulty urinating, burning with urination, or flank pain. Patient has a history of chronic neck and back pain. Patient does not complain of \"her insides shaking\" that she mentioned to nursing staff. HPI    NursingNotes were reviewed. REVIEW OF SYSTEMS    (2-9 systems for level 4, 10 or more for level 5)     Review of Systems   Constitutional:  Positive for fatigue. Negative for chills and fever. HENT:  Negative for congestion and rhinorrhea. Respiratory:  Negative for cough and shortness of breath. Cardiovascular:  Negative for chest pain and palpitations. Gastrointestinal:  Positive for nausea and vomiting. Negative for abdominal pain, blood in stool and diarrhea.    Genitourinary:  Negative for INSERTION  06/26/2016    Essure placement    UPPER GASTROINTESTINAL ENDOSCOPY N/A 11/7/2019    Dr Richelle Bustillos         CURRENT MEDICATIONS     Discharge Medication List as of 11/17/2023  5:12 PM        CONTINUE these medications which have NOT CHANGED    Details   omeprazole (PRILOSEC) 20 MG delayed release capsule Take 1 capsule by mouth dailyHistorical Med      hydrOXYzine HCl (ATARAX) 25 MG tablet Take 1 tablet by mouth 3 times daily as needed for Anxiety, Disp-90 tablet, R-0Normal      lamoTRIgine (LAMICTAL) 25 MG tablet Take 3 tablets by mouth daily for 7 days, THEN 4 tablets daily for 7 days. Start taking lamictal: Take one tablet nightly for 2 weeks THEN increase to two tablets nightly for 2 weeks. ., Disp-49 tablet, R-0Normal      cyclobenzaprine (FLEXERIL) 5 MG tablet Take 1 tablet by mouth 3 times daily as neededHistorical Med      cariprazine hcl (VRAYLAR) 1.5 MG capsule Take 1 capsule by mouth daily, Disp-30 capsule, R-1Normal      vitamin D (ERGOCALCIFEROL) 1.25 MG (33890 UT) CAPS capsule Take 1 capsule by mouth once a week, Disp-12 capsule, R-1Normal      sucralfate (CARAFATE) 1 GM tablet Take 1 tablet by mouth 4 times daily, Disp-120 tablet, R-3Normal      bisoprolol (ZEBETA) 5 MG tablet Take 1 tablet by mouth 2 times daily, Disp-60 tablet, R-5Normal      aspirin 81 MG EC tablet Take 1 tablet by mouth dailyHistorical Med             ALLERGIES     Advil [ibuprofen]    FAMILY HISTORY       Family History   Problem Relation Age of Onset    High Blood Pressure Mother     Diabetes Father     Heart Disease Father     Stomach Cancer Father     Colon Cancer Paternal Grandmother     Breast Cancer Other         maternal great aunt    Colon Polyps Neg Hx     Esophageal Cancer Neg Hx     Liver Cancer Neg Hx     Liver Disease Neg Hx     Rectal Cancer Neg Hx           SOCIAL HISTORY       Social History     Socioeconomic History    Marital status:      Spouse name: None    Number of children: None Capillary refill takes less than 2 seconds. Coloration: Skin is not jaundiced. Neurological:      General: No focal deficit present. Mental Status: She is alert and oriented to person, place, and time. Cranial Nerves: No cranial nerve deficit. Sensory: No sensory deficit. Motor: Weakness (Mild generalized) present. Coordination: Coordination abnormal (Abnormal finger-to-nose on left side only. ). Psychiatric:         Mood and Affect: Mood normal.         Behavior: Behavior normal.         DIAGNOSTIC RESULTS     EKG: All EKG's areinterpreted by the Emergency Department Physician who either signs or Co-signs this chart in the absence of a cardiologist.    EKG dated 11/17/2023 at 12:48 PM: Normal sinus rhythm, rate 73. Abnormal R wave progression with early transition.  QRS 96 QTc 427. EKG #2 dated 11/17/2023 at 1508 p.m.: Normal sinus rhythm, rate 73. Low voltage. T wave flattening in 3 and aVF.  QRS 89 QTc 420. RADIOLOGY:  Non-plain film images such as CT, Ultrasound and MRI are read by the radiologist. Plain radiographic images are visualized and preliminarily interpreted bythe emergency physician with the below findings:          XR CHEST PORTABLE   Final Result   1. No acute cardiopulmonary abnormality               ______________________________________    Electronically signed by: Nico Garcia M.D. Date:     11/17/2023   Time:    13:10       CTA HEAD NECK W CONTRAST   Final Result   1. No extracranial carotid or vertebral artery stenosis. 2. No large vessel occlusion or high grade stenosis within the [place]. All CT scans are performed using dose optimization techniques as appropriate to the performed exam and include    at least one of the following: Automated exposure control, adjustment of the mA and/or kV according to size, and the use of iterative reconstruction technique.         ______________________________________

## 2023-11-17 NOTE — ED NOTES
Code Stroke called by Dr. Prudence Encinas at [address] [facility]. paged at [phone] called for stroke at 46  Code Stroke coordinator called at [address] [facility]  11/17/23 1221

## 2023-11-18 DIAGNOSIS — F39 UNSPECIFIED MOOD (AFFECTIVE) DISORDER (HCC): ICD-10-CM

## 2023-11-20 LAB
EKG P AXIS: 43 DEGREES
EKG P AXIS: 49 DEGREES
EKG P-R INTERVAL: 122 MS
EKG P-R INTERVAL: 126 MS
EKG Q-T INTERVAL: 382 MS
EKG Q-T INTERVAL: 394 MS
EKG QRS DURATION: 86 MS
EKG QRS DURATION: 88 MS
EKG QTC CALCULATION (BAZETT): 404 MS
EKG QTC CALCULATION (BAZETT): 416 MS
EKG T AXIS: 4 DEGREES
EKG T AXIS: 9 DEGREES

## 2023-11-20 PROCEDURE — 93010 ELECTROCARDIOGRAM REPORT: CPT | Performed by: INTERNAL MEDICINE

## 2023-11-20 RX ORDER — LAMOTRIGINE 25 MG/1
TABLET ORAL
Qty: 60 TABLET | Refills: 3 | Status: SHIPPED | OUTPATIENT
Start: 2023-11-20

## 2023-11-29 ENCOUNTER — TELEPHONE (OUTPATIENT)
Dept: PSYCHIATRY | Age: 38
End: 2023-11-29

## 2023-11-29 NOTE — TELEPHONE ENCOUNTER
MA called [facility] to get an update on the referral our office sent out to them for therapy.     Scheduled for 11/21/23 @ 3 for intake therapy appt  Scheduled for 12/07/23 @ 3 for a follow up appt    Notified the provider     Electronically signed by Sanjuanita Sales MA on 11/29/2023 at 10:24 AM

## 2023-12-04 ENCOUNTER — TELEPHONE (OUTPATIENT)
Dept: PSYCHIATRY | Age: 38
End: 2023-12-04

## 2023-12-04 NOTE — TELEPHONE ENCOUNTER
Called patient to remind them of their appointment   -Pt confirmed    Reminded patient to complete their visit pre-check/digital registration in [address] [place] [address].     Electronically signed by Alejandrina Becerra MA on 12/4/2023 at 1:36 PM

## 2023-12-05 ENCOUNTER — SCHEDULED TELEPHONE ENCOUNTER (OUTPATIENT)
Dept: CARDIOLOGY CLINIC | Age: 38
End: 2023-12-05
Payer: MEDICARE

## 2023-12-05 DIAGNOSIS — R00.0 TACHYCARDIA: Primary | ICD-10-CM

## 2023-12-05 PROCEDURE — 99441 PR PHYS/QHP TELEPHONE EVALUATION 5-10 MIN: CPT | Performed by: CLINICAL NURSE SPECIALIST

## 2023-12-05 RX ORDER — BISOPROLOL FUMARATE 5 MG/1
5 TABLET, FILM COATED ORAL 2 TIMES DAILY
Qty: 60 TABLET | Refills: 5 | Status: SHIPPED | OUTPATIENT
Start: 2023-12-05

## 2023-12-05 NOTE — PROGRESS NOTES
Marquis Mills is a 34 y.o. female evaluated via telephone on 12/5/2023. Marquis Mills was evaluated through a patient-initiated, synchronous (real-time) audio only encounter. She (or guardian if applicable) is aware that it is a billable service, which includes applicable co-pays, with coverage as determined by her insurance carrier. This visit was conducted with the patient's (and/or Bebo Forte guardian's) verbal consent. She has not had a related appointment within my department in the past 7 days or scheduled within the next 24 hours. The patient was located in a state where the provider was licensed to provide care. The patient was located at: Home: [address] [address]  The provider was located at: Facility (Appt Dept): [address] [facility] [address] [facility] [place],  [address] [place] [address]    Total Time: minutes: 5-10 minutes    Note: not billable if this call serves to triage the patient into an appointment for the relevant concern      Lawence Rubinstein, APRN     Marquis Mills is a 34 y.o. female with the [facility]:    Patient has a history of tachycardia, syncope and loop recorder. Loop recorder reached [facility] and was removed back in February of this year. She also has recurrent major depressive disorder, HEMANTH, schizoaffective disorder, bipolar type, and seizures. Tachycardia has been treated with bisoprolol in the past    Phone call visit today in follow-up from emergency room visit. She states she just generally feels poorly. Been feeling that way for a month. Has follow-up with her primary care tomorrow. She did run out of her bisoprolol about 4 days ago. Has some episodes of fast heart rates    Ongoing weakness but no strokelike symptoms. Just has dry mouth.   She states she does have a nodule on her thyroid but her thyroid numbers were fine    HR  upa t time   BP are normal       Patient was recently treated in the emergency room for nausea vomiting and slurred

## 2023-12-06 ENCOUNTER — OFFICE VISIT (OUTPATIENT)
Dept: PRIMARY CARE CLINIC | Age: 38
End: 2023-12-06
Payer: MEDICARE

## 2023-12-06 VITALS
HEIGHT: 60 IN | BODY MASS INDEX: 36.87 KG/M2 | DIASTOLIC BLOOD PRESSURE: 70 MMHG | HEART RATE: 74 BPM | RESPIRATION RATE: 16 BRPM | WEIGHT: 187.8 LBS | SYSTOLIC BLOOD PRESSURE: 110 MMHG | TEMPERATURE: 98.1 F | OXYGEN SATURATION: 97 %

## 2023-12-06 DIAGNOSIS — Z23 NEED FOR INFLUENZA VACCINATION: ICD-10-CM

## 2023-12-06 DIAGNOSIS — J30.9 ALLERGIC SINUSITIS: ICD-10-CM

## 2023-12-06 DIAGNOSIS — Z09 HOSPITAL DISCHARGE FOLLOW-UP: ICD-10-CM

## 2023-12-06 DIAGNOSIS — G43.E19 CHRONIC MIGRAINE WITH AURA, INTRACTABLE, WITHOUT STATUS MIGRAINOSUS: Primary | ICD-10-CM

## 2023-12-06 PROCEDURE — 90674 CCIIV4 VAC NO PRSV 0.5 ML IM: CPT | Performed by: NURSE PRACTITIONER

## 2023-12-06 PROCEDURE — 99213 OFFICE O/P EST LOW 20 MIN: CPT | Performed by: NURSE PRACTITIONER

## 2023-12-06 PROCEDURE — G0008 ADMIN INFLUENZA VIRUS VAC: HCPCS | Performed by: NURSE PRACTITIONER

## 2023-12-06 RX ORDER — FLUTICASONE PROPIONATE 50 MCG
2 SPRAY, SUSPENSION (ML) NASAL DAILY
Qty: 16 G | Refills: 0 | Status: SHIPPED | OUTPATIENT
Start: 2023-12-06

## 2023-12-06 RX ORDER — ONDANSETRON 8 MG/1
TABLET, ORALLY DISINTEGRATING ORAL
COMMUNITY
Start: 2023-11-17

## 2023-12-06 ASSESSMENT — ENCOUNTER SYMPTOMS
GASTROINTESTINAL NEGATIVE: 1
EYES NEGATIVE: 1
RESPIRATORY NEGATIVE: 1

## 2023-12-06 NOTE — PATIENT INSTRUCTIONS
Follow up with Neuro in January. Continue with cardiology as scheduled. Continue with pain management. Continue with prescribed medications. Use flonase for sinus issues and ear fullness. Go back to ED if you begin to experience any of the stroke like symptoms like you had before. Come back in 4 weeks to see PJ for physical exam with a pap smear. Make sure you are fasting for labs.

## 2023-12-11 ENCOUNTER — TELEPHONE (OUTPATIENT)
Dept: PSYCHIATRY | Age: 38
End: 2023-12-11

## 2023-12-11 DIAGNOSIS — F41.0 GENERALIZED ANXIETY DISORDER WITH PANIC ATTACKS: ICD-10-CM

## 2023-12-11 DIAGNOSIS — F41.1 GENERALIZED ANXIETY DISORDER WITH PANIC ATTACKS: ICD-10-CM

## 2023-12-11 RX ORDER — HYDROXYZINE HYDROCHLORIDE 25 MG/1
25 TABLET, FILM COATED ORAL 3 TIMES DAILY PRN
Qty: 90 TABLET | Refills: 0 | Status: SHIPPED | OUTPATIENT
Start: 2023-12-11

## 2023-12-11 NOTE — TELEPHONE ENCOUNTER
Called and lvm letting pt know that her script for hydroxyzine was sent to her pharmacy     Electronically signed by Meka Lemus on 12/11/2023 at 4:39 PM

## 2023-12-11 NOTE — TELEPHONE ENCOUNTER
racing thoughts,      Mood Other:positive: Irritability,   lability,     Anxiety: positive (where, when, who, how long, how frequent)     Panic: positive: Palpitations,  racing heart beat,  shortness of breath, blurred vision       OCD: negative     PTSD: negative     Psychosis: negative     Social anxiety symptoms:  negative     Simple phobias: negative    (heights, planes, spiders, etc.)     Paranoia: negative     ADHD symptoms: positive: decreased ablity to focus and concentrate, scattered thoughts,       Eating Disorder symptoms:  negative    (binging, purging, excessive exercising)     Delusions:  negative    (TV, radio, thought broadcasting, mind control, referential thinking)    (persecutory delusion - e.g., believing one is being followed and harassed by gangs)    (grandiose delusion - e.g., believing one is a billionaire  who owns casinos around the world)    (erotomanic delusion - e.g., believing a famous  is in love with them)    (somatic delusion - e.g., believing one's sinuses have been infested by worms)    (delusions of reference - e.g., believing dialogue on a TV program is directed specifically towards the patient)    (delusions of control - e.g., believing one's thoughts and movements are controlled by planetary overlords)      MENTAL STATUS EXAMINATION     Appearance: Appropriately groomed. Made good eye contact. Gait stable. No abnormal movements or tremor. Behavior: Calm, cooperative, and socially appropriate. No psychomotor retardation/agitation appreciated. Speech: Normal in tone, volume, and quality. No slurring, dysarthria or pressured speech noted. Mood: \"anxious\"   Affect: Mood congruent. Thought Process: Appears linear, logical and goal oriented. Causality appears intact. Thought Content: Denies active suicidal and homicidal ideations. No overt delusions or paranoia appreciated. Perceptions: Denies auditory or visual hallucinations at present time.  Not

## 2023-12-11 NOTE — TELEPHONE ENCOUNTER
Called patient to remind them of their appointment     -Pt asked to reschedule and was rescheduled. Pt is having car trouble. Pt asked to do a phone visit but her insurance which is [facility], will not pay for phone visits. Rescheduled for 12/19/23 @ 1:30.     Electronically signed by David Yo MA on 12/11/2023 at 12:38 PM

## 2024-01-02 RX ORDER — FLUTICASONE PROPIONATE 50 MCG
2 SPRAY, SUSPENSION (ML) NASAL DAILY
Qty: 48 G | Refills: 1 | Status: SHIPPED | OUTPATIENT
Start: 2024-01-02

## 2024-01-04 DIAGNOSIS — F39 UNSPECIFIED MOOD (AFFECTIVE) DISORDER (HCC): ICD-10-CM

## 2024-01-04 NOTE — TELEPHONE ENCOUNTER
Start taking lamictal: Take one tablet nightly for 2 weeks THEN increase to two tablets nightly for 2 weeks.., Disp-49 tablet, R-0Normal  2. Generalized anxiety disorder with panic attacks  -     External Referral To Counseling Services  -     hydrOXYzine HCl (ATARAX) 25 MG tablet; Take 1 tablet by mouth 3 times daily as needed for Anxiety, Disp-90 tablet, R-0Normal     SETH Fields - CNP

## 2024-01-05 ENCOUNTER — TELEPHONE (OUTPATIENT)
Dept: PSYCHIATRY | Age: 39
End: 2024-01-05

## 2024-01-05 RX ORDER — LAMOTRIGINE 100 MG/1
100 TABLET ORAL DAILY
Qty: 30 TABLET | Refills: 0 | Status: SHIPPED | OUTPATIENT
Start: 2024-01-05

## 2024-01-05 NOTE — TELEPHONE ENCOUNTER
Called patient to remind them of their appointment   -left voicemail, requesting a return call    Reminded patient to complete their visit pre-check/digital registration in [facility].    Electronically signed by Sulma Ortiz MA on 1/5/2024 at 3:44 PM

## 2024-01-05 NOTE — TELEPHONE ENCOUNTER
Called and lvm letting pt know that her script for lamotrigine was sent to her pharmacy     Electronically signed by Tutu Ruiz on 1/5/2024 at 4:28 PM

## 2024-01-08 ENCOUNTER — OFFICE VISIT (OUTPATIENT)
Dept: PSYCHIATRY | Age: 39
End: 2024-01-08
Payer: MEDICARE

## 2024-01-08 VITALS
TEMPERATURE: 97.8 F | WEIGHT: 188.3 LBS | OXYGEN SATURATION: 100 % | HEIGHT: 60 IN | SYSTOLIC BLOOD PRESSURE: 106 MMHG | HEART RATE: 60 BPM | DIASTOLIC BLOOD PRESSURE: 70 MMHG | BODY MASS INDEX: 36.97 KG/M2

## 2024-01-08 DIAGNOSIS — F43.12 CHRONIC POST-TRAUMATIC STRESS DISORDER (PTSD): ICD-10-CM

## 2024-01-08 DIAGNOSIS — F41.1 GENERALIZED ANXIETY DISORDER WITH PANIC ATTACKS: ICD-10-CM

## 2024-01-08 DIAGNOSIS — F39 UNSPECIFIED MOOD (AFFECTIVE) DISORDER (HCC): Primary | ICD-10-CM

## 2024-01-08 DIAGNOSIS — F41.0 GENERALIZED ANXIETY DISORDER WITH PANIC ATTACKS: ICD-10-CM

## 2024-01-08 PROCEDURE — 99214 OFFICE O/P EST MOD 30 MIN: CPT

## 2024-01-08 RX ORDER — PRAZOSIN HYDROCHLORIDE 1 MG/1
1 CAPSULE ORAL NIGHTLY
Qty: 30 CAPSULE | Refills: 0 | Status: SHIPPED | OUTPATIENT
Start: 2024-01-08

## 2024-01-08 RX ORDER — HYDROXYZINE HYDROCHLORIDE 25 MG/1
25 TABLET, FILM COATED ORAL 3 TIMES DAILY PRN
Qty: 90 TABLET | Refills: 1 | Status: SHIPPED | OUTPATIENT
Start: 2024-01-08
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
## 2024-01-11 ENCOUNTER — TELEPHONE (OUTPATIENT)
Dept: NEUROLOGY | Age: 39
End: 2024-01-11

## 2024-01-11 NOTE — TELEPHONE ENCOUNTER
Attempt was made to call the patient in regard to needing to be rescheduled for Botox injections and new provider will be administering. Requested call back to the office.

## 2024-01-25 ENCOUNTER — OFFICE VISIT (OUTPATIENT)
Dept: PRIMARY CARE CLINIC | Age: 39
End: 2024-01-25
Payer: MEDICARE

## 2024-01-25 VITALS
SYSTOLIC BLOOD PRESSURE: 108 MMHG | HEART RATE: 66 BPM | BODY MASS INDEX: 37.81 KG/M2 | TEMPERATURE: 97.4 F | OXYGEN SATURATION: 100 % | HEIGHT: 60 IN | DIASTOLIC BLOOD PRESSURE: 72 MMHG | WEIGHT: 192.6 LBS | RESPIRATION RATE: 18 BRPM

## 2024-01-25 DIAGNOSIS — N89.8 VAGINAL DISCHARGE: ICD-10-CM

## 2024-01-25 DIAGNOSIS — Z12.31 ENCOUNTER FOR SCREENING MAMMOGRAM FOR BREAST CANCER: Primary | ICD-10-CM

## 2024-01-25 DIAGNOSIS — Z00.00 INITIAL MEDICARE ANNUAL WELLNESS VISIT: ICD-10-CM

## 2024-01-25 DIAGNOSIS — R25.1 TREMORS OF NERVOUS SYSTEM: ICD-10-CM

## 2024-01-25 DIAGNOSIS — N89.8 VAGINAL SORE: ICD-10-CM

## 2024-01-25 PROCEDURE — G0438 PPPS, INITIAL VISIT: HCPCS | Performed by: NURSE PRACTITIONER

## 2024-01-25 RX ORDER — MELOXICAM 7.5 MG/1
7.5 TABLET ORAL DAILY
Qty: 30 TABLET | Refills: 3 | Status: SHIPPED | OUTPATIENT
Start: 2024-01-25

## 2024-01-25 ASSESSMENT — PATIENT HEALTH QUESTIONNAIRE - PHQ9
7. TROUBLE CONCENTRATING ON THINGS, SUCH AS READING THE NEWSPAPER OR WATCHING TELEVISION: 0
SUM OF ALL RESPONSES TO PHQ QUESTIONS 1-9: 5
2. FEELING DOWN, DEPRESSED OR HOPELESS: 0
SUM OF ALL RESPONSES TO PHQ QUESTIONS 1-9: 5
SUM OF ALL RESPONSES TO PHQ9 QUESTIONS 1 & 2: 0
8. MOVING OR SPEAKING SO SLOWLY THAT OTHER PEOPLE COULD HAVE NOTICED. OR THE OPPOSITE, BEING SO FIGETY OR RESTLESS THAT YOU HAVE BEEN MOVING AROUND A LOT MORE THAN USUAL: 1
SUM OF ALL RESPONSES TO PHQ QUESTIONS 1-9: 5
6. FEELING BAD ABOUT YOURSELF - OR THAT YOU ARE A FAILURE OR HAVE LET YOURSELF OR YOUR FAMILY DOWN: 0
1. LITTLE INTEREST OR PLEASURE IN DOING THINGS: 0
SUM OF ALL RESPONSES TO PHQ QUESTIONS 1-9: 5
4. FEELING TIRED OR HAVING LITTLE ENERGY: 0
5. POOR APPETITE OR OVEREATING: 2
10. IF YOU CHECKED OFF ANY PROBLEMS, HOW DIFFICULT HAVE THESE PROBLEMS MADE IT FOR YOU TO DO YOUR WORK, TAKE CARE OF THINGS AT HOME, OR GET ALONG WITH OTHER PEOPLE: 0
3. TROUBLE FALLING OR STAYING ASLEEP: 2
9. THOUGHTS THAT YOU WOULD BE BETTER OFF DEAD, OR OF HURTING YOURSELF: 0

## 2024-01-25 ASSESSMENT — LIFESTYLE VARIABLES
HOW OFTEN DO YOU HAVE A DRINK CONTAINING ALCOHOL: MONTHLY OR LESS
HOW MANY STANDARD DRINKS CONTAINING ALCOHOL DO YOU HAVE ON A TYPICAL DAY: 1 OR 2

## 2024-01-25 NOTE — PATIENT INSTRUCTIONS
Sprain/strain   First 24 hours, use ice to injury site for 15 minutes at a time.  After 48 hours, may alternate ice/heat 15 minutes each.  R.I. C.E. therapy = rest, ice, compression and elevation.  May use ace wrap to injured area for compression.  Use Ibuprofen or other antiinflammatory for pain and inflammation.   If no open skin areas, OTC topical lidocaine such as Icy Hot or capsaicin creams may be applied for pain relief.  Slow stretches of area may help reduce spasms and improve range of motion.   Alternate Voltaren cream with Biofreeze gel to site 2 to 3 times a day .       Preventing Falls: Care Instructions  Injuries and health problems such as trouble walking or poor eyesight can increase your risk of falling. So can some medicines. But there are things you can do to help prevent falls. You can exercise to get stronger. You can also arrange your home to make it safer.    Talk to your doctor about the medicines you take. Ask if any of them increase the risk of falls and whether they can be changed or stopped.   Try to exercise regularly. It can help improve your strength and balance. This can help lower your risk of falling.     Practice fall safety and prevention.    Wear low-heeled shoes that fit well and give your feet good support. Talk to your doctor if you have foot problems that make this hard.  Carry a cellphone or wear a medical alert device that you can use to call for help.  Use stepladders instead of chairs to reach high objects. Don't climb if you're at risk for falls. Ask for help, if needed.  Wear the correct eyeglasses, if you need them.    Make your home safer.    Remove rugs, cords, clutter, and furniture from walkways.  Keep your house well lit. Use night-lights in hallways and bathrooms.  Install and use sturdy handrails on stairways.  Wear nonskid footwear, even inside. Don't walk barefoot or in socks without shoes.    Be safe outside.    Use handrails, curb cuts, and ramps whenever

## 2024-01-26 ENCOUNTER — TELEPHONE (OUTPATIENT)
Dept: PRIMARY CARE CLINIC | Age: 39
End: 2024-01-26

## 2024-01-26 PROBLEM — R25.1 TREMORS OF NERVOUS SYSTEM: Status: ACTIVE | Noted: 2024-01-26

## 2024-01-26 NOTE — TELEPHONE ENCOUNTER
----- Message from SETH Machado NP sent at 1/26/2024  7:03 AM CST -----  Please call her and have her come in and have some labs drawn I have put in the orders.  I wanted to check a few other things because of that vaginal sore.

## 2024-01-29 ENCOUNTER — TELEPHONE (OUTPATIENT)
Dept: PSYCHIATRY | Age: 39
End: 2024-01-29

## 2024-01-29 DIAGNOSIS — F43.12 CHRONIC POST-TRAUMATIC STRESS DISORDER (PTSD): ICD-10-CM

## 2024-01-29 DIAGNOSIS — F39 UNSPECIFIED MOOD (AFFECTIVE) DISORDER (HCC): ICD-10-CM

## 2024-01-29 RX ORDER — LAMOTRIGINE 100 MG/1
100 TABLET ORAL DAILY
Qty: 90 TABLET | Refills: 0 | Status: SHIPPED | OUTPATIENT
Start: 2024-01-29

## 2024-01-29 NOTE — TELEPHONE ENCOUNTER
2.880 10/23/2023            Lab Results   Component Value Date     VITD25 17.7 (L) 03/03/2023            Lab Results   Component Value Date     [id number] 461 09/06/2022            Lab Results   Component Value Date     FOLATE 16.2 12/11/2019         Assessment:    1. Unspecified mood (affective) disorder (HCC)    2. Generalized anxiety disorder with panic attacks    3. Chronic post-traumatic stress disorder (PTSD)          No evidence of acute suicidality, homicidality or psychosis observed.  Patient is psychiatrically stable     Plan:     1.   Continue   Lamictal 100 mg daily for mood stabilization  Vraylar 1.5 mg daily for mood   Hydroxyzine 25 mg TID PRN for anxiety        Start   Prazosin 1 mg nightly for nightmares related to PTSD  Begin therapy with [place] [facility], missed appt have to reschedule once car gets fixed     The risks, benefits, side effects, indications, contraindications, and adverse effects of the medications have been discussed. Yes.  2. The pt has verbalized understanding and has capacity to give informed consent.  3. The Jonathan report has been reviewed according to HB1 regulations.  4. Supportive therapy offered.  5. Follow up:    Return in about 6 weeks (around 2/19/2024) for Medication Follow up.  6. The patient has been advised to call with any problems.  7. Controlled substance Treatment Plan: n/a.  8. The above listed medications have been continued, modifications in meds and other orders/labs as follows:                 Encounter Medications        Orders Placed This Encounter   Medications    prazosin (MINIPRESS) 1 MG capsule       Sig: Take 1 capsule by mouth nightly       Dispense:  30 capsule       Refill:  0    cariprazine hcl (VRAYLAR) 1.5 MG capsule       Sig: Take 1 capsule by mouth daily       Dispense:  30 capsule       Refill:  1    hydrOXYzine HCl (ATARAX) 25 MG tablet       Sig: Take 1 tablet by mouth 3 times daily as needed for Anxiety       Dispense:  90 tablet

## 2024-01-29 NOTE — TELEPHONE ENCOUNTER
Called and lvm letting pt know that her script for lamotrigine was sent to her pharmacy     Electronically signed by Tutu Ruiz on 1/29/2024 at 1:40 PM

## 2024-01-30 ENCOUNTER — TELEPHONE (OUTPATIENT)
Dept: PSYCHIATRY | Age: 39
End: 2024-01-30

## 2024-01-30 RX ORDER — SUCRALFATE 1 G/1
1 TABLET ORAL 4 TIMES DAILY
Qty: 360 TABLET | Refills: 1 | Status: SHIPPED | OUTPATIENT
Start: 2024-01-30

## 2024-01-30 RX ORDER — PRAZOSIN HYDROCHLORIDE 1 MG/1
CAPSULE ORAL
Qty: 30 CAPSULE | Refills: 0 | Status: SHIPPED | OUTPATIENT
Start: 2024-01-30

## 2024-01-30 NOTE — TELEPHONE ENCOUNTER
transportation, reports she will reschedule soon as car is fixed.  We will follow up in 6 weeks.      Absent  suicidal ideation.    Reports compliance with medications as good .      Sleep: been having     Caffeine use:  mountain dew, 3 cans per day, tea occasionally      PREVIOUS MED TRIALS  Lamictal  Vraylar  Abilify  Zyprexa  Effexor  Nortriptyline  Zoloft        SUBSTANCE USE HISTORY  Alcohol: denies  Illicit drug use: denies   Marijuana: denies   Tobacco: denies   Vape: vapes with nicotine, few times per week        BP: LMP 05/23/2021 (Approximate)         Review of Systems - 14 point review:  Negative      Constitutional: (fevers, chills, night sweats, wt loss/gain, change in appetite, fatigue, somnolence)     HEENT: (ear pain or discharge, hearing loss, ear ringing, sinus pressure, nosebleed, nasal discharge, sore throat, oral sores, tooth pain, bleeding gums, hoarse voice, neck pain)      Cardiovascular: (HTN, chest pain, elevated cholesterol/lipids, palpitations, leg swelling, leg pain with walking)     Respiratory: (cough, wheezing, snoring, SOB with activity (dyspnea), SOB while lying flat (orthopnea), awakening with severe SOB (paroxysmal nocturnal dyspnea))     Gastrointestinal: (NVD, constipation, abdominal pain, bright red stools, black tarry stools, stool incontinence)     Genitourinary:  (pelvic pain, burning or frequency of urination, urinary urgency, blood in urine incomplete bladder emptying, urinary incontinence, STD; MEN: testicular pain or swelling, erectile dysfunction; WOMEN: LMP, heavy menstrual bleeding (menorrhagia), irregular periods, postmenopausal bleeding, menstrual pain (dymenorrhea, vaginal discharge)     Musculoskeletal: (bone pain/fracture, joint pain or swelling, musle pain)     Integumentary: (rashes, acne, non-healing sores, itching, breast lumps, breast pain, nipple discharge, hair loss)     Neurologic: (HA, muscle weakness, paresthesias (numbness, coldness, crawling or

## 2024-01-30 NOTE — TELEPHONE ENCOUNTER
Called and let pt know that her script for prazosin was sent to her pharmacy     Electronically signed by Tutu Ruiz on 1/30/2024 at 11:58 AM

## 2024-02-01 ENCOUNTER — HOSPITAL ENCOUNTER (OUTPATIENT)
Dept: WOMENS IMAGING | Age: 39
Discharge: HOME | End: 2024-02-01
Payer: MEDICARE

## 2024-02-01 DIAGNOSIS — Z12.31 ENCOUNTER FOR SCREENING MAMMOGRAM FOR BREAST CANCER: ICD-10-CM

## 2024-02-01 PROCEDURE — 77063 BREAST TOMOSYNTHESIS BI: CPT

## 2024-02-05 ENCOUNTER — LAB (OUTPATIENT)
Dept: PRIMARY CARE CLINIC | Age: 39
End: 2024-02-05

## 2024-02-05 DIAGNOSIS — N89.8 VAGINAL DISCHARGE: ICD-10-CM

## 2024-02-05 DIAGNOSIS — R25.1 TREMORS OF NERVOUS SYSTEM: ICD-10-CM

## 2024-02-05 DIAGNOSIS — N89.8 VAGINAL SORE: ICD-10-CM

## 2024-02-05 LAB
HIV-1 P24 AG: NORMAL
HIV1+2 AB SERPLBLD QL IA.RAPID: NORMAL

## 2024-02-08 LAB
HSV1+2 IGG SER IA-ACNC: 0.48 IV
HSV1+2 IGM SER IA-ACNC: 0.41 IV
RPR SER QL: NORMAL

## 2024-02-09 ENCOUNTER — TELEPHONE (OUTPATIENT)
Dept: PSYCHIATRY | Age: 39
End: 2024-02-09

## 2024-02-09 DIAGNOSIS — F43.12 CHRONIC POST-TRAUMATIC STRESS DISORDER (PTSD): ICD-10-CM

## 2024-02-09 RX ORDER — PRAZOSIN HYDROCHLORIDE 1 MG/1
1 CAPSULE ORAL NIGHTLY
Qty: 30 CAPSULE | Refills: 0 | Status: SHIPPED | OUTPATIENT
Start: 2024-02-09

## 2024-02-09 NOTE — TELEPHONE ENCOUNTER
Called and lvm letting pt know that her script for prazosin was sent to her pharmacy    Electronically signed by Tutu Ruiz on 2/9/2024 at 4:42 PM
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
## 2024-02-12 ENCOUNTER — HOSPITAL ENCOUNTER (OUTPATIENT)
Dept: PAIN MANAGEMENT | Age: 39
Discharge: HOME | End: 2024-02-12
Payer: MEDICARE

## 2024-02-12 VITALS — RESPIRATION RATE: 18 BRPM | TEMPERATURE: 97 F | OXYGEN SATURATION: 100 %

## 2024-02-12 DIAGNOSIS — G43.E19 CHRONIC MIGRAINE WITH AURA, INTRACTABLE, WITHOUT STATUS MIGRAINOSUS: Primary | ICD-10-CM

## 2024-02-12 DIAGNOSIS — G43.719 CHRONIC MIGRAINE WITHOUT AURA, INTRACTABLE, WITHOUT STATUS MIGRAINOSUS: ICD-10-CM

## 2024-02-12 PROCEDURE — 6360000002 HC RX W HCPCS

## 2024-02-12 PROCEDURE — A4216 STERILE WATER/SALINE, 10 ML: HCPCS

## 2024-02-12 PROCEDURE — 2580000003 HC RX 258

## 2024-02-12 PROCEDURE — 64615 CHEMODENERV MUSC MIGRAINE: CPT | Performed by: PSYCHIATRY & NEUROLOGY

## 2024-02-12 PROCEDURE — 64615 CHEMODENERV MUSC MIGRAINE: CPT

## 2024-02-12 RX ORDER — SODIUM CHLORIDE 9 MG/ML
5 INJECTION INTRAVENOUS ONCE
Status: DISCONTINUED | OUTPATIENT
Start: 2024-02-12 | End: 2024-02-14 | Stop reason: HOSPADM

## 2024-02-12 NOTE — PROGRESS NOTES
[facility] Botox Procedure Note     Patient:   Diana Carlos  MR#:    [id number]  Account Number:                   [id number]      YOB: 1985  Date of Evaluation:  2/12/2024  Time of Note:                          4:16 PM  Primary Physician:    Marleni Saeed APRN - NP   Consulting Physician:  George Bobby DO    Consent was signed and on the chart. Risk, benefits, and side effects discussed. Pt has a clear history of having more than 15 days/month of migraine, lasting more than 4 hours with multiple treatment failures.     Patient states that he/she has 30 headaches out of 30 days each month.  Patient states that he/she has 20 migraines out of 0 days each month.    Vial Exp Date: 6/26  Vial Lot Number:  [id number] x 2    Botox was diluted with 0.9% NS to yield a final concentration of 50 units / 1 ml.    The following muscles were injected in 0.1 ml (5 unit) increments:    -   5 units left, 5 units right  Procerus-      5 units  Frontalis-      20 units divided into 4 sites left and right  Temporalis-  40 units divided into 4 sites left and 4 sites right  Occipitalis-    30 units divided into 3 sites left and 3 sites right  Cervical Paraspinal-  20 units divided into 2 sites left and 2 sites right  Trapezius-     30 units divided into 3 sites left and 3 sites right    Total units injected: 155  Total units unavoidably discarded: 45    Pt tolerated the procedure well.  There were no complications. Pt will follow up in 6 weeks to assess effectiveness and will repeat injections in 12 weeks if continues to benefit.      Patient noting bilateral upper extremity numbness, pain, will check MRI C-spine.     George Bobby DO  Board Certified Neurologist

## 2024-02-12 NOTE — DISCHARGE INSTRUCTIONS
[facility]  Post Procedure Discharge Instructions        YOU HAVE HAD THE FOLLOWING PROCEDURE:                                  [] Occipital Nerve Blocks  [] CTS wrist injection(s)  [] Knee Injection(s)         [] Shoulder Injection(s)   [] Elbow Injection(s)     [x] Botox Injection  [] Cervical Trigger Point Injections    [] Thoracic Trigger Point Injections    [] Lumbar Trigger Point Injections  [] Piriformis Trigger Point Injections  [] SI Joint Injection(s)     [] Trochanteric Bursa Injection(s)       [] Ankle Injection(s)   [] Plantar Fasciitis   []  ______________  Injection(s) [x] Botox [x]  Migraines [] Spasticity    YOU HAVE RECEIVED THE FOLLOWING MEDICATIONS IN YOUR INJECTION(s)  [] Lidocaine [] Bupivacaine   [] DepoMedrol (steroid) [] Decadron (steroid)  []  Kenalog (steroid)   [] Toradol  [] Supartz [] Zilretta    [x] Botox        PATIENT INFORMATION:   You may experience the following symptoms after your procedure. These symptoms are normal and should not cause concern:    You may have an increase in your pain. This may last 24 - 48 hours after your procedure.  You may have no change in the pain that you had prior to your injection(s).  You may have weakness or numbness in your affected extremity. If this occurs, this may last until numbing the medication wears off.     REPORT THE FOLLOWING SYMPTOMS TO YOUR DOCTOR:  Redness, swelling or drainage at the injection site(s)  Unusual pain that interferes with your normal activities of daily living.    OTHER INSTRUCTIONS:    [] I will apply ice to the injection site(s) for at least 24 hours after the procedure. I will rotate the ice on for 20 minutes and off for 20 minutes for at least 24 hours.    [] I will not apply heat for at least 48 hours and I will not take a hot bath or shower for at least 24 hours.     [] I understand that if Lidocaine or Bupivacaine was used in my injection(s) that the injection site(s)

## 2024-02-13 PROCEDURE — 6360000002 HC RX W HCPCS

## 2024-02-15 ENCOUNTER — LAB (OUTPATIENT)
Dept: PRIMARY CARE CLINIC | Age: 39
End: 2024-02-15

## 2024-02-16 ENCOUNTER — TELEPHONE (OUTPATIENT)
Dept: PSYCHIATRY | Age: 39
End: 2024-02-16

## 2024-02-16 NOTE — TELEPHONE ENCOUNTER
Called pt to cancel/reschedule appt for 02/19/24 with Maura Delgado because the provider will not be in the office that day.    Rescheduled appt for 02/26/24 @ 2:30.    Electronically signed by Sulma Ortiz MA on 2/16/2024 at 11:58 AM

## 2024-02-22 ENCOUNTER — OFFICE VISIT (OUTPATIENT)
Dept: CARDIOLOGY CLINIC | Age: 39
End: 2024-02-22
Payer: MEDICARE

## 2024-02-22 VITALS
DIASTOLIC BLOOD PRESSURE: 62 MMHG | HEART RATE: 56 BPM | HEIGHT: 60 IN | BODY MASS INDEX: 38.28 KG/M2 | SYSTOLIC BLOOD PRESSURE: 104 MMHG | WEIGHT: 195 LBS | OXYGEN SATURATION: 100 %

## 2024-02-22 DIAGNOSIS — R55 SYNCOPE, UNSPECIFIED SYNCOPE TYPE: ICD-10-CM

## 2024-02-22 DIAGNOSIS — R00.0 TACHYCARDIA: Primary | ICD-10-CM

## 2024-02-22 PROCEDURE — 99213 OFFICE O/P EST LOW 20 MIN: CPT | Performed by: CLINICAL NURSE SPECIALIST

## 2024-02-22 NOTE — PROGRESS NOTES
[facility]  [address] [address]  Phone: [phone]  Fax: [phone]    OFFICE VISIT:  2024    Diana Carlos - : 1985    Reason For Visit:  Diana is a 38 y.o. female who is here for Follow-up (Patient complains still of SOA)  Patient has a history of tachycardia, syncope and loop recorder.  Loop recorder reached LOU and was removed back in 2023.  She also has recurrent major depressive disorder, HEMANTH, schizoaffective disorder, bipolar type, and seizures.    Patient has been remained on bisoprolol for heart rate control    She states that she continues to have episodes of fast heart rates.  Occurs every few weeks.  She will feel it coming on and feels lightheaded and dizzy.  She usually sits down.  She has not had any overt syncope.  No other significant changes.  She has had some bilateral hand numbness and shaking for the last couple years and is following up with neurology for this.    Subjective  Diana denies exertional chest pain, shortness of breath, orthopnea, paroxysmal nocturnal dyspnea, syncope, presyncope, arrhythmia, edema and fatigue.  The patient denies numbness or weakness to suggest cerebrovascular accident or transient ischemic attack.    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

## 2024-02-22 NOTE — PATIENT INSTRUCTIONS
If Heart rates stay >100 for hours can take extra dose (1/2-1) extra bisoprolol   Maintain good blood pressure control-goal<130/80 at rest  Maintain good cholesterol control LDL goal<70 with arterial disease  If you are diabetic work to keep/obtain hemoglobin A1c< 7    Follow up in 1 year  Call with any questions or concerns  Follow up with Marleni Saeed APRN - NP for non cardiac problems  Report any new problems  Cardiovascular Fitness-Exercise as tolerated.  Strive for 30 minutes of exercise most days of the week.    Cardiac / Healthy Diet- Avoid processed high fat foods, maintain low sodium/salt   Continue current medications as directed  Continue plan of treatment  It is always recommended that you bring your medications bottles with you to each visit - this is for your safety!

## 2024-02-23 ENCOUNTER — TELEPHONE (OUTPATIENT)
Dept: PSYCHIATRY | Age: 39
End: 2024-02-23

## 2024-02-23 NOTE — TELEPHONE ENCOUNTER
Called patient to remind them of their appointment   -Pt confirmed     Reminded patient to complete their visit pre-check/digital registration in [facility].    Electronically signed by Karina Esparza MA on 2/23/2024 at 11:26 AM

## 2024-02-26 ENCOUNTER — TELEPHONE (OUTPATIENT)
Dept: PSYCHIATRY | Age: 39
End: 2024-02-26

## 2024-02-26 NOTE — TELEPHONE ENCOUNTER
Appointment canceled for Diana Carlos ([id number])  Visit Type: FOLLOW UP  Date        Time      Length    Provider                  Department  2/26/2024    2:30 PM  30 mins.  SETH Schaffer-CNP [facility]     Reason for Cancellation: Other     Patient Comments: Sick      Appt rescheduled for 3/8 @ 2:30 PM    Electronically signed by Tutu Ruiz on 2/26/2024 at 2:01 PM

## 2024-03-06 DIAGNOSIS — F41.0 GENERALIZED ANXIETY DISORDER WITH PANIC ATTACKS: ICD-10-CM

## 2024-03-06 DIAGNOSIS — F41.1 GENERALIZED ANXIETY DISORDER WITH PANIC ATTACKS: ICD-10-CM

## 2024-03-06 RX ORDER — HYDROXYZINE HYDROCHLORIDE 25 MG/1
25 TABLET, FILM COATED ORAL 3 TIMES DAILY PRN
Qty: 90 TABLET | Refills: 1 | Status: SHIPPED | OUTPATIENT
Start: 2024-03-06

## 2024-03-06 NOTE — TELEPHONE ENCOUNTER
Lab Results   Component Value Date     TSH 2.880 10/23/2023            Lab Results   Component Value Date     VITD25 17.7 (L) 03/03/2023            Lab Results   Component Value Date     EAOLZAIJ35 461 09/06/2022            Lab Results   Component Value Date     FOLATE 16.2 12/11/2019         Assessment:    1. Unspecified mood (affective) disorder (HCC)    2. Generalized anxiety disorder with panic attacks    3. Chronic post-traumatic stress disorder (PTSD)          No evidence of acute suicidality, homicidality or psychosis observed.  Patient is psychiatrically stable     Plan:     1.   Continue   Lamictal 100 mg daily for mood stabilization  Vraylar 1.5 mg daily for mood   Hydroxyzine 25 mg TID PRN for anxiety        Start   Prazosin 1 mg nightly for nightmares related to PTSD  Begin therapy with [place] therapy, missed appt have to reschedule once car gets fixed     The risks, benefits, side effects, indications, contraindications, and adverse effects of the medications have been discussed. Yes.  2. The pt has verbalized understanding and has capacity to give informed consent.  3. The Jonathan report has been reviewed according to HB1 regulations.  4. Supportive therapy offered.  5. Follow up:    Return in about 6 weeks (around 2/19/2024) for Medication Follow up.  6. The patient has been advised to call with any problems.  7. Controlled substance Treatment Plan: n/a.  8. The above listed medications have been continued, modifications in meds and other orders/labs as follows:                 Encounter Medications        Orders Placed This Encounter   Medications    prazosin (MINIPRESS) 1 MG capsule       Sig: Take 1 capsule by mouth nightly       Dispense:  30 capsule       Refill:  0    cariprazine hcl (VRAYLAR) 1.5 MG capsule       Sig: Take 1 capsule by mouth daily       Dispense:  30 capsule       Refill:  1    hydrOXYzine HCl (ATARAX) 25 MG tablet       Sig: Take 1 tablet by mouth 3 times daily as needed for

## 2024-03-08 ENCOUNTER — OFFICE VISIT (OUTPATIENT)
Dept: PSYCHIATRY | Age: 39
End: 2024-03-08
Payer: MEDICARE

## 2024-03-08 VITALS
BODY MASS INDEX: 38.22 KG/M2 | OXYGEN SATURATION: 100 % | HEART RATE: 62 BPM | TEMPERATURE: 97.8 F | SYSTOLIC BLOOD PRESSURE: 118 MMHG | HEIGHT: 60 IN | DIASTOLIC BLOOD PRESSURE: 76 MMHG | WEIGHT: 194.7 LBS

## 2024-03-08 DIAGNOSIS — F43.12 CHRONIC POST-TRAUMATIC STRESS DISORDER (PTSD): ICD-10-CM

## 2024-03-08 DIAGNOSIS — F34.9 PERSISTENT MOOD DISORDER (HCC): Primary | ICD-10-CM

## 2024-03-08 DIAGNOSIS — F41.0 GENERALIZED ANXIETY DISORDER WITH PANIC ATTACKS: ICD-10-CM

## 2024-03-08 DIAGNOSIS — F41.1 GENERALIZED ANXIETY DISORDER WITH PANIC ATTACKS: ICD-10-CM

## 2024-03-08 PROCEDURE — 99214 OFFICE O/P EST MOD 30 MIN: CPT

## 2024-03-08 RX ORDER — LAMOTRIGINE 150 MG/1
150 TABLET ORAL DAILY
Qty: 30 TABLET | Refills: 3 | Status: SHIPPED | OUTPATIENT
Start: 2024-03-08

## 2024-03-08 ASSESSMENT — PATIENT HEALTH QUESTIONNAIRE - PHQ9
3. TROUBLE FALLING OR STAYING ASLEEP: 3
2. FEELING DOWN, DEPRESSED OR HOPELESS: 2
10. IF YOU CHECKED OFF ANY PROBLEMS, HOW DIFFICULT HAVE THESE PROBLEMS MADE IT FOR YOU TO DO YOUR WORK, TAKE CARE OF THINGS AT HOME, OR GET ALONG WITH OTHER PEOPLE: 1
6. FEELING BAD ABOUT YOURSELF - OR THAT YOU ARE A FAILURE OR HAVE LET YOURSELF OR YOUR FAMILY DOWN: 0
SUM OF ALL RESPONSES TO PHQ QUESTIONS 1-9: 11
5. POOR APPETITE OR OVEREATING: 1
SUM OF ALL RESPONSES TO PHQ QUESTIONS 1-9: 11
8. MOVING OR SPEAKING SO SLOWLY THAT OTHER PEOPLE COULD HAVE NOTICED. OR THE OPPOSITE, BEING SO FIGETY OR RESTLESS THAT YOU HAVE BEEN MOVING AROUND A LOT MORE THAN USUAL: 1
SUM OF ALL RESPONSES TO PHQ9 QUESTIONS 1 & 2: 3
9. THOUGHTS THAT YOU WOULD BE BETTER OFF DEAD, OR OF HURTING YOURSELF: 0
SUM OF ALL RESPONSES TO PHQ QUESTIONS 1-9: 11
1. LITTLE INTEREST OR PLEASURE IN DOING THINGS: 1
4. FEELING TIRED OR HAVING LITTLE ENERGY: 2
7. TROUBLE CONCENTRATING ON THINGS, SUCH AS READING THE NEWSPAPER OR WATCHING TELEVISION: 1
SUM OF ALL RESPONSES TO PHQ QUESTIONS 1-9: 11

## 2024-03-08 NOTE — PATIENT INSTRUCTIONS
Medication Instructions  Increase to Lamotrigine (Lamictal) to 150 mg daily for mood stabilization to help with irritability and agitation (Take 1.5 tablets of the 100 mg and I will send in the 150 mg tablets next fill)  Follow up with Neurologist
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
## 2024-03-28 ENCOUNTER — TELEPHONE (OUTPATIENT)
Dept: PSYCHIATRY | Age: 39
End: 2024-03-28

## 2024-03-28 DIAGNOSIS — F43.12 CHRONIC POST-TRAUMATIC STRESS DISORDER (PTSD): ICD-10-CM

## 2024-03-28 RX ORDER — PRAZOSIN HYDROCHLORIDE 1 MG/1
1 CAPSULE ORAL NIGHTLY
Qty: 30 CAPSULE | Refills: 0 | Status: SHIPPED | OUTPATIENT
Start: 2024-03-28 | End: 2024-04-27

## 2024-03-28 NOTE — TELEPHONE ENCOUNTER
Attempted to contact pt to inform her that refill RX for Prazosin had been sent to her pharmacy-no answer and mailbox full.

## 2024-04-12 ENCOUNTER — TELEPHONE (OUTPATIENT)
Dept: PSYCHIATRY | Age: 39
End: 2024-04-12

## 2024-04-12 NOTE — TELEPHONE ENCOUNTER
Called patient to remind them of their appointment     -Pt asked to reschedule and was rescheduled 05/02/24 @ 2:00.  Pt has a [employer] appt @ 2:45 and won't be able to make it to this appt in time.    Electronically signed by Sulma Ortiz MA on 4/12/2024 at 11:49 AM

## 2024-04-15 ENCOUNTER — TELEPHONE (OUTPATIENT)
Dept: PSYCHIATRY | Age: 39
End: 2024-04-15

## 2024-04-15 DIAGNOSIS — F39 UNSPECIFIED MOOD (AFFECTIVE) DISORDER (HCC): ICD-10-CM

## 2024-04-15 DIAGNOSIS — F34.9 PERSISTENT MOOD DISORDER (HCC): ICD-10-CM

## 2024-04-15 RX ORDER — LAMOTRIGINE 150 MG/1
150 TABLET ORAL DAILY
Qty: 90 TABLET | Refills: 1 | OUTPATIENT
Start: 2024-04-15

## 2024-04-15 RX ORDER — CARIPRAZINE 1.5 MG/1
1.5 CAPSULE, GELATIN COATED ORAL DAILY
Qty: 30 CAPSULE | Refills: 1 | Status: SHIPPED | OUTPATIENT
Start: 2024-04-15

## 2024-04-15 NOTE — TELEPHONE ENCOUNTER
status: Former       Current packs/day: 0.00       Average packs/day: 0.5 packs/day for 3.0 years (1.5 ttl pk-yrs)       Types: Cigarettes       Start date: 2011       Quit date: 2014       Years since quitting: 10.1       Passive exposure: Past    Smokeless tobacco: Former       Quit date: 9/9/2014   Vaping Use    Vaping Use: Some days    Start date: 1/1/2019    Substances: Flavoring    Devices: Refillable tank   Substance and Sexual Activity    Alcohol use: Yes       Comment: occ    Drug use: No    Sexual activity: Yes       Partners: Male   Social History Narrative     SAFETY PLAN           A suicide Safety Plan is a document that supports someone when they are having thoughts of suicide.           Warning Signs that indicate a suicidal crisis may be developing: What (situations, thoughts, feelings, body sensations, behaviors, etc.) do you experience that lets you know you are beginning to think about suicide?     1. Stop eating/drinking     2. Laying in bed throughout the day     3. Crying uncontrollably throughout the day           Internal Coping Strategies:  What things can I do (relaxation techniques, hobbies, physical activities, etc.) to take my mind off my problems without contacting another person?     1. You can't do it because of the kids               2. Deep breathing     3. Being around the kids           People and social settings that provide distraction: Who can I call or where can I go to distract me?     1. Name: Tori                    Phone: in phone     2. Name: ______               Phone: _______      3. Place: visit friend at work               4. Place: go to mother's home           People whom I can ask for help: Who can I call when I need help - for example, friends, family, clergy, someone else?     1. Name:                 Phone: in phone     2. Name: Mother-in-law                  Phone: in phone     3. Name: mother                Phone: in phone           Professionals or

## 2024-04-15 NOTE — TELEPHONE ENCOUNTER
Called and let pt know that her script for vraylar was sent to her pharmacy       Electronically signed by Tutu Ruiz on 4/15/2024 at 4:33 PM

## 2024-04-16 RX ORDER — BISOPROLOL FUMARATE 5 MG/1
5 TABLET, FILM COATED ORAL 2 TIMES DAILY
Qty: 180 TABLET | Refills: 1 | Status: SHIPPED | OUTPATIENT
Start: 2024-04-16

## 2024-04-23 DIAGNOSIS — F34.9 PERSISTENT MOOD DISORDER (HCC): ICD-10-CM

## 2024-04-23 RX ORDER — LAMOTRIGINE 150 MG/1
150 TABLET ORAL DAILY
Qty: 90 TABLET | Refills: 1 | OUTPATIENT
Start: 2024-04-23

## 2024-04-29 ENCOUNTER — OFFICE VISIT (OUTPATIENT)
Dept: PRIMARY CARE CLINIC | Age: 39
End: 2024-04-29
Payer: MEDICARE

## 2024-04-29 VITALS
SYSTOLIC BLOOD PRESSURE: 115 MMHG | HEART RATE: 73 BPM | TEMPERATURE: 97 F | RESPIRATION RATE: 18 BRPM | HEIGHT: 60 IN | DIASTOLIC BLOOD PRESSURE: 72 MMHG | BODY MASS INDEX: 38.79 KG/M2 | WEIGHT: 197.6 LBS | OXYGEN SATURATION: 100 %

## 2024-04-29 DIAGNOSIS — R21 RASH: Primary | ICD-10-CM

## 2024-04-29 DIAGNOSIS — F25.0 SCHIZOAFFECTIVE DISORDER, BIPOLAR TYPE (HCC): ICD-10-CM

## 2024-04-29 DIAGNOSIS — R56.9 SEIZURES (HCC): ICD-10-CM

## 2024-04-29 PROCEDURE — 99213 OFFICE O/P EST LOW 20 MIN: CPT | Performed by: NURSE PRACTITIONER

## 2024-04-29 ASSESSMENT — ENCOUNTER SYMPTOMS
COUGH: 0
VOMITING: 0
ALLERGIC/IMMUNOLOGIC NEGATIVE: 1
ABDOMINAL DISTENTION: 0
GASTROINTESTINAL NEGATIVE: 1
EYES NEGATIVE: 1
NAUSEA: 0
ABDOMINAL PAIN: 0
DIARRHEA: 0
RESPIRATORY NEGATIVE: 1
WHEEZING: 0
CONSTIPATION: 0
SHORTNESS OF BREATH: 0

## 2024-04-29 NOTE — PROGRESS NOTES
[facility]  [id number] [facility] [place] [address]  Dept: [phone]  Dept Fax: [phone]  Loc: [phone]    Diana Carlos is a 38 y.o. female who presents today for her medical conditions/complaints as noted below.  Diana Carlos is c/o of Follow-up (Pt here for 3 month f/u)        HPI:     HPI this 38-year-old female presents today for 3-month follow-up.  She states her rash continues and has gotten worse.  She states it is very itchy and it is all over.  She does state that the hydroxyzine seems to help some.  States she is currently only taking about twice a day.  She denies starting any new hygiene products or close detergent.  Patient is seeing SETH Schaffer for her psychiatric needs.  They have been titrating up on the lamotrigine.  Chief Complaint   Patient presents with    Follow-up     Pt here for 3 month f/u     Past Medical History:   Diagnosis Date    Abnormal glucose measurement     Abnormal Pap smear of cervix     Anxiety     Blood circulation, collateral     CAD (coronary artery disease)     Cerebral artery occlusion with cerebral infarction ([facility]) 2014    right side    Complication of anesthesia     difficulty waking    Depression     Encounter for adjustment and management of other cardiac device 2/2/2023    Heart disease 06/2020    LOOP recorder     IBS (irritable bowel syndrome)     MDD (major depressive disorder)     MI (myocardial infarction) ([facility]) 2014    Miscarriage 06/2012    Neurologic disorder     Postpartum depression     Prolonged emergence from general anesthesia     Schizophrenia ([facility])     Seizures ([facility])     anxiety related (last 6/2020)    Severe episode of recurrent major depressive disorder, without psychotic features ([facility]) 11/4/2021    Syncope     Tachycardia     UTI (urinary tract infection) 12/09/2019      Past Surgical History:   Procedure Laterality Date    CHOLECYSTECTOMY, LAPAROSCOPIC      COLONOSCOPY N/A 11/7/2019    Dr PALM

## 2024-04-29 NOTE — PATIENT INSTRUCTIONS
Reduce lamitcal to 100 mg nightly until you see NOAH Delgado on the 2nd.   Wean off lamictal due to rash.     Increase hydroxyzine to 4 times a day   May want to add pepcid otc twice a day

## 2024-04-30 ENCOUNTER — TELEPHONE (OUTPATIENT)
Dept: PSYCHIATRY | Age: 39
End: 2024-04-30

## 2024-04-30 NOTE — TELEPHONE ENCOUNTER
Called pt to cancel/reschedule appt for today 05/02/24 with Maura Delgado because the provider will not be in the office that day    No answer and LVM  Cancelled appt.    Electronically signed by Sulma Ortiz MA on 4/30/2024 at 4:19 PM

## 2024-05-06 ENCOUNTER — TELEPHONE (OUTPATIENT)
Dept: PSYCHIATRY | Age: 39
End: 2024-05-06

## 2024-05-06 NOTE — TELEPHONE ENCOUNTER
Called and confirmed appt with pt for 5/7 @ 2:30 PM.      Electronically signed by Tutu Ruiz on 5/6/2024 at 4:24 PM

## 2024-05-08 ENCOUNTER — OFFICE VISIT (OUTPATIENT)
Dept: PSYCHIATRY | Age: 39
End: 2024-05-08
Payer: MEDICARE

## 2024-05-08 ENCOUNTER — TELEPHONE (OUTPATIENT)
Dept: PSYCHIATRY | Age: 39
End: 2024-05-08

## 2024-05-08 VITALS
HEART RATE: 64 BPM | DIASTOLIC BLOOD PRESSURE: 76 MMHG | WEIGHT: 194.2 LBS | TEMPERATURE: 97 F | HEIGHT: 60 IN | SYSTOLIC BLOOD PRESSURE: 108 MMHG | OXYGEN SATURATION: 97 % | BODY MASS INDEX: 38.13 KG/M2

## 2024-05-08 DIAGNOSIS — F34.9 PERSISTENT MOOD DISORDER (HCC): Primary | ICD-10-CM

## 2024-05-08 DIAGNOSIS — F41.1 GENERALIZED ANXIETY DISORDER WITH PANIC ATTACKS: ICD-10-CM

## 2024-05-08 DIAGNOSIS — G47.00 INSOMNIA, UNSPECIFIED TYPE: ICD-10-CM

## 2024-05-08 DIAGNOSIS — F41.0 GENERALIZED ANXIETY DISORDER WITH PANIC ATTACKS: ICD-10-CM

## 2024-05-08 DIAGNOSIS — F43.12 CHRONIC POST-TRAUMATIC STRESS DISORDER (PTSD): ICD-10-CM

## 2024-05-08 PROCEDURE — 99214 OFFICE O/P EST MOD 30 MIN: CPT

## 2024-05-08 ASSESSMENT — PATIENT HEALTH QUESTIONNAIRE - PHQ9
SUM OF ALL RESPONSES TO PHQ QUESTIONS 1-9: 11
9. THOUGHTS THAT YOU WOULD BE BETTER OFF DEAD, OR OF HURTING YOURSELF: NOT AT ALL
SUM OF ALL RESPONSES TO PHQ QUESTIONS 1-9: 11
7. TROUBLE CONCENTRATING ON THINGS, SUCH AS READING THE NEWSPAPER OR WATCHING TELEVISION: SEVERAL DAYS
2. FEELING DOWN, DEPRESSED OR HOPELESS: SEVERAL DAYS
SUM OF ALL RESPONSES TO PHQ QUESTIONS 1-9: 11
8. MOVING OR SPEAKING SO SLOWLY THAT OTHER PEOPLE COULD HAVE NOTICED. OR THE OPPOSITE, BEING SO FIGETY OR RESTLESS THAT YOU HAVE BEEN MOVING AROUND A LOT MORE THAN USUAL: SEVERAL DAYS
1. LITTLE INTEREST OR PLEASURE IN DOING THINGS: SEVERAL DAYS
10. IF YOU CHECKED OFF ANY PROBLEMS, HOW DIFFICULT HAVE THESE PROBLEMS MADE IT FOR YOU TO DO YOUR WORK, TAKE CARE OF THINGS AT HOME, OR GET ALONG WITH OTHER PEOPLE: SOMEWHAT DIFFICULT
SUM OF ALL RESPONSES TO PHQ9 QUESTIONS 1 & 2: 2
6. FEELING BAD ABOUT YOURSELF - OR THAT YOU ARE A FAILURE OR HAVE LET YOURSELF OR YOUR FAMILY DOWN: NOT AT ALL
5. POOR APPETITE OR OVEREATING: NEARLY EVERY DAY
4. FEELING TIRED OR HAVING LITTLE ENERGY: MORE THAN HALF THE DAYS
3. TROUBLE FALLING OR STAYING ASLEEP: MORE THAN HALF THE DAYS
SUM OF ALL RESPONSES TO PHQ QUESTIONS 1-9: 11

## 2024-05-08 NOTE — TELEPHONE ENCOUNTER
Called pt to see if she could do a earlier phone appt with Maura Delgado for today because we are closing the office @ 3:00 because of the weather.     Pt agreed to come in at 11:30.    Electronically signed by Sulma Ortiz MA on 5/8/2024 at 9:12 AM

## 2024-05-08 NOTE — PROGRESS NOTES
of acute suicidality, homicidality or psychosis observed.  Patient is psychiatrically stable    Plan:    1.   Continue   Vraylar 1.5 mg daily for mood   Prazosin 1 mg nightly for nightmares related to PTSD  Hydroxyzine 25 mg TID PRN for anxiety       Start   Begin therapy with [place] [facility], missed appt have to reschedule once car gets fixed    Discontinue  Lamictal due to possible cause of rash    The risks, benefits, side effects, indications, contraindications, and adverse effects of the medications have been discussed. Yes.  2. The pt has verbalized understanding and has capacity to give informed consent.  3. The Jonathan report has been reviewed according to HB1 regulations.  4. Supportive therapy offered.  5. Follow up: Return in about 2 weeks (around 5/22/2024) for Medication Follow up.  6. The patient has been advised to call with any problems.  7. Controlled substance Treatment Plan: n/a.  8. The above listed medications have been continued, modifications in meds and other orders/labs as follows:      No orders of the defined types were placed in this encounter.     No orders of the defined types were placed in this encounter.      9. Additional comments: Continue therapy, discussed sleep hygiene, discussed the use of coping skills and relaxation strategies to manage symptoms.         10.Over 50% of the total visit time of   30  minutes was spent on counseling and/or coordination of care of:                        1. Persistent mood disorder (HCC)    2. Generalized anxiety disorder with panic attacks    3. Chronic post-traumatic stress disorder (PTSD)    4. Insomnia, unspecified type                          Psychotherapy Topics: mood/medication effectiveness, nightmares    Maura Delgado, APRN - CNP    This dictation was generated by voice recognition computer software.  Although all attempts are made to edit the dictation for accuracy, there may be errors in the transcription that are not intended.

## 2024-05-11 ENCOUNTER — HOSPITAL ENCOUNTER (EMERGENCY)
Age: 39
Discharge: LEFT BEFORE DISCHARGE | End: 2024-05-11

## 2024-05-11 VITALS
HEART RATE: 102 BPM | WEIGHT: 195.7 LBS | BODY MASS INDEX: 38.42 KG/M2 | HEIGHT: 60 IN | DIASTOLIC BLOOD PRESSURE: 91 MMHG | SYSTOLIC BLOOD PRESSURE: 110 MMHG | RESPIRATION RATE: 20 BRPM | TEMPERATURE: 98.2 F | OXYGEN SATURATION: 99 %

## 2024-05-11 ASSESSMENT — PAIN DESCRIPTION - PAIN TYPE: TYPE: ACUTE PAIN

## 2024-05-11 ASSESSMENT — PAIN DESCRIPTION - LOCATION: LOCATION: LEG

## 2024-05-11 ASSESSMENT — PAIN - FUNCTIONAL ASSESSMENT: PAIN_FUNCTIONAL_ASSESSMENT: 0-10

## 2024-05-11 ASSESSMENT — PAIN DESCRIPTION - ORIENTATION: ORIENTATION: LEFT;UPPER

## 2024-05-11 ASSESSMENT — PAIN DESCRIPTION - DESCRIPTORS: DESCRIPTORS: BURNING

## 2024-05-11 ASSESSMENT — PAIN SCALES - GENERAL: PAINLEVEL_OUTOF10: 8

## 2024-05-13 ENCOUNTER — HOSPITAL ENCOUNTER (OUTPATIENT)
Dept: PAIN MANAGEMENT | Age: 39
Discharge: HOME | End: 2024-05-13
Payer: MEDICARE

## 2024-05-13 ENCOUNTER — OFFICE VISIT (OUTPATIENT)
Dept: PRIMARY CARE CLINIC | Age: 39
End: 2024-05-13
Payer: MEDICARE

## 2024-05-13 VITALS
HEART RATE: 57 BPM | SYSTOLIC BLOOD PRESSURE: 120 MMHG | OXYGEN SATURATION: 99 % | TEMPERATURE: 97 F | RESPIRATION RATE: 18 BRPM | DIASTOLIC BLOOD PRESSURE: 74 MMHG

## 2024-05-13 VITALS
SYSTOLIC BLOOD PRESSURE: 115 MMHG | RESPIRATION RATE: 18 BRPM | TEMPERATURE: 97.2 F | HEIGHT: 60 IN | BODY MASS INDEX: 39.15 KG/M2 | OXYGEN SATURATION: 99 % | WEIGHT: 199.4 LBS | HEART RATE: 61 BPM | DIASTOLIC BLOOD PRESSURE: 78 MMHG

## 2024-05-13 DIAGNOSIS — G43.719 CHRONIC MIGRAINE WITHOUT AURA, INTRACTABLE, WITHOUT STATUS MIGRAINOSUS: Primary | ICD-10-CM

## 2024-05-13 DIAGNOSIS — T63.301D SPIDER BITE WOUND, ACCIDENTAL OR UNINTENTIONAL, SUBSEQUENT ENCOUNTER: Primary | ICD-10-CM

## 2024-05-13 PROCEDURE — 64615 CHEMODENERV MUSC MIGRAINE: CPT

## 2024-05-13 PROCEDURE — 99213 OFFICE O/P EST LOW 20 MIN: CPT | Performed by: FAMILY MEDICINE

## 2024-05-13 PROCEDURE — 6360000002 HC RX W HCPCS

## 2024-05-13 PROCEDURE — 64615 CHEMODENERV MUSC MIGRAINE: CPT | Performed by: PSYCHIATRY & NEUROLOGY

## 2024-05-13 PROCEDURE — 2580000003 HC RX 258

## 2024-05-13 PROCEDURE — A4216 STERILE WATER/SALINE, 10 ML: HCPCS

## 2024-05-13 RX ORDER — AMOXICILLIN AND CLAVULANATE POTASSIUM 875; 125 MG/1; MG/1
1 TABLET, FILM COATED ORAL 2 TIMES DAILY
COMMUNITY
Start: 2024-05-11 | End: 2024-05-22

## 2024-05-13 RX ORDER — SODIUM CHLORIDE 9 MG/ML
4.5 INJECTION, SOLUTION INTRAMUSCULAR; INTRAVENOUS; SUBCUTANEOUS ONCE
Status: DISCONTINUED | OUTPATIENT
Start: 2024-05-13 | End: 2024-05-15 | Stop reason: HOSPADM

## 2024-05-13 RX ORDER — PREDNISONE 20 MG/1
TABLET ORAL
COMMUNITY
Start: 2024-05-11 | End: 2024-05-20

## 2024-05-13 ASSESSMENT — ENCOUNTER SYMPTOMS
CHEST TIGHTNESS: 0
SHORTNESS OF BREATH: 0
WHEEZING: 0
BLOOD IN STOOL: 0
ABDOMINAL PAIN: 0

## 2024-05-13 NOTE — PROGRESS NOTES
capsule 0    hydrOXYzine HCl (ATARAX) 25 MG tablet Take 1 tablet by mouth 3 times daily as needed for Anxiety 90 tablet 1    sucralfate (CARAFATE) 1 GM tablet TAKE 1 TABLET BY MOUTH FOUR TIMES A  tablet 1    meloxicam (MOBIC) 7.5 MG tablet Take 1 tablet by mouth daily 30 tablet 3    fluticasone (FLONASE) 50 MCG/ACT nasal spray 2 sprays by Each Nostril route daily 48 g 1    ondansetron (ZOFRAN-ODT) 8 MG TBDP disintegrating tablet PLACE 1 TABLET ON THE TONGUE EVERY 8 HOURS AS NEEDED FOR NAUSEA AND VOMITING      ondansetron (ZOFRAN-ODT) 4 MG disintegrating tablet Take 1 tablet by mouth 3 times daily as needed for Nausea or Vomiting 21 tablet 0    vitamin D (ERGOCALCIFEROL) 1.25 MG (11086 UT) CAPS capsule Take 1 capsule by mouth once a week 12 capsule 1    aspirin 81 MG EC tablet Take 1 tablet by mouth daily       No current facility-administered medications for this visit.     Facility-Administered Medications Ordered in Other Visits   Medication Dose Route Frequency Provider Last Rate Last Admin    sodium chloride (PF) 0.9 % injection 4.5 mL  4.5 mL Other Once George Bobby DO        onabotulinumtoxinA (BOTOX) injection 200 Units  200 Units IntraMUSCular Once George Bobby DO          Family History   Problem Relation Age of Onset    High Blood Pressure Mother     Diabetes Father     Heart Disease Father     Stomach Cancer Father     Colon Cancer Paternal Grandmother     Breast Cancer Other         maternal great aunt    Colon Polyps Neg Hx     Esophageal Cancer Neg Hx     Liver Cancer Neg Hx     Liver Disease Neg Hx     Rectal Cancer Neg Hx       Past Medical History:   Diagnosis Date    Abnormal glucose measurement     Abnormal Pap smear of cervix     Anxiety     Blood circulation, collateral     CAD (coronary artery disease)     Cerebral artery occlusion with cerebral infarction (HCC) 2014    right side    Complication of anesthesia     difficulty waking    Depression     Encounter for adjustment and

## 2024-05-13 NOTE — PROGRESS NOTES
[facility]   [address]  [place] [address]  [phone] p  [phone]    Procedure:  Level of Consciousness: [x]Alert [x]Oriented []Disoriented []Lethargic  Anxiety Level: [x]Calm []Anxious []Depressed []Other  Skin: []Warm [x]Dry []Cool []Moist []Intact []Other  Cardiovascular: [x]Palpitations: []Never []Occasionally []Frequently  Chest Pain: [x]No []Yes  Respiratory:  [x]Unlabored []Labored []Cough ([] Productive []Unproductive)  HCG Required: [x]No []Yes   Results: []Negative []Positive  Knowledge Level:        [x]Patient/Other verbalized understanding of pre-procedure instructions.        [x]Assessment of post-op care needs (transportation, responsible caregiver)        [x]Able to discuss health care problems and how to deal with it.  Factors that Affect Teaching:        Language Barrier: [x]No []Yes - why:        Hearing Loss:        [x]No []Yes            Corrective Device:  []Yes []No        Vision Loss:           [x]No []Yes            Corrective Device:  []Yes []No        Memory Loss:       [x]No []Yes            []Short Term []Long Term  Motivational Level:  [x]Asks Questions                  []Extremely Anxious       [x]Seems Interested               []Seems Uninterested                  []Denies need for Education  Risk for Injury:  [x]Patient oriented to person, place and time  []History of frequent falls/loss of balance  Nutritional:  []Change in appetite   []Weight Gain   []Weight Loss  Functional:  []Requires assistance with ADL's      Migraine  Follow up Assessment:  Patient experiences 30 headaches per month  Patient states that he/she has 15 headaches out of 30 days each month.  Patient states that he/she has 20 migraines out of 30 days each month.  Patient has experienced a  reduction in Migraine headaches less than 15 days per month []Yes [x]No  Patient has experienced a reduction in Migraine hours []Yes [x]No

## 2024-05-13 NOTE — ADDENDUM NOTE
Encounter addended by: Geogre Bobby DO on: 5/13/2024 12:41 PM   Actions taken: Charge Capture section accepted

## 2024-05-14 ENCOUNTER — TELEPHONE (OUTPATIENT)
Dept: URGENT CARE | Facility: CLINIC | Age: 39
End: 2024-05-14
Payer: MEDICARE

## 2024-05-14 ENCOUNTER — HOSPITAL ENCOUNTER (EMERGENCY)
Age: 39
Discharge: HOME | End: 2024-05-14
Payer: MEDICARE

## 2024-05-14 VITALS
OXYGEN SATURATION: 100 % | WEIGHT: 194 LBS | DIASTOLIC BLOOD PRESSURE: 57 MMHG | TEMPERATURE: 97.8 F | HEART RATE: 53 BPM | BODY MASS INDEX: 38.09 KG/M2 | HEIGHT: 60 IN | RESPIRATION RATE: 18 BRPM | SYSTOLIC BLOOD PRESSURE: 104 MMHG

## 2024-05-14 DIAGNOSIS — T63.304A SPIDER BITE WOUND, UNDETERMINED INTENT, INITIAL ENCOUNTER: Primary | ICD-10-CM

## 2024-05-14 LAB
ALBUMIN SERPL-MCNC: 3.9 G/DL (ref 3.5–5.2)
ALP SERPL-CCNC: 86 U/L (ref 35–104)
ALT SERPL-CCNC: 38 U/L (ref 5–33)
ANION GAP SERPL CALCULATED.3IONS-SCNC: 17 MMOL/L (ref 7–19)
AST SERPL-CCNC: 33 U/L (ref 5–32)
BASOPHILS # BLD: 0.1 K/UL (ref 0–0.2)
BASOPHILS NFR BLD: 0.6 % (ref 0–1)
BILIRUB SERPL-MCNC: 0.2 MG/DL (ref 0.2–1.2)
BUN SERPL-MCNC: 16 MG/DL (ref 6–20)
CALCIUM SERPL-MCNC: 8.6 MG/DL (ref 8.6–10)
CHLORIDE SERPL-SCNC: 105 MMOL/L (ref 98–111)
CO2 SERPL-SCNC: 17 MMOL/L (ref 22–29)
CREAT SERPL-MCNC: 0.8 MG/DL (ref 0.5–0.9)
EOSINOPHIL # BLD: 0.3 K/UL (ref 0–0.6)
EOSINOPHIL NFR BLD: 2.3 % (ref 0–5)
ERYTHROCYTE [DISTWIDTH] IN BLOOD BY AUTOMATED COUNT: 13.6 % (ref 11.5–14.5)
GLUCOSE SERPL-MCNC: 84 MG/DL (ref 74–109)
HCT VFR BLD AUTO: 42.6 % (ref 37–47)
HGB BLD-MCNC: 13.3 G/DL (ref 12–16)
IMM GRANULOCYTES # BLD: 0 K/UL
LYMPHOCYTES # BLD: 3.6 K/UL (ref 1.1–4.5)
LYMPHOCYTES NFR BLD: 33.7 % (ref 20–40)
MCH RBC QN AUTO: 29.9 PG (ref 27–31)
MCHC RBC AUTO-ENTMCNC: 31.2 G/DL (ref 33–37)
MCV RBC AUTO: 95.7 FL (ref 81–99)
MONOCYTES # BLD: 0.7 K/UL (ref 0–0.9)
MONOCYTES NFR BLD: 6.4 % (ref 0–10)
NEUTROPHILS # BLD: 6 K/UL (ref 1.5–7.5)
NEUTS SEG NFR BLD: 56.6 % (ref 50–65)
PLATELET # BLD AUTO: 317 K/UL (ref 130–400)
PMV BLD AUTO: 9.4 FL (ref 9.4–12.3)
POTASSIUM SERPL-SCNC: 3.6 MMOL/L (ref 3.5–5)
PROT SERPL-MCNC: 7 G/DL (ref 6.6–8.7)
RBC # BLD AUTO: 4.45 M/UL (ref 4.2–5.4)
SODIUM SERPL-SCNC: 139 MMOL/L (ref 136–145)
WBC # BLD AUTO: 10.7 K/UL (ref 4.8–10.8)

## 2024-05-14 PROCEDURE — 6360000002 HC RX W HCPCS: Performed by: PHYSICIAN ASSISTANT

## 2024-05-14 PROCEDURE — 96372 THER/PROPH/DIAG INJ SC/IM: CPT

## 2024-05-14 PROCEDURE — 99284 EMERGENCY DEPT VISIT MOD MDM: CPT

## 2024-05-14 PROCEDURE — 80053 COMPREHEN METABOLIC PANEL: CPT

## 2024-05-14 PROCEDURE — 85025 COMPLETE CBC W/AUTO DIFF WBC: CPT

## 2024-05-14 PROCEDURE — 87040 BLOOD CULTURE FOR BACTERIA: CPT

## 2024-05-14 PROCEDURE — 36415 COLL VENOUS BLD VENIPUNCTURE: CPT

## 2024-05-14 RX ORDER — DEXAMETHASONE SODIUM PHOSPHATE 10 MG/ML
10 INJECTION, SOLUTION INTRAMUSCULAR; INTRAVENOUS ONCE
Status: COMPLETED | OUTPATIENT
Start: 2024-05-14 | End: 2024-05-14

## 2024-05-14 RX ADMIN — DEXAMETHASONE SODIUM PHOSPHATE 10 MG: 10 INJECTION, SOLUTION INTRAMUSCULAR; INTRAVENOUS at 14:30

## 2024-05-14 ASSESSMENT — ENCOUNTER SYMPTOMS
ABDOMINAL PAIN: 0
COLOR CHANGE: 1
SHORTNESS OF BREATH: 0

## 2024-05-14 NOTE — ED PROVIDER NOTES
Pulmonary effort is normal. No respiratory distress.   Musculoskeletal:        Legs:       Comments: Suspected spider bite on the left lateral knee. No decreased ROM or bony tenderness of the joint. Surrounding redness with central necrosis and blister. No active drainage. Surrounding erythema and localized warmth. Localized tenderness. Negative Nikolsky sign. Joint above and below are within normal limits. No proximal erythematous streaking.    Skin:     General: Skin is warm and dry.   Neurological:      General: No focal deficit present.      Mental Status: She is alert.         DIAGNOSTIC RESULTS     LABS:  Labs Reviewed   CBC WITH AUTO DIFFERENTIAL - Abnormal; Notable for the following components:       Result Value    MCHC 31.2 (*)     All other components within normal limits   COMPREHENSIVE METABOLIC PANEL - Abnormal; Notable for the following components:    CO2 17 (*)     ALT 38 (*)     AST 33 (*)     All other components within normal limits   CULTURE, BLOOD 2   CULTURE, BLOOD 1       All other labs were within normal range or not returned as of this dictation.    EMERGENCY DEPARTMENT COURSE and DIFFERENTIAL DIAGNOSIS/MDM:   Vitals:    Vitals:    05/14/24 1203   BP: (!) 104/57   Pulse: 53   Resp: 18   Temp: 97.8 °F (36.6 °C)   SpO2: 100%   Weight: 88 kg (194 lb)   Height: 1.524 m (5')       MDM  Patient is a 38-year-old female presented to the ER with concern for a spider bite.  At this time, there does appear to be localized cellulitis but it does not appear to be streaking up the leg or have any other concerning findings.  She is afebrile without medication.  She has been on antibiotic but is still having worsening of the redness.  We have taken a skin marker and drawn around the area so we can monitor the extent of the worsening cellulitis.  Augmentin is good coverage for the skin and so will not change her antibiotic at this time.  She was given an additional dose of steroid after discussing case with

## 2024-05-14 NOTE — DISCHARGE INSTRUCTIONS
Follow up with your primary care provider in the next 2-3 days to ensure improvement. Return to the ER for new or worsening symptoms.  Follow up with general surgeon as needed.

## 2024-05-14 NOTE — TELEPHONE ENCOUNTER
Pharmacy wants to know if the Ketorolac 10 MG can be changed to Meloxicam 7.5 MG. Ketorolac is not covered by patient's insurance.           Jovi VÁZQUEZ) contacted pt yest with denial of toradol at pharmacy and advised her to use ibuprofen which Jovi states today that pt said was fine.

## 2024-05-14 NOTE — ED NOTES
This RN and another RN attempted to start IV on pt. Was able to get some labs, but not all. Phlebotomy called to draw rest of labs.

## 2024-05-19 LAB
BACTERIA BLD CULT ORG #2: NORMAL
BACTERIA BLD CULT: NORMAL

## 2024-05-21 ENCOUNTER — TELEPHONE (OUTPATIENT)
Dept: PSYCHIATRY | Age: 39
End: 2024-05-21

## 2024-05-21 NOTE — TELEPHONE ENCOUNTER
[facility] no showed [facility]. Office called the patient to check on them and to reschedule their appointment.     Patient's appointment was rescheduled. Discussed the three no show and dismissal policies with the patient. Provided education that after three no show, patients can be dismissed by the provider and practice. Patient verbally understood. Barriers to treatment was discussed with the patient.    Discussed the three late cancellation and dismissal policies with the patient. Provided education that after three late cancellations, patients can be dismissed by the provider and practice. Patient verbally understood.    Barriers to treatment:  Pt had to espinoza her daughter the the doctor.    Rescheduled for 05/24/24 @ 1:00.    Electronically signed by Sulma Ortiz MA on 5/21/2024 at 3:49 PM

## 2024-05-22 ENCOUNTER — TELEPHONE (OUTPATIENT)
Dept: PSYCHIATRY | Age: 39
End: 2024-05-22

## 2024-05-22 NOTE — TELEPHONE ENCOUNTER
Called pt on 05/22/24 for appointment reminder for 05/24/24. Pt confirmed    Reminded patient to complete their visit pre-check/digital registration in [facility] / ?????.??.

## 2024-05-24 ENCOUNTER — OFFICE VISIT (OUTPATIENT)
Dept: PSYCHIATRY | Age: 39
End: 2024-05-24
Payer: MEDICARE

## 2024-05-24 ENCOUNTER — TELEPHONE (OUTPATIENT)
Dept: PSYCHIATRY | Age: 39
End: 2024-05-24

## 2024-05-24 VITALS
TEMPERATURE: 98 F | BODY MASS INDEX: 38.5 KG/M2 | SYSTOLIC BLOOD PRESSURE: 120 MMHG | WEIGHT: 196.1 LBS | DIASTOLIC BLOOD PRESSURE: 89 MMHG | HEIGHT: 60 IN | OXYGEN SATURATION: 98 % | RESPIRATION RATE: 16 BRPM | HEART RATE: 73 BPM

## 2024-05-24 DIAGNOSIS — F34.9 PERSISTENT MOOD DISORDER (HCC): ICD-10-CM

## 2024-05-24 DIAGNOSIS — F34.9 PERSISTENT MOOD DISORDER (HCC): Primary | ICD-10-CM

## 2024-05-24 DIAGNOSIS — F43.12 CHRONIC POST-TRAUMATIC STRESS DISORDER (PTSD): ICD-10-CM

## 2024-05-24 DIAGNOSIS — F41.1 GENERALIZED ANXIETY DISORDER WITH PANIC ATTACKS: ICD-10-CM

## 2024-05-24 DIAGNOSIS — F41.0 GENERALIZED ANXIETY DISORDER WITH PANIC ATTACKS: ICD-10-CM

## 2024-05-24 LAB
ALBUMIN SERPL-MCNC: 3.8 G/DL (ref 3.5–5.2)
ALP SERPL-CCNC: 78 U/L (ref 35–104)
ALT SERPL-CCNC: 15 U/L (ref 5–33)
ANION GAP SERPL CALCULATED.3IONS-SCNC: 13 MMOL/L (ref 7–19)
AST SERPL-CCNC: 18 U/L (ref 5–32)
BILIRUB SERPL-MCNC: 0.3 MG/DL (ref 0.2–1.2)
BUN SERPL-MCNC: 10 MG/DL (ref 6–20)
CALCIUM SERPL-MCNC: 8.8 MG/DL (ref 8.6–10)
CHLORIDE SERPL-SCNC: 102 MMOL/L (ref 98–111)
CO2 SERPL-SCNC: 23 MMOL/L (ref 22–29)
CREAT SERPL-MCNC: 0.7 MG/DL (ref 0.5–0.9)
GLUCOSE SERPL-MCNC: 90 MG/DL (ref 74–109)
POTASSIUM SERPL-SCNC: 3.7 MMOL/L (ref 3.5–5)
PROT SERPL-MCNC: 7 G/DL (ref 6.6–8.7)
SODIUM SERPL-SCNC: 138 MMOL/L (ref 136–145)

## 2024-05-24 PROCEDURE — 99214 OFFICE O/P EST MOD 30 MIN: CPT

## 2024-05-24 RX ORDER — DULOXETIN HYDROCHLORIDE 30 MG/1
30 CAPSULE, DELAYED RELEASE ORAL DAILY
Qty: 30 CAPSULE | Refills: 0 | Status: SHIPPED | OUTPATIENT
Start: 2024-05-24

## 2024-05-24 ASSESSMENT — PATIENT HEALTH QUESTIONNAIRE - PHQ9
SUM OF ALL RESPONSES TO PHQ QUESTIONS 1-9: 15
4. FEELING TIRED OR HAVING LITTLE ENERGY: MORE THAN HALF THE DAYS
SUM OF ALL RESPONSES TO PHQ QUESTIONS 1-9: 15
SUM OF ALL RESPONSES TO PHQ QUESTIONS 1-9: 15
6. FEELING BAD ABOUT YOURSELF - OR THAT YOU ARE A FAILURE OR HAVE LET YOURSELF OR YOUR FAMILY DOWN: SEVERAL DAYS
3. TROUBLE FALLING OR STAYING ASLEEP: SEVERAL DAYS
SUM OF ALL RESPONSES TO PHQ9 QUESTIONS 1 & 2: 4
8. MOVING OR SPEAKING SO SLOWLY THAT OTHER PEOPLE COULD HAVE NOTICED. OR THE OPPOSITE, BEING SO FIGETY OR RESTLESS THAT YOU HAVE BEEN MOVING AROUND A LOT MORE THAN USUAL: MORE THAN HALF THE DAYS
7. TROUBLE CONCENTRATING ON THINGS, SUCH AS READING THE NEWSPAPER OR WATCHING TELEVISION: NEARLY EVERY DAY
5. POOR APPETITE OR OVEREATING: MORE THAN HALF THE DAYS
9. THOUGHTS THAT YOU WOULD BE BETTER OFF DEAD, OR OF HURTING YOURSELF: NOT AT ALL
SUM OF ALL RESPONSES TO PHQ QUESTIONS 1-9: 15
1. LITTLE INTEREST OR PLEASURE IN DOING THINGS: NEARLY EVERY DAY
10. IF YOU CHECKED OFF ANY PROBLEMS, HOW DIFFICULT HAVE THESE PROBLEMS MADE IT FOR YOU TO DO YOUR WORK, TAKE CARE OF THINGS AT HOME, OR GET ALONG WITH OTHER PEOPLE: VERY DIFFICULT
2. FEELING DOWN, DEPRESSED OR HOPELESS: SEVERAL DAYS

## 2024-05-24 ASSESSMENT — COLUMBIA-SUICIDE SEVERITY RATING SCALE - C-SSRS
2. HAVE YOU ACTUALLY HAD ANY THOUGHTS OF KILLING YOURSELF?: NO
1. WITHIN THE PAST MONTH, HAVE YOU WISHED YOU WERE DEAD OR WISHED YOU COULD GO TO SLEEP AND NOT WAKE UP?: NO
7. DID THIS OCCUR IN THE LAST THREE MONTHS: NO
6. HAVE YOU EVER DONE ANYTHING, STARTED TO DO ANYTHING, OR PREPARED TO DO ANYTHING TO END YOUR LIFE?: YES

## 2024-05-24 NOTE — TELEPHONE ENCOUNTER
Per Maura ANN called pt and let pt know that her labs were  fine, kidney and liver function are good. Will send in cymbalta to pharmacy to take once a day. Pt stated ok,      Electronically signed by Tutu Ruiz on 5/24/2024 at 2:51 PM

## 2024-05-24 NOTE — PROGRESS NOTES
CNP    This dictation was generated by voice recognition computer software.  Although all attempts are made to edit the dictation for accuracy, there may be errors in the transcription that are not intended.

## 2024-06-03 ENCOUNTER — OFFICE VISIT (OUTPATIENT)
Dept: PRIMARY CARE CLINIC | Age: 39
End: 2024-06-03
Payer: MEDICARE

## 2024-06-03 VITALS
RESPIRATION RATE: 18 BRPM | BODY MASS INDEX: 38.64 KG/M2 | HEART RATE: 64 BPM | SYSTOLIC BLOOD PRESSURE: 122 MMHG | DIASTOLIC BLOOD PRESSURE: 84 MMHG | TEMPERATURE: 97.4 F | WEIGHT: 196.8 LBS | OXYGEN SATURATION: 100 % | HEIGHT: 60 IN

## 2024-06-03 DIAGNOSIS — T63.301D SPIDER BITE WOUND, ACCIDENTAL OR UNINTENTIONAL, SUBSEQUENT ENCOUNTER: ICD-10-CM

## 2024-06-03 DIAGNOSIS — L02.91 ABSCESS: ICD-10-CM

## 2024-06-03 DIAGNOSIS — R21 RASH: Primary | ICD-10-CM

## 2024-06-03 PROCEDURE — 99213 OFFICE O/P EST LOW 20 MIN: CPT | Performed by: NURSE PRACTITIONER

## 2024-06-03 RX ORDER — CEPHALEXIN 500 MG/1
500 CAPSULE ORAL 4 TIMES DAILY
Qty: 28 CAPSULE | Refills: 0 | Status: SHIPPED | OUTPATIENT
Start: 2024-06-03 | End: 2024-06-10

## 2024-06-03 ASSESSMENT — ENCOUNTER SYMPTOMS
EYES NEGATIVE: 1
SHORTNESS OF BREATH: 0
WHEEZING: 0
COUGH: 0
RESPIRATORY NEGATIVE: 1
ALLERGIC/IMMUNOLOGIC NEGATIVE: 1
GASTROINTESTINAL NEGATIVE: 1

## 2024-06-03 NOTE — PROGRESS NOTES
[facility]  [id number] [place] [address]  Dept: [phone]  Dept Fax: [phone]  Loc: [phone]    Diana Carlos is a 39 y.o. female who presents today for her medical conditions/complaints as noted below.  Diana Carlos is c/o of 1 Month Follow-Up (Pt is here for 1 month follow up on a rash. Pt states the rash is pretty much gone. Pt was seen in may by Dr. Vaughn for a spider bite above her left knee and it's looking better. No new concerns. )        HPI:     HPI this 39-year-old female presents today for 1 month follow-up on a rash and a spider bite above her left knee.  She states that he is looking better and she is feeling better however there still is a fairly large firm mass under the bite site.  She does states she finished the antibiotic about 1 week ago.  She states that she has been cleaning it daily and applying Neosporin.  She states her rash is completely resolved.  Chief Complaint   Patient presents with    1 Month Follow-Up     Pt is here for 1 month follow up on a rash. Pt states the rash is pretty much gone. Pt was seen in may by Dr. Vaughn for a spider bite above her left knee and it's looking better. No new concerns.      Past Medical History:   Diagnosis Date    Abnormal glucose measurement     Abnormal Pap smear of cervix     Anxiety     Blood circulation, collateral     CAD (coronary artery disease)     Cerebral artery occlusion with cerebral infarction ([facility]) 2014    right side    Complication of anesthesia     difficulty waking    Depression     Encounter for adjustment and management of other cardiac device 2/2/2023    Heart disease 06/2020    LOOP recorder     IBS (irritable bowel syndrome)     MDD (major depressive disorder)     MI (myocardial infarction) ([facility]) 2014    Miscarriage 06/2012    Neurologic disorder     Postpartum depression     Prolonged emergence from general anesthesia     Schizophrenia ([facility])     Seizures ([facility])

## 2024-06-03 NOTE — PATIENT INSTRUCTIONS
Clean site with dial soap twice a day  Then apply antibacterial ointment    Keep clean and dry.     Apply warm compresses 2 to 3 times a day    Probiotic daily for 3 to 6 months following 2 rounds of antibiotic therapy

## 2024-06-06 ENCOUNTER — TELEPHONE (OUTPATIENT)
Dept: PSYCHIATRY | Age: 39
End: 2024-06-06

## 2024-06-06 NOTE — TELEPHONE ENCOUNTER
Pt called to cancel/reschedule her appt for 06/07/24 with Maura Danny because she double booked herself with appts and will not be able to make the appt.    Rescheduled for 06/20/24 @ 3:30.    Electronically signed by Sulma Ortiz MA on 6/6/2024 at 3:48 PM

## 2024-06-06 NOTE — TELEPHONE ENCOUNTER
Called and confirmed appt with pt for 6/7 @ 2:30 PM    Electronically signed by Tutu Ruiz on 6/6/2024 at 10:07 AM

## 2024-06-19 ENCOUNTER — TELEPHONE (OUTPATIENT)
Dept: PSYCHIATRY | Age: 39
End: 2024-06-19

## 2024-06-19 NOTE — TELEPHONE ENCOUNTER
Called pt on 06/19/24 for appointment reminder for 06/20/24. Pt confirmed    ?   Reminded patient to complete their visit pre-check/digital registration in [facility].

## 2024-06-20 ENCOUNTER — OFFICE VISIT (OUTPATIENT)
Dept: PSYCHIATRY | Age: 39
End: 2024-06-20
Payer: MEDICARE

## 2024-06-20 VITALS
OXYGEN SATURATION: 97 % | WEIGHT: 193.9 LBS | HEART RATE: 57 BPM | HEIGHT: 60 IN | SYSTOLIC BLOOD PRESSURE: 126 MMHG | BODY MASS INDEX: 38.07 KG/M2 | TEMPERATURE: 97.1 F | DIASTOLIC BLOOD PRESSURE: 85 MMHG

## 2024-06-20 DIAGNOSIS — F43.12 CHRONIC POST-TRAUMATIC STRESS DISORDER (PTSD): ICD-10-CM

## 2024-06-20 DIAGNOSIS — F41.0 GENERALIZED ANXIETY DISORDER WITH PANIC ATTACKS: ICD-10-CM

## 2024-06-20 DIAGNOSIS — F34.9 PERSISTENT MOOD DISORDER (HCC): Primary | ICD-10-CM

## 2024-06-20 DIAGNOSIS — F41.1 GENERALIZED ANXIETY DISORDER WITH PANIC ATTACKS: ICD-10-CM

## 2024-06-20 DIAGNOSIS — G47.00 INSOMNIA, UNSPECIFIED TYPE: ICD-10-CM

## 2024-06-20 PROCEDURE — 99214 OFFICE O/P EST MOD 30 MIN: CPT

## 2024-06-20 RX ORDER — PRAZOSIN HYDROCHLORIDE 1 MG/1
1 CAPSULE ORAL NIGHTLY
Qty: 30 CAPSULE | Refills: 2 | Status: SHIPPED | OUTPATIENT
Start: 2024-06-20 | End: 2024-09-18

## 2024-06-20 RX ORDER — DULOXETIN HYDROCHLORIDE 30 MG/1
30 CAPSULE, DELAYED RELEASE ORAL DAILY
Qty: 30 CAPSULE | Refills: 2 | Status: SHIPPED | OUTPATIENT
Start: 2024-06-20

## 2024-06-20 ASSESSMENT — PATIENT HEALTH QUESTIONNAIRE - PHQ9
SUM OF ALL RESPONSES TO PHQ QUESTIONS 1-9: 18
10. IF YOU CHECKED OFF ANY PROBLEMS, HOW DIFFICULT HAVE THESE PROBLEMS MADE IT FOR YOU TO DO YOUR WORK, TAKE CARE OF THINGS AT HOME, OR GET ALONG WITH OTHER PEOPLE: SOMEWHAT DIFFICULT
9. THOUGHTS THAT YOU WOULD BE BETTER OFF DEAD, OR OF HURTING YOURSELF: NOT AT ALL
5. POOR APPETITE OR OVEREATING: NEARLY EVERY DAY
2. FEELING DOWN, DEPRESSED OR HOPELESS: MORE THAN HALF THE DAYS
SUM OF ALL RESPONSES TO PHQ QUESTIONS 1-9: 18
1. LITTLE INTEREST OR PLEASURE IN DOING THINGS: SEVERAL DAYS
8. MOVING OR SPEAKING SO SLOWLY THAT OTHER PEOPLE COULD HAVE NOTICED. OR THE OPPOSITE, BEING SO FIGETY OR RESTLESS THAT YOU HAVE BEEN MOVING AROUND A LOT MORE THAN USUAL: NEARLY EVERY DAY
4. FEELING TIRED OR HAVING LITTLE ENERGY: NEARLY EVERY DAY
7. TROUBLE CONCENTRATING ON THINGS, SUCH AS READING THE NEWSPAPER OR WATCHING TELEVISION: MORE THAN HALF THE DAYS
6. FEELING BAD ABOUT YOURSELF - OR THAT YOU ARE A FAILURE OR HAVE LET YOURSELF OR YOUR FAMILY DOWN: SEVERAL DAYS
SUM OF ALL RESPONSES TO PHQ QUESTIONS 1-9: 18
SUM OF ALL RESPONSES TO PHQ9 QUESTIONS 1 & 2: 3
3. TROUBLE FALLING OR STAYING ASLEEP: NEARLY EVERY DAY
SUM OF ALL RESPONSES TO PHQ QUESTIONS 1-9: 18

## 2024-06-20 ASSESSMENT — COLUMBIA-SUICIDE SEVERITY RATING SCALE - C-SSRS
6. HAVE YOU EVER DONE ANYTHING, STARTED TO DO ANYTHING, OR PREPARED TO DO ANYTHING TO END YOUR LIFE?: YES
1. WITHIN THE PAST MONTH, HAVE YOU WISHED YOU WERE DEAD OR WISHED YOU COULD GO TO SLEEP AND NOT WAKE UP?: NO
2. HAVE YOU ACTUALLY HAD ANY THOUGHTS OF KILLING YOURSELF?: NO
7. DID THIS OCCUR IN THE LAST THREE MONTHS: NO

## 2024-06-20 NOTE — PROGRESS NOTES
activity (dyspnea), SOB while lying flat (orthopnea), awakening with severe SOB (paroxysmal nocturnal dyspnea))    Gastrointestinal: (NVD, constipation, abdominal pain, bright red stools, black tarry stools, stool incontinence)     Genitourinary:  (pelvic pain, burning or frequency of urination, urinary urgency, blood in urine incomplete bladder emptying, urinary incontinence, STD; MEN: testicular pain or swelling, erectile dysfunction; WOMEN: LMP, heavy menstrual bleeding (menorrhagia), irregular periods, postmenopausal bleeding, menstrual pain (dymenorrhea, vaginal discharge)    Musculoskeletal: (bone pain/fracture, joint pain or swelling, musle pain)    Integumentary: (rashes, acne, non-healing sores, itching, breast lumps, breast pain, nipple discharge, hair loss)    Neurologic: (HA, muscle weakness, paresthesias (numbness, coldness, crawling or prickling), memory loss, seizure, dizziness)    Psychiatric:  (anxiety, sadness, irritability/anger, insomnia, suicidality)    Endocrine: (heat or cold intolerance, excessive thirst (polydipsia), excessive hunger (polyphagia))    Immune/Allergic: (hives, seasonal or environmental allergies, HIV exposure)    Hematologic/Lymphatic: (lymph node enlargement, easy bleeding or bruising)    History obtained via chart review and patient    PCP is Marleni Saeed APRN - NP       Current Meds:    Prior to Admission medications    Medication Sig Start Date End Date Taking? Authorizing Provider   cariprazine hcl (VRAYLAR) 1.5 MG capsule Take 1 capsule by mouth daily 6/20/24  Yes Maura Delgado APRN - CNP   DULoxetine (CYMBALTA) 30 MG extended release capsule Take 1 capsule by mouth daily 6/20/24  Yes Maura Delgado APRN - CNP   prazosin (MINIPRESS) 1 MG capsule Take 1 capsule by mouth nightly 6/20/24 9/18/24 Yes Maura Delgado APRN - CNP   bisoprolol (ZEBETA) 5 MG tablet TAKE 1 TABLET BY MOUTH TWICE A DAY 4/16/24   Royal Tamayo APRN   hydrOXYzine HCl (ATARAX)

## 2024-06-24 DIAGNOSIS — F41.1 GENERALIZED ANXIETY DISORDER WITH PANIC ATTACKS: ICD-10-CM

## 2024-06-24 DIAGNOSIS — F41.0 GENERALIZED ANXIETY DISORDER WITH PANIC ATTACKS: ICD-10-CM

## 2024-06-24 RX ORDER — HYDROXYZINE HYDROCHLORIDE 25 MG/1
25 TABLET, FILM COATED ORAL 3 TIMES DAILY PRN
Qty: 90 TABLET | Refills: 1 | Status: SHIPPED | OUTPATIENT
Start: 2024-06-24

## 2024-06-24 NOTE — TELEPHONE ENCOUNTER
situation.   Language: Intact.   Fund of information: Intact.   Memory: Recent and remote appear intact.   Impulsivity: Limited.   Neurovegitative: Fair appetite and sleep.   Insight: Fair.   Judgment: Fair.     Cognition: Can spell \"world\" backwards: Yes                    Can do serial 7's: Yes           Lab Results   Component Value Date      05/24/2024     K 3.7 05/24/2024      05/24/2024     CO2 23 05/24/2024     BUN 10 05/24/2024     CREATININE 0.7 05/24/2024     GLUCOSE 90 05/24/2024     CALCIUM 8.8 05/24/2024     BILITOT 0.3 05/24/2024     ALKPHOS 78 05/24/2024     AST 18 05/24/2024     ALT 15 05/24/2024     LABGLOM >90 05/24/2024     GFRAA >59 09/06/2022     GLOB 3.3 07/13/2016            Lab Results   Component Value Date/Time      05/24/2024 01:31 PM     K 3.7 05/24/2024 01:31 PM     K 4.1 01/18/2023 02:16 PM      05/24/2024 01:31 PM     CO2 23 05/24/2024 01:31 PM     BUN 10 05/24/2024 01:31 PM     CREATININE 0.7 05/24/2024 01:31 PM     GLUCOSE 90 05/24/2024 01:31 PM     CALCIUM 8.8 05/24/2024 01:31 PM            Lab Results   Component Value Date     CHOL 155 (L) 03/03/2023            Lab Results   Component Value Date     TRIG 60 03/03/2023            Lab Results   Component Value Date     HDL 54 (L) 03/03/2023      No components found for: \"LDLCHOLESTEROL\", \"LDLCALC\"     No results found for: \"VLDL\"  No results found for: \"CHOLHDLRATIO\"        Lab Results   Component Value Date     LABA1C 4.9 10/23/2023      No results found for: \"EAG\"        Lab Results   Component Value Date     TSH 2.880 10/23/2023            Lab Results   Component Value Date     VITD25 17.7 (L) 03/03/2023            Lab Results   Component Value Date     DLPKVHHZ62 461 09/06/2022            Lab Results   Component Value Date     FOLATE 16.2 12/11/2019         Assessment:    1. Persistent mood disorder (HCC)    2. Generalized anxiety disorder with panic attacks    3. Chronic post-traumatic stress disorder

## 2024-06-27 RX ORDER — MELOXICAM 7.5 MG/1
7.5 TABLET ORAL DAILY
Qty: 30 TABLET | Refills: 3 | Status: SHIPPED | OUTPATIENT
Start: 2024-06-27

## 2024-07-11 DIAGNOSIS — F34.9 PERSISTENT MOOD DISORDER (HCC): ICD-10-CM

## 2024-07-11 RX ORDER — DULOXETIN HYDROCHLORIDE 30 MG/1
CAPSULE, DELAYED RELEASE ORAL DAILY
Qty: 90 CAPSULE | Refills: 1 | OUTPATIENT
Start: 2024-07-11

## 2024-08-23 RX ORDER — METHYLPREDNISOLONE 4 MG
TABLET, DOSE PACK ORAL
Qty: 1 KIT | Refills: 0 | Status: SHIPPED | OUTPATIENT
Start: 2024-08-23 | End: 2024-08-29

## 2024-09-18 ENCOUNTER — TELEPHONE (OUTPATIENT)
Dept: PSYCHIATRY | Age: 39
End: 2024-09-18

## 2024-10-30 ENCOUNTER — OFFICE VISIT (OUTPATIENT)
Dept: NEUROLOGY | Age: 39
End: 2024-10-30
Payer: MEDICARE

## 2024-10-30 VITALS
WEIGHT: 193 LBS | HEIGHT: 60 IN | BODY MASS INDEX: 37.89 KG/M2 | OXYGEN SATURATION: 99 % | DIASTOLIC BLOOD PRESSURE: 66 MMHG | HEART RATE: 74 BPM | SYSTOLIC BLOOD PRESSURE: 108 MMHG

## 2024-10-30 DIAGNOSIS — R20.0 NUMBNESS: ICD-10-CM

## 2024-10-30 DIAGNOSIS — R51.9 HEADACHE, UNSPECIFIED HEADACHE TYPE: Primary | ICD-10-CM

## 2024-10-30 PROCEDURE — 99214 OFFICE O/P EST MOD 30 MIN: CPT | Performed by: PSYCHIATRY & NEUROLOGY

## 2024-10-30 RX ORDER — ERENUMAB-AOOE 70 MG/ML
INJECTION SUBCUTANEOUS
Qty: 1 ADJUSTABLE DOSE PRE-FILLED PEN SYRINGE | Refills: 0 | OUTPATIENT
Start: 2024-10-30

## 2024-10-30 RX ORDER — ERENUMAB-AOOE 70 MG/ML
INJECTION SUBCUTANEOUS
Refills: 0 | OUTPATIENT
Start: 2024-10-30

## 2024-10-30 RX ORDER — ATOGEPANT 60 MG/1
TABLET ORAL
Qty: 30 TABLET | Refills: 5 | Status: SHIPPED | OUTPATIENT
Start: 2024-10-30

## 2024-10-30 NOTE — PROGRESS NOTES
05/24/2024     05/24/2024    CO2 23 05/24/2024    BUN 10 05/24/2024    CREATININE 0.7 05/24/2024    GLUCOSE 90 05/24/2024    CALCIUM 8.8 05/24/2024    BILITOT 0.3 05/24/2024    ALKPHOS 78 05/24/2024    AST 18 05/24/2024    ALT 15 05/24/2024    LABGLOM >90 05/24/2024    GFRAA >59 09/06/2022    GLOB 3.3 07/13/2016     Carotid artery u/s (9/2018)- there is no plaque visualized in bilateral internal carotid arteries. No stenosis in right or left internal carotid arteries. Normal antegrade flow in the bilateral vertebral arteries    Echocardiogram (9/2018)- EF 55-60%; mildly thickened mitral valve and aortic valve; mild tricuspid and pulmonic regurgitation     MRI brain (11/2018)-no acute intracranial abnormality.  No focal FLAIR signal abnormality    MRA head (1/2019)- unremarkable     EEG (12/2018)-  Largely normal EEG for age in the awake, drowsy, and sleep states.There were  intermittent sharply contoured discharges that at times appeared somewhat generalized and semi-rhythmic.  Though, these discharges were not clearly consistent with epileptiform abnormalities and may be robust changes associated with drowsiness and sleep.  Clinical correlation is needed.     Video EEG was normal from an interictal standpoint.  Events captured were nonepileptic.        IMPRESSION:  Diana Carlos is a 39 y.o. female here for follow up of headaches and possible seizures. She reports history of stroke as well. Prior MRI brain was normal, no clear evidence of stroke. MRA normal. Echo/carotid artery u/s normal. Given overall normal stroke work up will hold off on hypercoagulable labs. Noting headaches still, was better on botox but not as effective now. Back on Emgality and has not helped. Unable to tolerate Nortriptyline given side effects. Topamax contraindicated d/t kidney stone history, Propranolol contraindicated due to syncopal episodes, unknown cause. Would avoid Depakote r/t child bearing age. Video EEG was normal with

## 2024-10-30 NOTE — TELEPHONE ENCOUNTER
Requested Prescriptions     Pending Prescriptions Disp Refills    Erenumab-aooe (AIMOVIG) 70 MG/ML SOAJ [Pharmacy Med Name: AIMOVIG 70 MG/ML AUTOINJECTOR] 1 Adjustable Dose Pre-filled Pen Syringe 0       Last Office Visit: 10/30/2024  Next Office Visit: 3/3/2025

## 2024-11-11 ENCOUNTER — TELEPHONE (OUTPATIENT)
Dept: NEUROLOGY | Age: 39
End: 2024-11-11

## 2024-11-11 ENCOUNTER — OFFICE VISIT (OUTPATIENT)
Dept: PSYCHIATRY | Age: 39
End: 2024-11-11
Payer: MEDICARE

## 2024-11-11 VITALS
HEIGHT: 60 IN | SYSTOLIC BLOOD PRESSURE: 111 MMHG | WEIGHT: 193.6 LBS | BODY MASS INDEX: 38.01 KG/M2 | TEMPERATURE: 97.4 F | OXYGEN SATURATION: 98 % | DIASTOLIC BLOOD PRESSURE: 77 MMHG | HEART RATE: 67 BPM

## 2024-11-11 DIAGNOSIS — F34.9 PERSISTENT MOOD DISORDER (HCC): Primary | ICD-10-CM

## 2024-11-11 DIAGNOSIS — F41.1 GENERALIZED ANXIETY DISORDER WITH PANIC ATTACKS: ICD-10-CM

## 2024-11-11 DIAGNOSIS — F41.0 GENERALIZED ANXIETY DISORDER WITH PANIC ATTACKS: ICD-10-CM

## 2024-11-11 DIAGNOSIS — F43.12 CHRONIC POST-TRAUMATIC STRESS DISORDER (PTSD): ICD-10-CM

## 2024-11-11 DIAGNOSIS — R11.0 NAUSEA: ICD-10-CM

## 2024-11-11 PROCEDURE — 99214 OFFICE O/P EST MOD 30 MIN: CPT

## 2024-11-11 RX ORDER — HYDROXYZINE HYDROCHLORIDE 25 MG/1
25 TABLET, FILM COATED ORAL 3 TIMES DAILY PRN
Qty: 90 TABLET | Refills: 1 | Status: SHIPPED | OUTPATIENT
Start: 2024-11-11

## 2024-11-11 RX ORDER — DULOXETIN HYDROCHLORIDE 30 MG/1
30 CAPSULE, DELAYED RELEASE ORAL DAILY
Qty: 30 CAPSULE | Refills: 2 | Status: SHIPPED | OUTPATIENT
Start: 2024-11-11

## 2024-11-11 RX ORDER — ONDANSETRON 4 MG/1
4 TABLET, ORALLY DISINTEGRATING ORAL EVERY 12 HOURS PRN
Qty: 28 TABLET | Refills: 0 | Status: SHIPPED | OUTPATIENT
Start: 2024-11-11

## 2024-11-11 RX ORDER — PRAZOSIN HYDROCHLORIDE 1 MG/1
1 CAPSULE ORAL NIGHTLY
Qty: 30 CAPSULE | Refills: 2 | Status: SHIPPED | OUTPATIENT
Start: 2024-11-11 | End: 2025-02-09

## 2024-11-11 NOTE — TELEPHONE ENCOUNTER
Patient insurance will not pay for the new medication . Dinesh told her to called and see  if something else could be called in for her that would be cover by her insurance. Patient was not able to give [facility] the name of her new medication.       Thank you

## 2024-11-11 NOTE — PROGRESS NOTES
11/11/2024        Progress Note    Diana Carlos 1985      Chief Complaint   Patient presents with    Follow-up         Subjective:    Patient is a 39 y.o. female diagnosed with persistent mood disorder, HEMANTH with panic attacks, chronic PTSD, and presents today for follow-up.  Last seen in clinic on 6/20/24 and prior records were reviewed.    Seen in the office.  Calm and cooperative.  Most recently taking Cymbalta, Vraylar, prazosin, hydroxyzine.  She reports noncompliance with medications for the last 2 months.  Has had increase in nausea and vomiting \"every time I try to take my medications I throw up\".  She reports this is also happening with any time she tries to eat, however then states that she is getting and maybe 2 meals a day.  She reports she is not sure what is making her get sick, she has tolerated these medications well before this time.  Denies any illness around the time it started.  Says she has chronic nausea and abdominal pain.  She has not seen her PCP regarding these issues, reports that they were down to no vehicles for a couple months hard for her to get to doctor's appointments.  Highly encouraged patient to make PCP appointment today to address nausea, we will send in a few weeks supply of Zofran to assist with nausea to be able to take medications.  She reports she has had some mood swings due to being off of medications, increased agitation.  She denies suicidal ideations.  She is future oriented.  She reports she plans to make appointment with PCP, will call their office today.  Discussed with patient if worsening symptoms, can go to urgent care or ER if needed.  Discussed with patient provider will be leaving office in a couple weeks, will be set up with another provider for 3-month follow-up.      Absent  suicidal ideation.    Reports compliance with medications as good .     Sleep: okay    Caffeine use:  mountain dew, 3 cans per day, tea occasionally     PREVIOUS MED TRIALS  Lamictal

## 2024-11-25 ENCOUNTER — TELEPHONE (OUTPATIENT)
Dept: NEUROLOGY | Age: 39
End: 2024-11-25

## 2024-11-25 DIAGNOSIS — F41.1 GENERALIZED ANXIETY DISORDER WITH PANIC ATTACKS: ICD-10-CM

## 2024-11-25 DIAGNOSIS — F41.0 GENERALIZED ANXIETY DISORDER WITH PANIC ATTACKS: ICD-10-CM

## 2024-11-25 RX ORDER — HYDROXYZINE HYDROCHLORIDE 25 MG/1
25 TABLET, FILM COATED ORAL 3 TIMES DAILY PRN
Qty: 270 TABLET | Refills: 1 | OUTPATIENT
Start: 2024-11-25

## 2024-11-25 NOTE — TELEPHONE ENCOUNTER
Pt called requesting an E-Visit with Dr Von Bobby. Pt states for the past 3 months she has been experiencing a migraine. Pt says it is effecting her eye sight (blurry even with glasses) and ears (pain). [facility] unable to schedule pt until March 2025. Please contact pt to discuss, thank you.

## 2025-01-23 ENCOUNTER — HOSPITAL ENCOUNTER (OUTPATIENT)
Dept: NEUROLOGY | Age: 40
Discharge: HOME | End: 2025-01-23
Attending: PSYCHIATRY & NEUROLOGY
Payer: MEDICARE

## 2025-01-23 DIAGNOSIS — R20.0 NUMBNESS: ICD-10-CM

## 2025-01-23 PROCEDURE — 95886 MUSC TEST DONE W/N TEST COMP: CPT

## 2025-01-23 PROCEDURE — 95913 NRV CNDJ TEST 13/> STUDIES: CPT

## 2025-01-30 ENCOUNTER — TELEPHONE (OUTPATIENT)
Dept: PSYCHIATRY | Age: 40
End: 2025-01-30

## 2025-01-30 NOTE — TELEPHONE ENCOUNTER
Called pt to schedule an appt with our new APRN, Devang Mosher.  Pt stated that she will just keep her scheduled appt with Dr. Rutledge for 03/03/25 because her  is off on Mondays.    Electronically signed by Sulma Ortiz MA on 1/30/2025 at 2:32 PM

## 2025-02-10 ENCOUNTER — OFFICE VISIT (OUTPATIENT)
Dept: CARDIOLOGY CLINIC | Age: 40
End: 2025-02-10
Payer: MEDICARE

## 2025-02-10 VITALS
DIASTOLIC BLOOD PRESSURE: 72 MMHG | HEART RATE: 80 BPM | WEIGHT: 196 LBS | SYSTOLIC BLOOD PRESSURE: 130 MMHG | HEIGHT: 60 IN | BODY MASS INDEX: 38.48 KG/M2

## 2025-02-10 DIAGNOSIS — R00.0 TACHYCARDIA: Primary | ICD-10-CM

## 2025-02-10 PROCEDURE — 99213 OFFICE O/P EST LOW 20 MIN: CPT | Performed by: CLINICAL NURSE SPECIALIST

## 2025-02-10 PROCEDURE — 93000 ELECTROCARDIOGRAM COMPLETE: CPT | Performed by: CLINICAL NURSE SPECIALIST

## 2025-02-10 RX ORDER — BISOPROLOL FUMARATE 5 MG/1
5 TABLET, FILM COATED ORAL 2 TIMES DAILY
Qty: 180 TABLET | Refills: 3 | Status: SHIPPED | OUTPATIENT
Start: 2025-02-10

## 2025-02-10 NOTE — PROGRESS NOTES
Nostril route daily 48 g 1    ondansetron (ZOFRAN-ODT) 8 MG TBDP disintegrating tablet PLACE 1 TABLET ON THE TONGUE EVERY 8 HOURS AS NEEDED FOR NAUSEA AND VOMITING      aspirin 81 MG EC tablet Take 1 tablet by mouth daily       No current facility-administered medications for this visit.     Allergies: Advil [ibuprofen]    Review of Systems  Constitutional - no significant activity change, appetite change, or unexpected weight change. No fever, chills or diaphoresis.  No fatigue.   HEENT - no significant rhinorrhea or epistaxis. No tinnitus or significant hearing loss.   Eyes - no sudden vision change or amaurosis.   Respiratory - no significant wheezing, stridor, apnea or cough.   + dyspnea on exertion + shortness of breath.  Cardiovascular - no exertional chest pain, orthopnea or PND.    + sensation of arrhythmia no slow heart rate.   No claudication or leg edema.  Gastrointestinal - no abdominal swelling or pain. No blood in stool.   No nausea or vomiting  Genitourinary - no difficulty urinating, dysuria, frequency, or urgency. No flank pain or hematuria.   Musculoskeletal - no back pain, gait disturbance, or myalgia.   Skin - no color change or rash.  No pallor.  No new surgical incision.  Neurologic - no speech difficulty, facial asymmetry or lateralizing weakness.  No seizures, presyncope, syncope,   occasionally has significant dizziness.  Hematologic - no easy bruising or excessive bleeding.   Psychiatric - no severe anxiety or insomnia.  No confusion.   All other review of systems are negative.      Objective  Vital Signs - /72   Pulse 80   Ht 1.524 m (5')   Wt 88.9 kg (196 lb)   LMP 05/23/2021 (Approximate)   BMI 38.28 kg/m²   General - Opal is alert, cooperative, and pleasant.  Well groomed.  No acute distress.    Body habitus is morbidly obese.  HEENT - The head is normocephalic. No circumoral cyanosis.  Dentition is normal.   EYES -  No Xanthelasma, no arcus senilis, no conjunctival

## 2025-02-26 ENCOUNTER — TELEPHONE (OUTPATIENT)
Dept: PSYCHIATRY | Age: 40
End: 2025-02-26

## 2025-02-26 NOTE — TELEPHONE ENCOUNTER
Pt called to cancel her appt with Dr. Rutledge for 03/03/25 @ 2:00 because her daughter is doing home school and will not be able to make the appt.  Pt stated that she will call back to reschedule.    Electronically signed by Sulma Ortiz MA on 2/26/2025 at 10:31 AM

## 2025-03-24 ENCOUNTER — HOSPITAL ENCOUNTER (OUTPATIENT)
Dept: WOMENS IMAGING | Age: 40
Discharge: HOME | End: 2025-03-24
Payer: MEDICARE

## 2025-03-24 DIAGNOSIS — Z12.31 BREAST CANCER SCREENING BY MAMMOGRAM: ICD-10-CM

## 2025-03-24 PROCEDURE — 77063 BREAST TOMOSYNTHESIS BI: CPT

## 2025-03-25 ENCOUNTER — RESULTS FOLLOW-UP (OUTPATIENT)
Dept: PRIMARY CARE CLINIC | Age: 40
End: 2025-03-25

## 2025-04-08 ENCOUNTER — TELEMEDICINE (OUTPATIENT)
Dept: PRIMARY CARE CLINIC | Age: 40
End: 2025-04-08
Payer: MEDICARE

## 2025-04-08 DIAGNOSIS — F25.0 SCHIZOAFFECTIVE DISORDER, BIPOLAR TYPE (HCC): ICD-10-CM

## 2025-04-08 DIAGNOSIS — R10.9 FLANK PAIN: Primary | ICD-10-CM

## 2025-04-08 DIAGNOSIS — R56.9 SEIZURES (HCC): ICD-10-CM

## 2025-04-08 PROCEDURE — 99213 OFFICE O/P EST LOW 20 MIN: CPT | Performed by: NURSE PRACTITIONER

## 2025-04-08 RX ORDER — BACLOFEN 10 MG/1
10 TABLET ORAL 3 TIMES DAILY
Qty: 90 TABLET | Refills: 0 | Status: SHIPPED | OUTPATIENT
Start: 2025-04-08

## 2025-04-08 SDOH — ECONOMIC STABILITY: FOOD INSECURITY: WITHIN THE PAST 12 MONTHS, YOU WORRIED THAT YOUR FOOD WOULD RUN OUT BEFORE YOU GOT MONEY TO BUY MORE.: NEVER TRUE

## 2025-04-08 SDOH — ECONOMIC STABILITY: FOOD INSECURITY: WITHIN THE PAST 12 MONTHS, THE FOOD YOU BOUGHT JUST DIDN'T LAST AND YOU DIDN'T HAVE MONEY TO GET MORE.: NEVER TRUE

## 2025-04-08 ASSESSMENT — ENCOUNTER SYMPTOMS
ALLERGIC/IMMUNOLOGIC NEGATIVE: 1
EYES NEGATIVE: 1
RESPIRATORY NEGATIVE: 1
COUGH: 0
GASTROINTESTINAL NEGATIVE: 1
ABDOMINAL DISTENTION: 0
CONSTIPATION: 0
WHEEZING: 0
ABDOMINAL PAIN: 0
NAUSEA: 0
VOMITING: 0
SHORTNESS OF BREATH: 0
DIARRHEA: 0
BACK PAIN: 1

## 2025-04-08 ASSESSMENT — PATIENT HEALTH QUESTIONNAIRE - PHQ9
1. LITTLE INTEREST OR PLEASURE IN DOING THINGS: NOT AT ALL
9. THOUGHTS THAT YOU WOULD BE BETTER OFF DEAD, OR OF HURTING YOURSELF: NOT AT ALL
SUM OF ALL RESPONSES TO PHQ QUESTIONS 1-9: 13
4. FEELING TIRED OR HAVING LITTLE ENERGY: NEARLY EVERY DAY
5. POOR APPETITE OR OVEREATING: NEARLY EVERY DAY
7. TROUBLE CONCENTRATING ON THINGS, SUCH AS READING THE NEWSPAPER OR WATCHING TELEVISION: MORE THAN HALF THE DAYS
6. FEELING BAD ABOUT YOURSELF - OR THAT YOU ARE A FAILURE OR HAVE LET YOURSELF OR YOUR FAMILY DOWN: SEVERAL DAYS
3. TROUBLE FALLING OR STAYING ASLEEP: NOT AT ALL
10. IF YOU CHECKED OFF ANY PROBLEMS, HOW DIFFICULT HAVE THESE PROBLEMS MADE IT FOR YOU TO DO YOUR WORK, TAKE CARE OF THINGS AT HOME, OR GET ALONG WITH OTHER PEOPLE: SOMEWHAT DIFFICULT
SUM OF ALL RESPONSES TO PHQ QUESTIONS 1-9: 13
SUM OF ALL RESPONSES TO PHQ QUESTIONS 1-9: 13
2. FEELING DOWN, DEPRESSED OR HOPELESS: MORE THAN HALF THE DAYS
8. MOVING OR SPEAKING SO SLOWLY THAT OTHER PEOPLE COULD HAVE NOTICED. OR THE OPPOSITE, BEING SO FIGETY OR RESTLESS THAT YOU HAVE BEEN MOVING AROUND A LOT MORE THAN USUAL: MORE THAN HALF THE DAYS
SUM OF ALL RESPONSES TO PHQ QUESTIONS 1-9: 13

## 2025-04-08 NOTE — ASSESSMENT & PLAN NOTE
Chronic, not at goal (unstable), patient states she is currently not taking any of her medications due to everything making her nauseated and throw up and medication adherence emphasized    In assessment patient states she is not having any nausea or vomiting but then when discussing specific she states that she cannot even hold down water.    Patient was just instructed that if this continues she will need to be seen because she need to be going to GI and get a EGD patient verbalized understanding

## 2025-04-08 NOTE — PROGRESS NOTES
NOT EXAMINED]    Constitutional: [x] Appears well-developed and well-nourished [x] No apparent distress      [] Abnormal -     Mental status: [x] Alert and awake  [x] Oriented to person/place/time [x] Able to follow commands    [] Abnormal -     Eyes:   EOM    [x]  Normal    [] Abnormal -   Sclera  [x]  Normal    [] Abnormal -          Discharge [x]  None visible   [] Abnormal -     HENT: [x] Normocephalic, atraumatic  [] Abnormal -   [x] Mouth/Throat: Mucous membranes are moist    External Ears [x] Normal  [] Abnormal -    Neck: [x] No visualized mass [] Abnormal -     Pulmonary/Chest: [x] Respiratory effort normal   [x] No visualized signs of difficulty breathing or respiratory distress        [] Abnormal -      Musculoskeletal:   [x] Normal gait with no signs of ataxia         [x] Normal range of motion of neck        [] Abnormal -     Neurological:        [x] No Facial Asymmetry (Cranial nerve 7 motor function) (limited exam due to video visit)          [x] No gaze palsy        [] Abnormal -          Skin:        [x] No significant exanthematous lesions or discoloration noted on facial skin         [] Abnormal -            Psychiatric:       [x] Normal Affect [] Abnormal -        [x] No Hallucinations    Other pertinent observable physical exam findings:-         On this date 4/8/2025 I have spent 10 minutes reviewing previous notes, test results and face to face (virtual) with the patient discussing the diagnosis and importance of compliance with the treatment plan as well as documenting on the day of the visit.    --SETH Machado - NP

## 2025-04-08 NOTE — PATIENT INSTRUCTIONS
Sprain/strain   First 24 hours, use ice to injury site for 15 minutes at a time.  After 48 hours, may alternate ice/heat 15 minutes each.  R.I. C.E. therapy = rest, ice, compression and elevation.  May use ace wrap to injured area for compression.  Use Ibuprofen or other antiinflammatory for pain and inflammation.   If no open skin areas, OTC topical lidocaine such as Icy Hot or capsaicin creams may be applied for pain relief.  Slow stretches of area may help reduce spasms and improve range of motion.   Alternate Voltaren cream with Biofreeze gel to site 2 to 3 times a day .

## 2025-06-02 ENCOUNTER — TELEPHONE (OUTPATIENT)
Dept: NEUROLOGY | Age: 40
End: 2025-06-02

## 2025-06-02 ENCOUNTER — OFFICE VISIT (OUTPATIENT)
Dept: NEUROLOGY | Age: 40
End: 2025-06-02
Payer: MEDICARE

## 2025-06-02 VITALS
HEIGHT: 60 IN | DIASTOLIC BLOOD PRESSURE: 70 MMHG | HEART RATE: 78 BPM | SYSTOLIC BLOOD PRESSURE: 122 MMHG | OXYGEN SATURATION: 99 % | BODY MASS INDEX: 38.48 KG/M2 | WEIGHT: 196 LBS

## 2025-06-02 DIAGNOSIS — R51.9 HEADACHE, UNSPECIFIED HEADACHE TYPE: Primary | ICD-10-CM

## 2025-06-02 DIAGNOSIS — R20.0 NUMBNESS: ICD-10-CM

## 2025-06-02 PROCEDURE — 99214 OFFICE O/P EST MOD 30 MIN: CPT | Performed by: PSYCHIATRY & NEUROLOGY

## 2025-06-02 NOTE — PROGRESS NOTES
[facility] NEUROLOGY:    Patient: Diana Carlos  :  1985  Age:  40 y.o.  MRN:  [id number]  Today:  25      Chief Complaint:  Chief Complaint   Patient presents with    Follow-up    Headache     Headaches are bad     Results     Results from EMG        HISTORY OF PRESENT ILLNESS:   Diana Carlos is a 40 y.o. year old female here for follow up of headaches. Still noting headaches, no improvement.  Botox had helped previously, but stopped due to pregnancy, back on and did not seem to help, now off again.  Off Emgality did not help. Most recently tried Qulipta which has not helped.  Still noting headaches, unchanged since last seen. Severe at times, some nausea and vomiting noted, some ED visits noted previously.  Noting some numbness and paresthesias. NCS/EMG with mild CTS, otherwise negative. 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  No overt seizures noted, but is noting intermittent episodes of shaking without a consistent YANG same as before, unchanged.  She is off nortriptyline as well. No change in characteristics of headaches. Headache pain is global with little radiation of pain. Pain is described as sharp and stabbing, She does report some scotoma like changes prior to headaches, sees spots in both eyes. She notes nausea, vomiting, vomiting, sonophobia, photophobia, dizziness and diplopia with headaches as above. She has previously tried Imitrex, Tylenol, Ibuprofen, Aleve with little relief. She is using Fioricet prn. Prior history of kidney stones as a child. Long history of headaches since age 11. No incontinence or tongue biting. No clear confusion noted afterwards, takes 15-20 minutes for her to be able to stand up again. Seen by cardiology as well, loop placed, states episodes seem to occur when she is in stressful situations. She does report a history of CVA and MI. No further stroke like symptoms since last seen. Some numbness, tingling noted, but not in a clear organic pattern,

## 2025-06-02 NOTE — TELEPHONE ENCOUNTER
Approved  PA Detail   Prior authorization approved  Payer: [employer] Patient's Payer Case ID: [id number]    [phone]  Note from payer: Your request has been approved  Approval Details    Authorized from January 1, 2025 to December 31, 2025  Electronic appeal: Not supported  Prior auth initiated by: [facility]/pharmacy #[id number] - [place] - [address] [phone] - F [phone]  View History     Notes     Time User Attachment    Attachment received from payer. 6/2/2025  4:52 PM Sukh, Roxanne Outgoing Prescription Prior Authorization Response Document      Medication Being Authorized    Erenumab-aooe 140 MG/ML SOAJ  Inject 140 mg into the skin every 30 days  Dispense: 1 Adjustable Dose Pre-filled Pen Syringe Refills: 11   Start: 6/2/2025   Class: Normal Diagnoses: Headache, unspecified headache type   This order has been released to its destination.  To be filled at: [facility]/pharmacy #[id number] [place] - [phone] [phone] - F [phone]

## 2025-06-09 ENCOUNTER — OFFICE VISIT (OUTPATIENT)
Dept: PRIMARY CARE CLINIC | Age: 40
End: 2025-06-09
Payer: MEDICARE

## 2025-06-09 VITALS
HEART RATE: 82 BPM | TEMPERATURE: 97.8 F | RESPIRATION RATE: 16 BRPM | SYSTOLIC BLOOD PRESSURE: 124 MMHG | DIASTOLIC BLOOD PRESSURE: 74 MMHG | BODY MASS INDEX: 38.48 KG/M2 | WEIGHT: 196 LBS | HEIGHT: 60 IN | OXYGEN SATURATION: 99 %

## 2025-06-09 DIAGNOSIS — L23.7 POISON IVY: Primary | ICD-10-CM

## 2025-06-09 PROCEDURE — 99213 OFFICE O/P EST LOW 20 MIN: CPT | Performed by: NURSE PRACTITIONER

## 2025-06-09 PROCEDURE — 96372 THER/PROPH/DIAG INJ SC/IM: CPT | Performed by: NURSE PRACTITIONER

## 2025-06-09 RX ORDER — TRIAMCINOLONE ACETONIDE 40 MG/ML
40 INJECTION, SUSPENSION INTRA-ARTICULAR; INTRAMUSCULAR ONCE
Status: COMPLETED | OUTPATIENT
Start: 2025-06-09 | End: 2025-06-09

## 2025-06-09 RX ADMIN — TRIAMCINOLONE ACETONIDE 40 MG: 40 INJECTION, SUSPENSION INTRA-ARTICULAR; INTRAMUSCULAR at 16:17

## 2025-06-10 ASSESSMENT — ENCOUNTER SYMPTOMS
RESPIRATORY NEGATIVE: 1
GASTROINTESTINAL NEGATIVE: 1
COUGH: 0
EYES NEGATIVE: 1
ALLERGIC/IMMUNOLOGIC NEGATIVE: 1

## 2025-06-10 NOTE — PROGRESS NOTES
improve. Or if rash spreads to eyes or nose.       Assessment & Plan    Diagnosis Orders   1. Poison ivy  triamcinolone acetonide (KENALOG-40) injection 40 mg               Patient given educational materials -see patient instructions.  Discussed use, benefit, and side effects of prescribed medications.  All patient questions answered.  Pt voiced understanding. Reviewed health maintenance.  Instructed to continue currentmedications, diet and exercise.  Patient agreed with treatment plan. Follow up as directed.     MEDICATIONS:  Orders Placed This Encounter   Medications    triamcinolone acetonide (KENALOG-40) injection 40 mg           ORDERS:  No orders of the defined types were placed in this encounter.      Results:  Results         Follow-up:  No follow-ups on file.    PATIENT INSTRUCTIONS:  There are no Patient Instructions on file for this visit.  Electronically signed by SETH Machado NP on 6/10/2025 at 4:57 PM    EMR Dragon/transcription disclaimer:  Much of thisencounter note is electronic transcription/translation of spoken language to printed texts.  The electronic translation of spoken language may be erroneous, or at times, nonsensical words or phrases may be inadvertentlytranscribed.  Although I have reviewed the note for such errors, some may still exist.    The patient (or guardian, if applicable) and other individuals in attendance with the patient were advised that Artificial Intelligence will be utilized during this visit to record, process the conversation to generate a clinical note, and support improvement of the AI technology. The patient (or guardian, if applicable) and other individuals in attendance at the appointment consented to the use of AI, including the recording.

## 2025-06-16 ENCOUNTER — HOSPITAL ENCOUNTER (OUTPATIENT)
Dept: MRI IMAGING | Age: 40
Discharge: HOME | End: 2025-06-16
Attending: PSYCHIATRY & NEUROLOGY
Payer: MEDICARE

## 2025-06-16 DIAGNOSIS — R51.9 HEADACHE, UNSPECIFIED HEADACHE TYPE: ICD-10-CM

## 2025-06-16 PROCEDURE — 6360000004 HC RX CONTRAST MEDICATION: Performed by: PSYCHIATRY & NEUROLOGY

## 2025-06-16 PROCEDURE — A9577 INJ MULTIHANCE: HCPCS | Performed by: PSYCHIATRY & NEUROLOGY

## 2025-06-16 PROCEDURE — 70546 MR ANGIOGRAPH HEAD W/O&W/DYE: CPT

## 2025-06-16 PROCEDURE — 70553 MRI BRAIN STEM W/O & W/DYE: CPT

## 2025-06-16 RX ADMIN — GADOBENATE DIMEGLUMINE 20 ML: 529 INJECTION, SOLUTION INTRAVENOUS at 15:11

## 2025-08-19 RX ORDER — BACLOFEN 10 MG/1
10 TABLET ORAL 3 TIMES DAILY
Qty: 90 TABLET | Refills: 0 | Status: SHIPPED | OUTPATIENT
Start: 2025-08-19

## (undated) DEVICE — ENDO KIT,LOURDES HOSPITAL: Brand: MEDLINE INDUSTRIES, INC.

## (undated) DEVICE — Z INACTIVE USE 2660664 SOLUTION IRRIG 3000ML 0.9% SOD CHL USP UROMATIC PLAS CONT

## (undated) DEVICE — Y-TYPE TUR/BLADDER IRRIGATION SET, REGULATING CLAMP

## (undated) DEVICE — VCARE MEDIUM, UTERINE MANIPULATOR, VAGINAL-CERVICAL-AHLUWALIA'S-RETRACTOR-ELEVATOR: Brand: VCARE

## (undated) DEVICE — CUFF BLD PRESSURE 1 TUBE AD 25-34 CM ARM VLY FLEXIPORT DISP

## (undated) DEVICE — DAVINCI: Brand: MEDLINE INDUSTRIES, INC.

## (undated) DEVICE — TRI-LUMEN FILTERED TUBE SET WITH ACTIVATED CHARCOAL FILTER: Brand: AIRSEAL

## (undated) DEVICE — NEEDLE INSUF L150MM DIA2MM DISP FOR PNEUMOPERI ENDOPATH

## (undated) DEVICE — BLADELESS OBTURATOR: Brand: WECK VISTA

## (undated) DEVICE — TUBE ET 7MM NSL ORAL BASIC CUF INTMED MURPHY EYE RADPQ MRK

## (undated) DEVICE — BLADE LARYNSCP HNDL MAC 3 DISP CURAVIEW LED

## (undated) DEVICE — TIP COVER ACCESSORY

## (undated) DEVICE — CANNULA SEAL

## (undated) DEVICE — GOWN,PREVENTION PLUS,XLN/XL,ST,24/CS: Brand: MEDLINE

## (undated) DEVICE — SOLUTION IV IRRIG WATER 1000ML POUR BRL 2F7114

## (undated) DEVICE — GLOVE SURG SZ 65 CRM LTX FREE POLYISOPRENE POLYMER BEAD ANTI

## (undated) DEVICE — ADHESIVE SKIN CLSR 0.7ML TOP DERMBND ADV

## (undated) DEVICE — FORCEPS BX L240CM DIA2.4MM L NDL RAD JAW 4 133340

## (undated) DEVICE — Device

## (undated) DEVICE — SEALANT TISS 10 CC FIBRIN VISTASEAL

## (undated) DEVICE — COVER LT HNDL BLU PLAS

## (undated) DEVICE — SUTURE MCRYL SZ 4-0 L18IN ABSRB UD L19MM PS-2 3/8 CIR PRIM Y496G

## (undated) DEVICE — INTENDED TO AID IN THE PASSING OF SUTURES THROUGH BONE AND SOFT TISSUE DURING ORTHOPEDIC SURGERY: Brand: HOFFEE SUTURE RETRIEVER

## (undated) DEVICE — GARMENT COMPR STD FOR 17IN CALF UNIF THER FLOTRN

## (undated) DEVICE — SUTURE VCRL SZ 0 L36IN ABSRB UD L36MM CT-1 1/2 CIR J946H

## (undated) DEVICE — AIRSEAL 8 MM ACCESS PORT AND LOW PROFILE OBTURATOR WITH BLADELESS OPTICAL TIP, 120 MM LENGTH: Brand: AIRSEAL

## (undated) DEVICE — SUTURE ABSORBABLE MONOFILAMENT 0 CT-2 12 IN STRATAFIX SPRL SXPP1B405

## (undated) DEVICE — ARM DRAPE

## (undated) DEVICE — APPLICATOR LAP 35 CM 2 RIGID VISTASEAL

## (undated) DEVICE — 40595 XL TRENDELENBURG POSITIONING KIT: Brand: 40595 XL TRENDELENBURG POSITIONING KIT